# Patient Record
Sex: FEMALE | Race: WHITE | NOT HISPANIC OR LATINO | Employment: OTHER | URBAN - METROPOLITAN AREA
[De-identification: names, ages, dates, MRNs, and addresses within clinical notes are randomized per-mention and may not be internally consistent; named-entity substitution may affect disease eponyms.]

---

## 2017-01-13 ENCOUNTER — COMMUNICATION - HEALTHEAST (OUTPATIENT)
Dept: TELEHEALTH | Facility: CLINIC | Age: 62
End: 2017-01-13

## 2017-01-13 ENCOUNTER — OFFICE VISIT - HEALTHEAST (OUTPATIENT)
Dept: FAMILY MEDICINE | Facility: CLINIC | Age: 62
End: 2017-01-13

## 2017-01-13 DIAGNOSIS — E66.9 OBESITY (BMI 30-39.9): ICD-10-CM

## 2017-01-13 DIAGNOSIS — G47.33 OBSTRUCTIVE SLEEP APNEA (ADULT) (PEDIATRIC): ICD-10-CM

## 2017-01-13 ASSESSMENT — MIFFLIN-ST. JEOR: SCORE: 1313.57

## 2017-01-23 ENCOUNTER — RECORDS - HEALTHEAST (OUTPATIENT)
Dept: GENERAL RADIOLOGY | Facility: CLINIC | Age: 62
End: 2017-01-23

## 2017-01-23 ENCOUNTER — OFFICE VISIT - HEALTHEAST (OUTPATIENT)
Dept: FAMILY MEDICINE | Facility: CLINIC | Age: 62
End: 2017-01-23

## 2017-01-23 DIAGNOSIS — M25.519 SHOULDER PAIN: ICD-10-CM

## 2017-01-23 DIAGNOSIS — M79.18 MYOFASCIAL MUSCLE PAIN: ICD-10-CM

## 2017-01-23 DIAGNOSIS — Z12.11 SCREEN FOR COLON CANCER: ICD-10-CM

## 2017-01-23 DIAGNOSIS — R31.9 HEMATURIA: ICD-10-CM

## 2017-01-23 DIAGNOSIS — M25.519 PAIN IN UNSPECIFIED SHOULDER: ICD-10-CM

## 2017-01-23 DIAGNOSIS — M70.71 BURSITIS OF HIP, RIGHT: ICD-10-CM

## 2017-01-23 ASSESSMENT — MIFFLIN-ST. JEOR: SCORE: 1333.07

## 2017-01-27 ENCOUNTER — COMMUNICATION - HEALTHEAST (OUTPATIENT)
Dept: FAMILY MEDICINE | Facility: CLINIC | Age: 62
End: 2017-01-27

## 2017-01-27 ENCOUNTER — OFFICE VISIT - HEALTHEAST (OUTPATIENT)
Dept: PHYSICAL THERAPY | Facility: REHABILITATION | Age: 62
End: 2017-01-27

## 2017-01-27 DIAGNOSIS — M25.611 DECREASED ROM OF RIGHT SHOULDER: ICD-10-CM

## 2017-01-27 DIAGNOSIS — R29.3 ABNORMAL POSTURE: ICD-10-CM

## 2017-01-27 DIAGNOSIS — M62.81 MUSCLE WEAKNESS (GENERALIZED): ICD-10-CM

## 2017-01-27 DIAGNOSIS — M25.511 RIGHT SHOULDER PAIN: ICD-10-CM

## 2017-01-27 DIAGNOSIS — M25.511 ACUTE PAIN OF RIGHT SHOULDER: ICD-10-CM

## 2017-02-01 ENCOUNTER — OFFICE VISIT - HEALTHEAST (OUTPATIENT)
Dept: PHYSICAL THERAPY | Facility: REHABILITATION | Age: 62
End: 2017-02-01

## 2017-02-01 DIAGNOSIS — M54.9 CHRONIC BACK PAIN: ICD-10-CM

## 2017-02-01 DIAGNOSIS — G89.29 CHRONIC BACK PAIN: ICD-10-CM

## 2017-02-01 DIAGNOSIS — M62.81 MUSCLE WEAKNESS (GENERALIZED): ICD-10-CM

## 2017-02-01 DIAGNOSIS — M25.611 DECREASED ROM OF RIGHT SHOULDER: ICD-10-CM

## 2017-02-01 DIAGNOSIS — E66.9 OBESITY (BMI 30-39.9): ICD-10-CM

## 2017-02-01 DIAGNOSIS — M25.511 RIGHT SHOULDER PAIN: ICD-10-CM

## 2017-02-01 DIAGNOSIS — R29.3 ABNORMAL POSTURE: ICD-10-CM

## 2017-02-01 DIAGNOSIS — R26.9 ABNORMALITY OF GAIT: ICD-10-CM

## 2017-02-01 DIAGNOSIS — M25.511 ACUTE PAIN OF RIGHT SHOULDER: ICD-10-CM

## 2017-02-01 DIAGNOSIS — M70.71 HIP BURSITIS, RIGHT: ICD-10-CM

## 2017-02-06 ENCOUNTER — COMMUNICATION - HEALTHEAST (OUTPATIENT)
Dept: SCHEDULING | Facility: CLINIC | Age: 62
End: 2017-02-06

## 2017-02-08 ENCOUNTER — OFFICE VISIT - HEALTHEAST (OUTPATIENT)
Dept: FAMILY MEDICINE | Facility: CLINIC | Age: 62
End: 2017-02-08

## 2017-02-08 DIAGNOSIS — J06.9 URI (UPPER RESPIRATORY INFECTION): ICD-10-CM

## 2017-02-08 DIAGNOSIS — R53.83 FATIGUE: ICD-10-CM

## 2017-02-08 DIAGNOSIS — R00.2 PALPITATIONS: ICD-10-CM

## 2017-02-08 DIAGNOSIS — R42 LIGHTHEADEDNESS: ICD-10-CM

## 2017-02-08 LAB
ATRIAL RATE - MUSE: 69 BPM
DIASTOLIC BLOOD PRESSURE - MUSE: NORMAL MMHG
INTERPRETATION ECG - MUSE: NORMAL
P AXIS - MUSE: 26 DEGREES
PR INTERVAL - MUSE: 170 MS
QRS DURATION - MUSE: 92 MS
QT - MUSE: 356 MS
QTC - MUSE: 381 MS
R AXIS - MUSE: 42 DEGREES
SYSTOLIC BLOOD PRESSURE - MUSE: NORMAL MMHG
T AXIS - MUSE: 34 DEGREES
VENTRICULAR RATE- MUSE: 69 BPM

## 2017-02-09 ENCOUNTER — COMMUNICATION - HEALTHEAST (OUTPATIENT)
Dept: FAMILY MEDICINE | Facility: CLINIC | Age: 62
End: 2017-02-09

## 2017-02-17 ENCOUNTER — COMMUNICATION - HEALTHEAST (OUTPATIENT)
Dept: SCHEDULING | Facility: CLINIC | Age: 62
End: 2017-02-17

## 2017-02-20 ENCOUNTER — OFFICE VISIT - HEALTHEAST (OUTPATIENT)
Dept: FAMILY MEDICINE | Facility: CLINIC | Age: 62
End: 2017-02-20

## 2017-02-20 DIAGNOSIS — R42 DIZZINESS: ICD-10-CM

## 2017-02-20 DIAGNOSIS — L98.9 SKIN LESIONS: ICD-10-CM

## 2017-02-20 DIAGNOSIS — R01.1 HEART MURMUR: ICD-10-CM

## 2017-02-20 ASSESSMENT — MIFFLIN-ST. JEOR: SCORE: 1337.61

## 2017-02-21 ENCOUNTER — OFFICE VISIT - HEALTHEAST (OUTPATIENT)
Dept: PHYSICAL THERAPY | Facility: REHABILITATION | Age: 62
End: 2017-02-21

## 2017-02-21 ENCOUNTER — OFFICE VISIT - HEALTHEAST (OUTPATIENT)
Dept: FAMILY MEDICINE | Facility: CLINIC | Age: 62
End: 2017-02-21

## 2017-02-21 DIAGNOSIS — G47.33 OBSTRUCTIVE SLEEP APNEA (ADULT) (PEDIATRIC): ICD-10-CM

## 2017-02-21 DIAGNOSIS — M25.611 DECREASED ROM OF RIGHT SHOULDER: ICD-10-CM

## 2017-02-21 DIAGNOSIS — E66.9 OBESITY (BMI 30-39.9): ICD-10-CM

## 2017-02-21 DIAGNOSIS — M25.511 RIGHT SHOULDER PAIN: ICD-10-CM

## 2017-02-21 DIAGNOSIS — G89.29 CHRONIC BACK PAIN: ICD-10-CM

## 2017-02-21 DIAGNOSIS — M54.9 CHRONIC BACK PAIN: ICD-10-CM

## 2017-02-21 DIAGNOSIS — R26.9 ABNORMALITY OF GAIT: ICD-10-CM

## 2017-02-21 DIAGNOSIS — M62.81 MUSCLE WEAKNESS (GENERALIZED): ICD-10-CM

## 2017-02-21 DIAGNOSIS — R29.3 ABNORMAL POSTURE: ICD-10-CM

## 2017-02-21 DIAGNOSIS — M25.511 ACUTE PAIN OF RIGHT SHOULDER: ICD-10-CM

## 2017-02-21 DIAGNOSIS — M70.71 HIP BURSITIS, RIGHT: ICD-10-CM

## 2017-02-21 ASSESSMENT — MIFFLIN-ST. JEOR: SCORE: 1330.35

## 2017-02-22 ENCOUNTER — COMMUNICATION - HEALTHEAST (OUTPATIENT)
Dept: FAMILY MEDICINE | Facility: CLINIC | Age: 62
End: 2017-02-22

## 2017-02-24 ENCOUNTER — OFFICE VISIT - HEALTHEAST (OUTPATIENT)
Dept: PHYSICAL THERAPY | Facility: REHABILITATION | Age: 62
End: 2017-02-24

## 2017-02-24 DIAGNOSIS — M25.511 ACUTE PAIN OF RIGHT SHOULDER: ICD-10-CM

## 2017-02-24 DIAGNOSIS — R29.3 ABNORMAL POSTURE: ICD-10-CM

## 2017-02-24 DIAGNOSIS — M62.81 MUSCLE WEAKNESS (GENERALIZED): ICD-10-CM

## 2017-02-24 DIAGNOSIS — M25.611 DECREASED ROM OF RIGHT SHOULDER: ICD-10-CM

## 2017-03-01 ENCOUNTER — RECORDS - HEALTHEAST (OUTPATIENT)
Dept: ADMINISTRATIVE | Facility: OTHER | Age: 62
End: 2017-03-01

## 2017-03-02 ENCOUNTER — RECORDS - HEALTHEAST (OUTPATIENT)
Dept: ADMINISTRATIVE | Facility: OTHER | Age: 62
End: 2017-03-02

## 2017-03-03 ENCOUNTER — HOSPITAL ENCOUNTER (OUTPATIENT)
Dept: CARDIOLOGY | Facility: CLINIC | Age: 62
Discharge: HOME | End: 2017-03-03
Attending: FAMILY MEDICINE

## 2017-03-03 ENCOUNTER — OFFICE VISIT - HEALTHEAST (OUTPATIENT)
Dept: PHYSICAL THERAPY | Facility: REHABILITATION | Age: 62
End: 2017-03-03

## 2017-03-03 DIAGNOSIS — R29.3 ABNORMAL POSTURE: ICD-10-CM

## 2017-03-03 DIAGNOSIS — R01.1 HEART MURMUR: ICD-10-CM

## 2017-03-03 DIAGNOSIS — M25.611 DECREASED ROM OF RIGHT SHOULDER: ICD-10-CM

## 2017-03-03 DIAGNOSIS — M62.81 MUSCLE WEAKNESS (GENERALIZED): ICD-10-CM

## 2017-03-03 DIAGNOSIS — M25.511 ACUTE PAIN OF RIGHT SHOULDER: ICD-10-CM

## 2017-03-03 LAB
AORTIC ROOT: 3.6 CM
AORTIC VALVE MEAN VELOCITY: 132 CM/S
AR DECEL SLOPE: 3030 MM/S2
AR PEAK VELOCITY: 372 CM/S
ASCENDING AORTA: 3.3 CM
AV DIMENSIONLESS INDEX VTI: 0.9
AV MEAN GRADIENT: 8 MMHG
AV PEAK GRADIENT: 17.6 MMHG
AV REGURGITANT PEAK GRADIENT: 55.4 MMHG
AV REGURGITATION PRESSURE HALF TIME: 359 MS
AV VALVE AREA: 2.1 CM2
AV VELOCITY RATIO: 0.9
DOP CALC AO PEAK VEL: 210 CM/S
DOP CALC AO VTI: 40.2 CM
DOP CALC LVOT AREA: 2.27 CM2
DOP CALC LVOT DIAMETER: 1.7 CM
DOP CALC LVOT PEAK VEL: 180 CM/S
DOP CALC LVOT STROKE VOLUME: 84.4 CM3
DOP CALC MV VTI: 46.3 CM
DOP CALC RVOT PEAK VEL: 126 CM/S
DOP CALC RVOT VTI: 23.9 CM
DOP CALCLVOT PEAK VEL VTI: 37.2 CM
ECHO EJECTION FRACTION ESTIMATED: 65 %
FRACTIONAL SHORTENING: 37.1 % (ref 28–44)
INTERVENTRICULAR SEPTUM IN END DIASTOLE: 1.7 CM (ref 0.6–0.9)
IVS/PW RATIO: 1.4
LA AREA 1: 19.1 CM2
LA AREA 2: 19.7 CM2
LEFT ATRIUM LENGTH: 5.33 CM
LEFT ATRIUM SIZE: 4.4 CM
LEFT ATRIUM TO AORTIC ROOT RATIO: 1.22 NO UNITS
LEFT ATRIUM VOLUME: 60 CM3
LEFT VENTRICLE DIASTOLIC VOLUME: 67 CM3 (ref 46–106)
LEFT VENTRICULAR INTERNAL DIMENSION IN DIASTOLE: 3.5 CM (ref 3.8–5.2)
LEFT VENTRICULAR INTERNAL DIMENSION IN SYSTOLE: 2.2 CM (ref 2.2–3.5)
LEFT VENTRICULAR MASS: 183 G
LEFT VENTRICULAR OUTFLOW TRACT MEAN GRADIENT: 7 MMHG
LEFT VENTRICULAR OUTFLOW TRACT MEAN VELOCITY: 119 CM/S
LEFT VENTRICULAR OUTFLOW TRACT PEAK GRADIENT: 13 MMHG
LEFT VENTRICULAR POSTERIOR WALL IN END DIASTOLE: 1.2 CM (ref 0.6–0.9)
MITRAL VALVE E/A RATIO: 0.9
MITRAL VALVE MEAN INFLOW VELOCITY: 85 CM/S
MITRAL VALVE PEAK VELOCITY: 132 CM/S
MR PEAK GRADIENT: 95.3 MMHG
MV AREA VTI: 1.82 CM2
MV AVERAGE E/E' RATIO: 16.4 CM/S
MV DECELERATION TIME: 254 MS
MV E'TISSUE VEL-LAT: 7.6 CM/S
MV E'TISSUE VEL-MED: 6.43 CM/S
MV LATERAL E/E' RATIO: 15.1
MV MEAN GRADIENT: 3 MMHG
MV MEDIAL E/E' RATIO: 17.9
MV PEAK A VELOCITY: 124 CM/S
MV PEAK E VELOCITY: 115 CM/S
MV PEAK GRADIENT: 7 MMHG
MV VALVE AREA BY CONTINUITY EQUATION: 1.8 CM2
PISA MR PEAK VEL: 488 CM/S
PV MEAN GRADIENT: 4 MMHG
PV MEAN VELOCITY: 93.7 CM/S
PV PEAK GRADIENT: 7.7 MMHG
PV VELOCITY TIME INTERVAL: 30 CM
PV VMAX: 139 CM/S
RIGHT VENTRICULAR INTERNAL DIMENSION IN DYSTOLE: 3.5 CM
RIGHT VENTRICULAR OUTFLOW PEAK GRADIENT: 6 MMHG
RIGHT VENTRICULAR OUTFLOW TRACT MEAN GRADIENT: 3 MMHG
RVOT MV: 81.8 CM/S
TRICUSPID REGURGITATION PEAK PRESSURE GRADIENT: 26.4 MMHG
TRICUSPID VALVE ANULAR PLANE SYSTOLIC EXCURSION: 2.9 CM
TRICUSPID VALVE PEAK REGURGITANT VELOCITY: 257 CM/S

## 2017-03-07 ENCOUNTER — COMMUNICATION - HEALTHEAST (OUTPATIENT)
Dept: FAMILY MEDICINE | Facility: CLINIC | Age: 62
End: 2017-03-07

## 2017-03-07 DIAGNOSIS — I35.0 AORTIC STENOSIS: ICD-10-CM

## 2017-03-08 ENCOUNTER — OFFICE VISIT - HEALTHEAST (OUTPATIENT)
Dept: PHYSICAL THERAPY | Facility: REHABILITATION | Age: 62
End: 2017-03-08

## 2017-03-08 DIAGNOSIS — R29.3 ABNORMAL POSTURE: ICD-10-CM

## 2017-03-08 DIAGNOSIS — M25.511 ACUTE PAIN OF RIGHT SHOULDER: ICD-10-CM

## 2017-03-08 DIAGNOSIS — M25.611 DECREASED ROM OF RIGHT SHOULDER: ICD-10-CM

## 2017-03-08 DIAGNOSIS — E66.9 OBESITY (BMI 30-39.9): ICD-10-CM

## 2017-03-08 DIAGNOSIS — G89.29 CHRONIC BACK PAIN: ICD-10-CM

## 2017-03-08 DIAGNOSIS — M54.9 CHRONIC BACK PAIN: ICD-10-CM

## 2017-03-08 DIAGNOSIS — M70.71 HIP BURSITIS, RIGHT: ICD-10-CM

## 2017-03-08 DIAGNOSIS — M62.81 MUSCLE WEAKNESS (GENERALIZED): ICD-10-CM

## 2017-03-08 DIAGNOSIS — R26.9 ABNORMALITY OF GAIT: ICD-10-CM

## 2017-03-08 DIAGNOSIS — M25.511 RIGHT SHOULDER PAIN: ICD-10-CM

## 2017-03-15 ENCOUNTER — OFFICE VISIT - HEALTHEAST (OUTPATIENT)
Dept: PHYSICAL THERAPY | Facility: REHABILITATION | Age: 62
End: 2017-03-15

## 2017-03-15 DIAGNOSIS — M25.511 RIGHT SHOULDER PAIN: ICD-10-CM

## 2017-03-15 DIAGNOSIS — R29.3 ABNORMAL POSTURE: ICD-10-CM

## 2017-03-15 DIAGNOSIS — M62.81 MUSCLE WEAKNESS (GENERALIZED): ICD-10-CM

## 2017-03-15 DIAGNOSIS — M25.511 ACUTE PAIN OF RIGHT SHOULDER: ICD-10-CM

## 2017-03-15 DIAGNOSIS — M25.611 DECREASED ROM OF RIGHT SHOULDER: ICD-10-CM

## 2017-03-15 DIAGNOSIS — E66.9 OBESITY (BMI 30-39.9): ICD-10-CM

## 2017-03-20 ENCOUNTER — RECORDS - HEALTHEAST (OUTPATIENT)
Dept: ADMINISTRATIVE | Facility: OTHER | Age: 62
End: 2017-03-20

## 2017-03-21 ENCOUNTER — COMMUNICATION - HEALTHEAST (OUTPATIENT)
Dept: SCHEDULING | Facility: CLINIC | Age: 62
End: 2017-03-21

## 2017-03-22 ENCOUNTER — OFFICE VISIT - HEALTHEAST (OUTPATIENT)
Dept: CARDIOLOGY | Facility: CLINIC | Age: 62
End: 2017-03-22

## 2017-03-22 DIAGNOSIS — R42 DIZZY SPELLS: ICD-10-CM

## 2017-03-22 DIAGNOSIS — I35.0 AORTIC STENOSIS: ICD-10-CM

## 2017-03-22 DIAGNOSIS — E78.2 MIXED HYPERLIPIDEMIA: ICD-10-CM

## 2017-03-22 DIAGNOSIS — G47.33 OBSTRUCTIVE SLEEP APNEA (ADULT) (PEDIATRIC): ICD-10-CM

## 2017-03-22 DIAGNOSIS — I35.2 NONRHEUMATIC AORTIC (VALVE) STENOSIS WITH INSUFFICIENCY: ICD-10-CM

## 2017-03-22 ASSESSMENT — MIFFLIN-ST. JEOR: SCORE: 1324

## 2017-03-28 ENCOUNTER — COMMUNICATION - HEALTHEAST (OUTPATIENT)
Dept: FAMILY MEDICINE | Facility: CLINIC | Age: 62
End: 2017-03-28

## 2017-03-28 ENCOUNTER — AMBULATORY - HEALTHEAST (OUTPATIENT)
Dept: FAMILY MEDICINE | Facility: CLINIC | Age: 62
End: 2017-03-28

## 2017-03-28 ENCOUNTER — HOSPITAL ENCOUNTER (OUTPATIENT)
Dept: CARDIOLOGY | Facility: CLINIC | Age: 62
Discharge: HOME | End: 2017-03-28
Attending: INTERNAL MEDICINE

## 2017-03-28 DIAGNOSIS — R42 DIZZINESS: ICD-10-CM

## 2017-03-28 DIAGNOSIS — R42 DIZZY SPELLS: ICD-10-CM

## 2017-03-29 ENCOUNTER — OFFICE VISIT - HEALTHEAST (OUTPATIENT)
Dept: PHYSICAL THERAPY | Facility: REHABILITATION | Age: 62
End: 2017-03-29

## 2017-03-29 DIAGNOSIS — M62.81 MUSCLE WEAKNESS (GENERALIZED): ICD-10-CM

## 2017-03-29 DIAGNOSIS — G89.29 CHRONIC BACK PAIN: ICD-10-CM

## 2017-03-29 DIAGNOSIS — R29.3 ABNORMAL POSTURE: ICD-10-CM

## 2017-03-29 DIAGNOSIS — M54.9 CHRONIC BACK PAIN: ICD-10-CM

## 2017-03-29 DIAGNOSIS — M25.611 DECREASED ROM OF RIGHT SHOULDER: ICD-10-CM

## 2017-03-29 DIAGNOSIS — E66.9 OBESITY (BMI 30-39.9): ICD-10-CM

## 2017-03-29 DIAGNOSIS — R26.9 ABNORMALITY OF GAIT: ICD-10-CM

## 2017-03-29 DIAGNOSIS — M25.511 RIGHT SHOULDER PAIN: ICD-10-CM

## 2017-03-29 DIAGNOSIS — M70.71 HIP BURSITIS, RIGHT: ICD-10-CM

## 2017-03-29 DIAGNOSIS — M25.511 ACUTE PAIN OF RIGHT SHOULDER: ICD-10-CM

## 2017-04-05 ENCOUNTER — COMMUNICATION - HEALTHEAST (OUTPATIENT)
Dept: FAMILY MEDICINE | Facility: CLINIC | Age: 62
End: 2017-04-05

## 2017-04-13 ENCOUNTER — HOSPITAL ENCOUNTER (OUTPATIENT)
Dept: MRI IMAGING | Facility: HOSPITAL | Age: 62
Discharge: HOME | End: 2017-04-13
Attending: INTERNAL MEDICINE

## 2017-04-13 DIAGNOSIS — I35.0 AORTIC STENOSIS: ICD-10-CM

## 2017-04-13 DIAGNOSIS — I35.1 AORTIC VALVE INSUFFICIENCY, UNSPECIFIED ETIOLOGY: ICD-10-CM

## 2017-04-13 DIAGNOSIS — R42 DIZZY SPELLS: ICD-10-CM

## 2017-04-13 DIAGNOSIS — Q25.29 OTHER ATRESIA OF AORTA: ICD-10-CM

## 2017-04-18 ENCOUNTER — OFFICE VISIT - HEALTHEAST (OUTPATIENT)
Dept: PHYSICAL THERAPY | Facility: REHABILITATION | Age: 62
End: 2017-04-18

## 2017-04-18 DIAGNOSIS — M25.611 DECREASED ROM OF RIGHT SHOULDER: ICD-10-CM

## 2017-04-18 DIAGNOSIS — M62.81 MUSCLE WEAKNESS (GENERALIZED): ICD-10-CM

## 2017-04-18 DIAGNOSIS — R29.3 ABNORMAL POSTURE: ICD-10-CM

## 2017-04-18 DIAGNOSIS — M25.511 ACUTE PAIN OF RIGHT SHOULDER: ICD-10-CM

## 2017-04-25 ENCOUNTER — AMBULATORY - HEALTHEAST (OUTPATIENT)
Dept: CARDIOLOGY | Facility: CLINIC | Age: 62
End: 2017-04-25

## 2017-04-25 DIAGNOSIS — R01.1 HEART MURMUR: ICD-10-CM

## 2017-05-03 ENCOUNTER — RECORDS - HEALTHEAST (OUTPATIENT)
Dept: ADMINISTRATIVE | Facility: OTHER | Age: 62
End: 2017-05-03

## 2017-05-20 ENCOUNTER — COMMUNICATION - HEALTHEAST (OUTPATIENT)
Dept: FAMILY MEDICINE | Facility: CLINIC | Age: 62
End: 2017-05-20

## 2017-05-20 DIAGNOSIS — D64.9 ANEMIA, UNSPECIFIED: ICD-10-CM

## 2017-05-30 ENCOUNTER — RECORDS - HEALTHEAST (OUTPATIENT)
Dept: ADMINISTRATIVE | Facility: OTHER | Age: 62
End: 2017-05-30

## 2017-06-17 ENCOUNTER — HEALTH MAINTENANCE LETTER (OUTPATIENT)
Age: 62
End: 2017-06-17

## 2017-07-28 ENCOUNTER — RECORDS - HEALTHEAST (OUTPATIENT)
Dept: ADMINISTRATIVE | Facility: OTHER | Age: 62
End: 2017-07-28

## 2017-09-01 ENCOUNTER — COMMUNICATION - HEALTHEAST (OUTPATIENT)
Dept: SCHEDULING | Facility: CLINIC | Age: 62
End: 2017-09-01

## 2017-09-12 ENCOUNTER — AMBULATORY - HEALTHEAST (OUTPATIENT)
Dept: LAB | Facility: CLINIC | Age: 62
End: 2017-09-12

## 2017-09-12 ENCOUNTER — COMMUNICATION - HEALTHEAST (OUTPATIENT)
Dept: SCHEDULING | Facility: CLINIC | Age: 62
End: 2017-09-12

## 2017-09-12 DIAGNOSIS — Z79.899 ENCOUNTER FOR LONG-TERM (CURRENT) USE OF OTHER MEDICATIONS: ICD-10-CM

## 2017-11-06 ENCOUNTER — COMMUNICATION - HEALTHEAST (OUTPATIENT)
Dept: FAMILY MEDICINE | Facility: CLINIC | Age: 62
End: 2017-11-06

## 2017-11-06 DIAGNOSIS — E03.9 HYPOTHYROIDISM: ICD-10-CM

## 2017-11-27 ENCOUNTER — COMMUNICATION - HEALTHEAST (OUTPATIENT)
Dept: FAMILY MEDICINE | Facility: CLINIC | Age: 62
End: 2017-11-27

## 2017-11-27 DIAGNOSIS — D64.9 ANEMIA: ICD-10-CM

## 2018-01-15 ENCOUNTER — OFFICE VISIT - HEALTHEAST (OUTPATIENT)
Dept: FAMILY MEDICINE | Facility: CLINIC | Age: 63
End: 2018-01-15

## 2018-01-15 ENCOUNTER — AMBULATORY - HEALTHEAST (OUTPATIENT)
Dept: FAMILY MEDICINE | Facility: CLINIC | Age: 63
End: 2018-01-15

## 2018-01-15 DIAGNOSIS — E03.9 HYPOTHYROID: ICD-10-CM

## 2018-01-15 DIAGNOSIS — Z00.00 ROUTINE GENERAL MEDICAL EXAMINATION AT A HEALTH CARE FACILITY: ICD-10-CM

## 2018-01-15 DIAGNOSIS — Z12.31 VISIT FOR SCREENING MAMMOGRAM: ICD-10-CM

## 2018-01-15 DIAGNOSIS — R20.0 NUMBNESS: ICD-10-CM

## 2018-01-15 DIAGNOSIS — Z12.11 SCREEN FOR COLON CANCER: ICD-10-CM

## 2018-01-15 DIAGNOSIS — E55.9 VITAMIN D DEFICIENCY: ICD-10-CM

## 2018-01-15 LAB
FOLATE SERPL-MCNC: >20 NG/ML
HBA1C MFR BLD: 5.9 % (ref 3.5–6)
T4 FREE SERPL-MCNC: 1 NG/DL (ref 0.7–1.8)
TSH SERPL DL<=0.005 MIU/L-ACNC: 5.38 UIU/ML (ref 0.3–5)
VIT B12 SERPL-MCNC: 856 PG/ML (ref 213–816)

## 2018-01-15 ASSESSMENT — MIFFLIN-ST. JEOR: SCORE: 1333.64

## 2018-01-16 LAB — 25(OH)D3 SERPL-MCNC: 35.4 NG/ML (ref 30–80)

## 2018-01-23 ENCOUNTER — COMMUNICATION - HEALTHEAST (OUTPATIENT)
Dept: FAMILY MEDICINE | Facility: CLINIC | Age: 63
End: 2018-01-23

## 2018-01-23 ENCOUNTER — AMBULATORY - HEALTHEAST (OUTPATIENT)
Dept: FAMILY MEDICINE | Facility: CLINIC | Age: 63
End: 2018-01-23

## 2018-01-23 DIAGNOSIS — E03.9 HYPOTHYROID: ICD-10-CM

## 2018-01-24 ENCOUNTER — COMMUNICATION - HEALTHEAST (OUTPATIENT)
Dept: FAMILY MEDICINE | Facility: CLINIC | Age: 63
End: 2018-01-24

## 2018-01-31 ENCOUNTER — COMMUNICATION - HEALTHEAST (OUTPATIENT)
Dept: FAMILY MEDICINE | Facility: CLINIC | Age: 63
End: 2018-01-31

## 2018-01-31 DIAGNOSIS — E78.2 MIXED HYPERLIPIDEMIA: ICD-10-CM

## 2018-03-19 ENCOUNTER — COMMUNICATION - HEALTHEAST (OUTPATIENT)
Dept: SCHEDULING | Facility: CLINIC | Age: 63
End: 2018-03-19

## 2018-04-30 ENCOUNTER — COMMUNICATION - HEALTHEAST (OUTPATIENT)
Dept: SCHEDULING | Facility: CLINIC | Age: 63
End: 2018-04-30

## 2018-04-30 DIAGNOSIS — D64.9 ANEMIA: ICD-10-CM

## 2018-05-23 ENCOUNTER — RECORDS - HEALTHEAST (OUTPATIENT)
Dept: GENERAL RADIOLOGY | Facility: CLINIC | Age: 63
End: 2018-05-23

## 2018-05-23 ENCOUNTER — OFFICE VISIT - HEALTHEAST (OUTPATIENT)
Dept: FAMILY MEDICINE | Facility: CLINIC | Age: 63
End: 2018-05-23

## 2018-05-23 ENCOUNTER — COMMUNICATION - HEALTHEAST (OUTPATIENT)
Dept: SCHEDULING | Facility: CLINIC | Age: 63
End: 2018-05-23

## 2018-05-23 DIAGNOSIS — S93.409A ANKLE SPRAIN: ICD-10-CM

## 2018-05-23 DIAGNOSIS — S93.409A SPRAIN OF UNSPECIFIED LIGAMENT OF UNSPECIFIED ANKLE, INITIAL ENCOUNTER: ICD-10-CM

## 2018-05-23 DIAGNOSIS — S82.839A AVULSION FRACTURE OF DISTAL FIBULA: ICD-10-CM

## 2018-05-31 ENCOUNTER — OFFICE VISIT - HEALTHEAST (OUTPATIENT)
Dept: FAMILY MEDICINE | Facility: CLINIC | Age: 63
End: 2018-05-31

## 2018-05-31 DIAGNOSIS — S82.839A AVULSION FRACTURE OF DISTAL FIBULA: ICD-10-CM

## 2018-07-06 ENCOUNTER — RECORDS - HEALTHEAST (OUTPATIENT)
Dept: GENERAL RADIOLOGY | Facility: CLINIC | Age: 63
End: 2018-07-06

## 2018-07-06 ENCOUNTER — OFFICE VISIT - HEALTHEAST (OUTPATIENT)
Dept: FAMILY MEDICINE | Facility: CLINIC | Age: 63
End: 2018-07-06

## 2018-07-06 DIAGNOSIS — S82.839A AVULSION FRACTURE OF DISTAL FIBULA: ICD-10-CM

## 2018-07-06 DIAGNOSIS — R42 LIGHTHEADEDNESS: ICD-10-CM

## 2018-07-06 DIAGNOSIS — S82.839A OTHER FRACTURE OF UPPER AND LOWER END OF UNSPECIFIED FIBULA, INITIAL ENCOUNTER FOR CLOSED FRACTURE: ICD-10-CM

## 2018-07-09 ENCOUNTER — RECORDS - HEALTHEAST (OUTPATIENT)
Dept: ADMINISTRATIVE | Facility: OTHER | Age: 63
End: 2018-07-09

## 2018-08-27 ENCOUNTER — RECORDS - HEALTHEAST (OUTPATIENT)
Dept: ADMINISTRATIVE | Facility: OTHER | Age: 63
End: 2018-08-27

## 2018-09-18 ENCOUNTER — AMBULATORY - HEALTHEAST (OUTPATIENT)
Dept: LAB | Facility: CLINIC | Age: 63
End: 2018-09-18

## 2018-09-18 DIAGNOSIS — Z79.899 NEED FOR PROPHYLACTIC CHEMOTHERAPY: ICD-10-CM

## 2018-09-18 LAB
ALP SERPL-CCNC: 83 U/L (ref 45–120)
ALT SERPL W P-5'-P-CCNC: 18 U/L (ref 0–45)
AST SERPL W P-5'-P-CCNC: 17 U/L (ref 0–40)
BASOPHILS # BLD AUTO: 0 THOU/UL (ref 0–0.2)
BASOPHILS NFR BLD AUTO: 1 % (ref 0–2)
BILIRUB DIRECT SERPL-MCNC: 0.1 MG/DL
BILIRUB INDIRECT SERPL-MCNC: 0.2 MG/DL (ref 0–1)
BILIRUB SERPL-MCNC: 0.3 MG/DL (ref 0–1)
EOSINOPHIL # BLD AUTO: 0.2 THOU/UL (ref 0–0.4)
EOSINOPHIL NFR BLD AUTO: 3 % (ref 0–6)
ERYTHROCYTE [DISTWIDTH] IN BLOOD BY AUTOMATED COUNT: 12.7 % (ref 11–14.5)
HCT VFR BLD AUTO: 34.4 % (ref 35–47)
HGB BLD-MCNC: 11.6 G/DL (ref 12–16)
LYMPHOCYTES # BLD AUTO: 2 THOU/UL (ref 0.8–4.4)
LYMPHOCYTES NFR BLD AUTO: 29 % (ref 20–40)
MCH RBC QN AUTO: 28 PG (ref 27–34)
MCHC RBC AUTO-ENTMCNC: 33.7 G/DL (ref 32–36)
MCV RBC AUTO: 83 FL (ref 80–100)
MONOCYTES # BLD AUTO: 0.6 THOU/UL (ref 0–0.9)
MONOCYTES NFR BLD AUTO: 9 % (ref 2–10)
NEUTROPHILS # BLD AUTO: 4.2 THOU/UL (ref 2–7.7)
NEUTROPHILS NFR BLD AUTO: 59 % (ref 50–70)
PLATELET # BLD AUTO: 284 THOU/UL (ref 140–440)
PMV BLD AUTO: 7.4 FL (ref 7–10)
RBC # BLD AUTO: 4.13 MILL/UL (ref 3.8–5.4)
VALPROATE SERPL-MCNC: 69.2 UG/ML (ref 50–150)
WBC: 7.1 THOU/UL (ref 4–11)

## 2018-12-04 ENCOUNTER — RECORDS - HEALTHEAST (OUTPATIENT)
Dept: ADMINISTRATIVE | Facility: OTHER | Age: 63
End: 2018-12-04

## 2019-02-24 ENCOUNTER — COMMUNICATION - HEALTHEAST (OUTPATIENT)
Dept: FAMILY MEDICINE | Facility: CLINIC | Age: 64
End: 2019-02-24

## 2019-02-24 DIAGNOSIS — E78.2 MIXED HYPERLIPIDEMIA: ICD-10-CM

## 2019-02-25 ENCOUNTER — RECORDS - HEALTHEAST (OUTPATIENT)
Dept: FAMILY MEDICINE | Facility: CLINIC | Age: 64
End: 2019-02-25

## 2019-03-13 ENCOUNTER — OFFICE VISIT - HEALTHEAST (OUTPATIENT)
Dept: FAMILY MEDICINE | Facility: CLINIC | Age: 64
End: 2019-03-13

## 2019-03-13 DIAGNOSIS — R73.03 PRE-DIABETES: ICD-10-CM

## 2019-03-13 DIAGNOSIS — E78.2 MIXED HYPERLIPIDEMIA: ICD-10-CM

## 2019-03-13 DIAGNOSIS — F31.89 OTHER BIPOLAR DISORDER (H): ICD-10-CM

## 2019-03-13 DIAGNOSIS — D64.9 ANEMIA, UNSPECIFIED TYPE: ICD-10-CM

## 2019-03-13 DIAGNOSIS — G47.33 OBSTRUCTIVE SLEEP APNEA (ADULT) (PEDIATRIC): ICD-10-CM

## 2019-03-13 DIAGNOSIS — Z12.31 VISIT FOR SCREENING MAMMOGRAM: ICD-10-CM

## 2019-03-13 DIAGNOSIS — E03.9 HYPOTHYROIDISM, UNSPECIFIED TYPE: ICD-10-CM

## 2019-03-13 DIAGNOSIS — G56.02 CARPAL TUNNEL SYNDROME OF LEFT WRIST: ICD-10-CM

## 2019-03-13 DIAGNOSIS — I35.2 NONRHEUMATIC AORTIC (VALVE) STENOSIS WITH INSUFFICIENCY: ICD-10-CM

## 2019-03-13 LAB
ALBUMIN SERPL-MCNC: 3.7 G/DL (ref 3.5–5)
ALP SERPL-CCNC: 90 U/L (ref 45–120)
ALT SERPL W P-5'-P-CCNC: 18 U/L (ref 0–45)
ANION GAP SERPL CALCULATED.3IONS-SCNC: 8 MMOL/L (ref 5–18)
AST SERPL W P-5'-P-CCNC: 14 U/L (ref 0–40)
BILIRUB SERPL-MCNC: 0.3 MG/DL (ref 0–1)
BUN SERPL-MCNC: 29 MG/DL (ref 8–22)
CALCIUM SERPL-MCNC: 9.8 MG/DL (ref 8.5–10.5)
CHLORIDE BLD-SCNC: 105 MMOL/L (ref 98–107)
CO2 SERPL-SCNC: 27 MMOL/L (ref 22–31)
CREAT SERPL-MCNC: 0.84 MG/DL (ref 0.6–1.1)
ERYTHROCYTE [DISTWIDTH] IN BLOOD BY AUTOMATED COUNT: 12.8 % (ref 11–14.5)
GFR SERPL CREATININE-BSD FRML MDRD: >60 ML/MIN/1.73M2
GLUCOSE BLD-MCNC: 88 MG/DL (ref 70–125)
HBA1C MFR BLD: 5.6 % (ref 3.5–6)
HCT VFR BLD AUTO: 38.3 % (ref 35–47)
HGB BLD-MCNC: 12.9 G/DL (ref 12–16)
MCH RBC QN AUTO: 28.3 PG (ref 27–34)
MCHC RBC AUTO-ENTMCNC: 33.7 G/DL (ref 32–36)
MCV RBC AUTO: 84 FL (ref 80–100)
PLATELET # BLD AUTO: 304 THOU/UL (ref 140–440)
PMV BLD AUTO: 7.8 FL (ref 7–10)
POTASSIUM BLD-SCNC: 4.6 MMOL/L (ref 3.5–5)
PROT SERPL-MCNC: 6.8 G/DL (ref 6–8)
RBC # BLD AUTO: 4.56 MILL/UL (ref 3.8–5.4)
SODIUM SERPL-SCNC: 140 MMOL/L (ref 136–145)
TSH SERPL DL<=0.005 MIU/L-ACNC: 0.66 UIU/ML (ref 0.3–5)
WBC: 6.4 THOU/UL (ref 4–11)

## 2019-03-13 ASSESSMENT — MIFFLIN-ST. JEOR: SCORE: 1337.04

## 2019-03-14 ENCOUNTER — COMMUNICATION - HEALTHEAST (OUTPATIENT)
Dept: FAMILY MEDICINE | Facility: CLINIC | Age: 64
End: 2019-03-14

## 2019-03-18 ENCOUNTER — HOSPITAL ENCOUNTER (OUTPATIENT)
Dept: MAMMOGRAPHY | Facility: CLINIC | Age: 64
Discharge: HOME | End: 2019-03-18
Attending: NURSE PRACTITIONER

## 2019-03-18 DIAGNOSIS — Z12.31 VISIT FOR SCREENING MAMMOGRAM: ICD-10-CM

## 2019-03-27 ENCOUNTER — HOSPITAL ENCOUNTER (OUTPATIENT)
Dept: CARDIOLOGY | Facility: CLINIC | Age: 64
Discharge: HOME | End: 2019-03-27
Attending: NURSE PRACTITIONER

## 2019-03-27 ENCOUNTER — COMMUNICATION - HEALTHEAST (OUTPATIENT)
Dept: FAMILY MEDICINE | Facility: CLINIC | Age: 64
End: 2019-03-27

## 2019-03-27 DIAGNOSIS — I35.2 NONRHEUMATIC AORTIC (VALVE) STENOSIS WITH INSUFFICIENCY: ICD-10-CM

## 2019-03-27 LAB
AORTIC ROOT: 3 CM
AORTIC VALVE MEAN VELOCITY: 138 CM/S
AR DECEL SLOPE: 1430 MM/S2
AR PEAK VELOCITY: 308 CM/S
AV DIMENSIONLESS INDEX VTI: 0.8
AV MEAN GRADIENT: 9 MMHG
AV PEAK GRADIENT: 16.8 MMHG
AV REGURGITANT PEAK GRADIENT: 37.9 MMHG
AV REGURGITATION PRESSURE HALF TIME: 633 MS
AV VALVE AREA: 1.9 CM2
AV VELOCITY RATIO: 0.8
BSA FOR ECHO PROCEDURE: 1.92 M2
CV BLOOD PRESSURE: ABNORMAL MMHG
CV ECHO HEIGHT: 61.3 IN
CV ECHO WEIGHT: 189 LBS
DOP CALC AO PEAK VEL: 205 CM/S
DOP CALC AO VTI: 38.8 CM
DOP CALC LVOT AREA: 2.27 CM2
DOP CALC LVOT DIAMETER: 1.7 CM
DOP CALC LVOT PEAK VEL: 168 CM/S
DOP CALC LVOT STROKE VOLUME: 74.6 CM3
DOP CALC MV VTI: 43 CM
DOP CALCLVOT PEAK VEL VTI: 32.9 CM
EJECTION FRACTION: 61 % (ref 55–75)
FRACTIONAL SHORTENING: 29.8 % (ref 28–44)
INTERVENTRICULAR SEPTUM IN END DIASTOLE: 0.9 CM (ref 0.6–0.9)
IVS/PW RATIO: 1.1
LA AREA 1: 23.1 CM2
LA AREA 2: 20.5 CM2
LEFT ATRIUM LENGTH: 5.82 CM
LEFT ATRIUM SIZE: 2.9 CM
LEFT ATRIUM TO AORTIC ROOT RATIO: 0.97 NO UNITS
LEFT ATRIUM VOLUME INDEX: 36 ML/M2
LEFT ATRIUM VOLUME: 69.2 ML
LEFT VENTRICLE CARDIAC INDEX: 3.1 L/MIN/M2
LEFT VENTRICLE CARDIAC OUTPUT: 6 L/MIN
LEFT VENTRICLE DIASTOLIC VOLUME INDEX: 72.9 CM3/M2 (ref 29–61)
LEFT VENTRICLE DIASTOLIC VOLUME: 140 CM3 (ref 46–106)
LEFT VENTRICLE HEART RATE: 81 BPM
LEFT VENTRICLE MASS INDEX: 68.9 G/M2
LEFT VENTRICLE SYSTOLIC VOLUME INDEX: 28.1 CM3/M2 (ref 8–24)
LEFT VENTRICLE SYSTOLIC VOLUME: 54 CM3 (ref 14–42)
LEFT VENTRICULAR INTERNAL DIMENSION IN DIASTOLE: 4.7 CM (ref 3.8–5.2)
LEFT VENTRICULAR INTERNAL DIMENSION IN SYSTOLE: 3.3 CM (ref 2.2–3.5)
LEFT VENTRICULAR MASS: 132.3 G
LEFT VENTRICULAR OUTFLOW TRACT MEAN GRADIENT: 6 MMHG
LEFT VENTRICULAR OUTFLOW TRACT MEAN VELOCITY: 116 CM/S
LEFT VENTRICULAR OUTFLOW TRACT PEAK GRADIENT: 11 MMHG
LEFT VENTRICULAR POSTERIOR WALL IN END DIASTOLE: 0.8 CM (ref 0.6–0.9)
LV STROKE VOLUME INDEX: 38.9 ML/M2
MITRAL REGURGITANT VELOCITY TIME INTEGRAL: 75.3 CM
MITRAL VALVE E/A RATIO: 0.8
MITRAL VALVE MEAN INFLOW VELOCITY: 105 CM/S
MITRAL VALVE PEAK VELOCITY: 151 CM/S
MR FLOW: 6 CM3
MR MEAN GRADIENT: 29 MMHG
MR MEAN VELOCITY: 210 CM/S
MR PEAK GRADIENT: 98 MMHG
MR PISA EROA: 0.1 CM2
MR PISA RADIUS: 0.4 CM
MR PISA VN NYQUIST: 38.5 CM/S
MV AREA VTI: 1.74 CM2
MV AVERAGE E/E' RATIO: 15.1 CM/S
MV DECELERATION TIME: 271 MS
MV E'TISSUE VEL-LAT: 9.07 CM/S
MV E'TISSUE VEL-MED: 7.6 CM/S
MV LATERAL E/E' RATIO: 13.9
MV MEAN GRADIENT: 5 MMHG
MV MEDIAL E/E' RATIO: 16.6
MV PEAK A VELOCITY: 149 CM/S
MV PEAK E VELOCITY: 126 CM/S
MV PEAK GRADIENT: 9.1 MMHG
MV REGURGITANT VOLUME: 5.9 CC
MV VALVE AREA BY CONTINUITY EQUATION: 1.7 CM2
NUC REST DIASTOLIC VOLUME INDEX: 3024 LBS
NUC REST SYSTOLIC VOLUME INDEX: 61.25 IN
PISA MR PEAK VEL: 495 CM/S
PR MAX PG: 4 MMHG
PR PEAK VELOCITY: 99.1 CM/S
TRICUSPID REGURGITATION PEAK PRESSURE GRADIENT: 9.9 MMHG
TRICUSPID VALVE ANULAR PLANE SYSTOLIC EXCURSION: 3 CM
TRICUSPID VALVE PEAK REGURGITANT VELOCITY: 157 CM/S

## 2019-03-27 ASSESSMENT — MIFFLIN-ST. JEOR: SCORE: 1333.64

## 2019-04-29 ENCOUNTER — COMMUNICATION - HEALTHEAST (OUTPATIENT)
Dept: SCHEDULING | Facility: CLINIC | Age: 64
End: 2019-04-29

## 2019-04-30 ENCOUNTER — RECORDS - HEALTHEAST (OUTPATIENT)
Dept: ADMINISTRATIVE | Facility: OTHER | Age: 64
End: 2019-04-30

## 2019-04-30 ENCOUNTER — OFFICE VISIT - HEALTHEAST (OUTPATIENT)
Dept: INTERNAL MEDICINE | Facility: CLINIC | Age: 64
End: 2019-04-30

## 2019-04-30 ENCOUNTER — COMMUNICATION - HEALTHEAST (OUTPATIENT)
Dept: SCHEDULING | Facility: CLINIC | Age: 64
End: 2019-04-30

## 2019-04-30 DIAGNOSIS — R29.898 WEAKNESS OF RIGHT ARM: ICD-10-CM

## 2019-04-30 DIAGNOSIS — F31.89 OTHER BIPOLAR DISORDER (H): ICD-10-CM

## 2019-04-30 DIAGNOSIS — F70 MILD INTELLECTUAL DISABILITIES: ICD-10-CM

## 2019-04-30 ASSESSMENT — MIFFLIN-ST. JEOR: SCORE: 1333.64

## 2019-05-01 ENCOUNTER — AMBULATORY - HEALTHEAST (OUTPATIENT)
Dept: INTERNAL MEDICINE | Facility: CLINIC | Age: 64
End: 2019-05-01

## 2019-05-01 ENCOUNTER — COMMUNICATION - HEALTHEAST (OUTPATIENT)
Dept: INTERNAL MEDICINE | Facility: CLINIC | Age: 64
End: 2019-05-01

## 2019-05-01 ENCOUNTER — COMMUNICATION - HEALTHEAST (OUTPATIENT)
Dept: FAMILY MEDICINE | Facility: CLINIC | Age: 64
End: 2019-05-01

## 2019-05-01 DIAGNOSIS — Z76.89 HEALTH CARE HOME: Primary | ICD-10-CM

## 2019-05-01 DIAGNOSIS — R29.898 WEAKNESS OF RIGHT ARM: ICD-10-CM

## 2019-05-02 ENCOUNTER — RECORDS - HEALTHEAST (OUTPATIENT)
Dept: ADMINISTRATIVE | Facility: OTHER | Age: 64
End: 2019-05-02

## 2019-05-07 ENCOUNTER — COMMUNICATION - HEALTHEAST (OUTPATIENT)
Dept: FAMILY MEDICINE | Facility: CLINIC | Age: 64
End: 2019-05-07

## 2019-05-14 ENCOUNTER — RECORDS - HEALTHEAST (OUTPATIENT)
Dept: ADMINISTRATIVE | Facility: OTHER | Age: 64
End: 2019-05-14

## 2019-08-29 ENCOUNTER — COMMUNICATION - HEALTHEAST (OUTPATIENT)
Dept: SCHEDULING | Facility: CLINIC | Age: 64
End: 2019-08-29

## 2019-08-29 ENCOUNTER — RECORDS - HEALTHEAST (OUTPATIENT)
Dept: SCHEDULING | Facility: CLINIC | Age: 64
End: 2019-08-29

## 2019-08-30 ENCOUNTER — COMMUNICATION - HEALTHEAST (OUTPATIENT)
Dept: FAMILY MEDICINE | Facility: CLINIC | Age: 64
End: 2019-08-30

## 2019-08-30 ENCOUNTER — RECORDS - HEALTHEAST (OUTPATIENT)
Dept: ADMINISTRATIVE | Facility: OTHER | Age: 64
End: 2019-08-30

## 2019-08-30 ENCOUNTER — OFFICE VISIT - HEALTHEAST (OUTPATIENT)
Dept: FAMILY MEDICINE | Facility: CLINIC | Age: 64
End: 2019-08-30

## 2019-08-30 DIAGNOSIS — F31.81 BIPOLAR 2 DISORDER (H): ICD-10-CM

## 2019-08-30 DIAGNOSIS — R20.2 NUMBNESS AND TINGLING OF RIGHT LOWER EXTREMITY: ICD-10-CM

## 2019-08-30 DIAGNOSIS — M79.5 FOREIGN BODY (FB) IN SOFT TISSUE: ICD-10-CM

## 2019-08-30 DIAGNOSIS — R20.0 NUMBNESS AND TINGLING OF RIGHT LOWER EXTREMITY: ICD-10-CM

## 2019-08-30 DIAGNOSIS — Z51.81 ENCOUNTER FOR THERAPEUTIC DRUG MONITORING: ICD-10-CM

## 2019-08-30 LAB
ALP SERPL-CCNC: 91 U/L (ref 45–120)
ALT SERPL W P-5'-P-CCNC: 25 U/L (ref 0–45)
AST SERPL W P-5'-P-CCNC: 23 U/L (ref 0–40)
BASOPHILS # BLD AUTO: 0 THOU/UL (ref 0–0.2)
BASOPHILS NFR BLD AUTO: 0 % (ref 0–2)
BILIRUB SERPL-MCNC: 0.4 MG/DL (ref 0–1)
EOSINOPHIL # BLD AUTO: 0.2 THOU/UL (ref 0–0.4)
EOSINOPHIL NFR BLD AUTO: 3 % (ref 0–6)
ERYTHROCYTE [DISTWIDTH] IN BLOOD BY AUTOMATED COUNT: 13.3 % (ref 11–14.5)
FOLATE SERPL-MCNC: 18.3 NG/ML
HCT VFR BLD AUTO: 36.2 % (ref 35–47)
HGB BLD-MCNC: 12 G/DL (ref 12–16)
LYMPHOCYTES # BLD AUTO: 1.6 THOU/UL (ref 0.8–4.4)
LYMPHOCYTES NFR BLD AUTO: 25 % (ref 20–40)
MCH RBC QN AUTO: 28 PG (ref 27–34)
MCHC RBC AUTO-ENTMCNC: 33.1 G/DL (ref 32–36)
MCV RBC AUTO: 85 FL (ref 80–100)
MONOCYTES # BLD AUTO: 0.5 THOU/UL (ref 0–0.9)
MONOCYTES NFR BLD AUTO: 9 % (ref 2–10)
NEUTROPHILS # BLD AUTO: 4 THOU/UL (ref 2–7.7)
NEUTROPHILS NFR BLD AUTO: 63 % (ref 50–70)
PLATELET # BLD AUTO: 271 THOU/UL (ref 140–440)
PMV BLD AUTO: 7.8 FL (ref 7–10)
RBC # BLD AUTO: 4.28 MILL/UL (ref 3.8–5.4)
VALPROATE SERPL-MCNC: 55.3 UG/ML (ref 50–150)
VIT B12 SERPL-MCNC: 681 PG/ML (ref 213–816)
WBC: 6.3 THOU/UL (ref 4–11)

## 2019-09-04 ENCOUNTER — COMMUNICATION - HEALTHEAST (OUTPATIENT)
Dept: FAMILY MEDICINE | Facility: CLINIC | Age: 64
End: 2019-09-04

## 2019-09-17 ENCOUNTER — OFFICE VISIT - HEALTHEAST (OUTPATIENT)
Dept: FAMILY MEDICINE | Facility: CLINIC | Age: 64
End: 2019-09-17

## 2019-09-17 DIAGNOSIS — R73.03 PRE-DIABETES: ICD-10-CM

## 2019-09-17 DIAGNOSIS — N39.41 URGE INCONTINENCE OF URINE: ICD-10-CM

## 2019-09-17 DIAGNOSIS — E07.9 ASYMMETRICAL THYROID: ICD-10-CM

## 2019-09-17 DIAGNOSIS — Z78.0 ASYMPTOMATIC MENOPAUSAL STATE: ICD-10-CM

## 2019-09-17 DIAGNOSIS — F31.89 OTHER BIPOLAR DISORDER (H): ICD-10-CM

## 2019-09-17 DIAGNOSIS — G47.33 OBSTRUCTIVE SLEEP APNEA (ADULT) (PEDIATRIC): ICD-10-CM

## 2019-09-17 DIAGNOSIS — E55.9 VITAMIN D DEFICIENCY: ICD-10-CM

## 2019-09-17 DIAGNOSIS — E03.9 HYPOTHYROIDISM, UNSPECIFIED TYPE: ICD-10-CM

## 2019-09-17 DIAGNOSIS — Z23 ENCOUNTER FOR IMMUNIZATION: ICD-10-CM

## 2019-09-17 DIAGNOSIS — Z00.00 ROUTINE GENERAL MEDICAL EXAMINATION AT A HEALTH CARE FACILITY: ICD-10-CM

## 2019-09-17 DIAGNOSIS — E78.2 MIXED HYPERLIPIDEMIA: ICD-10-CM

## 2019-09-17 LAB
ALBUMIN UR-MCNC: NEGATIVE MG/DL
APPEARANCE UR: CLEAR
BACTERIA #/AREA URNS HPF: ABNORMAL HPF
BILIRUB UR QL STRIP: NEGATIVE
CHOLEST SERPL-MCNC: 180 MG/DL
COLOR UR AUTO: YELLOW
FASTING STATUS PATIENT QL REPORTED: YES
GLUCOSE UR STRIP-MCNC: NEGATIVE MG/DL
HBA1C MFR BLD: 6 % (ref 3.5–6)
HDLC SERPL-MCNC: 61 MG/DL
HGB UR QL STRIP: ABNORMAL
KETONES UR STRIP-MCNC: NEGATIVE MG/DL
LDLC SERPL CALC-MCNC: 100 MG/DL
LEUKOCYTE ESTERASE UR QL STRIP: ABNORMAL
MUCOUS THREADS #/AREA URNS LPF: ABNORMAL LPF
NITRATE UR QL: NEGATIVE
PH UR STRIP: 6 [PH] (ref 5–8)
RBC #/AREA URNS AUTO: ABNORMAL HPF
SP GR UR STRIP: 1.02 (ref 1–1.03)
SQUAMOUS #/AREA URNS AUTO: ABNORMAL LPF
TRIGL SERPL-MCNC: 95 MG/DL
TSH SERPL DL<=0.005 MIU/L-ACNC: 1.55 UIU/ML (ref 0.3–5)
UROBILINOGEN UR STRIP-ACNC: ABNORMAL
WBC #/AREA URNS AUTO: ABNORMAL HPF

## 2019-09-17 ASSESSMENT — ANXIETY QUESTIONNAIRES
6. BECOMING EASILY ANNOYED OR IRRITABLE: NEARLY EVERY DAY
4. TROUBLE RELAXING: NEARLY EVERY DAY
GAD7 TOTAL SCORE: 9
5. BEING SO RESTLESS THAT IT IS HARD TO SIT STILL: SEVERAL DAYS
3. WORRYING TOO MUCH ABOUT DIFFERENT THINGS: MORE THAN HALF THE DAYS
7. FEELING AFRAID AS IF SOMETHING AWFUL MIGHT HAPPEN: NOT AT ALL
1. FEELING NERVOUS, ANXIOUS, OR ON EDGE: NOT AT ALL
2. NOT BEING ABLE TO STOP OR CONTROL WORRYING: NOT AT ALL
IF YOU CHECKED OFF ANY PROBLEMS ON THIS QUESTIONNAIRE, HOW DIFFICULT HAVE THESE PROBLEMS MADE IT FOR YOU TO DO YOUR WORK, TAKE CARE OF THINGS AT HOME, OR GET ALONG WITH OTHER PEOPLE: VERY DIFFICULT

## 2019-09-17 ASSESSMENT — MIFFLIN-ST. JEOR: SCORE: 1304.72

## 2019-09-17 ASSESSMENT — PATIENT HEALTH QUESTIONNAIRE - PHQ9: SUM OF ALL RESPONSES TO PHQ QUESTIONS 1-9: 14

## 2019-09-18 LAB — BACTERIA SPEC CULT: NO GROWTH

## 2019-09-19 ENCOUNTER — COMMUNICATION - HEALTHEAST (OUTPATIENT)
Dept: FAMILY MEDICINE | Facility: CLINIC | Age: 64
End: 2019-09-19

## 2019-09-20 ENCOUNTER — COMMUNICATION - HEALTHEAST (OUTPATIENT)
Dept: SCHEDULING | Facility: CLINIC | Age: 64
End: 2019-09-20

## 2019-09-24 ENCOUNTER — HOSPITAL ENCOUNTER (OUTPATIENT)
Dept: ULTRASOUND IMAGING | Facility: CLINIC | Age: 64
Discharge: HOME | End: 2019-09-24
Attending: NURSE PRACTITIONER

## 2019-09-24 ENCOUNTER — AMBULATORY - HEALTHEAST (OUTPATIENT)
Dept: FAMILY MEDICINE | Facility: CLINIC | Age: 64
End: 2019-09-24

## 2019-09-24 DIAGNOSIS — E07.9 ASYMMETRICAL THYROID: ICD-10-CM

## 2019-09-24 DIAGNOSIS — E04.1 LEFT THYROID NODULE: ICD-10-CM

## 2019-09-26 ENCOUNTER — DOCUMENTATION ONLY (OUTPATIENT)
Dept: OTHER | Facility: CLINIC | Age: 64
End: 2019-09-26

## 2019-09-26 ENCOUNTER — AMBULATORY - HEALTHEAST (OUTPATIENT)
Dept: OTHER | Facility: CLINIC | Age: 64
End: 2019-09-26

## 2019-09-27 ENCOUNTER — RECORDS - HEALTHEAST (OUTPATIENT)
Dept: BONE DENSITY | Facility: CLINIC | Age: 64
End: 2019-09-27

## 2019-09-27 DIAGNOSIS — Z78.0 ASYMPTOMATIC MENOPAUSAL STATE: ICD-10-CM

## 2019-10-01 ENCOUNTER — HOSPITAL ENCOUNTER (OUTPATIENT)
Dept: ULTRASOUND IMAGING | Facility: CLINIC | Age: 64
Discharge: HOME | End: 2019-10-01
Attending: NURSE PRACTITIONER | Admitting: RADIOLOGY

## 2019-10-01 ENCOUNTER — COMMUNICATION - HEALTHEAST (OUTPATIENT)
Dept: FAMILY MEDICINE | Facility: CLINIC | Age: 64
End: 2019-10-01

## 2019-10-01 DIAGNOSIS — E04.1 LEFT THYROID NODULE: ICD-10-CM

## 2019-10-02 LAB
CAP COMMENT: NORMAL
LAB AP CHARGES (HE HISTORICAL CONVERSION): NORMAL
LAB AP INITIAL CYTO EVAL (HE HISTORICAL CONVERSION): NORMAL
LAB MED GENERAL PATH INTERP (HE HISTORICAL CONVERSION): NORMAL
PATH REPORT.COMMENTS IMP SPEC: NORMAL
PATH REPORT.FINAL DX SPEC: NORMAL
PATH REPORT.MICROSCOPIC SPEC OTHER STN: NORMAL
PATH REPORT.RELEVANT HX SPEC: NORMAL
RESULT FLAG (HE HISTORICAL CONVERSION): NORMAL
SPECIMEN DESCRIPTION: NORMAL

## 2019-10-03 ENCOUNTER — COMMUNICATION - HEALTHEAST (OUTPATIENT)
Dept: FAMILY MEDICINE | Facility: CLINIC | Age: 64
End: 2019-10-03

## 2019-10-15 ENCOUNTER — RECORDS - HEALTHEAST (OUTPATIENT)
Dept: ADMINISTRATIVE | Facility: OTHER | Age: 64
End: 2019-10-15

## 2019-10-28 ENCOUNTER — COMMUNICATION - HEALTHEAST (OUTPATIENT)
Dept: FAMILY MEDICINE | Facility: CLINIC | Age: 64
End: 2019-10-28

## 2019-12-03 ENCOUNTER — COMMUNICATION - HEALTHEAST (OUTPATIENT)
Dept: SCHEDULING | Facility: CLINIC | Age: 64
End: 2019-12-03

## 2019-12-09 ENCOUNTER — RECORDS - HEALTHEAST (OUTPATIENT)
Dept: GENERAL RADIOLOGY | Facility: CLINIC | Age: 64
End: 2019-12-09

## 2019-12-09 ENCOUNTER — OFFICE VISIT - HEALTHEAST (OUTPATIENT)
Dept: FAMILY MEDICINE | Facility: CLINIC | Age: 64
End: 2019-12-09

## 2019-12-09 DIAGNOSIS — M79.644 PAIN IN RIGHT FINGER(S): ICD-10-CM

## 2019-12-09 DIAGNOSIS — M79.644 THUMB PAIN, RIGHT: ICD-10-CM

## 2019-12-09 DIAGNOSIS — M79.644 PAIN OF FINGER OF RIGHT HAND: ICD-10-CM

## 2019-12-20 ENCOUNTER — AMBULATORY - HEALTHEAST (OUTPATIENT)
Dept: LAB | Facility: CLINIC | Age: 64
End: 2019-12-20

## 2019-12-20 DIAGNOSIS — M79.672 PAIN IN BOTH FEET: ICD-10-CM

## 2019-12-20 DIAGNOSIS — M79.671 PAIN IN BOTH FEET: ICD-10-CM

## 2019-12-20 LAB
TSH SERPL DL<=0.005 MIU/L-ACNC: 1.6 UIU/ML (ref 0.3–5)
VIT B12 SERPL-MCNC: 792 PG/ML (ref 213–816)

## 2019-12-24 LAB
PATH ICD:: NORMAL
PROT PATTERN SERPL IFE-IMP: NORMAL
REVIEWING PATHOLOGIST: NORMAL

## 2019-12-31 ENCOUNTER — AMBULATORY - HEALTHEAST (OUTPATIENT)
Dept: LAB | Facility: CLINIC | Age: 64
End: 2019-12-31

## 2019-12-31 DIAGNOSIS — M79.672 LEFT FOOT PAIN: ICD-10-CM

## 2019-12-31 DIAGNOSIS — M79.671 RIGHT FOOT PAIN: ICD-10-CM

## 2020-01-02 LAB — PYRIDOXAL PHOS SERPL-SCNC: 50 NMOL/L (ref 20–125)

## 2020-01-14 ENCOUNTER — COMMUNICATION - HEALTHEAST (OUTPATIENT)
Dept: SCHEDULING | Facility: CLINIC | Age: 65
End: 2020-01-14

## 2020-01-14 DIAGNOSIS — M79.644 PAIN OF RIGHT THUMB: ICD-10-CM

## 2020-01-17 ENCOUNTER — COMMUNICATION - HEALTHEAST (OUTPATIENT)
Dept: FAMILY MEDICINE | Facility: CLINIC | Age: 65
End: 2020-01-17

## 2020-01-22 ENCOUNTER — RECORDS - HEALTHEAST (OUTPATIENT)
Dept: ADMINISTRATIVE | Facility: OTHER | Age: 65
End: 2020-01-22

## 2020-03-24 ENCOUNTER — COMMUNICATION - HEALTHEAST (OUTPATIENT)
Dept: FAMILY MEDICINE | Facility: CLINIC | Age: 65
End: 2020-03-24

## 2020-03-24 DIAGNOSIS — E03.9 HYPOTHYROIDISM, UNSPECIFIED TYPE: ICD-10-CM

## 2020-07-16 ENCOUNTER — COMMUNICATION - HEALTHEAST (OUTPATIENT)
Dept: FAMILY MEDICINE | Facility: CLINIC | Age: 65
End: 2020-07-16

## 2020-07-16 DIAGNOSIS — E78.2 MIXED HYPERLIPIDEMIA: ICD-10-CM

## 2020-09-03 ENCOUNTER — COMMUNICATION - HEALTHEAST (OUTPATIENT)
Dept: SCHEDULING | Facility: CLINIC | Age: 65
End: 2020-09-03

## 2020-09-04 ENCOUNTER — OFFICE VISIT - HEALTHEAST (OUTPATIENT)
Dept: INTERNAL MEDICINE | Facility: CLINIC | Age: 65
End: 2020-09-04

## 2020-09-04 DIAGNOSIS — M25.561 MEDIAL KNEE PAIN, RIGHT: ICD-10-CM

## 2020-09-04 ASSESSMENT — ANXIETY QUESTIONNAIRES
3. WORRYING TOO MUCH ABOUT DIFFERENT THINGS: MORE THAN HALF THE DAYS
6. BECOMING EASILY ANNOYED OR IRRITABLE: NEARLY EVERY DAY
2. NOT BEING ABLE TO STOP OR CONTROL WORRYING: SEVERAL DAYS
4. TROUBLE RELAXING: NEARLY EVERY DAY
1. FEELING NERVOUS, ANXIOUS, OR ON EDGE: MORE THAN HALF THE DAYS
7. FEELING AFRAID AS IF SOMETHING AWFUL MIGHT HAPPEN: NOT AT ALL
IF YOU CHECKED OFF ANY PROBLEMS ON THIS QUESTIONNAIRE, HOW DIFFICULT HAVE THESE PROBLEMS MADE IT FOR YOU TO DO YOUR WORK, TAKE CARE OF THINGS AT HOME, OR GET ALONG WITH OTHER PEOPLE: SOMEWHAT DIFFICULT
GAD7 TOTAL SCORE: 12
5. BEING SO RESTLESS THAT IT IS HARD TO SIT STILL: SEVERAL DAYS

## 2020-09-04 ASSESSMENT — PATIENT HEALTH QUESTIONNAIRE - PHQ9: SUM OF ALL RESPONSES TO PHQ QUESTIONS 1-9: 13

## 2020-09-10 ENCOUNTER — RECORDS - HEALTHEAST (OUTPATIENT)
Dept: ADMINISTRATIVE | Facility: OTHER | Age: 65
End: 2020-09-10

## 2020-12-22 ENCOUNTER — COMMUNICATION - HEALTHEAST (OUTPATIENT)
Dept: FAMILY MEDICINE | Facility: CLINIC | Age: 65
End: 2020-12-22

## 2020-12-22 ENCOUNTER — COMMUNICATION - HEALTHEAST (OUTPATIENT)
Dept: SCHEDULING | Facility: CLINIC | Age: 65
End: 2020-12-22

## 2020-12-28 ENCOUNTER — OFFICE VISIT - HEALTHEAST (OUTPATIENT)
Dept: FAMILY MEDICINE | Facility: CLINIC | Age: 65
End: 2020-12-28

## 2020-12-28 DIAGNOSIS — E66.01 MORBID OBESITY (H): ICD-10-CM

## 2020-12-28 DIAGNOSIS — M17.11 PRIMARY LOCALIZED OSTEOARTHROSIS OF RIGHT LOWER LEG: ICD-10-CM

## 2021-01-08 ENCOUNTER — OFFICE VISIT - HEALTHEAST (OUTPATIENT)
Dept: FAMILY MEDICINE | Facility: CLINIC | Age: 66
End: 2021-01-08

## 2021-01-08 DIAGNOSIS — E66.01 MORBID OBESITY (H): ICD-10-CM

## 2021-01-08 DIAGNOSIS — N39.41 URGE INCONTINENCE: ICD-10-CM

## 2021-01-08 DIAGNOSIS — E55.9 VITAMIN D DEFICIENCY: ICD-10-CM

## 2021-01-08 DIAGNOSIS — M17.10 ARTHRITIS OF KNEE: ICD-10-CM

## 2021-01-08 DIAGNOSIS — E03.9 HYPOTHYROIDISM, UNSPECIFIED TYPE: ICD-10-CM

## 2021-01-08 DIAGNOSIS — Z79.899 DRUG THERAPY: ICD-10-CM

## 2021-01-08 DIAGNOSIS — Z12.31 VISIT FOR SCREENING MAMMOGRAM: ICD-10-CM

## 2021-01-08 DIAGNOSIS — Z00.00 ROUTINE GENERAL MEDICAL EXAMINATION AT A HEALTH CARE FACILITY: ICD-10-CM

## 2021-01-08 LAB
ALBUMIN SERPL-MCNC: 3.9 G/DL (ref 3.5–5)
ALP SERPL-CCNC: 72 U/L (ref 45–120)
ALT SERPL W P-5'-P-CCNC: 20 U/L (ref 0–45)
ANION GAP SERPL CALCULATED.3IONS-SCNC: 6 MMOL/L (ref 5–18)
AST SERPL W P-5'-P-CCNC: 15 U/L (ref 0–40)
BASOPHILS # BLD AUTO: 0 THOU/UL (ref 0–0.2)
BASOPHILS NFR BLD AUTO: 1 % (ref 0–2)
BILIRUB DIRECT SERPL-MCNC: 0.1 MG/DL
BILIRUB SERPL-MCNC: 0.3 MG/DL (ref 0–1)
BUN SERPL-MCNC: 21 MG/DL (ref 8–22)
CALCIUM SERPL-MCNC: 8.9 MG/DL (ref 8.5–10.5)
CHLORIDE BLD-SCNC: 106 MMOL/L (ref 98–107)
CO2 SERPL-SCNC: 30 MMOL/L (ref 22–31)
CREAT SERPL-MCNC: 0.75 MG/DL (ref 0.6–1.1)
EOSINOPHIL # BLD AUTO: 0.2 THOU/UL (ref 0–0.4)
EOSINOPHIL NFR BLD AUTO: 3 % (ref 0–6)
ERYTHROCYTE [DISTWIDTH] IN BLOOD BY AUTOMATED COUNT: 12.1 % (ref 11–14.5)
GFR SERPL CREATININE-BSD FRML MDRD: >60 ML/MIN/1.73M2
GLUCOSE BLD-MCNC: 82 MG/DL (ref 70–125)
HCT VFR BLD AUTO: 34.1 % (ref 35–47)
HGB BLD-MCNC: 11.8 G/DL (ref 12–16)
LYMPHOCYTES # BLD AUTO: 1.9 THOU/UL (ref 0.8–4.4)
LYMPHOCYTES NFR BLD AUTO: 28 % (ref 20–40)
MCH RBC QN AUTO: 27.8 PG (ref 27–34)
MCHC RBC AUTO-ENTMCNC: 34.5 G/DL (ref 32–36)
MCV RBC AUTO: 81 FL (ref 80–100)
MONOCYTES # BLD AUTO: 0.6 THOU/UL (ref 0–0.9)
MONOCYTES NFR BLD AUTO: 9 % (ref 2–10)
NEUTROPHILS # BLD AUTO: 4.2 THOU/UL (ref 2–7.7)
NEUTROPHILS NFR BLD AUTO: 60 % (ref 50–70)
PLATELET # BLD AUTO: 228 THOU/UL (ref 140–440)
PMV BLD AUTO: 7.9 FL (ref 7–10)
POTASSIUM BLD-SCNC: 4.4 MMOL/L (ref 3.5–5)
PROT SERPL-MCNC: 6.7 G/DL (ref 6–8)
RBC # BLD AUTO: 4.23 MILL/UL (ref 3.8–5.4)
SODIUM SERPL-SCNC: 142 MMOL/L (ref 136–145)
T4 TOTAL - HISTORICAL: 6 UG/DL (ref 4.5–13)
TSH SERPL DL<=0.005 MIU/L-ACNC: 3.44 UIU/ML (ref 0.3–5)
VALPROATE SERPL-MCNC: 80.6 UG/ML (ref 50–150)
WBC: 7 THOU/UL (ref 4–11)

## 2021-01-08 ASSESSMENT — MIFFLIN-ST. JEOR: SCORE: 1401.11

## 2021-01-08 ASSESSMENT — PATIENT HEALTH QUESTIONNAIRE - PHQ9: SUM OF ALL RESPONSES TO PHQ QUESTIONS 1-9: 15

## 2021-01-11 ENCOUNTER — COMMUNICATION - HEALTHEAST (OUTPATIENT)
Dept: NURSING | Facility: CLINIC | Age: 66
End: 2021-01-11

## 2021-01-11 LAB
25(OH)D3 SERPL-MCNC: 29.6 NG/ML (ref 30–80)
HCV AB SERPL QL IA: NEGATIVE

## 2021-01-12 ENCOUNTER — COMMUNICATION - HEALTHEAST (OUTPATIENT)
Dept: ADMINISTRATIVE | Facility: CLINIC | Age: 66
End: 2021-01-12

## 2021-01-12 ENCOUNTER — COMMUNICATION - HEALTHEAST (OUTPATIENT)
Dept: CARE COORDINATION | Facility: CLINIC | Age: 66
End: 2021-01-12

## 2021-01-13 ENCOUNTER — COMMUNICATION - HEALTHEAST (OUTPATIENT)
Dept: FAMILY MEDICINE | Facility: CLINIC | Age: 66
End: 2021-01-13

## 2021-01-18 ENCOUNTER — COMMUNICATION - HEALTHEAST (OUTPATIENT)
Dept: NURSING | Facility: CLINIC | Age: 66
End: 2021-01-18

## 2021-01-26 ENCOUNTER — COMMUNICATION - HEALTHEAST (OUTPATIENT)
Dept: NURSING | Facility: CLINIC | Age: 66
End: 2021-01-26

## 2021-01-26 ASSESSMENT — ACTIVITIES OF DAILY LIVING (ADL): DEPENDENT_IADLS:: MONEY MANAGEMENT

## 2021-02-25 ENCOUNTER — COMMUNICATION - HEALTHEAST (OUTPATIENT)
Dept: NURSING | Facility: CLINIC | Age: 66
End: 2021-02-25

## 2021-03-08 ENCOUNTER — COMMUNICATION - HEALTHEAST (OUTPATIENT)
Dept: NURSING | Facility: CLINIC | Age: 66
End: 2021-03-08

## 2021-03-09 ENCOUNTER — COMMUNICATION - HEALTHEAST (OUTPATIENT)
Dept: CARE COORDINATION | Facility: CLINIC | Age: 66
End: 2021-03-09

## 2021-03-11 ENCOUNTER — COMMUNICATION - HEALTHEAST (OUTPATIENT)
Dept: FAMILY MEDICINE | Facility: CLINIC | Age: 66
End: 2021-03-11

## 2021-03-11 DIAGNOSIS — E03.9 HYPOTHYROIDISM, UNSPECIFIED TYPE: ICD-10-CM

## 2021-03-24 ENCOUNTER — COMMUNICATION - HEALTHEAST (OUTPATIENT)
Dept: NURSING | Facility: CLINIC | Age: 66
End: 2021-03-24

## 2021-04-08 ENCOUNTER — COMMUNICATION - HEALTHEAST (OUTPATIENT)
Dept: NURSING | Facility: CLINIC | Age: 66
End: 2021-04-08

## 2021-04-20 ENCOUNTER — COMMUNICATION - HEALTHEAST (OUTPATIENT)
Dept: CARE COORDINATION | Facility: CLINIC | Age: 66
End: 2021-04-20

## 2021-05-26 ASSESSMENT — PATIENT HEALTH QUESTIONNAIRE - PHQ9: SUM OF ALL RESPONSES TO PHQ QUESTIONS 1-9: 14

## 2021-05-27 ASSESSMENT — PATIENT HEALTH QUESTIONNAIRE - PHQ9
SUM OF ALL RESPONSES TO PHQ QUESTIONS 1-9: 13
SUM OF ALL RESPONSES TO PHQ QUESTIONS 1-9: 15

## 2021-05-27 NOTE — TELEPHONE ENCOUNTER
Left message to call back for: Patient  Information to relay to patient:  Please see message below from Cuca and relay to pt when she calls back. Thanks!

## 2021-05-27 NOTE — TELEPHONE ENCOUNTER
----- Message from Cuca Fong CNP sent at 3/27/2019  4:46 PM CDT -----  Please call Genia and let her know her echocardiogram showed no change.  Still mild aortic stenosis.  Very reassuring that this has not progressed.

## 2021-05-27 NOTE — TELEPHONE ENCOUNTER
Patient Returning Call  Reason for call:  Patient returning call to the clinic.  Information relayed to patient:  Yes as instructed and patient agrees with plan.  Patient has additional questions:  No  If YES, what are your questions/concerns:  none  Okay to leave a detailed message?: Yes

## 2021-05-28 ASSESSMENT — ANXIETY QUESTIONNAIRES
GAD7 TOTAL SCORE: 9
GAD7 TOTAL SCORE: 12

## 2021-05-28 NOTE — TELEPHONE ENCOUNTER
She should've seen Dr. Velazquez on 5/2- I faxed a records request to [facility]    Called patient and let her know- she is having the EMG on Tuesday- will await results

## 2021-05-28 NOTE — TELEPHONE ENCOUNTER
Test Results  Who is calling?:   patient  Who ordered the test:  Jeison  Type of test: Imaging  Date of test:  4/30/2019  Where was the test performed:   [facility]  What are your questions/concerns?:   Patient is wondering of the results.  She was told she should hear something right away as if there was an issue  Okay to leave a detailed message?:  Yes

## 2021-05-28 NOTE — TELEPHONE ENCOUNTER
CC:  Hand / wrist numbness     Went to ED but left before was seen      Continues to have R hand issues as noted before - numbness to R Hand and tingling in fingers - does not want to go back to ED though as she waited there for so long last night       Has has had it elevated and in a brace / splint       Some anxiety reported        A/P:   > OK for appt today for eval    > No appts at usual clinic - did find appts downtown   > Agree with at home care thus far         Umer Perez, RN   Triage and Medication Refills

## 2021-05-28 NOTE — TELEPHONE ENCOUNTER
I arranged a   Consult with neurology and she was informed of this  I spoke with her regarding her MRI results also  Arley Mchugh MD

## 2021-05-28 NOTE — PROGRESS NOTES
Office Visit - Follow Up   Grace Chinchilla   64 y.o. female    Date of Visit: 4/30/2019    Chief Complaint   Patient presents with     Numbness     Right hand feels numb and tingling, gets pain also starts in elbow and shoots up to shoulder, was light headed this AM at times        Assessment and Plan   1. Weakness of right arm  Symptoms began 72 hours ago.  Neuro exam as below story as below.  Differential could be a acute/subacute left hemisphere CVA.  Al so this could be a approximately C7 nerve palsy.  - MR Brain Without Contrast; Future  - MR Cervical Spine Without Contrast; Future  We will need neurosurgery evaluation likely this is a cervical spine issue.  Hospitalization if this is a acute CVA  2. Mild intellectual disabilities  Did seem to understand that we need imaging studies including these above MRI scans to further determine what is wrong with her hand.    3. Bipolar II Disorder        No follow-ups on file.     History of Present Illness   This 64 y.o. old primarily seen at the [facility].  She had an onset 3 days ago in the afternoon of numbness and numbness of her right hand.  The next day she called our triage line and was told to go to urgent care.  She went to [facility] urgent care.  They were concerned about her examination and suggest that she go to [facility] emergency room.  She went to the ER.  She waited over 4 hours and decided to leave.  With she comes into this clinic today with persistent numbness in her right second through fifth fingers with some pain in her right shoulder and prominent weakness of her right hand and wrist.  She has no neck pain.  She reports no trauma    Review of Systems: A comprehensive review of systems was negative except as noted.     Medications, Allergies and Problem List   Reviewed, reconciled and updated     Physical Exam   General Appearance:       /58 (Patient Site: Left Arm, Patient Position: Sitting, Cuff Size: Adult Large)   Pulse  "78   Ht 5' 1.25\" (1.556 m)   Wt 189 lb (85.7 kg)   LMP  (LMP Unknown)   SpO2 98%   BMI 35.42 kg/m      She has full range of motion about the neck.  There is no pain on movement of her neck.  She has no facial droop.  EOMs are full.  She has quite normal reflexes of her right biceps and triceps and these are equal to her left biceps and triceps.  She has 3/5 weakness of her right wrist and also her right hand.  Her right hand is warm.  There may be just a bit of swollen but this might be due to disuse.     Additional Information   Current Outpatient Medications   Medication Sig Dispense Refill     atorvastatin (LIPITOR) 80 MG tablet Take 1 tablet (80 mg total) by mouth daily. 90 tablet 3     calcium-magnesium-zinc Tab Take 2 tablets by mouth daily.        cholecalciferol, vitamin D3, 1,000 unit tablet Take 1,000 Units by mouth daily.       clonazePAM (KLONOPIN) 0.5 MG tablet        dextroamphetamine-amphetamine (ADDERALL XR) 20 MG 24 hr capsule Take 40 mg by mouth.       divalproex (DEPAKOTE) 250 MG 24 hr tablet Take 250 mg by mouth at bedtime. Total dose 1250mg       divalproex (DEPAKOTE) 500 MG 24 hr tablet Take 1,000 mg by mouth at bedtime. Total 1250mg       DULoxetine (CYMBALTA) 30 MG capsule Take 90 mg by mouth daily.       ferrous sulfate 325 (65 FE) MG tablet Take 1 tablet (325 mg total) by mouth daily. 90 tablet 0     GLUCOSAMINE HCL/CHONDR RANDALL A NA (OSTEO BI-FLEX ORAL) Take 2 tablets by mouth daily.        levothyroxine (SYNTHROID, LEVOTHROID) 125 MCG tablet Take 1 tablet (125 mcg total) by mouth daily. 90 tablet 3     MULTIVITAMINS,THER W-MINERALS (VITAMINS & MINERALS ORAL) Take 1 tablet by mouth daily.       omega-3 fatty acids-vitamin E (FISH OIL) 1,000 mg cap Take 1 capsule by mouth daily.       No current facility-administered medications for this visit.      Allergies   Allergen Reactions     Bupropion Unknown     Social History     Tobacco Use     Smoking status: Never Smoker     Smokeless " tobacco: Never Used   Substance Use Topics     Alcohol use: No     Drug use: No       Review and/or order of clinical lab tests:  Review and/or order of radiology tests:  Review and/or order of medicine tests:  Discussion of test results with performing physician:  Decision to obtain old records and/or obtain history from someone other than the patient:  Review and summarization of old records and/or obtaining history from someone other than the patient and.or discussion of case with another health care provider:  Independent visualization of image, tracing or specimen itself:    Time: total time spent with the patient was 25 minutes of which >50% was spent in counseling and coordination of care     Arley Mchugh MD

## 2021-05-28 NOTE — TELEPHONE ENCOUNTER
Who is calling:  Patient  Reason for Call:  Patient was checking the status of her results. Patient stated she does not need to know the results today. Patient stated she was concerned because the MRI tech at [facility] in [place] made it sound like she was supposed to get a call today. Please note the MRIs were not done at [facility] but at [facility] in [place].  Date of last appointment with primary care: 4/30/19  Okay to leave a detailed message: Yes

## 2021-05-28 NOTE — TELEPHONE ENCOUNTER
"Patient is calling to report that she had sudden loss of Right hand function with pain and swelling on Saturday 4/27/19 around 6:30 pm. She states that she could not use a pen or make a fist. Patient states that she also experienced difficulty with speaking/garbled speech at this time. She states prior to this she noticed left hip pain that caused her to have difficulty walking. Patient denied headaches, she did state that she had some dizziness this morning that went away. She denies any trouble with vision. Patient states that she has an arm brace for her left arm that she has been using today on her Right arm. Patient states that she can raise her Right arm and that helps with the hand pain. But patient states she cannot raise it as far as her Left. Denies chest pain, shortness of breath, headache, no difficulty walking and talking now.  Patient was given disposition of ED per RN protocol and she stated that she needed to speak with her sisters before she would go.  Please see triage questions below.  Jewels Foss, RN  [facility] Triage Nurse  11:29 AM  4/29/2019      Reason for Disposition    Can't use hand normally (e.g., hold a glass of water)    Answer Assessment - Initial Assessment Questions  1. SYMPTOM: \"What is the main symptom you are concerned about?\" (e.g., weakness, numbness)     Weak tingling swelling right hand, denies discoloration  2. ONSET: \"When did this start?\" (minutes, hours, days; while sleeping)      Saturday evening  3. LAST NORMAL: \"When was the last time you were normal (no symptoms)?\"      Saturday evening  4. PATTERN \"Does this come and go, or has it been constant since it started?\"  \"Is it present now?\"     Constant  5. CARDIAC SYMPTOMS: \"Have you had any of the following symptoms: chest pain, difficulty breathing, palpitations?\"      none  6. NEUROLOGIC SYMPTOMS: \"Have you had any of the following symptoms: headache, dizziness, vision loss, double vision, changes in " "speech, unsteady on your feet?\"      Dizziness for a few seconds this morning  7. OTHER SYMPTOMS: \"Do you have any other symptoms?\"      Patient states before this happened she noticed Left hip pain that caused trouble walking on Saturday but this went away by Sunday.  8. PREGNANCY: \"Is there any chance you are pregnant?\" \"When was your last menstrual period?\"      n/a    Protocols used: NEUROLOGIC DEFICIT-A-OH      "

## 2021-05-28 NOTE — TELEPHONE ENCOUNTER
Question following Office Visit  When did you see your provider: 4/30/19  What is your question: She has an appointment for an EMG on 5/14/19 through [facility].  In the meantime, she is asking Dr. Mchugh what she can do to relieve the tingling in her right hand.  She is wearing a wrist brace during the day but it does not seem to help much.    Okay to leave a detailed message: Yes

## 2021-05-28 NOTE — TELEPHONE ENCOUNTER
Tell her that we do not have any medications for numbness.  We have to wait for the results of the EMG.  Ask her if she saw the neurologist already.  I don't have this report and I would like to get the consult sent to me from [facility]

## 2021-05-28 NOTE — TELEPHONE ENCOUNTER
Dr. Mchugh,    Do you advise patient go on her trip or stay home? Please advise and I will contact patient for you.    She is also asking about her test results (imaging) from yesterday.   I do not see the report yet.    Lela DANG LPN .......... 1:27 PM  05/01/19

## 2021-05-28 NOTE — TELEPHONE ENCOUNTER
Question following Office Visit  When did you see your provider: yesterday  What is your question: Patient stated she and her  are going to state recognition event for the weight loss program they are in, [facility]. Patient stated her friend booked a hotel stay for tomorrow until Sunday, for them in [place], for this event. Patient stated she concerned on whether or not she should go to this event. Patient stated due to her hand numbness, she is questioning if she should go. Patient would like a call back from Dr. Mchugh if she should go on this trip to [place] or should she stay home? Patient stated she will stand to lose $280. Please call her back today.  Okay to leave a detailed message: Yes

## 2021-05-30 VITALS — HEIGHT: 62 IN | BODY MASS INDEX: 34.6 KG/M2 | WEIGHT: 188 LBS

## 2021-05-30 VITALS — BODY MASS INDEX: 34.23 KG/M2 | HEIGHT: 62 IN | WEIGHT: 186 LBS

## 2021-05-30 VITALS — BODY MASS INDEX: 33.8 KG/M2 | WEIGHT: 183.7 LBS | HEIGHT: 62 IN

## 2021-05-30 VITALS — WEIGHT: 187.4 LBS | BODY MASS INDEX: 34.48 KG/M2 | HEIGHT: 62 IN

## 2021-05-30 VITALS — BODY MASS INDEX: 34.78 KG/M2 | WEIGHT: 189 LBS | HEIGHT: 62 IN

## 2021-05-30 VITALS — BODY MASS INDEX: 33.83 KG/M2 | WEIGHT: 182 LBS

## 2021-05-31 ENCOUNTER — RECORDS - HEALTHEAST (OUTPATIENT)
Dept: ADMINISTRATIVE | Facility: CLINIC | Age: 66
End: 2021-05-31

## 2021-05-31 VITALS — HEIGHT: 61 IN | WEIGHT: 189 LBS | BODY MASS INDEX: 35.68 KG/M2

## 2021-05-31 NOTE — TELEPHONE ENCOUNTER
----- Message from Gabriela Rose MD sent at 9/3/2019 10:48 AM CDT -----  Please let the patient know all of her labs are normal

## 2021-05-31 NOTE — TELEPHONE ENCOUNTER
Reason for Disposition    Patient wants to be seen    Caller reluctant to take out FB and it's causing pain    Protocols used: SKIN FOREIGN BODY-A-OH

## 2021-05-31 NOTE — PROGRESS NOTES
Assessment & Plan  1. Numbness and tingling of right lower extremity  I do not think this is related to the graphite, which is not in the nerve distribution and is very superficial.  Therefore will rule out deficiency, and refer to neurology for EMG  - Vitamin B12  - Folate, Serum  - Ambulatory referral to Neurology    2. Encounter for therapeutic drug monitoring  She brings in a letter from her psychiatrist who requested these labs  - HM1(CBC and Differential)  - Valproic Acid (Depakene )  - ALT (SGPT)  - AST (SGOT)  - Alkaline Phosphatase; Future  - HM1 (CBC with Diff)  - Alkaline Phosphatase    3. Bipolar 2 disorder (H)  As above  - Bilirubin, Total    4. Foreign body (FB) in soft tissue  The skin is completely healed over the area, and there is no sign of infection.  I think that the symptoms are not related to the graphite.  I offered to try to remove it by making a small incision over the foreign body, but she declined.  I think it may cause more harm to remove than to leave it there.      Gabriela Rose MD    Subjective  Chief Complaint:  Foot Pain (right foot tip of pencil in foot, right toe numbness DOI: 8/26/19)    HPI:   Grace Chinchilla is a 64 y.o. female who has pencil graphite in her right foot.  She is not sure how she got it because she did not step on the pencil. She is concerned because she now has numbness and weakness of the toes on that foot.    Injury was several days ago    Allergies:  is allergic to bupropion.    SH/FH:  Social History and Family History reviewed and updated.   Tobacco Status:  She  reports that she has never smoked. She has never used smokeless tobacco.    Review of Systems:    Constitutional:  No Fever  Skin: No Skin Lesions  Neuro: Weakness, Numbness  Psych: hx of bipolar    Objective  Vitals:    08/30/19 1059   BP: 138/58   Patient Site: Right Arm   Patient Position: Sitting   Cuff Size: Adult Regular   Pulse: 76   SpO2: 98%   Weight: 183 lb (83 kg)       Physical  Exam:  GENERAL: Alert, well-appearing, in no acute distress.   PSYCH: flat affect  SKIN: No apparent lesions.  There is a visible foreign body of the lateral aspect of her right foot.  It is very superficial, however the skin has completely healed over the area.  There is no pain on palpation of the area and no surrounding erythema.   PV : No edema. The circulation to the feet is intact bilaterally  MSK: No deformity.  When asked to dorsiflex or plantarflex the right foot she will not do so.  However she is able to walk on her heel when asked to walk.  She will not move her toes when requested, though is able to have a normal gait.   NEURO: negative Babinski.  Decreased sensation by patient report on the right plantar surface to monofilament testing.

## 2021-05-31 NOTE — PATIENT INSTRUCTIONS - HE
1. We'll do a couple lab tests to see why you have numbness  2. We'll also refer you for a lab test

## 2021-05-31 NOTE — TELEPHONE ENCOUNTER
RN triage   Callack from pt   Pt states initially had numbness middle R toe -- now all R toes feeling tingling   Per protocol - should be seen today --   Transferred to    No [facility] appts available today --   Pt states she will wait until tomorrow's appt   July Silva RN [facility] RN triage

## 2021-05-31 NOTE — TELEPHONE ENCOUNTER
Pencil lead  Stuck in  Right foot, by right ankle.    She states she is unsure how the pencil tip(lead) got into ankle, and she states she tried to get it out, and could not get it out.    An appointment was made at [facility] clinic tomorrow , where patient will be seen by Dr. Gabriela Rose at 11:00am.    Cindy Kruger RN  [facility] Triage/refill nurse

## 2021-05-31 NOTE — TELEPHONE ENCOUNTER
Question following Office Visit  When did you see your provider: Today  What is your question: Patient would like to know if there is anything she can do at home to help with her foot?  Okay to leave a detailed message: No  [phone]

## 2021-06-01 VITALS — BODY MASS INDEX: 34.44 KG/M2 | WEIGHT: 183.75 LBS

## 2021-06-01 VITALS — WEIGHT: 189 LBS | BODY MASS INDEX: 35.42 KG/M2

## 2021-06-01 NOTE — TELEPHONE ENCOUNTER
Left message to call back for: Patient  Information to relay to patient:  Please see results message below from Cuca and relay to pt when she calls back. Thanks!

## 2021-06-01 NOTE — TELEPHONE ENCOUNTER
Patient Returning Call  Reason for call:  The patient is returning the call.  Information relayed to patient:  The below message has been relayed to the patient.   Patient has additional questions:  No  If YES, what are your questions/concerns:  NA  Okay to leave a detailed message?: No

## 2021-06-01 NOTE — TELEPHONE ENCOUNTER
----- Message from Cuca Fong CNP sent at 9/18/2019  5:54 PM CDT -----  Please call Genia and let her know that her urine did not show an infection. Her thyroid hormone and cholesterol panel look good.  Her A1C remains in the pre diabetes range and I recommend regular walking as we discussed. We will recheck this in one year.

## 2021-06-01 NOTE — TELEPHONE ENCOUNTER
"Pt had pneumonia and influenza immunizations 9/17/19 (72 hours ago).  Pt reports \"tenderness at the injection spots.\"  Denies redness or swelling.  Denies any hardened lumps.  \"Just sore.\"    Advised self care measures:  - 10 mins topical cold followed by 10 mins of warm packs  - repeat this pattern 4x daily as feasible  - may switch to entirely warm packs by tomorrow if still having discomfort  - seek clinical eval if no improvement or any new redness, swelling or fever    Pt verbalizes understanding.  Agrees to plan.    Cristiane Rocha RN BSBA  [facility] RN Triage     Reason for Disposition    Mild immunization reaction    Protocols used: IMMUNIZATION [id number]      "

## 2021-06-01 NOTE — TELEPHONE ENCOUNTER
Patient Returning Call  Reason for call:  Results  Information relayed to patient:    ----- Message from Gabriela Rose MD sent at 9/3/2019 10:48 AM CDT -----  Please let the patient know all of her labs are normal  Patient has additional questions:  No  If YES, what are your questions/concerns:  N/A  Okay to leave a detailed message?: No call back needed

## 2021-06-01 NOTE — PROGRESS NOTES
Assessment and Plan:   1. Routine general medical examination at a health care facility  Fasting labs.  Up-to-date on mammogram and colonoscopy.  Due for first pneumonia vaccine today.  Order for first bone density scan as well.  We discussed cervical cancer screening and is her Paps have been normal for the last 10 years and she has not had any new partners in several decades it is reasonable to discontinue screening at this time.  She feels comfortable with this plan.  - Pneumococcal conjugate vaccine 13-valent 6wks-17yrs; >50yrs    2. Asymmetrical thyroid  Thyroid feels enlarged and asymmetric on exam with prominence of the left lobe.  Ultrasound to assess.  Known history of Hashimoto's thyroiditis for many years.  - US Thyroid; Future    3. Urge incontinence of urine  New onset urge incontinence.  Check urinalysis for possible infection.  We discussed scheduled toileting and use of a bladder training program.  I am unsure if she will be able to follow this so we simplified it by making it a goal to use the restroom on even hours consistently even if she does not feel the need to urinate.  Discussed incontinence briefs and she is fairly resistant to this initially.  - Urinalysis-UC if Indicated  - Culture, Urine    4. Pre-diabetes  Recheck A1c.  No current exercise, encouraged more regular walking.  Discussed healthy dietary changes.  - Glycosylated Hemoglobin A1c    5. Hypothyroidism, unspecified type  - Thyroid Cascade    6. Mixed hyperlipidemia  On atorvastatin 80 mg.  - Lipid Cascade    7. Vitamin D deficiency    8. Bipolar II Disorder  Continue to follow with psychiatry    9. Encounter for immunization   - Pneumococcal conjugate vaccine 13-valent 6wks-17yrs; >50yrs    10. Asymptomatic menopausal state   - DXA Bone Density Scan; Future    11. Obstructive Sleep Apnea  Continue to follow with sleep medicine.  She does wear her CPAP consistently.    The patient's current medical problems were reviewed.    The  following high BMI interventions were performed this visit: encouragement to exercise and dietary needs education  The following health maintenance schedule was reviewed with the patient and provided in printed form in the after visit summary:   Health Maintenance   Topic Date Due     HEPATITIS C SCREENING  1955     HIV SCREENING  04/22/1969     ZOSTER VACCINES (2 of 3) 11/11/2015     DXA SCAN  04/22/2019     PNEUMOCOCCAL POLYSACCHARIDE VACCINE AGE 65 AND OVER  09/17/2020 (Originally 4/22/2019)     DEPRESSION FOLLOW UP  03/17/2020     MEDICARE ANNUAL WELLNESS VISIT  09/17/2020     FALL RISK ASSESSMENT  09/17/2020     MAMMOGRAM  03/18/2021     COLONOSCOPY  12/04/2023     ADVANCE CARE PLANNING  09/17/2024     TD 18+ HE  09/16/2025     INFLUENZA VACCINE RULE BASED  Completed     TDAP ADULT ONE TIME DOSE  Completed     PNEUMOCOCCAL CONJUGATE VACCINE FOR ADULTS (PCV13 OR PREVNAR)  Completed        Subjective:   Chief Complaint: Grace Chinchilla is an 64 y.o. female here for an Annual Wellness visit.   HPI:      Neck stiffness: Right side of neck for last 1-2 days.  Primarily with trying to fall asleep and first waking in the morning. No radiation of pain. Full ROM, pain with rotation to the left.      Neuropathy: She was seen in April of this year for numbness and weakness of the right arm as well as transient speech changes.  MRI obtained, which was essentially normal.  She had an MRI of her cervical spine as well that showed severe right C5-C6 and C4-5 C5 foraminal stenosis.  Consulted with neurology who felt this might be consistent with a TIA or stroke despite the lack of imaging evidence.  She did undergo EMG testing that was normal.  They noted potential psychological overlay with poor performance on testing in clinic.  She states that since that time the numbness and tingling in the right arm as well as weakness has essentially resolved.  She is continuing to experience some numbness and tingling of the  first 3 digits of the left hand which is consistent with her previous carpal tunnel symptoms.  She did see Dr. Goff last month for new onset neuropathy in the right toes,, second third and fourth.  B12 and folate normal.  She was referred to neurology and [facility].    Sister is power of .  She expresses frustration with having to include her medical and financial decisions.  History of mild to moderate intellectual disabilities.    Heart health: She would like to have a discussion regarding general recommendations for heart health.  She is generally walking maybe once a week a few blocks.  She notes she runs to the bus when she misses one. Diet is low in plant sources    Aortic valve stenosis: Obtain follow-up echocardiogram in March of this year that showed no significant change when compared to study from 2017.  Normal ejection fraction.  Mild aortic stenosis.  She denies any shortness of breath or activity intolerance.    Urinary urgency: She states within the last month she is experiencing more difficulty with urinary incontinence.  She states she typically is unaware she needs to urinate until she has a very straight and urge.  Most often she is not able to make it to the bathroom in the situations without losing continence.  She states this typically occurs when she has forgotten to urinate for many hours and just simply is not aware that her bladder is full.  No stress incontinence noted.    Review of Systems:   Please see above.  The rest of the review of systems are negative for all systems.    Patient Care Team:  Cuca Fong CNP as PCP - General (Nurse Practitioner)  [facility] (Generic Provider)  Juan Montana MD as Psychologist (Psychiatry)  [facility]  (Generic Provider)  Brady Grijalva MD as Physician (Cardiology)  Service, Britton Molina MD as Physician (Otolaryngology)  Cuca Fong CNP as Assigned  PCP     Patient Active Problem List   Diagnosis     Bipolar II Disorder     Urge Incontinence Of Urine     Obstructive Sleep Apnea     Localized Primary Osteoarthritis Of The Knee     Conversion Disorder     Mixed hyperlipidemia     Anxiety Disorder NOS     Hypothyroidism     Anemia     Mild Intellectual Disabilities     Carpal tunnel syndrome     Vitamin D Deficiency     Obesity (BMI 30-39.9)     Memory loss     Nonrheumatic aortic (valve) stenosis with insufficiency     Pre-diabetes     Past Medical History:   Diagnosis Date     Anxiety      Depression      Disease of thyroid gland      Hyperlipidemia       Past Surgical History:   Procedure Laterality Date     BREAST BIOPSY      benign     CARPAL TUNNEL RELEASE Right      CERVICAL BIOPSY       DC BIOPSY OF BREAST, INCISIONAL      Description: Biopsy Breast Open;  Recorded: 07/14/2009;  Comments: right, benign      Family History   Problem Relation Age of Onset     Breast cancer Maternal Grandmother 85        d @94     Colon cancer Maternal Grandfather      Breast cancer Maternal Aunt 65     Breast cancer Cousin         diagnosed in her 30's     Heart disease Mother         d @42     Leukemia Father         d @49     Cancer Paternal Grandmother         lung     Diabetes Sister      Emphysema Brother      Alcoholism Brother      Mental retardation Brother      Heart murmur Brother      Liver cancer Paternal Aunt      Cancer Niece         breast, liver d@ 44       Social History     Socioeconomic History     Marital status:      Spouse name: Jesse     Number of children: 0     Years of education: Not on file     Highest education level: Not on file   Occupational History     Not on file   Social Needs     Financial resource strain: Not on file     Food insecurity:     Worry: Not on file     Inability: Not on file     Transportation needs:     Medical: Not on file     Non-medical: Not on file   Tobacco Use     Smoking status: Never Smoker     Smokeless tobacco:  Never Used   Substance and Sexual Activity     Alcohol use: No     Drug use: No     Sexual activity: Yes     Partners: Male     Birth control/protection: Post-menopausal   Lifestyle     Physical activity:     Days per week: Not on file     Minutes per session: Not on file     Stress: Not on file   Relationships     Social connections:     Talks on phone: Not on file     Gets together: Not on file     Attends Confucianist service: Not on file     Active member of club or organization: Not on file     Attends meetings of clubs or organizations: Not on file     Relationship status: Not on file     Intimate partner violence:     Fear of current or ex partner: Not on file     Emotionally abused: Not on file     Physically abused: Not on file     Forced sexual activity: Not on file   Other Topics Concern     Not on file   Social History Narrative     nulligravida       Current Outpatient Medications   Medication Sig Dispense Refill     atorvastatin (LIPITOR) 80 MG tablet Take 1 tablet (80 mg total) by mouth daily. 90 tablet 3     calcium-magnesium-zinc Tab Take 2 tablets by mouth daily.        cholecalciferol, vitamin D3, 1,000 unit tablet Take 1,000 Units by mouth daily.       clonazePAM (KLONOPIN) 0.5 MG tablet        dextroamphetamine-amphetamine (ADDERALL XR) 20 MG 24 hr capsule Take 40 mg by mouth.       divalproex (DEPAKOTE) 250 MG 24 hr tablet Take 250 mg by mouth at bedtime. Total dose 1250mg       divalproex (DEPAKOTE) 500 MG 24 hr tablet Take 1,000 mg by mouth at bedtime. Total 1250mg       DULoxetine (CYMBALTA) 30 MG capsule Take 90 mg by mouth daily.       ferrous sulfate 325 (65 FE) MG tablet Take 1 tablet (325 mg total) by mouth daily. 90 tablet 0     GLUCOSAMINE HCL/CHONDR RANDALL A NA (OSTEO BI-FLEX ORAL) Take 2 tablets by mouth daily.        levothyroxine (SYNTHROID, LEVOTHROID) 125 MCG tablet Take 1 tablet (125 mcg total) by mouth daily. 90 tablet 3     MULTIVITAMINS,THER W-MINERALS (VITAMINS & MINERALS  "ORAL) Take 1 tablet by mouth daily.       omega-3 fatty acids-vitamin E (FISH OIL) 1,000 mg cap Take 1 capsule by mouth daily.       No current facility-administered medications for this visit.       Objective:   Vital Signs:   Visit Vitals  /68 (Patient Site: Left Arm, Patient Position: Sitting, Cuff Size: Adult Large)   Pulse 68   Ht 5' 1.5\" (1.562 m)   Wt 181 lb 12 oz (82.4 kg)   LMP  (LMP Unknown)   BMI 33.79 kg/m         VisionScreening:  No exam data present     PHYSICAL EXAM  GENERAL: Alert, well appearing female  PSYCH: Pleasant mood, affect appropriate.   SKIN: No atypical lesions  EYES: Conjunctiva pink, sclera white, no exudates. BG.  EOMs intact.   EARS: TMs pearly grey, no bulging, redness, retraction.   MOUTH: Pharynx moist, pink without exudate. No tonsillar enlargement  NECK: No lymphadenopathy. Thyroid feels enlarged with prominence on left side.   CV: Regular rate and rhythm without murmurs, rubs or gallops.  RESP: Lung sounds clear  ABDOMEN: BS+. Abdomen soft, nontender to palpation without guarding. No organomegaly, masses or hernias  PV : No edema  BREASTS: Breasts symmetric, no dimpling, masses or skin discolorations seen. Areolas and nipples symmetric without discharge. On palpation, breast tissue supple and nontender. No masses or nodules. Axillary and epitrochlear lymph nodes nonpalpable.       Assessment Results 9/17/2019   Activities of Daily Living No help needed   Instrumental Activities of Daily Living 2-4 - Moderate impairment   Get Up and Go Score -   Mini Cog Total Score 5   Some recent data might be hidden     A Mini-Cog score of 0-2 suggests the possibility of dementia, score of 3-5 suggests no dementia    Identified Health Risks:     She is at risk for lack of exercise and has been provided with information to increase physical activity for the benefit of her well-being.  The patient was counseled and encouraged to consider modifying their diet and eating habits. She was " provided with information on recommended healthy diet options.  The patient was provided with written information regarding signs of hearing loss.  Information on urinary incontinence and treatment options given to patient.  The patient was provided with suggestions to help her develop a healthy emotional lifestyle.   The patient s PHQ-9 score is consistent with moderate depression.  She was provided with information regarding depression and she follows with psychiatry and a therapist regularly  Patient's advanced directive was discussed and encouraged patient to complete

## 2021-06-02 VITALS — HEIGHT: 61 IN | WEIGHT: 189 LBS | BODY MASS INDEX: 35.68 KG/M2

## 2021-06-02 VITALS — WEIGHT: 189.75 LBS | BODY MASS INDEX: 35.83 KG/M2 | HEIGHT: 61 IN

## 2021-06-02 NOTE — TELEPHONE ENCOUNTER
Reviewed EMG, please let her know that this was completely normal.  If she is still having any difficulty I recommend she schedule a follow up with neurology to discuss.

## 2021-06-02 NOTE — TELEPHONE ENCOUNTER
Patient Returning Call  Reason for call:  Return call  Information relayed to patient:  Patient was informed of her result below. Patient verbalized understanding to have a repeat ultrasound in 12 months and 24 months to monitor for stability in size. Patient is in agreement with this and has no further questions or concerns at this time.  Patient has additional questions:  No  If YES, what are your questions/concerns:  n/a  Okay to leave a detailed message?: No call back needed

## 2021-06-02 NOTE — TELEPHONE ENCOUNTER
Test Results  Who is calling?:  Patient   Who ordered the test:  Cuca Fong CNP  Type of test: Other: EMG  Date of test:  10/15/19  Where was the test performed:  [facility], [place]   What are your questions/concerns?:  Patient requesting for results .  Okay to leave a detailed message?:  No

## 2021-06-02 NOTE — TELEPHONE ENCOUNTER
Patient Returning Call  Reason for call:  Patient called back.  Information relayed to patient:  Informed of Cuca Fong NP's message listed below.  Patient states understanding.  Patient states she does not drive and it is difficult to get to all these appointment's she has had. States would like to know if the neurologist can call her on what the next steps would be.  Please call and advise.  Patient has additional questions:  Yes  If YES, what are your questions/concerns:  As above.  Okay to leave a detailed message?: Yes

## 2021-06-02 NOTE — TELEPHONE ENCOUNTER
Called pt and phone line was disconnected.  If pt calls back, please relay Cuca's message below.  Will try calling her back later today.

## 2021-06-02 NOTE — TELEPHONE ENCOUNTER
----- Message from Cuca Fong CNP sent at 10/3/2019 12:14 PM CDT -----  Attempted to reach patient with results but unable to leave VM on cell number and no voicemail on home phone.     Please try back and let her know her biopsy showed benign findings - this is like something called a colloid nodule.  We will plan to repeat an ultrasound in 12 months and 24 months to monitor for stability in size.

## 2021-06-03 VITALS — BODY MASS INDEX: 35.68 KG/M2 | HEIGHT: 61 IN | WEIGHT: 189 LBS

## 2021-06-03 VITALS
DIASTOLIC BLOOD PRESSURE: 68 MMHG | SYSTOLIC BLOOD PRESSURE: 122 MMHG | HEIGHT: 62 IN | HEART RATE: 68 BPM | BODY MASS INDEX: 33.45 KG/M2 | WEIGHT: 181.75 LBS

## 2021-06-03 VITALS — BODY MASS INDEX: 34.3 KG/M2 | WEIGHT: 183 LBS

## 2021-06-03 NOTE — TELEPHONE ENCOUNTER
Right hand problems x 2 weeks.  Palm of hand by thumb hurts and makes noise.  Thumb is sensitive.    Carpal tunnel in past and surgery.    Needs to be seen.  No known injury. Pain not as bad today as yesterday.    Patient to be seen in next 3 days per protocol. Walk In Clinic available as well.  Transferred to scheduling for an appointment.    Jodie Dsouza, RN, [facility] Nurse Triage/Med Refills RN     Reason for Disposition    MODERATE pain (e.g., interferes with normal activities) and present > 3 days    Protocols used: HAND AND WRIST PAIN-A-OH

## 2021-06-04 VITALS
BODY MASS INDEX: 34.25 KG/M2 | DIASTOLIC BLOOD PRESSURE: 68 MMHG | HEART RATE: 77 BPM | OXYGEN SATURATION: 96 % | SYSTOLIC BLOOD PRESSURE: 140 MMHG | WEIGHT: 184.25 LBS

## 2021-06-04 NOTE — PROGRESS NOTES
OFFICE VISIT - FAMILY MEDICINE     ASSESSMENT AND PLAN     1. Pain of finger of right hand  XR Hand Right 3 or More VWS   2. Thumb pain, right  XR Hand Right 3 or More VWS   X-ray wrist and thumb did not show any acute fracture, differential diagnosis discussed included tendinitis, degenerative joint disease, musculoskeletal injury etc., symptomatic care discussed, she will continue the wrist brace with a thumb splint, continued symptomatic care, activity modification, consider referral to orthopedist if not improving.    CHIEF COMPLAINT   Hand Pain (Rt.- pain started about 3 weeks ago, index finger-knuckle has intermitten sharp pain. Non-injury, had carpal tunnel years ago-wonders if same thing, but not having pain in rt wrist.)    HPI   Grace Chinchilla is a 64 y.o. female.  No Patient Care Coordination Note on file.  Right  thumb pain and some time second finger mild to moderate sharp pain, no specific injury, history of carpal tunnel syndrome release.  Occasional numbness and tingling.  No swelling no redness.  Some time mild discomfort at the wrist area.  Has been using a wrist splint with a thumb with some improvement.      Review of Systems As per HPI, otherwise negative.    OBJECTIVE   /68 (Patient Site: Left Arm, Patient Position: Sitting, Cuff Size: Adult Regular)   Pulse 77   Wt 184 lb 4 oz (83.6 kg)   LMP  (LMP Unknown)   SpO2 96%   BMI 34.25 kg/m    Physical Exam   Constitutional: She appears well-developed and well-nourished.   Musculoskeletal:         General: Tenderness (Minimal at the proximal interphalangeal joint of the right thumb) present. No deformity or edema.       [facility]     Family History   Problem Relation Age of Onset     Breast cancer Maternal Grandmother 85        d @94     Colon cancer Maternal Grandfather      Breast cancer Maternal Aunt 65     Breast cancer Cousin         diagnosed in her 30's     Heart disease Mother         d @42     Leukemia Father         d @49      Cancer Paternal Grandmother         lung     Diabetes Sister      Emphysema Brother      Alcoholism Brother      Mental retardation Brother      Heart murmur Brother      Liver cancer Paternal Aunt      Cancer Niece         breast, liver d@ 44      Social History     Socioeconomic History     Marital status:      Spouse name: Jesse     Number of children: 0     Years of education: Not on file     Highest education level: Not on file   Occupational History     Not on file   Social Needs     Financial resource strain: Not on file     Food insecurity:     Worry: Not on file     Inability: Not on file     Transportation needs:     Medical: Not on file     Non-medical: Not on file   Tobacco Use     Smoking status: Never Smoker     Smokeless tobacco: Never Used   Substance and Sexual Activity     Alcohol use: No     Drug use: No     Sexual activity: Yes     Partners: Male     Birth control/protection: Post-menopausal   Lifestyle     Physical activity:     Days per week: Not on file     Minutes per session: Not on file     Stress: Not on file   Relationships     Social connections:     Talks on phone: Not on file     Gets together: Not on file     Attends Nondenominational service: Not on file     Active member of club or organization: Not on file     Attends meetings of clubs or organizations: Not on file     Relationship status: Not on file     Intimate partner violence:     Fear of current or ex partner: Not on file     Emotionally abused: Not on file     Physically abused: Not on file     Forced sexual activity: Not on file   Other Topics Concern     Not on file   Social History Narrative     nulligravida      Relevant history was reviewed with the patient today, unless noted in HPI, nothing is pertinent for this visit.  [facility]     Patient Active Problem List    Diagnosis Date Noted     Thyroid nodule 10/03/2019     Overview Note:     Left thyroid nodule, FNA 10/19 - colloid nodule  Plan for repeat US at 12 and 24  months       Pre-diabetes 03/13/2019     Nonrheumatic aortic (valve) stenosis with insufficiency 03/22/2017     Obesity (BMI 30-39.9) 08/11/2015     Memory loss 08/11/2015     Vitamin D Deficiency      Overview Note:     Created by Conversion    Replacement Utility updated for latest IMO load       Bipolar II Disorder      Overview Note:     Created by Conversion    Replacement Utility updated for latest IMO load       Urge Incontinence Of Urine      Overview Note:     Created by Conversion         Obstructive Sleep Apnea      Overview Note:     Created by Conversion         Localized Primary Osteoarthritis Of The Knee      Overview Note:     Created by Conversion         Conversion Disorder      Overview Note:     Created by Conversion  Health [facility] Annotation: Dec 22 2011  5:35PM - Vera Roman: Intermittent   inability to open her eyes. Has been evaluated by ohthalmology.         Mixed hyperlipidemia      Overview Note:     Created by Conversion         Anxiety Disorder NOS      Overview Note:     Created by Conversion    Replacement Utility updated for latest IMO load       Hypothyroidism      Overview Note:     Created by Conversion    Replacement Utility updated for latest IMO load       Anemia      Overview Note:     Created by Conversion         Mild Intellectual Disabilities      Overview Note:     Created by Conversion         Carpal tunnel syndrome      Overview Note:     S/p release on right  Bracing left          Past Surgical History:   Procedure Laterality Date     BREAST BIOPSY      benign     CARPAL TUNNEL RELEASE Right      CERVICAL BIOPSY       MO BIOPSY OF BREAST, INCISIONAL      Description: Biopsy Breast Open;  Recorded: 07/14/2009;  Comments: right, benign     US THYROID BIOPSY  10/1/2019       RESULTS/CONSULTS (Lab/Rad)   No results found for this or any previous visit (from the past 168 hour(s)).  Xr Hand Right 3 Or More Vws    Result Date: 12/9/2019  EXAM: XR HAND RIGHT 3 OR MORE VWS  LOCATION: [facility] DATE/TIME: 12/9/2019 2:05 PM INDICATION: right 1st and 2nd finger pain COMPARISON: None.     Normal joint spaces and alignment. No fracture.    MEDICATIONS     Current Outpatient Medications on File Prior to Visit   Medication Sig Dispense Refill     atorvastatin (LIPITOR) 80 MG tablet Take 1 tablet (80 mg total) by mouth daily. 90 tablet 3     calcium-magnesium-zinc Tab Take 2 tablets by mouth daily.        cholecalciferol, vitamin D3, 1,000 unit tablet Take 1,000 Units by mouth daily.       clonazePAM (KLONOPIN) 0.5 MG tablet        dextroamphetamine-amphetamine (ADDERALL XR) 20 MG 24 hr capsule Take 40 mg by mouth.       divalproex (DEPAKOTE) 250 MG 24 hr tablet Take 250 mg by mouth at bedtime. Total dose 1250mg       divalproex (DEPAKOTE) 500 MG 24 hr tablet Take 1,000 mg by mouth at bedtime. Total 1250mg       DULoxetine (CYMBALTA) 30 MG capsule Take 90 mg by mouth daily.       ferrous sulfate 325 (65 FE) MG tablet Take 1 tablet (325 mg total) by mouth daily. 90 tablet 0     GLUCOSAMINE HCL/CHONDR RANDALL A NA (OSTEO BI-FLEX ORAL) Take 2 tablets by mouth daily.        levothyroxine (SYNTHROID, LEVOTHROID) 125 MCG tablet Take 1 tablet (125 mcg total) by mouth daily. 90 tablet 3     MULTIVITAMINS,THER W-MINERALS (VITAMINS & MINERALS ORAL) Take 1 tablet by mouth daily.       omega-3 fatty acids-vitamin E (FISH OIL) 1,000 mg cap Take 1 capsule by mouth daily.       No current facility-administered medications on file prior to visit.        HEALTH MAINTENANCE / SCREENING   PHQ-2 Total Score: 3 (9/17/2019 10:00 AM)  , PHQ-9 Total Score: 14 (9/17/2019 10:00 AM)  ,JILLIAN 7 Total Score: 9 (9/17/2019 11:00 AM)    Immunization History   Administered Date(s) Administered     Influenza high dose,seasonal,PF, 65+ yrs 09/17/2019     Influenza, seasonal,quad inj 6-35 mos 10/18/2011     Influenza,seasonal quad, PF, =/> 6months 12/05/2016     Pneumo Conj 13-V (2010&after) 09/17/2019     Td, Adult, Absorbed  10/18/1995     Td,adult,historic,unspecified 08/25/2005     Tdap 09/16/2015     ZOSTER, LIVE 09/16/2015     Health Maintenance   Topic     DEPRESSION ACTION PLAN      HEPATITIS C SCREENING      HIV SCREENING      ZOSTER VACCINES (2 of 3)     MEDICARE ANNUAL WELLNESS VISIT      PNEUMOCOCCAL IMMUNIZATION 65+ LOW/MEDIUM RISK (2 of 2 - PPSV23)     FALL RISK ASSESSMENT      MAMMOGRAM      DXA SCAN      COLONOSCOPY      LIPID      ADVANCE CARE PLANNING      TD 18+ HE      INFLUENZA VACCINE RULE BASED      TDAP ADULT ONE TIME DOSE        Morro Avendaño MD  Family Medicine, [facility]     This note was dictated using a voice recognition software.  Any grammatical or context distortion are unintentional and inherent to the software.

## 2021-06-04 NOTE — PATIENT INSTRUCTIONS - HE
Right Hand and thumb Tendinitis with mild osteoarthritis.  Wear a wrist brace,take tylenol or ibuprofen as needed  Could have you see the orthopedic for steroid injection if more pain

## 2021-06-05 VITALS
WEIGHT: 200.6 LBS | DIASTOLIC BLOOD PRESSURE: 60 MMHG | SYSTOLIC BLOOD PRESSURE: 148 MMHG | TEMPERATURE: 98.5 F | OXYGEN SATURATION: 97 % | HEART RATE: 72 BPM | BODY MASS INDEX: 37.29 KG/M2

## 2021-06-05 VITALS
OXYGEN SATURATION: 98 % | DIASTOLIC BLOOD PRESSURE: 62 MMHG | HEIGHT: 62 IN | HEART RATE: 78 BPM | SYSTOLIC BLOOD PRESSURE: 138 MMHG | WEIGHT: 203 LBS | BODY MASS INDEX: 37.36 KG/M2

## 2021-06-05 NOTE — TELEPHONE ENCOUNTER
"Pt had clinical eval 12/9/2019 for hand pain.  Had been only R hand at time of OV.  Now \"sometimes pain in L hand also.\"    Per OV notes of 12/9/19, pain primarily involved R thumb.  No fractures found at time of OV>  No wrist pain.  Pt was instructed as follows:  Right Hand and thumb Tendinitis with mild osteoarthritis.  Wear a wrist brace,take tylenol or ibuprofen as needed  Could have you see the orthopedic for steroid injection if more pain.  __________________________    Pain primarily involves \"webbing between thumb and index finger (2nd digit).\"  Pt has tried \"squeezing a ball.\"  Pain medication \"helped somewhat\" (Tylenol, ibuprofen).  \"Hand brace does not help.\"    Pt would like referral to an orthopedic provider to consider injection.  Needs orders entered please.  Pt is reliant upon public transportation for scheduling purposes.  Needs a location along a bus route.    Pt will await a callback -> [phone].  Detailed Voicemail message okay.    Cristiane Rocha RN  [facility] Triage     Reason for Disposition    [1] Follow-up call from patient regarding patient's clinical status AND [2] information NON-URGENT    Protocols used: PCP CALL - NO TRIAGE-A-AH      "

## 2021-06-05 NOTE — TELEPHONE ENCOUNTER
Spoke with pt and relayed message.  Informed her of where she had her most recent sleep follow up and gave her the phone number to where she can call to see about getting a new mask and tubing.

## 2021-06-05 NOTE — TELEPHONE ENCOUNTER
Left detailed message for pt letting her know that a referral for orthopedics was placed for her and someone should be in contact with her to get this scheduled.

## 2021-06-05 NOTE — TELEPHONE ENCOUNTER
I would need to know all of her settings and exactly what type of mask and hose she needs . For this reason it is best for her to request the order from the location where she had her most recent sleep study

## 2021-06-05 NOTE — TELEPHONE ENCOUNTER
Orders being requested: CPAP Mask and Hose  Reason service is needed/diagnosis: Sleep Apnea  When are orders needed by: as soon as possible  Where to send Orders: unknown patient goes to [facility]  Okay to leave detailed message?  Yes

## 2021-06-07 NOTE — TELEPHONE ENCOUNTER
Refill Approved    Rx renewed per Medication Renewal Policy. Medication was last renewed on 3/13/19.    Janelle Schmitt, [facility] Triage/Med Refill 3/24/2020     Requested Prescriptions   Pending Prescriptions Disp Refills     levothyroxine (SYNTHROID, LEVOTHROID) 125 MCG tablet 90 tablet 3     Sig: Take 1 tablet (125 mcg total) by mouth daily.       Thyroid Hormones Protocol Passed - 3/24/2020 12:04 PM        Passed - Provider visit in past 12 months or next 3 months     Last office visit with prescriber/PCP: 3/13/2019 Cuca Fong CNP OR same dept: 12/9/2019 Morro Avendaño MD OR same specialty: 12/9/2019 Morro Avendaño MD  Last physical: 9/17/2019 Last MTM visit: Visit date not found   Next visit within 3 mo: Visit date not found  Next physical within 3 mo: Visit date not found  Prescriber OR PCP: Cuca Fong CNP  Last diagnosis associated with med order: 1. Hypothyroidism, unspecified type  - levothyroxine (SYNTHROID, LEVOTHROID) 125 MCG tablet; Take 1 tablet (125 mcg total) by mouth daily.  Dispense: 90 tablet; Refill: 3    If protocol passes may refill for 12 months if within 3 months of last provider visit (or a total of 15 months).             Passed - TSH on file in past 12 months for patient age 12 & older     TSH   Date Value Ref Range Status   12/20/2019 1.60 0.30 - 5.00 uIU/mL Final

## 2021-06-07 NOTE — TELEPHONE ENCOUNTER
FYI - Status Update  Who is Calling: Patient  Update: Patient stated she requested a refill for her levothyroxine 2 weeks ago from her pharmacy and has heard nothing. Patient stated she is out of the levothyroxine and would like this sent in today.  Okay to leave a detailed message?:  No return call needed

## 2021-06-08 NOTE — PROGRESS NOTES
Assessment/Plan:        Diagnoses and all orders for this visit:    Shoulder pain  -     XR Shoulder Right 2 or More VWS; Future; Expected date: 1/23/17  -     Ambulatory referral to Physical Therapy    Hematuria  -     Urinalysis-UC if Indicated  -     Culture, Urine    Screen for colon cancer  -     Ambulatory referral for Colonoscopy    Bursitis of hip, right  -     meloxicam (MOBIC) 7.5 MG tablet; Take 1-2 tablets (7.5-15 mg total) by mouth daily. Take 1-2 tabs po q day prn pain  Dispense: 60 tablet; Refill: 0    Myofascial muscle pain  -     meloxicam (MOBIC) 7.5 MG tablet; Take 1-2 tablets (7.5-15 mg total) by mouth daily. Take 1-2 tabs po q day prn pain  Dispense: 60 tablet; Refill: 0    ray of the shoulder taken and read by me - this shows mild DJD in the AC joint. Otherwise is normal.   I believe she has a deltoid sprain. Will give trial of Pt for this. She has been using Meloxicam for her   Hip. This should work for her shoulder as well.   FU will be after PT if no improvmenet.         Subjective:    Patient ID: Grace Chinchilla is a 61 y.o. female.    Shoulder Pain    The pain is present in the right shoulder. This is a new problem. The current episode started more than 1 month ago. There has been no history of extremity trauma. The problem occurs intermittently. The problem has been unchanged. The quality of the pain is described as aching. The pain is at a severity of 3/10. The pain is mild. Associated symptoms include a limited range of motion. Pertinent negatives include no joint locking, joint swelling or stiffness. The symptoms are aggravated by activity. She has tried acetaminophen and NSAIDS for the symptoms. The treatment provided mild relief. There is no history of osteoarthritis.       The following portions of the patient's history were reviewed and updated as appropriate: allergies, current medications, past family history, past medical history, past social history, past surgical history  and problem list.    Review of Systems   Musculoskeletal: Positive for arthralgias and back pain. Negative for joint swelling, neck pain, neck stiffness and stiffness.   All other systems reviewed and are negative.            Objective:    Physical Exam   Constitutional: She appears well-developed and well-nourished. No distress.   Musculoskeletal:   Exam of the R shoulder shows no pain to palpation over the shoulder. She indicates pain over the lateral shoulder. ROM - Abduction active is to 80 degrees before pain. Abduction passive is to 100 degrees before pain. Forward flexion is to 90 degrees before pain. Internal and externall rotation are both painful. Positive give-away sign.    Skin: Skin is warm and dry.   Nursing note and vitals reviewed.

## 2021-06-08 NOTE — PROGRESS NOTES
Subjective   Chief Complaint:  Cough (non-productive cough); Dizziness; Fatigue; Loss of appetite; and URI    HPI:   Grace Chinchilla is a 61 y.o. female who presents for evaluation of cough, URI, fatigue.      She states cough and URI symptoms began one week ago.  Sneezing, nasal congestion, itchy eyes.  Have improved over the last two days but now she is concerned that she is feeling more fatigued, lightheaded at times. No pre-syncope.  States when she called the triage nurse about her cough on Monday she felt her heart rhythm was 'off' but cannot describe further.  She has discomfort in her chest wall in center of chest.  Tender to the touch.  Fatigue, lightheadedness and chest tenderness present at all times.  Fatigue is more bothersome when being more active around the house. Denies shortness of breath, exertional chest pain. No N/V.  No changes in gait.  No changes in vision.     She confirms no recent changes to medications.       PMH:   Patient Active Problem List   Diagnosis     Bipolar II Disorder     Urge Incontinence Of Urine     Obstructive Sleep Apnea     Localized Primary Osteoarthritis Of The Knee     Conversion Disorder     Mixed hyperlipidemia     Anxiety Disorder NOS     Hypothyroidism     Anemia     Asymptomatic Hyperuricemia     Mild Intellectual Disabilities     Microscopic Hematuria     Carpal Tunnel Syndrome     Vitamin D Deficiency     Benign Vulvar Neoplasm     Chronic back pain     Polypharmacy     Obesity (BMI 30-39.9)     Memory loss       Past Medical History:   Diagnosis Date     Anxiety      Depression      Disease of thyroid gland      Hyperlipidemia        Current Medications:   Current Outpatient Prescriptions on File Prior to Visit   Medication Sig Dispense Refill     atorvastatin (LIPITOR) 80 MG tablet Take 1 tablet by mouth once daily. 90 tablet 3     calcium-magnesium-zinc Tab Take 1 tablet by mouth daily.       CHROMIUM ORAL Take by mouth.       clonazePAM (KLONOPIN) 1 MG  tablet Take 1 mg by mouth bedtime.       dextroamphetamine-amphetamine (AMPHETAMINE-DEXTROAMPHETAMINE) 10 mg Tab tablet Take 10 mg by mouth daily.        divalproex (DEPAKOTE) 500 MG 24 hr tablet Take 1,000 mg by mouth daily.        DULoxetine (CYMBALTA) 30 MG capsule Take 90 mg by mouth daily.       ferrous sulfate 325 (65 FE) MG tablet Take 1 tablet by mouth daily. 90 tablet 1     GLUCOSAMINE HCL/CHONDR RANDALL A NA (OSTEO BI-FLEX ORAL) Take by mouth.       levothyroxine (SYNTHROID, LEVOTHROID) 112 MCG tablet Take 1 tablet (112 mcg total) by mouth daily. 90 tablet 0     meloxicam (MOBIC) 7.5 MG tablet Take 1-2 tablets (7.5-15 mg total) by mouth daily. Take 1-2 tabs po q day prn pain 60 tablet 0     MULTIVITAMINS,THER W-MINERALS (VITAMINS & MINERALS ORAL) Take 1 tablet by mouth daily.       omega-3 fatty acids-vitamin E (FISH OIL) 1,000 mg cap Take 1 capsule by mouth daily.       UNABLE TO FIND Med Name: Flax seed       No current facility-administered medications on file prior to visit.        Allergies:  has No Known Allergies.    SH/FH:  Social History and Family History reviewed and updated.   Tobacco Status:  She  reports that she has never smoked. She has never used smokeless tobacco.    Review of Systems:  A complete head to toe ROS is negative unless otherwise noted in HPI    Objective   There were no vitals filed for this visit.  Wt Readings from Last 3 Encounters:   01/23/17 188 lb (85.3 kg)   01/13/17 183 lb 11.2 oz (83.3 kg)   12/08/16 176 lb 14.4 oz (80.2 kg)       Physical Exam:  GENERAL: Alert, well appearing female.    SKIN: No rashes  HEAD: Normocephalic, atraumatic  EYES: Conjunctiva pink, sclera white, no exudates.   EARS: TMs pearly grey, no bulging, redness, retraction. Hearing grossly normal.   NOSE: Nares patent, no discharge.   MOUTH: Pharynx moist, pink without exudate. No tonsillar enlargement  NECK: No lymphadenopathy.   CV: Regular rate and rhythm without murmurs, rubs or gallops. Tenderness  to palpation of sternum  RESP: Lung sounds clear.  No wheezing, rhonchi, rales.   NEURO: Alert, oriented x3 CNs 2-12 intact.  Normal gait.  Negative Romberg.   EKG:  Normal sinus rhythm    Labs:    No results found for this or any previous visit (from the past 168 hour(s)).    Assessment & Plan   1. Fatigue:  Suspect fatigue is due to recovering from recent cough and URI, though will check labs today.  She did note some irregularity in heart rhythm two days ago, EKG normal today.  Advised to continue to monitor fatigue and return for reevaluation if no improvement in a week or symptoms worsen  - HM2(CBC w/o Differential)  - Comprehensive Metabolic Panel    2. Lightheadedness    3. Palpitations  - Electrocardiogram Perform - Clinic    4. URI (upper respiratory infection)    Cuca Fong, AMANDA

## 2021-06-08 NOTE — PROGRESS NOTES
ASSESSMENT:  1. Obstructive Sleep Apnea     2. Obesity (BMI 30-39.9)           PLAN:  Follow up in 1 month.  Continue supplements.   Continue to work on weight loss for treatment of obstructive sleep apnea, continue to use CPAP for treatment of this.  Goals set today:  1.  She will go back to the [facility] and set goal of getting a 2 times per week.  2.  She plans walk more at least one half hour daily.  3.  She'll not eat anything after her dinner meal.    Recommend increasing movement throughout the day--sitting less, moving more.  Will increase activity over time to reach a goal of at least 150 minutes of moderate exercise each week.  Behavior modification:  Cognitive restructuring exercises, journaling stressors, triggers for food cravings.  Dietary Intervention:  Reduced calorie, reduced carbohydrates, whole food diet.  Greater than 50% of this 15 minute visit was spent in counseling regarding patient's obesity, medications, dietary, exercise, and behavior modification recommendations.      SUBJECTIVE  Patient presents for treatment of chronic, comorbid conditions (RD) using intensive therapeutic lifestyle interventions including nutrition, physical activity, and behavior management.  She will have a difficult time over the Christmas season keeping her weight under control.  She snacks a lot in the evenings and feels that this is what she really needs to get a hold of.  She has not done any exercise.  To the [facility] and wants to get to 2 days per week.  We set some goals today as noted above.  She continues to CPAP return of obstructive sleep apnea.    Are you experiencing any side effects to the medications:  No  Hunger control:  good  Exercise was discussed: yes  Taking supplements:  yes  Discussed journaling food:  yes  Patient is pleased with the current results:  no  The patient is following the nutrition plan:  no  Barriers to losing weight: Social.    Patient Active Problem List   Diagnosis     Bipolar II  Disorder     Urge Incontinence Of Urine     Obstructive Sleep Apnea     Localized Primary Osteoarthritis Of The Knee     Conversion Disorder     Mixed hyperlipidemia     Anxiety Disorder NOS     Hypothyroidism     Anemia     Asymptomatic Hyperuricemia     Mild Intellectual Disabilities     Microscopic Hematuria     Carpal Tunnel Syndrome     Vitamin D Deficiency     Benign Vulvar Neoplasm     Chronic back pain     Polypharmacy     Obesity (BMI 30-39.9)     Memory loss       Current Outpatient Prescriptions on File Prior to Visit   Medication Sig Dispense Refill     atorvastatin (LIPITOR) 80 MG tablet Take 1 tablet by mouth once daily. 90 tablet 3     calcium-magnesium-zinc Tab Take 1 tablet by mouth daily.       CHROMIUM ORAL Take by mouth.       clonazePAM (KLONOPIN) 1 MG tablet Take 1 mg by mouth bedtime.       dextroamphetamine-amphetamine (AMPHETAMINE-DEXTROAMPHETAMINE) 10 mg Tab tablet Take 10 mg by mouth daily.        divalproex (DEPAKOTE) 500 MG 24 hr tablet Take 1,000 mg by mouth daily.        DULoxetine (CYMBALTA) 30 MG capsule Take 90 mg by mouth daily.       ferrous sulfate 325 (65 FE) MG tablet Take 1 tablet by mouth daily. 90 tablet 1     GLUCOSAMINE HCL/CHONDR RANDALL A NA (OSTEO BI-FLEX ORAL) Take by mouth.       levothyroxine (SYNTHROID, LEVOTHROID) 112 MCG tablet Take 1 tablet (112 mcg total) by mouth daily. 90 tablet 0     meloxicam (MOBIC) 7.5 MG tablet Take 1-2 tablets (7.5-15 mg total) by mouth daily. 60 tablet 0     MULTIVITAMINS,THER W-MINERALS (VITAMINS & MINERALS ORAL) Take 1 tablet by mouth daily.       omega-3 fatty acids-vitamin E (FISH OIL) 1,000 mg cap Take 1 capsule by mouth daily.       UNABLE TO FIND Med Name: Flax seed       No current facility-administered medications on file prior to visit.            OBJECTIVE:  Vitals:    01/13/17 1138   BP: 132/68   Pulse: 76     Initial Weight: 187 lbs  Weight: 183 lb 11.2 oz (83.3 kg)  Weight loss from initial: 3.3  % Weight loss: 1.76 %  Body  mass index is 34.15 kg/(m^2).  General:  Patient is alert, pleasant, no distress.  Patient is obese.       .  This note has been dictated using voice recognition software. Any grammatical or context distortions are unintentional and inherent to the software

## 2021-06-08 NOTE — PROGRESS NOTES
[facility] Certification Request    January 27, 2017      Patient: Grace Chinchilla  MR Number: [id number]  YOB: 1955  Date of Visit: 1/27/2017      Dear Dr. Brianna Kendrick:    Thank you for this referral.   We are seeing Grace Chinchilla for Physical Therapy of right shoulder pain with limited ROM and decreased strength.    Medicare and/or Medicaid requires physician review and approval of the treatment plan. Please review the plan of care and verify that you agree with the therapy plan of care by co-signing this note.      Plan of Care  Authorization / Certification Start Date: 01/27/17  Authorization / Certification End Date: 04/20/17  Authorization / Certification Number of Visits: 12  Communication with: Referral Source  Patient Related Instruction: Nature of Condition;Treatment plan and rationale;Self Care instruction;Basis of treatment;Posture;Precautions;Next steps;Expected outcome  Times per Week: 2  Number of Weeks: 6  Number of Visits: 12  Discharge Planning: Independent HEP and self-management of symptoms  Precautions / Restrictions : None  Therapeutic Exercise: ROM;Stretching;Strengthening  Neuromuscular Reeducation: kinesio tape;posture  Manual Therapy: soft tissue mobilization;myofascial release  Modalities: ultrasound;electrical stimulation;cold pack  Equipment: [employer]    Goals:  Pt. will demonstrate/verbalize independence in self-management of condition in : 6 weeks  Pt. will be independent with home exercise program in : 6 weeks  Patient will decrease : SPADI score;by _ points;for improved quality of function;for improved quality of life;in 6 weeks  by ___ points: 10  Pt will: be able to wash back via ER or IR with less pain, greater ease, in 6 weeks  Pt will: be able to reach overhead into cupboards with less pain, greater ease in 6 weeks  Pt will: be able to yolis UE garments with less pain, greater ease in 6 weeks  Pt will: be able to lift 10# loads such  as groceries with less pain, greater ease, in 6 weeks      If you have any questions or concerns, please don't hesitate to call.    Sincerely,      Grace Johnson, PT        Physician recommendation:     ___ Follow therapist's recommendation        ___ Modify therapy      *Physician co-signature indicates they certify the need for these services furnished within this plan and while under their care.          [facility]   Shoulder Initial Evaluation    Patient Name: Grace Chinchilla  PRECAUTIONS/RESTRICTIONS: None  Date of evaluation: 1/27/2017  12 visits requested through [facility]  Referral Diagnosis:   Right Shoulder Pain  Referring provider: Brianna Kendrick*  Visit Diagnosis:     ICD-10-CM    1. Right shoulder pain M25.511    2. Decreased ROM of right shoulder M25.611    3. Muscle weakness (generalized) M62.81    4. Abnormal posture R29.3        Assessment:      Skilled PT is required to modulate pain, increase ROM, strength and posture through manual therapy, modalities, exercise and education.  Pt. is appropriate for skilled PT intervention as outlined in the Plan of Care (POC).  Pt. is a good candidate for skilled PT services to improve pain levels and function.    Goals:  Pt. will demonstrate/verbalize independence in self-management of condition in : 6 weeks  Pt. will be independent with home exercise program in : 6 weeks  Patient will decrease : SPADI score;by _ points;for improved quality of function;for improved quality of life;in 6 weeks  by ___ points: 10  Pt will: be able to wash back via ER or IR with less pain, greater ease, in 6 weeks  Pt will: be able to reach overhead into cupboards with less pain, greater ease in 6 weeks    Patient's expectations/goals are realistic.    Barriers to Learning or Achieving Goals:  No Barriers.       Plan / Patient Instructions:        Plan of Care:   Authorization / Certification Start Date: 01/27/17  Authorization / Certification End Date:  04/20/17  Authorization / Certification Number of Visits: 12  Communication with: Referral Source  Patient Related Instruction: Nature of Condition;Treatment plan and rationale;Self Care instruction;Basis of treatment;Posture;Precautions;Next steps;Expected outcome  Times per Week: 2  Number of Weeks: 6  Number of Visits: 12  Discharge Planning: Independent HEP and self-management of symptoms  Precautions / Restrictions : None  Therapeutic Exercise: ROM;Stretching;Strengthening  Neuromuscular Reeducation: kinesio tape;posture  Manual Therapy: soft tissue mobilization;myofascial release  Modalities: ultrasound;electrical stimulation;cold pack  Equipment: [employer]    POC and pathology of condition were reviewed with patient.  Pt. is in agreement with the Plan of Care  A Home Exercise Program (HEP) was initiated today.  Pt. was instructed in exercises by PT and patient was given a handout with detailed instructions.    Plan for next visit: see 2x/week for 5 more visits then reassess needs.  Begin US to AP shoulder and teres musculature at 3 MHZ, 20%, 0.8 w/c2, add scapular retraction, pulleys, wand ER/IR and flex, progressing to row/pull downs, table top weight shifting, IR/Adduction with ex band, and active motion with progressive resistance.  .   Subjective:       Social information:      Occupation:  Homemaker   Work Status:  -   Equipment Available: None    History of Present Illness:    Grace Chinchilla is a 61 y.o. female who presents to therapy today with chief complaints of right shoulder pain with limited use and loss of motion, onset early December 2016.  Patient reports she feel in the snow in December and recalls she did not have the pain in the right shoulder prior to fallilng.  She denies any prior episodes of similar pain.  She has had left shoulder pain in the past, requiring PT.  She tried performing some of the previously instructed exercises from her left shoulder treatment but they were pain  provoking.  Pt demo's signs and sx consistent with Rotator cuff Sprain/strain.     Pain Rating current:  3  Pain rating at best: 1  Pain rating at worst: 8  Pain description: sharp and soreness    Functional limitations are described as occurring with:   reach up behind back and over shoulders to wash back, reach overhead into cupboards, donning UE garments, lift 10# loads    Patient reports benefit from:  rest  , cold       Objective:      Note: Items left blank indicates the item was not performed or not indicated at the time of the evaluation.    Patient Outcome Measures :    Shoulder Pain and Disability Index (SPADI) in %: 61   Scores range from 0-100%, where a score of 0% represents minimal pain and maximal function. The minimal clincically important difference is a score reduction of 10%.    Shoulder Examination  1. Right shoulder pain     2. Decreased ROM of right shoulder     3. Muscle weakness (generalized)     4. Abnormal posture       Precautions/Restrictions: None    Involved side: Right    Posture Observation: Standing: C-protraction, left shoulder/scapula, iliac crest high, increased L lordosis       Cervical Clearing: Cervical ROM  Date: 1/27/2017  *=pain     *Indicate scale AROM AROM AROM   Cervical Flexion Min  Loss, NE     Cervical Extension Min-mod loss, mild right shoulder pain upon return to neutral      Right Left Right Left Right Left   Cervical Sidebending Min-mod loss, NE Min to mod loss, NE       Cervical Rotation Min loss, NE Min loss, NE       Cervical Protraction      Cervical Retraction      Thoracic Flexion      Thoracic Extension      Thoracic Sidebending         Thoracic Rotation             Flexibility:     Palpation:  Tenderness right supraspinatus, subscapularis, coracoid process, teres musculature>>infraspinatus    Joint Assessment:  Sternoclavicular Joint Assessment: Not Indicated  Acromioclavicular Joint Assessment: Not Indicated  Scapulothoracic Joint Assessment: pain limited  motion  Glenohumeral Joint Assessment:Hypomobile., Posterior Capsule tightness., Inferior Capsule tightness.    Shoulder/Elbow ROM   Date: 1/27/2017  *=pain     Shoulder and Elbow ROM ( )   AROM in degrees AROM in degrees AROM in degrees    Right Left Right Left Right Left   Shoulder Flexion (0-180 ) 90 *, and pop upon return to neutral  140       Shoulder Abduction (0-180 ) 85 * 130       Shoulder Extension (0-60 ) 59 with end range * 62       Shoulder ER (0-90 ) 50 * end range 70       Shoulder IR (0-70 ) S3-4 * T12 bra line       Shoulder IR/Ext         Elbow Flexion (150 )         Elbow Extension (0 )          PROM in degrees PROM in degrees PROM in degrees    Right Left Right Left Right Left   Shoulder Flexion (0-180 ) 110 end range * with catch or locking upon return to neutral 130 end range pain       Shoulder Abduction (0-180 ) 100 end range * 115 end range *       Shoulder Extension (0-60 )         Shoulder ER (0-90 ) At neutral/base to 50o with end range * 85 end range *       Shoulder IR (0-70 ) To stomach as base postion 30 at 90 abduction with end range *       Elbow Flexion (150 )         Elbow Extension (0 )           Shoulder/Elbow Strength Tested isometric  Date: 1/27/2017  *=pain     Shoulder/Elbow Strength (/5)  Manual Muscle Test (MMT) MMT MMT MMT    Right Left Right Left Right Left   Shoulder Flexion  5       Supraspinatus         Shoulder Abduction * 4 with feeling of weakness       Shoulder Extension         Shoulder External Rotation 3+ tightness top of shoulder mild painAC pain 4+       Shoulder Internal Rotation 5 tightness 5 with tightness       Elbow Flexion 5 5       Elbow Extension 5- 5       Other:         Other:             Shoulder Special Tests     Impingement Cluster Right (+/-) Left (+/-) Rotator Cuff Tests Right (+/-) Left (+/-)   Jung-Bigg +  Drop Arm Sign     Painful Arc   Hornblowers     Infraspinatus Test   ERLS     AC Tests Right (+/-) Left (+/-) IRLS     Active  Compression   Labral Tests Right (+/-) Left (+/-)   Crossbody Adduction   Biceps Load Test II     AC Resisted Extension   Jerk Test     GH Instability Tests Right (+/-) Left (+/-) Marcelina Test     Sulcus Sign   Biceps Tests Right (+/-) Left (+/-)   Apprehension   Speed     Relocation   Brayan pena     Other:   Other:     Other:   Other:       Today's HEP:  Exercises:  Exercise #1: Codmans for flexioin/extension and rotation both directions, 10x each-H  Comment #1: Trial wand flex at standing/chest press into overhead flex 1x each with pain  Exercise #2: Use of cold packs instructed., 15-20 minutes, 2-3x-H    Tolerated CP well.  Pain significantly impacting AAROM of right arm.      Treatment Today  TREATMENT MINUTES COMMENTS   Evaluation 45 Shoulder/neck   Self-care/ Home management 8 See flow sheet   Manual therapy     Neuromuscular Re-education     Therapeutic Activity     Therapeutic Exercises 15 See flow sheet.  Detailed instruction on presumed cause of pain.  Patient had numerous questions on the meaning of the tests and findings   Gait training     Modality__________________          CP right AP shoulder, secured with sheet  8 No charge.   Total 68    Blank areas are intentional and mean the treatment did not include these items.     PT Evaluation Code: (Please list factors)  Patient History/Comorbidities: 1 1/2-2 month history of pain, no prior right shoulder pain  Examination: neck and shoulder ROM and strength  Clinical Presentation: stable  Clinical Decision Making:  low    Patient History/  Comorbidities Examination  (body structures and functions, activity limitations, and/or participation restrictions) Clinical Presentation Clinical Decision Making (Complexity)   No documented Comorbidities or personal factors 1-2 Elements Stable and/or uncomplicated Low   1-2 documented comorbidities or personal factor 3 Elements Evolving clinical presentation with changing characteristics Moderate   3-4 documented  comorbidities or personal factors 4 or more Unstable and unpredictable High              Grace Johnson  1/27/2017  2:07 PM

## 2021-06-09 NOTE — PROGRESS NOTES
"[facility] Daily Progress     Patient Name: Grace Chinchilla  PRECAUTIONS/RESTRICTIONS: none  Date: 3/29/2017  Visit #:  9/12  PTA visit #:  5  Referral Diagnosis:  Right shoulder pain  Referring provider: Brianna Kendrick*  Visit Diagnosis:     ICD-10-CM    1. Decreased ROM of right shoulder M25.611    2. Muscle weakness (generalized) M62.81    3. Abnormal posture R29.3    4. Acute pain of right shoulder M25.511    5. Right shoulder pain M25.511    6. Obesity (BMI 30-39.9) E66.9    7. Hip bursitis, right M70.71    8. Abnormality of gait R26.9    9. Chronic back pain M54.9     G89.29          Assessment:     Cues for HEP/posture-poor scapular retraction  Overall, ROM hasn't significantly improved but greatest improvement with shoulder abduction and IR.  Symptoms are variable but overall meeting expectations.  Reports feeling much better, lifting light grocery bags and reaching into cupboards for bowls with decreased pain.  Goals nearing      Goal Status:  Ongoing  Pt. will demonstrate/verbalize independence in self-management of condition in : Progressing toward  Pt. will be independent with home exercise program in : Progressing toward  Patient will decrease : Met  by ___ points: 10  Pt will: be able to wash back via ER or IR with less pain, greater ease, in 6 weeks-partially met  Pt will: be able to reach overhead into cupboards with less pain, greater ease in 6 week-progress toward goal  Pt will: be able to yolis UE garments with less pain, greater ease in 6 weeks-partially met  Pt will: be able to lift 10# loads such as groceries with less pain, greater ease, in 6 weeks-partially met    Plan / Patient Education:   Continue POC  Progress ex as tolerated   See once more    Subjective:     Pain Rating: right shoulder pain 5/10 intermittent pain this morning  Pt reports she is having some heart issues  \"Im wearing a heart monitor this morning\".  Ex 1-2x/day      Objective:   Pt wearing heart " monitor,held US today  Ex per flow sheet  Pt anxious about her heart condition so doing ex to tolerance        US to right AP shoulder    Treatment Today  3/29/2017      TREATMENT MINUTES COMMENTS   Evaluation     Self-care/ Home management     Manual therapy 5 Cross friction massage right anterior subscapularis and bicipital groove.   Neuromuscular Re-education     Therapeutic Activity     Therapeutic Exercises 18 Ex per flow sheet   Gait training     Modality__________________  US to right shoulder 2MHz 50% 1.0 wts/cms seated A/P   ROM assessment           Total 23    Blank areas are intentional and mean the treatment did not include these items.       Son Goldman  3/29/2017

## 2021-06-09 NOTE — PROGRESS NOTES
[facility] Daily Progress     Patient Name: Grace Chinchilla  PRECAUTIONS/RESTRICTIONS: none  Date: 3/15/2017  Visit #:  8/12  PTA visit #:  4  Referral Diagnosis:  Right shoulder pain  Referring provider: Brianna Kendrick*  Visit Diagnosis:     ICD-10-CM    1. Decreased ROM of right shoulder M25.611    2. Muscle weakness (generalized) M62.81    3. Abnormal posture R29.3    4. Acute pain of right shoulder M25.511    5. Right shoulder pain M25.511    6. Obesity (BMI 30-39.9) E66.9          Assessment:     Cues for HEP/posture-poor scapular retraction  Overall, ROM hasn't significantly improved but greatest improvement with shoulder abduction and IR.  Symptoms are variable but overall meeting expectations.  Reports feeling much better, lifting light grocery bags and reaching into cupboards for bowls with decreased pain.  Goals nearing      Goal Status:  Ongoing  Pt. will demonstrate/verbalize independence in self-management of condition in : Progressing toward  Pt. will be independent with home exercise program in : Progressing toward  Patient will decrease : Met  by ___ points: 10  Pt will: be able to wash back via ER or IR with less pain, greater ease, in 6 weeks-partially met  Pt will: be able to reach overhead into cupboards with less pain, greater ease in 6 week-progress toward goal  Pt will: be able to yolis UE garments with less pain, greater ease in 6 weeks-partially met  Pt will: be able to lift 10# loads such as groceries with less pain, greater ease, in 6 weeks-partially met    Plan / Patient Education:   Continue POC  Progress ex as tolerated   Assess outcomes and need for more visits    Subjective:     Pain Rating: right shoulder pain 1/10 intermittent pain states she is feeling much better  Reports relief with US  Ex 1-2x/day      Objective:   Arrived 12 mins late missed bus.  Reviewed HEP  Reviewed  flex and scaption to 90 degrees  Added empty can and cupboard reach with 6 oz  cans  Cues for HEP/posture needed  Today's Exercises: per flow sheet    US to right AP shoulder    Treatment Today  3/15/2017      TREATMENT MINUTES COMMENTS   Evaluation     Self-care/ Home management     Manual therapy  Cross friction massage right anterior subscapularis and bicipital groove.   Neuromuscular Re-education     Therapeutic Activity     Therapeutic Exercises 13 Ex per flow sheet   Gait training     Modality__________________ 8 + 2set up US to right shoulder 2MHz 50% 1.0 wts/cms seated A/P   ROM assessment           Total 23    Blank areas are intentional and mean the treatment did not include these items.       Son Goldman  3/15/2017

## 2021-06-09 NOTE — PROGRESS NOTES
[facility] Daily Progress     Patient Name: Grace Chinchilla  PRECAUTIONS/RESTRICTIONS: none  Date: 3/8/2017  Visit #:  7/12  PTA visit #:  3  Referral Diagnosis:  Right shoulder pain  Referring provider: Brianna Kendrick*  Visit Diagnosis:     ICD-10-CM    1. Decreased ROM of right shoulder M25.611    2. Muscle weakness (generalized) M62.81    3. Abnormal posture R29.3    4. Acute pain of right shoulder M25.511    5. Right shoulder pain M25.511    6. Hip bursitis, right M70.71    7. Abnormality of gait R26.9    8. Obesity (BMI 30-39.9) E66.9    9. Chronic back pain M54.9     G89.29          Assessment:     Cues for HEP/posture-poor scapular retraction  Complaint with HEP with some difficulty with wand exercise for Ext and IR and IR with ex band  Overall, ROM hasn't significantly improved but greatest improvement with shoulder abduction and IR.  Symptoms are variable but overall meeting expectations.  Greater ROM with IR but little and variable improvement in pain reduction with flexion/abduction, which is limiting ROM.  ROM is only slightly better.      Goal Status:  Ongoing  Pt. will demonstrate/verbalize independence in self-management of condition in : Progressing toward  Pt. will be independent with home exercise program in : Progressing toward  Patient will decrease : Met  by ___ points: 10  Pt will: be able to wash back via ER or IR with less pain, greater ease, in 6 weeks-partially met  Pt will: be able to reach overhead into cupboards with less pain, greater ease in 6 week-progress toward goal  Pt will: be able to yolis UE garments with less pain, greater ease in 6 weeks-partially met  Pt will: be able to lift 10# loads such as groceries with less pain, greater ease, in 6 weeks-partially met    Plan / Patient Education:   Continue POC  Progress ex as tolerated    CFM to anterior RC tendons and bicipital groove as tolerated   review flex/scaption to 90o     Subjective:     Pain Rating:  "right shoulder pain 3/10 constant., pt c/o being dizzy past few days seeing a Doc on Friday    Reports relief with US  Ex 1-2x/day      Objective:     Palpation:  Tenderness at right supraspinatus/subscapularis tendons and bicipital groove..tried CFM but pt uncomfortable so held today  Reviewed HEP  Added flex and scaption to 90 degrees  Cues for HEP/posture needed      Today's Exercises: per flow sheet    OPT EXERCISES 3/3/2017   Add Additional Exercises    Exercise #1    Comment #1    Exercise #2 Encouraged continued use of CP   Comment #2    Exercise #3    Comment #3 pulleys flex/ scaption/ and added IR with assist 15x each, scaption is less painful than flexion   Exercise #4 rows and pull downs with OTB 10x 5\"each-H   Comment #4 UBE 1.5 level,  F/B at 1 minute intervals for 2 cycles seat at #5   Exercise #5    Comment #5    Exercise #6 Ex Band IR with orange band, 10x5\"-reinstructed   Comment #6    Exercise #7 Passive flexion/scaptionx4 each, ER/IRx2 each       US to right AP shoulder    Treatment Today  3/8/2017      TREATMENT MINUTES COMMENTS   Evaluation     Self-care/ Home management     Manual therapy 5 Cross friction massage right anterior subscapularis and bicipital groove.   Neuromuscular Re-education     Therapeutic Activity     Therapeutic Exercises 12 Ex per flow sheet   Gait training     Modality__________________ 8 + 2set up US to right shoulder 2MHz 50% 1.0 wts/cms seated A/P   ROM assessment           Total 27    Blank areas are intentional and mean the treatment did not include these items.       Son Goldman  3/8/2017    "

## 2021-06-09 NOTE — PROGRESS NOTES
[facility] Daily Progress     Patient Name: Grace Chinchilla  PRECAUTIONS/RESTRICTIONS: none  Date: 2/21/2017  Visit #:3/12  PTA visit #:  2  Referral Diagnosis:  Right shoulder pain  Referring provider: Brianna Kendrick*  Visit Diagnosis:     ICD-10-CM    1. Right shoulder pain M25.511    2. Decreased ROM of right shoulder M25.611    3. Muscle weakness (generalized) M62.81    4. Abnormal posture R29.3    5. Acute pain of right shoulder M25.511    6. Hip bursitis, right M70.71    7. Abnormality of gait R26.9    8. Obesity (BMI 30-39.9) E66.9    9. Chronic back pain M54.9     G89.29          Assessment:     Cues for HEP/posture  Complaint with HEP     Goal Status:  Ongoing  Pt. will demonstrate/verbalize independence in self-management of condition in : 6 weeks  Pt. will be independent with home exercise program in : 6 weeks  Patient will decrease : SPADI score;by _ points;for improved quality of function;for improved quality of life;in 6 weeks  by ___ points: 10  Pt will: be able to wash back via ER or IR with less pain, greater ease, in 6 weeks  Pt will: be able to reach overhead into cupboards with less pain, greater ease in 6 weeks  Pt will: be able to yolis UE garments with less pain, greater ease in 6 weeks  Pt will: be able to lift 10# loads such as groceries with less pain, greater ease, in 6 weeks    Plan / Patient Education:   Continue POC  Progress ex as tolerated   continue US as needed    Subjective:     Pain Rating: right shoulder pain 2/10 constant, I haven't been doing my ex lately  Reports relief with US  Ex 1-2x/day        Objective:     Cues for HEP/posture needed  reviewed scapular retraction, wand flex, bench press, IR/ER with wand seated, pulleys  Added rows and pull downs with OTB, UBE level 1.5 F/B 4 mins  US to right AP shoulder continued  Tolerated treatment well      Treatment Today  2/21/2017      TREATMENT MINUTES COMMENTS   Evaluation     Self-care/ Home management      Manual therapy     Neuromuscular Re-education     Therapeutic Activity     Therapeutic Exercises 15 Ex per flow sheet   Gait training     Modality__________________ 8 + 2set up US to right shoulder 3MHz 20% 0.8wts/cms seated A/P              Total 25    Blank areas are intentional and mean the treatment did not include these items.       Son Goldman  2/21/2017

## 2021-06-09 NOTE — PROGRESS NOTES
ASSESSMENT:  1. Obstructive Sleep Apnea     2. Obesity (BMI 30-39.9)           PLAN:  Discussed with her at length that at this point I don't think continuing in her program would provide her additional benefit.  She admits she is not really very motivated in doing what she needs to do to lose weight.  I did give her some resources for her to use on her own at home and she will follow-up if ever at some point she feels it would be beneficial.    Greater than 50% of this 15 minute visit was spent in counseling regarding patient's obesity, medications, dietary, exercise, and behavior modification recommendations.      SUBJECTIVE  Patient presents for treatment of chronic, comorbid conditions (obstructive sleep apnea) using intensive therapeutic lifestyle interventions including nutrition, physical activity, and behavior management.  She has not been exercising at all and really not paying any attention to what she's been eating.  She tends to snack a lot on highly processed foods.  She does not feel motivated to lose weight though states she just wants the weight off.  She has gained 15 pounds over the past year.  We discussed issues of motivation.  We discussed that maybe this is not the right time for her to be focusing on weight loss.    Are you experiencing any side effects to the medications:  Not taking  Hunger control:  poor  Exercise was discussed: yes  Taking supplements:  no  Discussed journaling food:  yes  Patient is pleased with the current results:  no  The patient is following the nutrition plan:  no  Barriers to losing weight:  Psychology:   mental health    Patient Active Problem List   Diagnosis     Bipolar II Disorder     Urge Incontinence Of Urine     Obstructive Sleep Apnea     Localized Primary Osteoarthritis Of The Knee     Conversion Disorder     Mixed hyperlipidemia     Anxiety Disorder NOS     Hypothyroidism     Anemia     Asymptomatic Hyperuricemia     Mild Intellectual Disabilities      Microscopic Hematuria     Carpal Tunnel Syndrome     Vitamin D Deficiency     Benign Vulvar Neoplasm     Chronic back pain     Polypharmacy     Obesity (BMI 30-39.9)     Memory loss       Current Outpatient Prescriptions on File Prior to Visit   Medication Sig Dispense Refill     atorvastatin (LIPITOR) 80 MG tablet Take 1 tablet by mouth once daily. 90 tablet 3     calcium-magnesium-zinc Tab Take 1 tablet by mouth daily.       CHROMIUM ORAL Take by mouth.       clonazePAM (KLONOPIN) 1 MG tablet Take 1 mg by mouth bedtime.       dextroamphetamine-amphetamine (AMPHETAMINE-DEXTROAMPHETAMINE) 10 mg Tab tablet Take 10 mg by mouth daily.        divalproex (DEPAKOTE) 500 MG 24 hr tablet Take 1,000 mg by mouth daily.        DULoxetine (CYMBALTA) 30 MG capsule Take 90 mg by mouth daily.       ferrous sulfate 325 (65 FE) MG tablet Take 1 tablet by mouth daily. 90 tablet 1     GLUCOSAMINE HCL/CHONDR RANDALL A NA (OSTEO BI-FLEX ORAL) Take by mouth.       levothyroxine (SYNTHROID, LEVOTHROID) 112 MCG tablet Take 1 tablet (112 mcg total) by mouth daily. 90 tablet 0     meloxicam (MOBIC) 7.5 MG tablet Take 1-2 tablets (7.5-15 mg total) by mouth daily. Take 1-2 tabs po q day prn pain 60 tablet 0     MULTIVITAMINS,THER W-MINERALS (VITAMINS & MINERALS ORAL) Take 1 tablet by mouth daily.       omega-3 fatty acids-vitamin E (FISH OIL) 1,000 mg cap Take 1 capsule by mouth daily.       UNABLE TO FIND Med Name: Flax seed       No current facility-administered medications on file prior to visit.            OBJECTIVE:  Vitals:    02/21/17 1100   BP: 122/74   Pulse: 76     Initial Weight: 187 lbs  Weight: 187 lb 6.4 oz (85 kg)  Weight loss from initial: -0.4  % Weight loss: -0.21 %  Body mass index is 34.84 kg/(m^2).  General:  Patient is alert, pleasant, no distress.  Patient is obese.       .  This note has been dictated using voice recognition software. Any grammatical or context distortions are unintentional and inherent to the software

## 2021-06-09 NOTE — TELEPHONE ENCOUNTER
Medication Request  Medication name: ATORVASTATIN 80MG TALBET  Requested Pharmacy: [facility]  Reason for request: Electronic request  When did you use medication last?:  N/A  Patient offered appointment:  N/A - electronic request  Okay to leave a detailed message: no

## 2021-06-09 NOTE — PROGRESS NOTES
VENKATESH Cormier is a 61 year old lady who is here c/o several things. Her main issue is dizziness. She ssays this is not really dizziness but lightheadedness. She describes getting up and feeling unsteady. She has noticed this for the last week. She does not think this is getting any better. She also does not think that the episodes are getting less frequent or are shorter in duration. She denies vertigo. She does have tinnitus with these episodes. No N/V. No change in vision. She has a headache with this which she says is all over her head. No head injury. She denies numbness over her face or anywhere. No problems with weakness of the arms or legs.  She also requests a referral to dermatology for a general skin check.     O - VS _ BP - 138/62 height and weight are noted. This lady is alert and in NAD. HEENT - Eyes - PERRL EOMI - she could not follow for this. Fundi - benign. No AV nicking, exudastes or hemorraghes. Ears - TM clear. Canals normal. Nose - septum midline. Turbinates are clear. Throat - Posterior pharynx is non-red. No exudate or adenopathy. Lungs - clear throughout. Heart - RR and R S1S2 normal with Grade 2/6 systolic murmur . Neck - no thyromegaly. CN - 2-12 intact. DTR are = and full bilaterally both UE and LE.     ASS  - 1 Lightheadedness - ? Etiology               2. Heart murmur - per the patient this has not been THOMASON in the past               3. Request for skin check     PLAN - 1. Will check thyroid studies. She has had other bloodwork done recently                2. Echocardiogram                3. Referral to ENT - if this is not revealing and if sx continue, will get imaging of head and refer to neurology

## 2021-06-09 NOTE — PROGRESS NOTES
[facility] Daily Progress     Patient Name: Grace Chinchilla  PRECAUTIONS/RESTRICTIONS: none  Date: 2/24/2017  Visit:   #5/12  PTA visit #:  2  Referral Diagnosis:  Right shoulder pain  Referring provider: Brianna Kendrick*  Visit Diagnosis:     ICD-10-CM    1. Decreased ROM of right shoulder M25.611    2. Muscle weakness (generalized) M62.81    3. Abnormal posture R29.3    4. Acute pain of right shoulder M25.511          Assessment:     Cues for HEP/posture  Complaint with HEP   Overall, ROM hasn't significantly improved but greatest improvement with shoulder abduction and IR.  Symptoms are variable but overall meeting expectations.  SPADI demonstrated a statistically significant change.    Goal Status:  Ongoing  Pt. will demonstrate/verbalize independence in self-management of condition in : Partially Met  Pt. will be independent with home exercise program in : Partially met  Patient will decrease : Met  by ___ points: 10  Pt will: be able to wash back via ER or IR with less pain, greater ease, in 6 weeks-partially met  Pt will: be able to reach overhead into cupboards with less pain, greater ease in 6 week-progress toward goal  Pt will: be able to yolis UE garments with less pain, greater ease in 6 weeks-partially met  Pt will: be able to lift 10# loads such as groceries with less pain, greater ease, in 6 weeks-unmet    Plan / Patient Education:   Continue POC  Progress ex as tolerated   continue US as needed    Subjective:     Pain Rating: right shoulder pain 4/10 constant, Did exercises this morning  Reports relief with US  Ex 1-2x/day  Greater ease in reaching overhead to retrieve items on high shelves but more difficulty in lifting something up overhead to shelving,  Also lifting/carrying items is difficult.  Improvements:  Less intense and frequency of pain.    SPADI Outcome Measure:  Initial 61%  Current 37%      Objective:     Shoulder/Elbow ROM            Date: 1/27/2017  *=pain  " 2/24/2017     Shoulder and Elbow ROM ( ) AROM in degrees AROM in degrees AROM in degrees    Right Left Right Left Right Left   Shoulder Flexion (0-180 ) 90 *, and pop upon return to neutral  140 114 with pain          Shoulder Abduction (0-180 ) 85 * 130 95 with pain          Shoulder Extension (0-60 ) 59 with end range * 62  55  stretch         Shoulder ER (0-90 ) 50 * end range 70  55         Shoulder IR (0-70 ) S3-4 * T12 bra line L4-5          Shoulder IR/Ext               Elbow Flexion (150 )                   PROM in degrees PROM in degrees PROM in degrees     Right Left Right Left Right Left   Shoulder Flexion (0-180 ) 110 end range * with catch or locking upon return to neutral 130 end range pain  110 end range pain         Shoulder Abduction (0-180 ) 100 end range * 115 end range *  95o with pain         Shoulder Extension (0-60 )               Shoulder ER (0-90 ) At neutral/base to 50o with end range * 85 end range * 65 end range pain          Shoulder IR (0-70 ) To stomach as base postion 30 at 90 abduction with end range *  to stomach at base position         Elbow Flexion (150 )               Elbow Extension (0 )                 Palpation:  Tenderness at right supraspinatus/subscapularis tendons and coracoid process.    Cues for HEP/posture needed  Skin condition good at right shoulder.    Today's Exercises:  OPT EXERCISES 2/24/2017   Comment #3 pulleys flex/ scaption/ and added IR with assist 10x each   Exercise #4 rows and pull downs with OTB 10x 5\"each-H   Comment #4 UBE 1.5 level F/B at 1 minute intervals for s cycles seat at #6   Exercise #5    Comment #5    Exercise #6 Ex Band IR with orange band, 10x5\"-H       US to right AP shoulder continued  Tolerated treatment well      Treatment Today  2/24/2017      TREATMENT MINUTES COMMENTS   Evaluation     Self-care/ Home management     Manual therapy     Neuromuscular Re-education     Therapeutic Activity     Therapeutic Exercises 25 Ex per flow sheet "   Gait training     Modality__________________ 8 + 2set up US to right shoulder 3MHz 20% 0.8wts/cms seated A/P   ROM assessment 20          Total 55    Blank areas are intentional and mean the treatment did not include these items.       Grace Johnson  2/24/2017

## 2021-06-09 NOTE — PROGRESS NOTES
[facility] Daily Progress     Patient Name: Grace Chinchilla  PRECAUTIONS/RESTRICTIONS: none  Date: 3/3/2017  Visit #:  6/12  PTA visit #:  2  Referral Diagnosis:  Right shoulder pain  Referring provider: Brianna Kendrick*  Visit Diagnosis:     ICD-10-CM    1. Decreased ROM of right shoulder M25.611    2. Muscle weakness (generalized) M62.81    3. Abnormal posture R29.3    4. Acute pain of right shoulder M25.511          Assessment:     Cues for HEP/posture-poor scapular retraction  Complaint with HEP with some difficulty with wand exercise for Ext and IR and IR with ex band  Overall, ROM hasn't significantly improved but greatest improvement with shoulder abduction and IR.  Symptoms are variable but overall meeting expectations.  Greater ROM with IR but little and variable improvement in pain reduction with flexion/abduction, which is limiting ROM.  ROM is only slightly better.      Goal Status:  Ongoing  Pt. will demonstrate/verbalize independence in self-management of condition in : Progressing toward  Pt. will be independent with home exercise program in : Progressing toward  Patient will decrease : Met  by ___ points: 10  Pt will: be able to wash back via ER or IR with less pain, greater ease, in 6 weeks-partially met  Pt will: be able to reach overhead into cupboards with less pain, greater ease in 6 week-progress toward goal  Pt will: be able to yolis UE garments with less pain, greater ease in 6 weeks-partially met  Pt will: be able to lift 10# loads such as groceries with less pain, greater ease, in 6 weeks-partially met    Plan / Patient Education:   Continue POC  Progress ex as tolerated   Focus on CFM to anterior RC tendons and bicipital groove, need to focus on ROM and try flex/scaption to 90o and or isometric for strength.  Also need to ensure patient is performing exercises accurately.      Subjective:     Pain Rating: right shoulder pain 5/10 constant.  Some difficulty with wand  extension and IR.  Reports relief with US  Ex 1-2x/day  Improvements:  Greater ease in reaching overhead to retrieve items on high shelves but more difficulty in lifting something up overhead to shelving,  Also lifting/carrying items is difficult.  Feels ROM may be better.    Objective:     Shoulder/Elbow ROM            Date: 1/27/2017  *=pain  2/24/2017 3/3/2017    Shoulder and Elbow ROM ( ) AROM in degrees AROM in degrees AROM in degrees    Right Left Right Left Right Left   Shoulder Flexion (0-180 ) 90 *, and pop upon return to neutral  140 114 with pain    97 pain     Shoulder Abduction (0-180 ) 85 * 130 95 with pain     92 pain    Shoulder Extension (0-60 ) 59 with end range * 62  55  stretch   50      Shoulder ER (0-90 ) 50 * end range 70  55   55     Shoulder IR (0-70 ) S3-4 * T12 bra line L4-5    L3-4 pain      Shoulder IR/Ext               Elbow Flexion (150 )                   PROM in degrees PROM in degrees PROM in degrees     Right Left Right Left Right Left   Shoulder Flexion (0-180 ) 110 end range * with catch or locking upon return to neutral 130 end range pain  110 end range pain         Shoulder Abduction (0-180 ) 100 end range * 115 end range *  95o with pain         Shoulder Extension (0-60 )               Shoulder ER (0-90 ) At neutral/base to 50o with end range * 85 end range * 65 end range pain          Shoulder IR (0-70 ) To stomach as base postion 30 at 90 abduction with end range *  to stomach at base position         Elbow Flexion (150 )               Elbow Extension (0 )                 Palpation:  Tenderness at right supraspinatus/subscapularis tendons and bicipital groove..    Cues for HEP/posture needed      Today's Exercises:     OPT EXERCISES 3/3/2017   Add Additional Exercises    Exercise #1 Codmans for flexioin/extension and rotation both directions, 10x each; trial into abduction/adduction but with slight pain in abduction.   Comment #1    Exercise #2 Encouraged continued use of CP  "  Comment #2 scapular retraction 5x5\" then 5x10\" with cues/reinstruction   Exercise #3    Comment #3 pulleys flex/ scaption/ and added IR with assist 15x each, scaption is less painful than flexion   Exercise #4 rows and pull downs with OTB 10x 5\"each-H   Comment #4 UBE 1.5 level,  F/B at 1 minute intervals for s cycles seat at #6, noting pain at medial left elbow during     Exercise #5    Comment #5    Exercise #6 Ex Band IR with orange band, 10x5\"-reinstructed   Comment #6    Exercise #7 Passive flexion/scaptionx4 each, ER/IRx2 each       US to right AP shoulder and gentle CFM tolerated well.   Significant discomfort during last rep of supine scaption requiring patient to perform codmans dangle for distraction which resolved the pain.  Tolerated treatment well      Treatment Today  3/3/2017      TREATMENT MINUTES COMMENTS   Evaluation     Self-care/ Home management     Manual therapy 8 Cross friction massage right anterior subscapularis and bicipital groove.   Neuromuscular Re-education     Therapeutic Activity     Therapeutic Exercises 35 Ex per flow sheet   Gait training     Modality__________________ 8 + 2set up US to right shoulder 2MHz 50% 1.0 wts/cms seated A/P   ROM assessment           Total 53    Blank areas are intentional and mean the treatment did not include these items.       Grace Johnson  3/3/2017    "

## 2021-06-09 NOTE — PROGRESS NOTES
Consultation - [facility]  Grace Chinchilla,  1955, MRN [id number]    PCP: Brianna Kendrick MD, [phone]    Assessment and Plan: Lightheadedness and dizziness aortic stenosis and regurgitation   Recommendations: We will arrange for cardiac MRI to better delineate the aortic valve which could not be fully discerned by echo.  In addition I am concerned this could be a bicuspid valve I to ascertain the diagnosis of the structure and if bicuspid valve present aortic assessment for dilatation.    Chief Complaint: Dizziness lightheadedness    HPI:  We have been requested by Dr. Kendrick to evaluate Grace Chinchilla for consultation who is a  61 y.o. year old female for above.   Hx: 61-year-old woman with no previous cardiac history.  She gives a more recent history of recurrent episodes of lightheadedness and dizziness which she describes as different phenomena.  The dizziness is a sort of a spinning sensation not to the room around her but herself.  The lightheadedness is more of a transitory weakness that she experiences particularly when standing up abruptly.  Seems to be occurring daily has been more noticeable over the past month she does not have chest pain pressure or angina she does have shoulder arm ache with motion or movement of the extremity no changes in breathing pattern dyspnea or peripheral edema.  When she was young she had episodes of fainting but these have resolved for many years over 15 year she states she has a number of concerns that she listed for us and we reviewed today she does have apparently a family history of a hypertrophic cardiomyopathy which was written down a piece of paper for her to give to me today she is non-smoker no diabetes no hypertension hyperlipidemia coronary disease myocardial infarction.  The cardiac echo was obtained and showed mild regurgitation and stenosis but no ability to define the leaflet structure or number.    Medical  History  Active Ambulatory (Non-Hospital) Problems    Diagnosis     Dizzy spells     Nonrheumatic aortic (valve) stenosis with insufficiency     Obesity (BMI 30-39.9)     Memory loss     Chronic back pain     Polypharmacy     Benign Vulvar Neoplasm     Vitamin D Deficiency     Bipolar II Disorder     Urge Incontinence Of Urine     Obstructive Sleep Apnea     Localized Primary Osteoarthritis Of The Knee     Conversion Disorder     Mixed hyperlipidemia     Anxiety Disorder NOS     Hypothyroidism     Anemia     Asymptomatic Hyperuricemia     Mild Intellectual Disabilities     Microscopic Hematuria     Carpal Tunnel Syndrome     Past Medical History:   Diagnosis Date     Anxiety      Depression      Disease of thyroid gland      Hyperlipidemia        Surgical History  She  has a past surgical history that includes biopsy of breast, incisional; Breast biopsy; Carpal tunnel release (Right); and Cervical biopsy.    Social History  Reviewed, and she  reports that she has never smoked. She has never used smokeless tobacco. She reports that she does not drink alcohol or use illicit drugs.  Smoking status reviewed.  Social history othrwise not contributory to HPI.  Allergies  No Known Allergies    Family History  Reviewed, and family history includes Alcoholism in her brother; Breast cancer in her cousin; Breast cancer (age of onset: 65) in her maternal aunt; Breast cancer (age of onset: 85) in her maternal grandmother; Cancer in her niece and paternal grandmother; Colon cancer in her maternal grandfather; Diabetes in her sister; Emphysema in her brother; Heart disease in her mother; Heart murmur in her brother; Leukemia in her father; Liver cancer in her paternal aunt; Mental retardation in her brother.  Extended Emergency Contact Information  Primary Emergency Contact: Puja Cha   [place]  Work Phone: [phone]  Mobile Phone: [phone]  Relation: Sibling  Secondary Emergency Contact:  Jesse Chinchilla  Address: [address] [place] [address] [place]  Home Phone: [phone]  Relation: Spouse  Family history otherwise negative or not conributory to HPI.    Psychosocial Needs  Social History     Social History Narrative     nulligravida      Additional psychosocial needs reviewed per nursing assessment.    Prior to Admission Medications    (Not in a hospital admission)    Review of Systems:  A 12 point comprehensive review of systems was negative except as noted.  Review of systems is negative except for HPI  Physical Exam:  [unfilled]    Vital signs are stable adult female in no acute distress head and neck without focal cranial neurologic defect external eye exam normal external ear exam normal JVD is nondistended carotid upstrokes are normal no cervical lymphadenopathy.  No thyromegaly.  Breath sounds were clear without rales or wheezes.  Heart tones S1-S2 normal distinct regular there is a 2 to 3/6 murmur heard systolic midsystolic crescendo decrescendo mid chest extending upwards no diastolic murmur was heard no S3-S4 epigastric bowel tones are normal pulses were full and equal in the upper lower extremities joints with mild degenerative changes towards the lower extremity no peripheral edema skin warm and dry without rash nodules lesions spontaneous move all extremities.    Pertinent Labs  Lab Results: personally reviewed.   Hospital Outpatient Visit on 03/03/2017   Component Date Value     LV volume diastolic 03/03/2017 67      IVSd 03/03/2017 1.7      LVIDd 03/03/2017 3.5      LVIDs 03/03/2017 2.2      LVOT diam 03/03/2017 1.7      LVOT mean kuldeep 03/03/2017 119      LVOT mean gradient 03/03/2017 7      LVOT peak VTI 03/03/2017 37.2      LVOT peak kuldeep 03/03/2017 180      LVOT peak gradient 03/03/2017 13      LV PWd 03/03/2017 1.2      MV E' lat kuldeep 03/03/2017 7.6      MV E' med kuldeep 03/03/2017 6.43      AR decel slope 03/03/2017 3030      AR p 1/2  time 03/03/2017 359      AR peak kuldeep 03/03/2017 372      AV peak kuldeep 03/03/2017 210      AV mean kuldeep 03/03/2017 132      AV mean gradient 03/03/2017 8      AV VTI 03/03/2017 40.2      AO root 03/03/2017 3.6      AO ascending 03/03/2017 3.3      LA size 03/03/2017 4.4      LA/AO root ratio 03/03/2017 1.22      MR PISA peak kuldeep 03/03/2017 488      MV peak velocityoctiy 03/03/2017 132      MV decel time 03/03/2017 254      MV peak A kuldeep 03/03/2017 124      MV peak E kuldeep 03/03/2017 115      MV mean kuldeep 03/03/2017 85      MV mean gradient 03/03/2017 3      MV VTI 03/03/2017 46.3      PV mean kuldeep 03/03/2017 93.7      PV mean gradient 03/03/2017 4      PV VTI 03/03/2017 30      PV peak velocity 03/03/2017 139      RVIDd 03/03/2017 3.5      RVOT mean kuldeep 03/03/2017 81.8      RVOT mean gradient 03/03/2017 3      RVOT peak VTI 03/03/2017 23.9      RVOT peak kuldeep 03/03/2017 126      RVOT peak gradient 03/03/2017 6      TR peak kuldeep 03/03/2017 257      LA area 2 03/03/2017 19.7      LA area 1 03/03/2017 19.1      LA length 03/03/2017 5.33      TAPSE 03/03/2017 2.9      IVS/PW ratio 03/03/2017 1.4      MR peak gradent 03/03/2017 95.3      TR peak gradent 03/03/2017 26.4      LV FS 03/03/2017 37.1      LA volume 03/03/2017 60.0      LV mass 03/03/2017 183.0      AV area 03/03/2017 2.1      AV DIM IND kuldeep 03/03/2017 0.9      MV area cont eq 03/03/2017 1.8      PV peak gradient 03/03/2017 7.7      MV E/A Ratio 03/03/2017 0.9      LVOT area 03/03/2017 2.27      LVOT SV 03/03/2017 84.4      AV peak gradient 03/03/2017 17.6      MV peak gradient 03/03/2017 7.0      MV med E/e' ratio 03/03/2017 17.9      MV lat E/e' ratio 03/03/2017 15.1      MV Avg E/e' Ratio 03/03/2017 16.4      AR peak gradient 03/03/2017 55.4      AV DIM IND VTI 03/03/2017 0.9      MVA VTI 03/03/2017 1.82      Echo LVEF Estimated 03/03/2017 65    Office Visit on 02/20/2017   Component Date Value     Free T4 02/21/2017 0.9      TSH 02/21/2017 1.43    Office Visit on  02/08/2017   Component Date Value     WBC 02/08/2017 9.2      RBC 02/08/2017 4.74      Hemoglobin 02/08/2017 13.0      Hematocrit 02/08/2017 38.8      MCV 02/08/2017 82      MCH 02/08/2017 27.4      MCHC 02/08/2017 33.5      RDW 02/08/2017 14.4      Platelets 02/08/2017 294      MPV 02/08/2017 7.4      Sodium 02/08/2017 138      Potassium 02/08/2017 4.5      Chloride 02/08/2017 101      CO2 02/08/2017 27      Anion Gap, Calculation 02/08/2017 10      Glucose 02/08/2017 82      BUN 02/08/2017 19      Creatinine 02/08/2017 0.83      GFR MDRD Af Amer 02/08/2017 >60      GFR MDRD Non Af Amer 02/08/2017 >60      Bilirubin, Total 02/08/2017 0.4      Calcium 02/08/2017 9.7      Protein, Total 02/08/2017 7.3      Albumin 02/08/2017 4.1      Alkaline Phosphatase 02/08/2017 85      AST 02/08/2017 16      ALT 02/08/2017 16      VENTRICULAR RATE 02/08/2017 69      ATRIAL RATE 02/08/2017 69      P-R INTERVAL 02/08/2017 170      QRS DURATION 02/08/2017 92      Q-T INTERVAL 02/08/2017 356      QTC CALCULATION (BEZET) 02/08/2017 381      P Burton 02/08/2017 26      R AXIS 02/08/2017 42      T AXIS 02/08/2017 34      MUSE DIAGNOSIS 02/08/2017                      Value:Normal sinus rhythm with sinus arrhythmia  Normal ECG  No previous ECGs available  Confirmed by ARTURO THAKKAR MD LOC: ([id number]) on 2/8/2017 4:42:39 PM     Office Visit on 01/23/2017   Component Date Value     Color, UA 01/23/2017 Yellow      Clarity, UA 01/23/2017 Clear      Glucose, UA 01/23/2017 Negative      Bilirubin, UA 01/23/2017 Negative      Ketones, UA 01/23/2017 Negative      Specific Gravity, UA 01/23/2017 1.015      Blood, UA 01/23/2017 Negative      pH, UA 01/23/2017 7.5      Protein, UA 01/23/2017 Negative      Urobilinogen, UA 01/23/2017 0.2 E.U./dL      Nitrite, UA 01/23/2017 Negative      Leukocytes, UA 01/23/2017 Moderate*     Culture 01/23/2017 No Growth    Physical on 12/05/2016   Component Date Value     Color,  12/05/2016 Yellow      Clarity,  UA 12/05/2016 Clear      Glucose, UA 12/05/2016 Negative      Bilirubin, UA 12/05/2016 Negative      Ketones, UA 12/05/2016 Negative      Specific Gravity, UA 12/05/2016 1.020      Blood, UA 12/05/2016 Trace*     pH, UA 12/05/2016 6.0      Protein, UA 12/05/2016 Negative      Urobilinogen, UA 12/05/2016 0.2 E.U./dL      Nitrite, UA 12/05/2016 Negative      Leukocytes, UA 12/05/2016 Moderate*     Bacteria, UA 12/05/2016 Moderate*     RBC, UA 12/05/2016 0-2      WBC, UA 12/05/2016 5-10*     Squam Epithel, UA 12/05/2016 0-5      Case Report 12/05/2016                      Value:Gynecologic Cytology Report                       Case: [id number]                                   Authorizing Provider:  Brianna Kendrick MD Collected:           12/05/2016 [id number]              Ordering Location:     [facility] Received:            12/05/2016 3741                                     Medicine/OB                                                                  First Screen:          SARAH Harris                                                                            (ASCP)                                                                       Specimen:    SUREPATH PAP, SCREENING, Endocervical/cervical                                              Interpretation 12/05/2016 Negative for squamous intraepithelial lesion or malignancy      Result Flag 12/05/2016 Normal      Specimen Adequacy 12/05/2016 Satisfactory for evaluation, endocervical/transformation zone component present      HPV Reflex? 12/05/2016 Yes regardless of result      HIGH RISK 12/05/2016                      Value:No     LMP/Menopause Date 12/05/2016                      Value:remote     Abnormal Bleeding 12/05/2016                      Value:No     Pt Status 12/05/2016                      Value:na     Birth Control/Hormones 12/05/2016                      Value:None     Previous Normal/Date 12/05/2016                       Value:2013     Prev Abn Date/Dx 12/05/2016                      Value:no     Cervical Appearance 12/05/2016                      Value:Normal     Culture 12/05/2016 Mixture of urogenital organisms      Interpretation 12/05/2016 No HPV Type(s) Detected       12/05/2016 Job Dangelo MD, [facility]    Lab on 10/31/2016   Component Date Value     Bilirubin, Total 10/31/2016 0.4      Bilirubin, Direct 10/31/2016 0.1      Protein, Total 10/31/2016 6.8      Albumin 10/31/2016 3.9      Alkaline Phosphatase 10/31/2016 83      AST 10/31/2016 16      ALT 10/31/2016 14      Valproic Acid 10/31/2016 57.9      WBC 10/31/2016 6.7      RBC 10/31/2016 4.46      Hemoglobin 10/31/2016 12.1      Hematocrit 10/31/2016 35.8      MCV 10/31/2016 80      MCH 10/31/2016 27.2      MCHC 10/31/2016 33.8      RDW 10/31/2016 13.7      Platelets 10/31/2016 269      MPV 10/31/2016 8.2      Neutrophils % 10/31/2016 60      Lymphocytes % 10/31/2016 29      Monocytes % 10/31/2016 8      Eosinophils % 10/31/2016 3      Basophils % 10/31/2016 1      Neutrophils Absolute 10/31/2016 4.0      Lymphocytes Absolute 10/31/2016 1.9      Monocytes Absolute 10/31/2016 0.5      Eosinophils Absolute 10/31/2016 0.2      Basophils Absolute 10/31/2016 0.1        Pertinent Radiology  Radiology Results: See Report

## 2021-06-10 NOTE — PROGRESS NOTES
"[facility] Daily Progress     Patient Name: Grace Chinchilla  PRECAUTIONS/RESTRICTIONS: none  Date: 4/18/2017  Visit #:  9/12  PTA visit #:  5  Referral Diagnosis:  Right shoulder pain  Referring provider: Brianna Kendrick*  Visit Diagnosis:     ICD-10-CM    1. Decreased ROM of right shoulder M25.611    2. Muscle weakness (generalized) M62.81    3. Abnormal posture R29.3    4. Acute pain of right shoulder M25.511          Assessment:     Compliance with HEP:  Inconsistent performing exercises 2x/week.  Work up for cardiac issues interfered with compliance for a period of time.  Cues for HEP/posture-poor scapular retraction  Overall, right shoulder ROM has improved since initial assessment but she continues to have end range pain in all motions at AC joint and anterior shoulder.  Reports feeling much better, lifting light grocery bags and reaching into cupboards with decreased pain although she will have occasions when the shoulder will \"crink and become frozen.\"  Meeting expectations with progress toward functional goals.  SPADI Outcome Measure has demonstrated a statistically significant change.    Goal Status:  Ongoing  Pt. will demonstrate/verbalize independence in self-management of condition in : Met  Pt. will be independent with home exercise program in : Met  Patient will decrease : Met  by ___ points: 10  Pt will: be able to wash back via ER or IR with less pain, greater ease, in 6 weeks-progress toward goal but uses a scrub brush  Pt will: be able to reach overhead into cupboards with less pain, greater ease in 6 week-progress toward goal  Pt will: be able to yoils UE garments with less pain, greater ease in 6 weeks-progress toward goal but still modifies method, right arm in first  Pt will: be able to lift 10# loads such as groceries with less pain, greater ease, in 6 weeks-progress toward goal, able to lift a gallon of milk    Plan / Patient Education:     Patient will continue " independently at home and is aware of importance of exercise compliance to further increase strength and have less pain with ROM and functional use.    Subjective:     Pain Rating: right shoulder pain at rest 2/10, with use 4/10 but at its worst is still 7/10.  Overall the shoulder has been good reporting better ROM and flexibility.    Patient relates that due to lightheadedness and dizziness that she used a 24 hour heart monitor and had heart MRI on 4/13/2017 with concern over a heart murmur and heart valve.    SPADI Outcome Measure:  Initial 61%  Current 41%       Objective:     Palpation:  Tenderness right AC joint and supraspinatus  tendon    Shoulder/Elbow ROM            Date: 1/27/2017  *=pain 4/18/2017      Shoulder and Elbow ROM ( ) AROM in degrees AROM in degrees AROM in degrees    Right Left Right Left Right Left   Shoulder Flexion (0-180 ) 90 *, and pop upon return to neutral  140 138 end range pain          Shoulder Abduction (0-180 ) 85 * 130 110 end range pain          Shoulder Extension (0-60 ) 59 with end range * 62  60         Shoulder ER (0-90 ) 50 * end range 70 60 with end range pain          Shoulder IR (0-70 ) S3-4 * T12 bra line L4-5 pain          Shoulder IR/Ext               Elbow Flexion (150 )               Elbow Extension (0 )                 PROM in degrees PROM in degrees PROM in degrees     Right Left Right Left Right Left   Shoulder Flexion (0-180 ) 110 end range * with catch or locking upon return to neutral 130 end range pain 140 end range pain         Shoulder Abduction (0-180 ) 100 end range * 115 end range *  140 end range pain         Shoulder Extension (0-60 )               Shoulder ER (0-90 ) At neutral/base to 50o with end range * 85 end range * 85-90          Shoulder IR (0-70 ) To stomach as base postion 30 at 90 abduction with end range * 30 at 90o abduction.          Elbow Flexion (150 )               Elbow Extension (0 )                  Shoulder/Elbow Strength Tested  "isometric           Date: 1/27/2017  *=pain  4/18/2017     Shoulder/Elbow Strength (/5)  Manual Muscle Test (MMT) MMT MMT MMT    Right Left Right Left Right Left   Shoulder Flexion   5           Supraspinatus               Shoulder Abduction * 4 with feeling of weakness weak          Shoulder Extension               Shoulder External Rotation 3+ tightness top of shoulder mild painAC pain 4+  4         Shoulder Internal Rotation 5 tightness 5 with tightness 5          Elbow Flexion 5 5 5          Elbow Extension 5- 5  5-         Other:Adduction      painful         Other:                Cervical Clearing: Cervical ROM           Date: 1/27/2017  *=pain       *Indicate scale AROM AROM AROM   Cervical Flexion Min Loss, NE Nil loss     Cervical Extension Min-mod loss, mild right shoulder pain upon return to neutral Min-mod without pain        Right Left Right Left Right Left   Cervical Sidebending Min-mod loss, NE Min to mod loss, NE Status quo  Status quo       Cervical Rotation Min loss, NE Min loss, NE Status  quo Status quo         Complete HEP:  Exercises:  Exercise #1: Codmans for flexioin/extension and rotation both directions, 10x each; added abduction/adduction   Comment #1: wand flex /chest press supine 10x each  Exercise #2: Encouraged continued use of CP  Comment #2: scapular retraction 5x5\" then 5x10\" with cues/reinstruction  Exercise #3: wand shoulder IR/ER seated 10x [id number] 10x  Comment #3: pulleys flex/ scaption/ and added IR with assist 15x each, scaption is less painful than flexion  Exercise #4: rows and pull downs with OTB 10x 5\"each-  Comment #4: UBE 1.5 level,  F/B at 1 minute intervals for 2 cycles seat at #5,   Exercise #5: wand shoulder ext like a canoe x10  Exercise #6: Ex Band IR with orange band, 10x5\"  Comment #6: Kinesiotape for supraspinatus.  Skin prep applied.  Wear and care instructed HO issued.  Exercise #7: Passive flexion/scaptionx4 each, ER/IRx2 each  Comment #7: flex and " scaption to 90 degrees 10x each  Exercise #8: empty can 10x memo  Comment #8: cupboard reach with 6oz cans 10 memo  Exercise #9: carry and lift with 3# weight 4 x  Exercise #13: Wand ext, 10x-H      Treatment Today  4/18/2017      TREATMENT MINUTES COMMENTS   Evaluation     Self-care/ Home management     Manual therapy  Cross friction massage right anterior subscapularis and bicipital groove.   Neuromuscular Re-education     Therapeutic Activity     Therapeutic Exercises 30 Ex per flow sheet, PROM right shoulderx5 each   Gait training     Modality__________________  US to right shoulder 2MHz 50% 1.0 wts/cms seated A/P   ROM assessment 25 PROM, AROM shoulder, neck    CP right AP shoulder at left SL 10 No charge   Total 55 Without CP charge.   Blank areas are intentional and mean the treatment did not include these items.       Grace Johnson  4/18/2017        [facility] Discharge Summary  Patient Name: Grace Chinchilla  Date: 5/19/2017  Referral Diagnosis: Right Shoulder Pain  Referring provider: Brianna Kendrick*  Visit Diagnosis:   1. Decreased ROM of right shoulder     2. Muscle weakness (generalized)     3. Abnormal posture     4. Acute pain of right shoulder         Goals:  Pt. will demonstrate/verbalize independence in self-management of condition in : Met  Pt. will be independent with home exercise program in : Met  Patient will decrease : Met  Pt will: be able to wash back via ER or IR with less pain, greater ease, in 6 weeks-progress toward goal but uses a scrub brush  Pt will: be able to reach overhead into cupboards with less pain, greater ease in 6 week-progress toward goal  Pt will: be able to yolis UE garments with less pain, greater ease in 6 weeks-progress toward goal but still modifies method, right arm in first  Pt will: be able to lift 10# loads such as groceries with less pain, greater ease, in 6 weeks-progress toward goal, able to lift a gallon of milk    Patient was seen  for 9 visits from 1/27/2017 to 4/18/2017 with 12 missed/cancelled appointments.  The patient met/making progress toward goals and has demonstrated understanding of and independence in the home program for self-care, and progression to next steps.  She will initiate contact if questions or concerns arise.  The patient reports feeling better and did not wish to schedule further therapy at this time.  Patient received a home program shoulder ROM/shoulder and scapular strengthen  The patient was issued theraband/kinesiotape and instructed in proper usage.  No further therapy is required at this time.    Therapy will be discontinued at this time.  The patient will need a new referral to resume.    Thank you for your referral.  Grace Johnson  5/19/2017  8:16 AM

## 2021-06-11 NOTE — PROGRESS NOTES
"Office Visit - Follow Up   Grace Chinchilla   65 y.o. female    Date of Visit: 9/4/2020    Chief Complaint   Patient presents with     Knee Pain     Right x 6 days - No known injury        -------------------------------------------------------------------------------------------------------------------------  Assessment and Plan    Right knee pain at the medial joint line, onset 6 days ago on Saturday, August 29, 2020, without any preceding trauma, suspect this is meniscus pain    On examination today, she is walking with a limp, and has reduced flexion range of motion of the right knee.  I did not detect any effusion or ligament instability, however she is tender when I press at her medial joint line.    She is known to have osteoarthritis involving both of her hips, and she very well may have degenerative arthritis in her knees.    I am going to refer her to orthopedics for further evaluation, and imaging per discretion of the orthopedic specialist.    In the meantime, I told her that she could use over-the-counter ibuprofen 200 mg tablets, take 2 or 3 tablets up to 3 times a day.  I told her that ibuprofen has anti-inflammatory as well as pain relief activity.  She has been taking Tylenol (acetaminophen) which has no anti-inflammatory action, but does have some pain relieving analgesic effects.    She has no history of kidney disease, and her blood pressure is reasonably well controlled.    She told me that she has access to a cane, so I told her to hold the cane in her left hand meaning the \"good\" side, so that she leans away from the bad right knee.    Bipolar II Disorder          Urge Incontinence Of Urine       Obstructive Sleep Apnea           Mixed hyperlipidemia     Anxiety Disorder NOS    Hypothyroidism                 --------------------------------------------------------------------------------------------------------------------------  History of Present Illness  This 65 y.o. old woman    Right " knee pain at the medial joint line, onset 6 days ago on Saturday, August 29, 2020, without any preceding trauma    Patient reports pain in her right knee since Saturday.  Patient says it's the medial aspect of her knee; pain level is 7-8/10; no redness/swellng/rash/ulcer.  Patient reports she tried using some muscle rub.  Patient reports both ankles are slightly swollen.      Known to have hip osteoarthritis  XR HIP RIGHT 2 OR MORE VWS  2/11/2016 3:53 PM   INDICATION: Hip pain.      COMPARISON: None.  FINDINGS: Mild degenerative changes of both hips. Degenerative changes also noted in the lower lumbar    Thyroid nodule 10/03/2019  Left thyroid nodule, FNA 10/19 - colloid nodule  Plan for repeat US at 12 and 24 months     Pre-diabetes 03/13/2019  Nonrheumatic aortic (valve) stenosis with insufficiency 03/22/2017  Obesity (BMI 30-39.9) 08/11/2015  Memory loss 08/11/2015  Vitamin D Deficiency      Bipolar II Disorder      Urge Incontinence Of Urine       Obstructive Sleep Apnea       Localized Primary Osteoarthritis Of The Knee      Conversion Disorder    Dec 22 2011       Mixed hyperlipidemia      Anxiety Disorder NOS      Hypothyroidism      Anemia       Mild Intellectual Disabilities      Carpal tunnel syndrome      Wt Readings from Last 3 Encounters:   09/04/20 200 lb 9.6 oz (91 kg)   12/09/19 184 lb 4 oz (83.6 kg)   09/17/19 181 lb 12 oz (82.4 kg)     BP Readings from Last 3 Encounters:   09/04/20 148/60   12/09/19 140/68   09/17/19 122/68       Lab Results   Component Value Date    WBC 6.3 08/30/2019    HGB 12.0 08/30/2019    HCT 36.2 08/30/2019     08/30/2019    CHOL 180 09/17/2019    TRIG 95 09/17/2019    HDL 61 09/17/2019    LDLDIRECT 113 04/04/2013    ALT 25 08/30/2019    AST 23 08/30/2019     03/13/2019    K 4.6 03/13/2019     03/13/2019    CREATININE 0.84 03/13/2019    BUN 29 (H) 03/13/2019    CO2 27 03/13/2019    TSH 1.60 12/20/2019    GLUF 98 05/04/2011    HGBA1C 6.0 09/17/2019         ---------------------------------------------------------------------------------------------------------------------------    Medications, Allergies, Social, and Problem List   Current Outpatient Medications   Medication Sig Dispense Refill     atorvastatin (LIPITOR) 80 MG tablet Take 1 tablet (80 mg total) by mouth daily. 90 tablet 3     calcium-magnesium-zinc Tab Take 2 tablets by mouth daily.        cholecalciferol, vitamin D3, 1,000 unit tablet Take 1,000 Units by mouth daily.       clonazePAM (KLONOPIN) 0.5 MG tablet        dextroamphetamine-amphetamine (ADDERALL XR) 20 MG 24 hr capsule Take 40 mg by mouth.       divalproex (DEPAKOTE) 250 MG 24 hr tablet Take 250 mg by mouth at bedtime. Total dose 1250mg       divalproex (DEPAKOTE) 500 MG 24 hr tablet Take 1,000 mg by mouth at bedtime. Total 1250mg       DULoxetine (CYMBALTA) 30 MG capsule Take 90 mg by mouth daily.       ferrous sulfate 325 (65 FE) MG tablet Take 1 tablet (325 mg total) by mouth daily. 90 tablet 0     gabapentin (NEURONTIN) 100 MG capsule Take 2 capsules by mouth at bedtime       levothyroxine (SYNTHROID, LEVOTHROID) 125 MCG tablet Take 1 tablet (125 mcg total) by mouth daily. 90 tablet 2     MULTIVITAMINS,THER W-MINERALS (VITAMINS & MINERALS ORAL) Take 1 tablet by mouth daily.       omega-3 fatty acids-vitamin E (FISH OIL) 1,000 mg cap Take 1 capsule by mouth daily.       No current facility-administered medications for this visit.      Allergies   Allergen Reactions     Bupropion Unknown     Social History     Tobacco Use     Smoking status: Never Smoker     Smokeless tobacco: Never Used   Substance Use Topics     Alcohol use: No     Drug use: No     Patient Active Problem List   Diagnosis     Bipolar II Disorder     Urge Incontinence Of Urine     Obstructive Sleep Apnea     Localized Primary Osteoarthritis Of The Knee     Conversion Disorder     Mixed hyperlipidemia     Anxiety Disorder NOS     Hypothyroidism     Anemia     Mild  Intellectual Disabilities     Carpal tunnel syndrome     Vitamin D Deficiency     Obesity (BMI 30-39.9)     Memory loss     Nonrheumatic aortic (valve) stenosis with insufficiency     Pre-diabetes     Thyroid nodule      Reviewed, reconciled and updated       Physical Exam   General Appearance: Very pleasant, somewhat tangential in communication style, but does not appear to be in any distress, breathing comfortably    /60 (Patient Site: Right Arm, Patient Position: Sitting, Cuff Size: Adult Large)   Pulse 72   Temp 98.5  F (36.9  C) (Oral)   Wt 200 lb 9.6 oz (91 kg)   LMP  (LMP Unknown)   SpO2 97%   BMI 37.29 kg/m      Walks with a limp because of right knee pain  Overweight  Right knee has slightly reduced flexion range of motion, flexes to about 75 degrees, limited by pain  Quite tender when I push over her medial joint line  No effusion appreciated  No ligament instability appreciated       Additional Information        Jonatan Hair MD

## 2021-06-11 NOTE — PATIENT INSTRUCTIONS - HE
"Right knee pain at the medial joint line, onset 6 days ago on Saturday, August 29, 2020, without any preceding trauma, suspect this is meniscus pain    On examination today, she is walking with a limp, and has reduced flexion range of motion of the right knee.  I did not detect any effusion or ligament instability, however she is tender when I press at her medial joint line.    She is known to have osteoarthritis involving both of her hips, and she very well may have degenerative arthritis in her knees.    I am going to refer her to orthopedics for further evaluation, and imaging per discretion of the orthopedic specialist.    In the meantime, I told her that she could use over-the-counter ibuprofen 200 mg tablets, take 2 or 3 tablets up to 3 times a day.  I told her that ibuprofen has anti-inflammatory as well as pain relief activity.  She has been taking Tylenol (acetaminophen) which has no anti-inflammatory action, but does have some pain relieving analgesic effects.    She has no history of kidney disease, and her blood pressure is reasonably well controlled.    She told me that she has access to a cane, so I told her to hold the cane in her left hand meaning the \"good\" side, so that she leans away from the bad right knee.    Bipolar II Disorder          Urge Incontinence Of Urine       Obstructive Sleep Apnea           Mixed hyperlipidemia     Anxiety Disorder NOS    Hypothyroidism      "

## 2021-06-11 NOTE — TELEPHONE ENCOUNTER
"Patient reports pain in her right knee since Saturday.  Patient says it's the medial aspect of her knee; pain level is 7-8/10; no redness/swellng/rash/ulcer.  Patient reports she tried using some muscle rub.  Patient reports both ankles are slightly swollen.      Reviewed care advice with caller per RN triage protocol re: home care, reasons to call back and timeframe to be seen by a healthcare provider.  Patient was informed to be seen within 2-3 days.  Caller verbalized understanding but says her sister will have to transport her so she may have to wait until Monday or Tuesday or next week.      Reason for Disposition    [1] MODERATE pain (e.g., interferes with normal activities, limping) AND [2] present > 3 days    Additional Information    Negative: Sounds like a life-threatening emergency to the triager    Negative: [1] Swollen joint AND [2] fever    Negative: [1] Red area or streak AND [2] fever    Negative: Patient sounds very sick or weak to the triager    Negative: [1] SEVERE pain (e.g., excruciating, unable to walk) AND [2] not improved after 2 hours of pain medicine    Negative: [1] Can't move swollen joint at all AND [2] no fever    Negative: [1] Thigh or calf pain AND [2] only 1 side AND [3] present > 1 hour    Negative: [1] Thigh, calf, or ankle swelling AND [2] only 1 side    Negative: [1] Looks infected (spreading redness, pus) AND [2] large red area (> 2 in. or 5 cm)    Negative: [1] Very swollen joint AND [2] no fever    Negative: Blistering rash in area of pain  (i.e., dermatomal distribution or \"band\" or \"stripe\")    Negative: Looks like a boil, infected sore, or deep ulcer    Negative: [1] Redness of the skin AND [2] no fever    Protocols used: KNEE PAIN-A-AH      "

## 2021-06-14 NOTE — TELEPHONE ENCOUNTER
Reason for Call:  Other call back  and question for provider     Detailed comments: Puja calling with questions to provider; wondering if there's any recommendation for weight loss or exercise plan for pt and is the care team aware she's getting the cortisone injections as recommended on ltr from 12/28/20.    Phone Number Patient can be reached at: Other phone number:  [phone]    Best Time: anytime    Can we leave a detailed message on this number?: Yes    Call taken on 1/12/2021 at 12:14 PM by Nolan Valentin

## 2021-06-14 NOTE — TELEPHONE ENCOUNTER
Grace is having knee pain due to arthritis and wanted home care for that. Discussed with her. She also is going to discuss with her provider next Monday.     Reason for Disposition    Knee pain is a chronic symptom (recurrent or ongoing AND lasting > 4 weeks)    Swollen knee joint (no fever or redness)    Protocols used: KNEE PAIN-A-OH

## 2021-06-14 NOTE — TELEPHONE ENCOUNTER
Yes, weight loss is advised. It looks like this was discussed recently at her annual wellness visit with Dr. Agrawal.      In general, I recommend healthy eating with a mediterranean type diet. We can print information to mail.     Exercise 3-5 times a week for at least 30 minutes. This is important for the osteoarthritis in her knee as well as weight loss.  Something like a recumbent bike or water aerobics would be the most gentle on her joints.

## 2021-06-14 NOTE — PROGRESS NOTES
Clinic Care Coordination Contact  Community Health Worker Initial Outreach    CHW Initial Information Gathering:  Referral Source: PCP  Preferred Urgent Care: [facility], [phone]  Current living arrangement:: I live in a private home  Type of residence:: Apartment - handicap accessible  Informal Support system:: Family, Neighbors  Transportation means:: None  CHW Additional Questions  If ED/Hospital discharge, follow-up appointment scheduled as recommended?: N/A  MyChart active?: No  Patient agreeable to assistance with activating MyChart?: No    Patient accepts CC: Yes. Patient scheduled for assessment with CC DANIELE Caro on 1/12/2021 at 2pm. Patient noted desire to discuss Transportation resources.

## 2021-06-14 NOTE — TELEPHONE ENCOUNTER
Left message to call back for: Grace  Information to relay to patient:  See note,  Lab was faxed    Lena De Leon CMA ([facility])

## 2021-06-14 NOTE — PROGRESS NOTES
Clinic Care Coordination Contact  Due Date: Within 2 weeks from today's date, 1/12/2021  Delegation: No Show for SW appointment. Please follow unsuccessful outreach, no show standard work.

## 2021-06-14 NOTE — TELEPHONE ENCOUNTER
Cuca Fong is out of the office until 1/11/21.  Unable to schedule pt any sooner.  
FYI - Status Update  Who is Calling: Can someone call this patient back and see if she can get in with Cuca?  It looks like Cuca left a note that she had openings on 12/28 Tuesday afternoon, but no one called her back. (I got her one evening, I work the 3pm to 11 shift. So, I could not call her back during the day. I noticed no one has called her back. Is there any other openings before 1/6 for her to see Cuca?  If so, please call her to reschedule her AWV. Thanks. ( I had to schedule with Dr. Max as when I scheduled at night, there were no openings for Cuca) thanks. ( I just noticed this was still open)   Update: See above   Okay to leave a detailed message?:  Try to get patient on phone  
Joann Fong appears to have openings in the afternoon on 12/28  
New Appointment Needed  What is the reason for the visit:  Patient needs a physical around 11am before January 8th with Cuca Fong for a physical and lab work that needs to be completed before then per her psychiatrist. Can Cuca fit this patient in for an appointment?  Right now I have her Scheduled with Dr. Agrawal at the [facility] as this is the only clinic she can get to as she relies on a ride from family.      Provider Preference: Dr. Fong  How soon do you need to be seen?: on of before 1/8 with Cuca Fong [facility]  Waitlist offered?: N/A  Okay to leave a detailed message:  Yes, but it would be better to speak to patient. Thank you    
Moises Murphy)  Otolaryngology  [address] [place] [address]  Phone: [phone]  Fax: [phone]  Follow Up Time: 1-3 days

## 2021-06-14 NOTE — PROGRESS NOTES
Patient enrolled with CCC and goals were established with SW  Outreach Frequency: monthly     Plan: Will send care plan.  Delegating to CHW to call in less than one month. SW CC to do outreach in 45 days and as needed.   Next outreach 2/22/2021      CHW spoke to patient on 1/25 and rescheduled SW assmt regarding transportation on 1/26      No answer on 1/22- last attempt with patient will be made on 1/25    Clinic Care Coordination Contact  [facility]/Voicemail       Clinical Data: Care Coordinator Outreach  Outreach attempted x 1.  Left message on patient's voicemail with call back information and requested return call.  Plan: Care Coordinator reschedule missed SW phone assmt from 1/12/21    Care Coordinator will try to reach patient again in 3-5 business days.  1/22/2021

## 2021-06-14 NOTE — PROGRESS NOTES
"Grace Chinchilla is a 65 y.o. female who is being evaluated via a billable telephone visit.      The patient has been notified of following:     \"This telephone visit will be conducted via a call between you and your physician/provider. We have found that certain health care needs can be provided without the need for a physical exam.  This service lets us provide the care you need with a short phone conversation.  If a prescription is necessary we can send it directly to your pharmacy.  If lab work is needed we can place an order for that and you can then stop by our lab to have the test done at a later time.    Telephone visits are billed at different rates depending on your insurance coverage. During this emergency period, for some insurers they may be billed the same as an in-person visit.  Please reach out to your insurance provider with any questions.    If during the course of the call the physician/provider feels a telephone visit is not appropriate, you will not be charged for this service.\"    Patient has given verbal consent to a Telephone visit? Yes    What phone number would you like to be contacted at? [phone]    Patient would like to receive their AVS by AVS Preference: Mail a copy.    Additional provider notes:         Subjective   Chief Complaint:  Knee Pain (R knee pain x 2 weeks, got a lot worse last Monday; not a constant pain, pain comes and goes)    HPI:   Grace hCinchilla is a 65 y.o. female who presents for knee pain.     She complains of right knee pain. Began more intensely 12/9.  Intensified even more one week ago.  Has not noted any redness and warmth.  Has noted swelling.  Located on outside of right knee.  No injury that she recalls.  Taking Tylenol arthritis twice a day.      Did have an UC visit 9/4 for right knee pain with acute onset and no known injury. Referred to orthopedics, records reviewed.  Grade 3 osteoarthritis right knee.  Counseled on injections though preferred " to wait.     Today she states her sister controls her finances and her sister did not want her to get it due to concerns about side effects.  She states she does not believe she needs the cortisone injection.  She requests a letter be sent to her explaining the need for cortisone injection.     Puja Cha is POA.    [address] [facility] [address]    Allergies:  is allergic to bupropion.    SH/FH:  Social History and Family History reviewed and updated.   Tobacco Status:  She  reports that she has never smoked. She has never used smokeless tobacco.    Review of Systems:  A complete head to toe ROS is negative unless otherwise noted in HPI    Assessment & Plan   1. Primary localized osteoarthrosis of right lower leg  Referral back to orthopedics for consideration of steroid injection . Letter written to patient's sister per patient's request.  She is her POA.  Recommended continuing acetaminophen and adding topical Voltaren.   - diclofenac sodium (VOLTAREN) 1 % Gel; Apply three times daily to affected area as needed for pain.  Dispense: 100 g; Refill: 1  - Ambulatory referral to Orthopedics    2. Morbid obesity (H)  Discussed relation to arthritis and recommended continuing walking as able.   Cuca Fong CNP    Phone call duration:  23 minutes

## 2021-06-14 NOTE — PATIENT INSTRUCTIONS - HE
Patient Education   Urinary Incontinence, Female (Adult)  Urinary incontinence means loss of control of the bladder. This problem affects many women, especially as they get older. If you have incontinence, you may be embarrassed to ask for help. But know that this problem can be treated.  Types of Incontinence  There are different types of incontinence. Two of the main types are described here. You can have more than one type.    Stress incontinence. With this type, urine leaks when pressure (stress) is put on the bladder. This may happen when you cough, sneeze, or laugh. Stress incontinence most often occurs because the pelvic floor muscles that support the bladder and urethra are weak. This can happen after pregnancy and vaginal childbirth or a hysterectomy. It can also be due to excess body weight or hormone changes.    Urge incontinence (also called overactive bladder). With this type, a sudden urge to urinate is felt often. This may happen even though there may not be much urine in the bladder. The need to urinate often during the night is common. Urge incontinence most often occurs because of bladder spasms. This may be due to bladder irritation or infection. Damage to bladder nerves or pelvic muscles, constipation, and certain medicines can also lead to urge incontinence.  Treatment of urinary incontinence depends on the cause. Further evaluation is needed to find the type you have. This will likely include an exam and certain tests. Based on the results, you and your healthcare provider can then plan treatment. Until a diagnosis is made, the home care tips below can help relieve symptoms.  Home care    Do pelvic floor muscle exercises, if they are prescribed. The pelvic floor muscles help support the bladder and urethra. Many women find that their symptoms improve when doing special exercises that strengthen these muscles. To do the exercises contract the muscles you would use to stop your stream of urine,  but do this when you re not urinating. Hold for 10 seconds, then relax. Repeat 10 to 20 times in a row, at least 3 times a day. Your provider may give you other instructions for how to do the exercises and how often.    Keep a bladder diary. This helps track how often and how much you urinate over a set period of time. Bring this diary with you to your next visit with the provider. The information can help your provider learn more about your bladder problem.    Lose weight, if advised to by your provider. Excess weight puts pressure on the bladder. Your provider can help you create a weight-loss plan that s right for you. This may include exercising more and making certain diet changes.    Don't consume foods and drinks that may irritate the bladder. These can include alcohol and caffeinated drinks.    Quit smoking. Smoking and other tobacco use can lead to chronic cough that strains the pelvic floor muscles. Smoking may also damage the bladder and urethra. Talk with your provider about treatments or methods you can use to quit smoking.    If drinking large amounts of fluid causes you to have symptoms, you may be advised to limit your fluid intake. You may also be advised to drink most of your fluids during the day and to limit fluids at night.    If you re worried about urine leakage or accidents, you may wear absorbent pads to catch urine. Change the pads often. This helps reduce discomfort. It may also reduce the risk of skin or bladder infections.  Follow-up care  Follow up with your healthcare provider, or as directed. It may take some to find the right treatment for your problem. Your treatment plan may include special therapies or medicines. Certain procedures or surgery may also be options. Be sure to discuss any questions you have with your provider.  When to seek medical advice  Call the healthcare provider right away if any of these occur:    Fever of 100.4 F (38 C) or higher, or as directed by your  provider    Bladder pain or fullness    Abdominal swelling    Nausea or vomiting    Back pain    Weakness, dizziness or fainting  Date Last Reviewed: 10/1/2017    6138-6355 [employer]. [address] [place] [address], [place] [address]. All rights reserved. This information is not intended as a substitute for professional medical care. Always follow your healthcare professional's instructions.        Maalox or tums for acid reflux.

## 2021-06-14 NOTE — TELEPHONE ENCOUNTER
----- Message from Elysia Agrawal, DO sent at 1/11/2021  3:06 PM CST -----  Please call patient with their results. Let her know that all her labs look great.  Her screen for hepatitis C is negative.  Her thyroid is within normal limits.  She has normal kidney function.  Patient's blood levels look good.  Patient's hemoglobin is mildly low but nothing to worry about at this time.  Patient's vitamin D level is also mildly low.  Encouraged her to take a vitamin D supplement daily.  Patient should take 2000 units of vitamin D daily.    Please ensure a fax of these results has been sent to her psychiatrist.  Number was written in the lab order.    Elysia Agrawal

## 2021-06-14 NOTE — PROGRESS NOTES
Assessment and Plan:   Patient has been advised of split billing requirements and indicates understanding: Yes  Grace was seen today for annual wellness visit.    Routine general medical examination at a health care facility: Patient here for annual wellness exam.  Patient doing okay overall.  Patient will be due for her mammogram shortly.  Ordered this.  Will screen for hepatitis C.  Patient notes issues with IDLs.  Patient's  and family assist with this.  Patient notes issues with transportation to appointments.  Her insurance should cover rides.  We will send a referral to care management to help coordinate this.  -     Ambulatory referral to Care Management (Primary Care)  -     Hepatitis C Antibody (Anti-HCV)  -     Basic Metabolic Panel    Visit for screening mammogram  -     Mammo Screening Bilateral; Future    Drug therapy: Patient psychiatrist requesting labs.  Provided fax number in order  -     HM1(CBC and Differential)  -     Valproic Acid (Depakene )  -     Hepatic Profile    Obesity (BMI 35.0-39.9) with comorbidity (H): Discussed healthy eating and encouraged light exercise    Hypothyroidism, unspecified type: Patient on levothyroxine will complete yearly lab  -     Thyroid Stimulating Hormone (TSH)  -     T4, Total    Urge Incontinence Of Urine: Offered referral to pelvic floor PT.  Patient would like to wait till after Covid.  Will readdress at the next visit    Arthritis of knee: Patient is currently doing well with topical treatment.  We will continue to monitor.  Patient has had x-rays at [facility]    Vitamin D deficiency: Patient on therapy will screen today  -     Vitamin D, Total (25-Hydroxy)    Other orders  -     Pneumococcal polysaccharide vaccine 23-valent 3 yo or older, subq/IM    Elysia Agrawal D.O.  [facility]    The patient's current medical problems were reviewed.    The following health maintenance schedule was reviewed with the patient and provided  in printed form in the after visit summary:   Health Maintenance   Topic Date Due     HEPATITIS C SCREENING  1955     ZOSTER VACCINES (2 of 3) 11/11/2015     MAMMOGRAM  03/18/2021     MEDICARE ANNUAL WELLNESS VISIT  01/08/2022     FALL RISK ASSESSMENT  01/08/2022     LIPID  09/17/2024     ADVANCE CARE PLANNING  09/26/2024     TD 18+ HE  09/16/2025     COLORECTAL CANCER SCREENING  12/04/2028     DEXA SCAN  09/27/2034     DEPRESSION ACTION PLAN  Completed     Pneumococcal Vaccine: 65+ Years  Completed     INFLUENZA VACCINE RULE BASED  Completed     Pneumococcal Vaccine: Pediatrics (0 to 5 Years) and At-Risk Patients (6 to 64 Years)  Aged Out     HEPATITIS B VACCINES  Aged Out        Subjective:   Chief Complaint: Grace Chinchilla is an 65 y.o. female here for an Annual Wellness visit.   HPI:     Knee pain: seeing ortho, offered cortison injection. Trying topical medication. This is helping. Will continue this. Worse in the morning and late at night.     Needing labs for psychiatry. Sees psychology and psychiatry.     Patient describes having urge incontinence.  She notes that she does not have to go to the bathroom until she stands up and will have an urge to go.  Sometimes she loses her bladder.    Review of Systems:  Please see above.  The rest of the review of systems are negative for all systems.    Patient Care Team:  Cuca Fong CNP as PCP - General (Nurse Practitioner)  [facility] (Generic Provider)  Juan Montana MD as Psychologist (Psychiatry)  [facility]  (Generic Provider)  Brady Grijalva MD as Physician (Cardiology)  Service, Britton Molina MD as Physician (Otolaryngology)  Jonatan Hair MD as Assigned PCP     Patient Active Problem List   Diagnosis     Bipolar II Disorder     Urge Incontinence Of Urine     Obstructive Sleep Apnea     Localized Primary Osteoarthritis Of The Knee     Conversion Disorder     Mixed hyperlipidemia     Anxiety Disorder NOS      Hypothyroidism     Anemia     Mild Intellectual Disabilities     Carpal tunnel syndrome     Vitamin D Deficiency     Obesity (BMI 30-39.9)     Memory loss     Nonrheumatic aortic (valve) stenosis with insufficiency     Pre-diabetes     Thyroid nodule     Obesity (BMI 35.0-39.9) with comorbidity (H)     Past Medical History:   Diagnosis Date     Anxiety      Depression      Disease of thyroid gland      Hyperlipidemia       Past Surgical History:   Procedure Laterality Date     BREAST BIOPSY      benign     CARPAL TUNNEL RELEASE Right      CERVICAL BIOPSY       N[place] BIOPSY OF BREAST, INCISIONAL      Description: Biopsy Breast Open;  Recorded: 07/14/2009;  Comments: right, benign     US THYROID BIOPSY  10/1/2019      Family History   Problem Relation Age of Onset     Breast cancer Maternal Grandmother 85        d @94     Colon cancer Maternal Grandfather      Breast cancer Maternal Aunt 65     Breast cancer Cousin         diagnosed in her 30's     Heart disease Mother         d @42     Leukemia Father         d @49     Cancer Paternal Grandmother         lung     Diabetes Sister      Emphysema Brother      Alcoholism Brother      Mental retardation Brother      Heart murmur Brother      Liver cancer Paternal Aunt      Cancer Niece         breast, liver d@ 44       Social History     Socioeconomic History     Marital status:      Spouse name: Jesse     Number of children: 0     Years of education: Not on file     Highest education level: Not on file   Occupational History     Not on file   Social Needs     Financial resource strain: Not on file     Food insecurity     Worry: Not on file     Inability: Not on file     Transportation needs     Medical: Not on file     Non-medical: Not on file   Tobacco Use     Smoking status: Never Smoker     Smokeless tobacco: Never Used   Substance and Sexual Activity     Alcohol use: No     Drug use: No     Sexual activity: Yes     Partners: Male     Birth control/protection:  Post-menopausal   Lifestyle     Physical activity     Days per week: Not on file     Minutes per session: Not on file     Stress: Not on file   Relationships     Social connections     Talks on phone: Not on file     Gets together: Not on file     Attends Scientologist service: Not on file     Active member of club or organization: Not on file     Attends meetings of clubs or organizations: Not on file     Relationship status: Not on file     Intimate partner violence     Fear of current or ex partner: Not on file     Emotionally abused: Not on file     Physically abused: Not on file     Forced sexual activity: Not on file   Other Topics Concern     Not on file   Social History Narrative     nulligravida       Current Outpatient Medications   Medication Sig Dispense Refill     atorvastatin (LIPITOR) 80 MG tablet Take 1 tablet (80 mg total) by mouth daily. 90 tablet 3     calcium-magnesium-zinc Tab Take 2 tablets by mouth daily.        cholecalciferol, vitamin D3, 1,000 unit tablet Take 1,000 Units by mouth daily.       clonazePAM (KLONOPIN) 0.5 MG tablet        dextroamphetamine-amphetamine (ADDERALL XR) 20 MG 24 hr capsule Take 40 mg by mouth.       divalproex (DEPAKOTE) 250 MG 24 hr tablet Take 250 mg by mouth at bedtime. Total dose 1250mg       divalproex (DEPAKOTE) 500 MG 24 hr tablet Take 1,000 mg by mouth at bedtime. Total 1250mg       DULoxetine (CYMBALTA) 30 MG capsule Take 90 mg by mouth daily.       ferrous sulfate 325 (65 FE) MG tablet Take 1 tablet (325 mg total) by mouth daily. 90 tablet 0     gabapentin (NEURONTIN) 100 MG capsule Take 2 capsules by mouth at bedtime       glucosamine-chondroitin 500-400 mg cap Take 1 capsule by mouth 3 (three) times a day.       levothyroxine (SYNTHROID, LEVOTHROID) 125 MCG tablet Take 1 tablet (125 mcg total) by mouth daily. 90 tablet 2     MULTIVITAMINS,THER W-MINERALS (VITAMINS & MINERALS ORAL) Take 1 tablet by mouth daily.       omega-3 fatty acids-vitamin E  "(FISH OIL) 1,000 mg cap Take 1 capsule by mouth daily.       diclofenac sodium (VOLTAREN) 1 % Gel Apply three times daily to affected area as needed for pain. 100 g 1     No current facility-administered medications for this visit.       Objective:   Vital Signs:   Visit Vitals  /62 (Patient Site: Left Arm, Patient Position: Sitting, Cuff Size: Adult Regular)   Pulse 78   Ht 5' 1.5\" (1.562 m)   Wt 203 lb (92.1 kg)   LMP  (LMP Unknown)   SpO2 98%   BMI 37.74 kg/m       VisionScreening:  No exam data present     PHYSICAL EXAM  GENERAL:  Patient alert, in no acute distress. Overweight  EYES: PERRLA. Extraoccular movements intact, pupils equal, reactive to light and accommodation.  Normal conjunctiva and lids.   ENT:  Hearing grossly normal.  Normal appearance to ears and nose.  Bilateral TM s, external canals, oropharynx normal. Normal lips, gums and teeth.  Normal nasal mucosa, septum and turbinates.  NECK:  Supple, without thyromegaly or mass.  RESP:  Clear to auscultation without crackles, wheezes or distress.  Normal respiratory effort.   CV:  Regular rate and rhythm without murmurs, rubs or gallops.  Normal carotid, abdominal aorta, femoral and pedal pulses.  No varicosities or edema.  ABDOMEN:  Soft, non-tender, without hepatosplenomegaly, masses, or hernias.   LYMPHATIC: Normal palpation of neck, groin and axilla..  No lymphadenopathy.  No bruising.  NEURO:  CN II-XII intact, motor & sensory function all intact.  DTR and reflexes normal.  PSYCHIATRIC:  Alert & oriented with normal mood and affect.  Tangential   SKIN:  Normal inspection and palpation.  MUSCULOSKELETAL: Normal gait and station.  - Spine / Ribs / Pelvis: Normal inspection, ROM, stability and strength: Spine, Head, Neck, Upper and Lower Extremities.      Assessment Results 1/8/2021   Activities of Daily Living No help needed   Instrumental Activities of Daily Living 2-4 - Moderate impairment   Get Up and Go Score Less than 12 seconds   Mini " Cog Total Score 5   Some recent data might be hidden      and family help with IDLs     A Mini-Cog score of 0-2 suggests the possibility of dementia, score of 3-5 suggests no dementia    Identified Health Risks:     Information on urinary incontinence and treatment options given to patient.

## 2021-06-15 NOTE — PROGRESS NOTES
Clinic Care Coordination Contact  [facility]/Voicemail       Clinical Data: Care Coordinator Outreach  Outreach attempted x 1.  Left message on patient's voicemail with call back information and requested return call.  Plan: Care Coordinator UPDATE GOAL PROGRESSION    Care Coordinator will try to reach patient again in 10 business days.  3/3/2021

## 2021-06-15 NOTE — TELEPHONE ENCOUNTER
Refill Approved    Rx renewed per Medication Renewal Policy. Medication was last renewed on 3/24/20.    Heraclio Ortiz, [facility] Connection Triage/Med Refill 3/12/2021     Requested Prescriptions   Pending Prescriptions Disp Refills     levothyroxine (SYNTHROID, LEVOTHROID) 125 MCG tablet [Pharmacy Med Name: levothyroxine 125 mcg tablet (SYNTHROID)] 90 tablet 2     Sig: Take 1 tablet (125 mcg total) by mouth daily.       Thyroid Hormones Protocol Passed - 3/11/2021  4:34 PM        Passed - Provider visit in past 12 months or next 3 months     Last office visit with prescriber/PCP: 3/13/2019 Cuca Fong CNP OR same dept: Visit date not found OR same specialty: 12/9/2019 Morro Avendaño MD  Last physical: 9/17/2019 Last MTM visit: Visit date not found   Next visit within 3 mo: Visit date not found  Next physical within 3 mo: Visit date not found  Prescriber OR PCP: Cuca Fong CNP  Last diagnosis associated with med order: 1. Hypothyroidism, unspecified type  - levothyroxine (SYNTHROID, LEVOTHROID) 125 MCG tablet [Pharmacy Med Name: levothyroxine 125 mcg tablet (SYNTHROID)]; Take 1 tablet (125 mcg total) by mouth daily.  Dispense: 90 tablet; Refill: 2    If protocol passes may refill for 12 months if within 3 months of last provider visit (or a total of 15 months).             Passed - TSH on file in past 12 months for patient age 12 & older     TSH   Date Value Ref Range Status   01/08/2021 3.44 0.30 - 5.00 uIU/mL Final

## 2021-06-15 NOTE — PROGRESS NOTES
Assessment and Plan:   Normal exam on this 62 year old lady  1. Hypothyroid  -labs ddrawn to check this   2/Hyperlipidemia  - lipids checked   3. Peripheral numbness - Ft4, TSH, B12, folate, CMP, HM1, checked   4. R upper arm pain - no signs for a fracture   5. Hx of aortic stenosis - will get repeat echo in 1 year   6. RD - on CPAP  7. Vit d deficiency - levvel checked today      Health Maintenance   Topic Date Due     COLONOSCOPY  01/17/2013     INFLUENZA VACCINE RULE BASED (1) 08/01/2017     MAMMOGRAM  05/20/2018     ADVANCE DIRECTIVES DISCUSSED WITH PATIENT  12/03/2018     PAP SMEAR  12/05/2021     TD 18+ HE  09/16/2025     TDAP ADULT ONE TIME DOSE  Completed     ZOSTER VACCINE  Completed        Subjective:   Chief Complaint: Grace Chinchilla is an 62 y.o. female here for an Annual Wellness visit.   HPI:  Patient is feeling well. She has several questions - 1. R upper arm pain. This has been since she accidentally hit this arm about 2 weeks ago. No   Loss of strength or ROM.  2. She has 2 toes which go numb. This has been present for multiple months.  3. She states her feet will   Periodically feel numb. It has not gotten worse and has not extended up her leg.     Review of Systems:     Please see above.  The rest of the review of systems are negative for all systems.    Patient Care Team:  Brianna Kendrick MD as PCP - General (Family Medicine)     Patient Active Problem List   Diagnosis     Bipolar II Disorder     Urge Incontinence Of Urine     Obstructive Sleep Apnea     Localized Primary Osteoarthritis Of The Knee     Conversion Disorder     Mixed hyperlipidemia     Anxiety Disorder NOS     Hypothyroidism     Anemia     Asymptomatic Hyperuricemia     Mild Intellectual Disabilities     Microscopic Hematuria     Carpal Tunnel Syndrome     Vitamin D Deficiency     Benign Vulvar Neoplasm     Chronic back pain     Polypharmacy     Obesity (BMI 30-39.9)     Memory loss     Dizzy spells     Nonrheumatic  aortic (valve) stenosis with insufficiency     Past Medical History:   Diagnosis Date     Anxiety      Depression      Disease of thyroid gland      Hyperlipidemia       Past Surgical History:   Procedure Laterality Date     BREAST BIOPSY      benign     CARPAL TUNNEL RELEASE Right      CERVICAL BIOPSY       [place] BIOPSY OF BREAST, INCISIONAL      Description: Biopsy Breast Open;  Recorded: 07/14/2009;  Comments: right, benign      Family History   Problem Relation Age of Onset     Breast cancer Maternal Grandmother 85     d @94     Colon cancer Maternal Grandfather      Breast cancer Maternal Aunt 65     Breast cancer Cousin      diagnosed in her 30's     Heart disease Mother      d @42     Leukemia Father      d @49     Cancer Paternal Grandmother      lung     Diabetes Sister      Emphysema Brother      Alcoholism Brother      Mental retardation Brother      Heart murmur Brother      Liver cancer Paternal Aunt      Cancer Niece      breast, liver d@ 44       Social History     Social History     Marital status:      Spouse name: Jesse     Number of children: 0     Years of education: N/A     Occupational History     Not on file.     Social History Main Topics     Smoking status: Never Smoker     Smokeless tobacco: Never Used     Alcohol use No     Drug use: No     Sexual activity: Yes     Partners: Male     Birth control/ protection: Post-menopausal     Other Topics Concern     Not on file     Social History Narrative     nulligravida       Current Outpatient Prescriptions   Medication Sig Dispense Refill     atorvastatin (LIPITOR) 80 MG tablet Take 80 mg by mouth daily.       calcium-magnesium-zinc Tab Take 2 tablets by mouth daily.        cholecalciferol, vitamin D3, 1,000 unit tablet Take 1,000 Units by mouth daily.       clonazePAM (KLONOPIN) 0.5 MG tablet        dextroamphetamine-amphetamine (ADDERALL XR) 30 MG 24 hr capsule        dextroamphetamine-amphetamine (AMPHETAMINE-DEXTROAMPHETAMINE) 10 mg  "Tab tablet Take 20 mg by mouth daily.        divalproex (DEPAKOTE) 250 MG 24 hr tablet Take 250 mg by mouth at bedtime. Total dose 1250mg       divalproex (DEPAKOTE) 500 MG 24 hr tablet Take 1,000 mg by mouth at bedtime. Total 1250mg       DULoxetine (CYMBALTA) 30 MG capsule Take 90 mg by mouth daily.       ferrous sulfate 325 (65 FE) MG tablet Take 1 tablet (325 mg total) by mouth daily. 90 tablet 0     GLUCOSAMINE HCL/CHONDR RANDALL A NA (OSTEO BI-FLEX ORAL) Take 2 tablets by mouth daily.        levothyroxine (SYNTHROID, LEVOTHROID) 112 MCG tablet Take 1 tablet (112 mcg total) by mouth daily. 90 tablet 0     MULTIVITAMINS,THER W-MINERALS (VITAMINS & MINERALS ORAL) Take 1 tablet by mouth daily.       omega-3 fatty acids-vitamin E (FISH OIL) 1,000 mg cap Take 1 capsule by mouth daily.       CHROMIUM ORAL Take 2 tablets by mouth daily.        clonazePAM (KLONOPIN) 1 MG tablet Take 1 mg by mouth Daily after lunch.        meloxicam (MOBIC) 7.5 MG tablet Take 7.5-15 mg by mouth daily as needed for pain.       No current facility-administered medications for this visit.       Objective:   Vital Signs:   Visit Vitals     /72 (Patient Position: Sitting, Cuff Size: Adult Regular)     Ht 5' 1.25\" (1.556 m)     Wt 189 lb (85.7 kg)     LMP  (LMP Unknown)     BMI 35.42 kg/m2        VisionScreening:  No exam data present     PHYSICAL EXAM  Physical Examination: General appearance - alert, well appearing, and in no distress  Mental status - alert, oriented to person, place, and time  Eyes - pupils equal and reactive, extraocular eye movements intact  Ears - bilateral TM's and external ear canals normal  Nose - normal and patent, no erythema, discharge or polyps  Mouth - mucous membranes moist, pharynx normal without lesions and dental hygiene good  Neck - supple, no significant adenopathy, no thyromegaly  Lymphatics - no palpable lymphadenopathy, no hepatosplenomegaly  Chest - clear to auscultation, no wheezes, rales or rhonchi, " symmetric air entry  Heart - normal rate, regular rhythm, normal S1, S2, no murmurs, rubs, clicks or gallops  Abdomen - soft, nontender, nondistended, no masses or organomegaly  Breasts - breasts appear normal, no suspicious masses, no skin or nipple changes or axillary nodes  Pelvic - examination not indicated  Back exam - full range of motion, no tenderness, palpable spasm or pain on motion  Neurological - alert, oriented, normal speech, no focal findings or movement disorder noted  Musculoskeletal - toes indicated were the 3rd and 4th toes on the R foot. These are normal to appearance. Warm to touch.   Touch senssation is intact. Feet show intact senssation to touch. Pulses are intact. Exam of RUE - shows no bruising.   ROM - abduction is intact witihout pain. Forward flexion is intact without pain. Strength is 5/5. No point pain to palpation.   Extremities - peripheral pulses normal, no pedal edema, no clubbing or cyanosis  Skin - normal coloration and turgor, no rashes, no suspicious skin lesions noted      Assessment Results 1/15/2018   Activities of Daily Living No help needed   Instrumental Activities of Daily Living 2-4 - Moderate impairment   Get Up and Go Score Less than 12 seconds   Mini Cog Total Score 5   Some recent data might be hidden     A Mini-Cog score of 0-2 suggests the possibility of dementia, score of 3-5 suggests no dementia    Identified Health Risks:     The patient was provided with suggestions to help her develop a healthy lifestyle.

## 2021-06-15 NOTE — PROGRESS NOTES
Clinic Care Coordination Contact  [facility]/Voicemail       Clinical Data: Care Coordinator Outreach    Left message on patient's voicemail with call back information and requested return call and informing her that vaccines for COVID are available and to call if she wants assistance with setting up appointment.  CHW has left VM as well to check on progress on goals.  Plan: Delegating to CHW to continue to do outreach per protocol.  Care Coordinator will try to reach patient again in 45 days and as needed  NIRAJ Meneses  Clinic Care Coordinator  [phone]

## 2021-06-15 NOTE — PROGRESS NOTES
Clinic Care Coordination Contact  [facility]/Voicemail       Clinical Data: Care Coordinator Outreach  Outreach attempted x 1.  Left message on patient's voicemail with call back information and requested return call.  Plan: Care Coordinator update and discuss goal progression.   Care Coordinator will try to reach patient again in 10 business days.  3/18/21

## 2021-06-16 NOTE — PROGRESS NOTES
Clinic Care Coordination Contact    Situation: Patient chart reviewed by care coordinator.    Background: Enrolled in care coordination in January 2021.     Assessment: has been unreachable since assessment.     Plan/Recommendations: Disenrolling patient due to lack of follow up with team.    Vania Fortune [facility]  Clinic Care Coordinator  [phone]

## 2021-06-16 NOTE — PROGRESS NOTES
Clinic Care Coordination Contact  [facility]/Voicemail       Clinical Data: Care Coordinator Outreach  Outreach attempted x 2.  Left message on patient's voicemail with call back information and requested return call.  Plan: Care Coordinator will send disenrollment letter with care coordinator contact information via mail. Care Coordinator will try to reach patient again in 2 weeks.

## 2021-06-16 NOTE — PROGRESS NOTES
Clinic Care Coordination Contact  [facility]/Voicemail       Clinical Data: Care Coordinator Outreach  Outreach attempted x 2.  Left message on patient's voicemail with call back information and requested return call.  Plan: Care Coordinator will send unable to contact letter with care coordinator contact information via mail. Care Coordinator will try to reach patient again in 10 business days.  4/5/2021

## 2021-06-18 NOTE — PROGRESS NOTES
Assessment/ Plan  1. Ankle sprain  Requires x-ray by [place] criteria based on lateral malleolus tenderness and swelling and inability to walk  - XR Ankle Left 3 or More VWS; Future    2. Avulsion fracture of distal fibula  Nondisplaced  Plan is short sugar tong splint which I placed today with a 3 inch fiberglass roll and padding as well as 2 Ace wraps.  She was given crutches and instructions on how to use them.    Appointment made for next week, plan to transition to a walking boot cast  Recommend ice and elevation  Subjective  HPI  63-year-old with mild intellectual disability presents with ankle sprain injury  L Ankle Injury    When?  Last night  Description of injury-rolled ankle off uneven pavement, inversion  Felt a pop/snap? no  Pain: Immediately location: lat ankle  Quality: sharp  Severity:  variable  Things that trigger/exacerbate pain?  standing Able to walk? immediately after injury?  No now?  Hardly, using ski poles   Previous history of ankle sprains/ treatment? no  Comments: Significant pain and swelling.  Current Outpatient Prescriptions on File Prior to Visit   Medication Sig     atorvastatin (LIPITOR) 80 MG tablet Take 1 tablet (80 mg total) by mouth daily.     calcium-magnesium-zinc Tab Take 2 tablets by mouth daily.      cholecalciferol, vitamin D3, 1,000 unit tablet Take 1,000 Units by mouth daily.     CHROMIUM ORAL Take 2 tablets by mouth daily.      clonazePAM (KLONOPIN) 0.5 MG tablet      clonazePAM (KLONOPIN) 1 MG tablet Take 1 mg by mouth Daily after lunch.      dextroamphetamine-amphetamine (ADDERALL XR) 30 MG 24 hr capsule      dextroamphetamine-amphetamine (AMPHETAMINE-DEXTROAMPHETAMINE) 10 mg Tab tablet Take 20 mg by mouth daily.      divalproex (DEPAKOTE) 250 MG 24 hr tablet Take 250 mg by mouth at bedtime. Total dose 1250mg     divalproex (DEPAKOTE) 500 MG 24 hr tablet Take 1,000 mg by mouth at bedtime. Total 1250mg     DULoxetine (CYMBALTA) 30 MG capsule Take 90 mg by mouth daily.      ferrous sulfate 325 (65 FE) MG tablet Take 1 tablet (325 mg total) by mouth daily.     GLUCOSAMINE HCL/CHONDR RANDALL A NA (OSTEO BI-FLEX ORAL) Take 2 tablets by mouth daily.      levothyroxine (SYNTHROID, LEVOTHROID) 112 MCG tablet Take 1 tablet (112 mcg total) by mouth daily.     levothyroxine (SYNTHROID, LEVOTHROID) 125 MCG tablet Take 1 tablet (125 mcg total) by mouth daily.     meloxicam (MOBIC) 7.5 MG tablet Take 7.5-15 mg by mouth daily as needed for pain.     MULTIVITAMINS,THER W-MINERALS (VITAMINS & MINERALS ORAL) Take 1 tablet by mouth daily.     omega-3 fatty acids-vitamin E (FISH OIL) 1,000 mg cap Take 1 capsule by mouth daily.     No current facility-administered medications on file prior to visit.      Patient Active Problem List   Diagnosis     Bipolar II Disorder     Urge Incontinence Of Urine     Obstructive Sleep Apnea     Localized Primary Osteoarthritis Of The Knee     Conversion Disorder     Mixed hyperlipidemia     Anxiety Disorder NOS     Hypothyroidism     Anemia     Asymptomatic Hyperuricemia     Mild Intellectual Disabilities     Microscopic Hematuria     Carpal Tunnel Syndrome     Vitamin D Deficiency     Chronic back pain     Polypharmacy     Obesity (BMI 30-39.9)     Memory loss     Dizzy spells     Nonrheumatic aortic (valve) stenosis with insufficiency     Past Surgical History:   Procedure Laterality Date     BREAST BIOPSY      benign     CARPAL TUNNEL RELEASE Right      CERVICAL BIOPSY       MI BIOPSY OF BREAST, INCISIONAL      Description: Biopsy Breast Open;  Recorded: 07/14/2009;  Comments: right, benign     Family History   Problem Relation Age of Onset     Breast cancer Maternal Grandmother 85     d @94     Colon cancer Maternal Grandfather      Breast cancer Maternal Aunt 65     Breast cancer Cousin      diagnosed in her 30's     Heart disease Mother      d @42     Leukemia Father      d @49     Cancer Paternal Grandmother      lung     Diabetes Sister      Emphysema Brother       Alcoholism Brother      Mental retardation Brother      Heart murmur Brother      Liver cancer Paternal Aunt      Cancer Niece      breast, liver d@ 44        ROS  As listed above     Objective  Physical Exam  Vitals:    05/23/18 1517   BP: 132/54   Patient Site: Left Arm   Patient Position: Sitting   Cuff Size: Adult Regular   Pulse: 76   Resp: 12   Temp: 97.4  F (36.3  C)   TempSrc: Oral   Weight: 183 lb 12 oz (83.3 kg)     Left ankle inspection shows  Ankle being swollen  Bruising over the lateral malleolus  Palpation shows  Negative squeeze test- of tibia and fibula  Severe tenderness along distal fibula, lateral malleolus  No tenderness anterior inferior or posterior to the medial malleolus  No tenderness on squeezing the Achilles  Squeeze test of the calcaneus is negative  No tenderness along the fifth metatarsal  There is   Mild along the joint line anterior/dorsal  No tenderness along the anterior talo fibular ligament  No tenderness along the in inferior talo fibular ligament  Personally reviewed ankle films which show a nondisplaced avulsion type fracture of distal fibula.  Mortise appears intact            Please note: Voice recognition software was used in this dictation.  It may therefore contain typographical errors.

## 2021-06-18 NOTE — PROGRESS NOTES
Grace follows up today for lateral malleolar fracture left ankle.  She has been wearing a short stirrup splint and using crutches for 1 week.  She is here with her sister for follow-up.  She has had no significant problems.    Splint removed today, swelling is gone down somewhat, fair amount of bruising remains per still slightly tender.    She was given a walking boot cast which she was able to navigate.  Instructed that she could now bear weight, discussed range of motion exercises to use with her feet.  Have her continue to ice and elevate.  Follow-up 5 weeks from now, re-x-ray

## 2021-06-19 NOTE — PROGRESS NOTES
Assessment/ Plan  1. Avulsion fracture of distal fibula  6 weeks of walking boot cast, no radiographic evidence for healing, also, question on initial film of some mortise widening though patient had no medial tenderness.  Referred to orthopedics  - XR Ankle Left 3 or More VWS; Future  - Ambulatory referral to Orthopedics    2. Lightheadedness  No significant orthostatic changes encouraged increased fluid intake, follow-up if dizziness not improving  - Orthostatic blood pressure  - Ambulatory referral to Orthopedics    Body mass index is 35.42 kg/(m^2).    Subjective  CC:  Chief Complaint   Patient presents with     Dizziness     Has been getting dizzy with standing and doesn't go away for awhile  - started happening after the migraine      Follow-up     Ankle      HPI:  Patient presented with left ankle injury 5/23/18.  Ankle rolled off uneven pavement and had pain on lateral malleolus.  X-ray showed avulsion fraction.  Placed in an ankle stirrup splint for 1 week with crutches then transition to a boot cast.  Now here for follow-up.      Spells of presyncope    Narrative: 11 days ago had early am migraine, has had spells ever since then    How long have spells been happening/number of spells?  Above, 11 days  Duration of spells?  Just last a few seconds, like 32nd  Trigger? When standing up suddenly  Any symptoms that precede dizziness? no  Any symptoms while having dizziness? no  Chest pain?  No  Diaphoresis?  No  Shortness of breath?  No  History of heart disease?  No  History of seizures?  No      Patient Active Problem List   Diagnosis     Bipolar II Disorder     Urge Incontinence Of Urine     Obstructive Sleep Apnea     Localized Primary Osteoarthritis Of The Knee     Conversion Disorder     Mixed hyperlipidemia     Anxiety Disorder NOS     Hypothyroidism     Anemia     Asymptomatic Hyperuricemia     Mild Intellectual Disabilities     Microscopic Hematuria     Carpal Tunnel Syndrome     Vitamin D Deficiency      Chronic back pain     Polypharmacy     Obesity (BMI 30-39.9)     Memory loss     Dizzy spells     Nonrheumatic aortic (valve) stenosis with insufficiency     Current medications reviewed as follows:  Current Outpatient Prescriptions on File Prior to Visit   Medication Sig     atorvastatin (LIPITOR) 80 MG tablet Take 1 tablet (80 mg total) by mouth daily.     calcium-magnesium-zinc Tab Take 2 tablets by mouth daily.      cholecalciferol, vitamin D3, 1,000 unit tablet Take 1,000 Units by mouth daily.     CHROMIUM ORAL Take 2 tablets by mouth daily.      clonazePAM (KLONOPIN) 0.5 MG tablet      clonazePAM (KLONOPIN) 1 MG tablet Take 1 mg by mouth Daily after lunch.      dextroamphetamine-amphetamine (ADDERALL XR) 30 MG 24 hr capsule      dextroamphetamine-amphetamine (AMPHETAMINE-DEXTROAMPHETAMINE) 10 mg Tab tablet Take 20 mg by mouth daily.      divalproex (DEPAKOTE) 250 MG 24 hr tablet Take 250 mg by mouth at bedtime. Total dose 1250mg     divalproex (DEPAKOTE) 500 MG 24 hr tablet Take 1,000 mg by mouth at bedtime. Total 1250mg     DULoxetine (CYMBALTA) 30 MG capsule Take 90 mg by mouth daily.     ferrous sulfate 325 (65 FE) MG tablet Take 1 tablet (325 mg total) by mouth daily.     GLUCOSAMINE HCL/CHONDR RANDALL A NA (OSTEO BI-FLEX ORAL) Take 2 tablets by mouth daily.      levothyroxine (SYNTHROID, LEVOTHROID) 112 MCG tablet Take 1 tablet (112 mcg total) by mouth daily.     levothyroxine (SYNTHROID, LEVOTHROID) 125 MCG tablet Take 1 tablet (125 mcg total) by mouth daily.     meloxicam (MOBIC) 7.5 MG tablet Take 7.5-15 mg by mouth daily as needed for pain.     MULTIVITAMINS,THER W-MINERALS (VITAMINS & MINERALS ORAL) Take 1 tablet by mouth daily.     omega-3 fatty acids-vitamin E (FISH OIL) 1,000 mg cap Take 1 capsule by mouth daily.     No current facility-administered medications on file prior to visit.      History   Smoking Status     Never Smoker   Smokeless Tobacco     Never Used     Social History     Social  History Narrative     nulligravida      Patient Care Team:  Melita Caraballo PA-C as PCP - General (General Practice)  [facility] (Generic Provider)  Juan Montana MD as Psychologist (Psychiatry)  [facility] (Generic Provider)  Brady Grijalva MD as Physician (Cardiology)  Britton Blackwell MD as Physician (Otolaryngology)  ROS  Full 10 system review including constitutional, respiratory, cardiac, gi, urinary, rheumatologic, neurologic, reproductive, dermatologic psychiatric is  performed (via questionnaire) and is negative except for fatigue, dizziness, ringing in ears bilaterally, heartburn, constipation, urinary urgency, heat intolerance, discomfort from a tight bra, no desire for sex, anxiety, mood swings        Objective  Physical Exam  Vitals:    07/06/18 1311   BP: 124/58   Patient Site: Left Arm   Patient Position: Sitting   Cuff Size: Adult Regular   Pulse: 76   Resp: 16   Temp: 97.2  F (36.2  C)   TempSrc: Oral   Weight: 189 lb (85.7 kg)     Gen- alert, oriented/ appropriately responsive  HEENT- normal cephalic, atraumatic.   Chest- Normal inspiration and expiration.    Clear to ascultation.    No chest wall deformity or scar.  CV- Heart regular rate and rhythm  normal tones, no murmurs   No gallops or rubs.  Ext- appear well perfused, no edema  Skin- warm and dry,   no visualized rash  Lying down  -Blood pressure 142/58  -Pulse 63    Standing for 1 minute  -Blood pressure 138/60  -Pulse 60    Standing for 3 minutes  -Blood pressure 172/62  -Pulse 72      Ankle is examined and while slightly swollen is no longer tender over lateral malleolus  Diagnostics  X-ray is personally reviewed and shows no sign of healing of the fracture and possibly some widening of the fracture line    Please note: Voice recognition software was used in this dictation.  It may therefore contain typographical errors.

## 2021-06-19 NOTE — LETTER
Letter by Cuca Fong CNP at      Author: Cuca Fong CNP Service: -- Author Type: --    Filed:  Encounter Date: 10/1/2019 Status: Signed         Grace GELLER Giovannyrenan  [address] [place] [address]             October 1, 2019         Dear Ms. Amor,    Below are the results from your recent visit:    Resulted Orders   DXA Bone Density Scan    Narrative    9/27/2019      RE: Grace Amor  YOB: 1955        Dear Cuca Fong,    Patient Profile:  64 y.o. female, postmenopausal, is here for the follow up bone density   test.   History of fractures - Yes;  Tibia. Family history of osteoporosis - Yes;    sibling.  Family history of hip fracture: None. Smoking history - No.   Osteoporosis treatment past -  No. Osteoporosis treatment current - No.    Chronic medical problems - Thyroid disease. High risk medications -  None.    Assessment:    1. The spine bone density is best assessed with L2  excluded.  Using L1,   L3, and L4, bone density is in the normal range with a T score of -0.1.  2. Femoral bone densities show left femoral neck T- score of -0.5, and   right femoral neck T-score of -0.4..  3.  Since the scan in 2016, bone density has decreased a significant 3.2%   in the right total hip and 6.6% in the AP spine.  Bone density has not   significantly changed in the left total hip.  4.  The trabecular bone score could not be assessed.    64 y.o. female with NORMAL BONE DENSITY and LOW predicted fracture risk.         Recommendations:  Ensure adequate calcium, vitamin D intake, and regular weightbearing   exercise with a recheck in 3 to 5 years.      Bone densitometry was performed on your patient using our SkyBridgeXA   densitometer. The results are summarized and a copy of the actual scans   are included for your review. In conformity with the International Society   of Clinical Densitometry's most recent position statement for DXA   interpretation (2015), the  diagnosis will be made on the lowest measured   T-score of the lumbar spine, femoral neck, total proximal femur or 33%   radius. Note the change in terminology for diagnostic classification from   OSTEOPENIA to LOW BONE MASS. All trending for sequential exams will be   done using multiple vertebrae or the total proximal femur. Fracture risk   is based on the WHO Fracture Risk Assessment Tool (FRAX). If additional   information is needed or if you would like to discuss the results, please   do not hesitate to call me.       Thank you for referring this patient to [facility].   We are happy to be of service in support of you and your practice. If you   have any questions or suggestions to improve our service, please call me   at [phone].     Sincerely,     Tripp Romero M.D. C.C.HIRAM.  Osteoporosis Services, [facility]       COMMENT:   Grace, your bone density has decreased slightly though is still within normal range with a low  predicted fracture risk.  We will plan to recheck this in five years.     Please call with questions or contact us using [employer].    Sincerely,        Electronically signed by Cuca Fong CNP

## 2021-06-19 NOTE — LETTER
Letter by Cuca Fong CNP at      Author: Cuca Fong CNP Service: -- Author Type: --    Filed:  Encounter Date: 9/4/2019 Status: (Other)         Grace GELLER Amor  [address] [place] [address]             September 4, 2019         Dear Ms. Amor,    Below are the results from your recent visit:    Resulted Orders   Vitamin B12   Result Value Ref Range    Vitamin B-12 681 213 - 816 pg/mL   Folate, Serum   Result Value Ref Range    Folate 18.3 >=3.5 ng/mL   Valproic Acid (Depakene )   Result Value Ref Range    Valproic Acid 55.3 50.0 - 150.0 ug/mL      Comment:        Recommended therapeutic trough conc range when used  for manic episodes associated with bipolar disorder:   ug/ml.   ALT (SGPT)   Result Value Ref Range    ALT 25 0 - 45 U/L   AST (SGOT)   Result Value Ref Range    AST 23 0 - 40 U/L   HM1 (CBC with Diff)   Result Value Ref Range    WBC 6.3 4.0 - 11.0 thou/uL    RBC 4.28 3.80 - 5.40 mill/uL    Hemoglobin 12.0 12.0 - 16.0 g/dL    Hematocrit 36.2 35.0 - 47.0 %    MCV 85 80 - 100 fL    MCH 28.0 27.0 - 34.0 pg    MCHC 33.1 32.0 - 36.0 g/dL    RDW 13.3 11.0 - 14.5 %    Platelets 271 140 - 440 thou/uL    MPV 7.8 7.0 - 10.0 fL    Neutrophils % 63 50 - 70 %    Lymphocytes % 25 20 - 40 %    Monocytes % 9 2 - 10 %    Eosinophils % 3 0 - 6 %    Basophils % 0 0 - 2 %    Neutrophils Absolute 4.0 2.0 - 7.7 thou/uL    Lymphocytes Absolute 1.6 0.8 - 4.4 thou/uL    Monocytes Absolute 0.5 0.0 - 0.9 thou/uL    Eosinophils Absolute 0.2 0.0 - 0.4 thou/uL    Basophils Absolute 0.0 0.0 - 0.2 thou/uL   Bilirubin, Total   Result Value Ref Range    Bilirubin, Total 0.4 0.0 - 1.0 mg/dL   Alkaline Phosphatase   Result Value Ref Range    Alkaline Phosphatase 91 45 - 120 U/L       All labs are normal    Please call with questions or contact us using [employer].    Sincerely,        Electronically signed by Cuca Fong CNP

## 2021-06-19 NOTE — LETTER
Letter by Cuca Fong CNP at      Author: Cuca Fong CNP Service: -- Author Type: --    Filed:  Encounter Date: 3/14/2019 Status: (Other)         Grace Chinchilla  [address]  [place] [address]             March 14, 2019         Dear Ms. Chinchilla,    Below are the results from your recent visit:    Resulted Orders   Thyroid Cascade   Result Value Ref Range    TSH 0.66 0.30 - 5.00 uIU/mL   Comprehensive Metabolic Panel   Result Value Ref Range    Sodium 140 136 - 145 mmol/L    Potassium 4.6 3.5 - 5.0 mmol/L    Chloride 105 98 - 107 mmol/L    CO2 27 22 - 31 mmol/L    Anion Gap, Calculation 8 5 - 18 mmol/L    Glucose 88 70 - 125 mg/dL    BUN 29 (H) 8 - 22 mg/dL    Creatinine 0.84 0.60 - 1.10 mg/dL    GFR MDRD Af Amer >60 >60 mL/min/1.73m2    GFR MDRD Non Af Amer >60 >60 mL/min/1.73m2    Bilirubin, Total 0.3 0.0 - 1.0 mg/dL    Calcium 9.8 8.5 - 10.5 mg/dL    Protein, Total 6.8 6.0 - 8.0 g/dL    Albumin 3.7 3.5 - 5.0 g/dL    Alkaline Phosphatase 90 45 - 120 U/L    AST 14 0 - 40 U/L    ALT 18 0 - 45 U/L    Narrative    Fasting Glucose reference range is 70-99 mg/dL per  American Diabetes Association (ADA) guidelines.   Glycosylated Hemoglobin A1c   Result Value Ref Range    Hemoglobin A1c 5.6 3.5 - 6.0 %   HM2(CBC w/o Differential)   Result Value Ref Range    WBC 6.4 4.0 - 11.0 thou/uL    RBC 4.56 3.80 - 5.40 mill/uL    Hemoglobin 12.9 12.0 - 16.0 g/dL    Hematocrit 38.3 35.0 - 47.0 %    MCV 84 80 - 100 fL    MCH 28.3 27.0 - 34.0 pg    MCHC 33.7 32.0 - 36.0 g/dL    RDW 12.8 11.0 - 14.5 %    Platelets 304 140 - 440 thou/uL    MPV 7.8 7.0 - 10.0 fL        Please call patient and let her know that her labs look good.  Her diabetes number is decreased and no  longer in the pre-diabetes range.  We will plan to see you soon for your physical     Please call with questions or contact us using [employer].    Sincerely,        Electronically signed by Cuca Fong CNP

## 2021-06-21 NOTE — LETTER
Letter by Cuca Fong CNP at      Author: Cuca Fong CNP Service: -- Author Type: --    Filed:  Encounter Date: 12/28/2020 Status: (Other)         December 28, 2020     Patient: Grace Chinchilla   YOB: 1955   Date of Visit: 12/28/2020       Puja Chinchilla is a patient of mine and she has requested I communicate with you regarding her plan of care.  She suffers from arthritis of the right knee that has been shown on xray and is worsening in intensity.  In addition to using Tylenol and topical diclofenac for pain, I believe that she would benefit greatly from a steroid injection and have referred her back to [facility] for this.     If you have any questions or concerns, please don't hesitate to call.    Sincerely,        Electronically signed by Cuca Fong CNP

## 2021-06-21 NOTE — LETTER
Letter by Vania Fortune SW at      Author: Vania Fortune SW Service: -- Author Type: --    Filed:  Encounter Date: 1/26/2021 Status: (Other)       Care Plan  About Me:    Patient Name:  Grace Chinchilla    YOB: 1955  Age:         65 y.o.   [facility] MRN:    [id number] Telephone Information:  Home Phone [phone]   Mobile [phone]       Address:  [address]  [place] [address] Email address:  No e-mail address on record      Emergency Contact(s)  Extended Emergency Contact Information      Name: Puja Cha    Work Phone Number: [phone]  Relation: Sibling      Name: Jesse Chinchilla  Address:       [address] [place] [address]  Home Phone Number: [phone]  Work Phone Number: [phone]  Relation: Spouse      Name[place] [address]  Home Phone Number: [phone]          Primary language:  English     needed? [employer]:  [phone] op. 1  Other communication barriers: Cognitive impairment, Access to technology  Preferred Method of Communication:     Current living arrangement: I live in a private home with spouse  Mobility Status/ Medical Equipment: Independent    Health Maintenance  Health Maintenance Reviewed: Up to date    My Access Plan  Medical Emergency 911   Primary Clinic Line Cuca Fong CNP - [phone]   24 Hour Appointment Line [phone] or  [phone]) (toll-free)   24 Hour Nurse Line [phone] (toll-free)   Preferred Urgent Care [facility], [phone]   Preferred [facility]  [phone]   Preferred Pharmacy [facility] - [place], [place] - [address]     Behavioral Health Crisis Line The National Suicide Prevention Lifeline at 1-736.629.4142 or 911             My Care Team Members  Patient Care Team       Relationship Specialty Notifications Start End     Viland, Cuca, CNP PCP - General Nurse Practitioner  3/13/19     Phone: [phone] Fax: [phone]         [address] [place] [address]    Pa, [facility]  Generic Provider  1/15/18     Fax: [phone]         Juan Montana MD Psychologist Psychiatry  1/15/18     Phone: [phone] Fax: [phone]         [address] [facility] [address]    Davey, [facility]   Generic Provider  1/15/18     Phone: [phone] Fax: [phone]        Brady Grijalva MD Physician Cardiology  1/15/18     Phone: [phone] Fax: [phone]         [address] [facility] [address]    Rishi, Britton Molina MD Physician Otolaryngology  1/15/18     Phone: [phone] Fax: [phone]         [address] [address]    Jonatan Hair MD Assigned PCP   9/21/20     Phone: [phone] Fax: [phone]         [address] [facility] [address] [place] [address]    Vania Fortune SW Lead Care Coordinator Primary Care - CC  1/26/21      Michelle Parada CHW Community Health Worker Primary Care - CC  1/26/21     Phone: [phone] Fax: [phone]                My Care Plans  Self Management and Treatment Plan  Goals and (Comments)  Goals        General    Financial Wellbeing (pt-stated)     Notes - Note created  1/26/2021 10:15 AM by Vania Fortune SW    Goal Statement: I will get my taxes done within 6 months.   Date Goal set: 1-  Barriers: Didn't get taxes done last year, not able to do taxes by myself.   Strengths: Have used [employer] services in the past and am calling them to see how to work together during COVID.  Date to Achieve By: 7-  Patient expressed understanding of goal: yes  Action steps to achieve this goal:  1. I will talk to [employer] to see how it works in 2021.  2. I will work with them remotely if possible.   3. I will update care coordination team if I need other resources and my progress, at least  monthly.         Mental Health Management (pt-stated)     Notes - Note created  1/26/2021 10:09 AM by Vania Fortune SW    Goal Statement: I will have stable mental health during COVID when I am more isolated at home for next six months.  Date Goal set: 1-  Barriers: COVID has stopped many activities that I enjoy.  Not comfortable taking public transportation.   Strengths: Has therapist and psychiatrist, supportive family, extrovert.  Date to Achieve By: 7-  Patient expressed understanding of goal: yes  Action steps to achieve this goal:  1. I will stay in touch with family and friends as I can.   2. I will stay safe when interacting with people to prevent COVID.  3. I will work with my psychiatrist and therapist.  4. I will update care coordination team at least monthly and as needed.           Other    Weight (lb) < 200 lb (90.7 kg)     Notes - Note created  1/26/2021 10:13 AM by Vania Fortnue SW    Goal Statement: I will not gain any more weight during COVID, currently weigh 200, within 6 months.   Date Goal set: 1-  Barriers: Not able to attend TOPS meetings, more isolated, not as active due to COVID.  Have been overwieght much of my life.   Strengths: Found TOPS helpful.  Motivated to lose weight.   Date to Achieve By: 1-  Patient expressed understanding of goal: yes  Action steps to achieve this goal:  1. I will not eat because I am bored.   2. I will keep walking in the building for exercise.   3. I will return to [facility] when safe to do so.  4. I will update care coordination team at least monthly and as needed.                  Advance Care Plans/Directives Type:        My Medical and Care Information  Problem List   Patient Active Problem List   Diagnosis   ? Bipolar II Disorder   ? Urge Incontinence Of Urine   ? Obstructive Sleep Apnea   ? Localized Primary Osteoarthritis Of The Knee   ? Conversion Disorder   ? Mixed hyperlipidemia   ? Anxiety Disorder NOS   ? Hypothyroidism    ? Anemia   ? Mild Intellectual Disabilities   ? Carpal tunnel syndrome   ? Vitamin D Deficiency   ? Obesity (BMI 30-39.9)   ? Memory loss   ? Nonrheumatic aortic (valve) stenosis with insufficiency   ? Pre-diabetes   ? Thyroid nodule   ? Obesity (BMI 35.0-39.9) with comorbidity (H)      Current Medications and Allergies:  See printed Medication Report.    Care Coordination Start Date: 1/26/2021   Frequency of Care Coordination: monthly   Form Last Updated: 01/26/2021

## 2021-06-21 NOTE — LETTER
Letter by Michelle Johnson CHW at      Author: Michelle Johnson CHW Service: -- Author Type: --    Filed:  Encounter Date: 3/24/2021 Status: (Other)         March 24, 2021            Grace Chinchilla  [address] [place] [address]        Dear Grace,    After you enrolled with Clinic Care Coordination in January we discussed your goals and how as a Community Health Worker, I can work to keep you connected to your provider and care team, we both agreed that in order to best help give you the access, support and resources you need to reach your goals; and most importantly, for you to get and stay healthy, we would connect monthly.    I have been unable to reach you for 1 month.      I am writing to ask you, a family member or your representative to call me at [phone].  If you reach my voicemail, please leave a message with your daytime telephone number and a date and time that I can call you.      Please call me as soon as you receive this letter.  I look forward to speaking with you.          Sincerely,  Melissa STORY  Community Health Worker  [phone]

## 2021-06-21 NOTE — LETTER
Letter by Vania Fortune SW at      Author: Vania Fortune SW Service: -- Author Type: --    Filed:  Encounter Date: 4/20/2021 Status: (Other)       CARE COORDINATION  [facility]    April 20, 2021    Grace Chinchilla  [address] [place] [address]      Dear Grace,  I have been unsuccessful in reaching you since our last contact. At this time the Care Coordination team will make no further attempts to reach you, however this does not change your ability to continue receiving care from your providers at your primary care clinic. If you need additional support from a care coordinator in the future please contact Melissa at [phone].    All of us at [facility] are invested in your health and are here to assist you in meeting your goals.     Sincerely,    Vania Fortune, [facility] Care Coordinator  [phone]

## 2021-06-21 NOTE — LETTER
Letter by Michelle Johnson CHW at      Author: Micehlle Johnson CHW Service: -- Author Type: --    Filed:  Encounter Date: 4/8/2021 Status: (Other)       CARE COORDINATION  [facility]  [address] [place] [address]    April 8, 2021    Grace Chinchilla  [address]  [place] [address]      Dear Grace,  I have been unsuccessful in reaching you since our last contact. At this time the Care Coordination team will make no further attempts to reach you, however this does not change your ability to continue receiving care from your providers at your primary care clinic. If you need additional support from a care coordinator in the future please contact Melissa at [phone].    All of us at [facility] are invested in your health and are here to assist you in meeting your goals.     Sincerely,  Melissa RIDDLE  Community Health Worker  [phone]

## 2021-06-21 NOTE — LETTER
Letter by Vania Fortune SW at      Author: Vania Fortune SW Service: -- Author Type: --    Filed:  Encounter Date: 1/26/2021 Status: (Other)       CARE COORDINATION  [facility]    January 26, 2021    Grace Chinchilla  [address] [place] [address]      Dear Grace,    I am a clinic care coordinator who works with Cuca Fong CNP at [facility]. I wanted to thank you for spending the time to talk with me.  Below is a description of clinic care coordination and how I can further assist you.      The clinic care coordination team is made up of a registered nurse,  and community health worker who understand the health care system. The goal of clinic care coordination is to help you manage your health and improve access to the health care system in the most efficient manner. The team can assist you in meeting your health care goals by providing education, coordinating services, strengthening the communication among your providers and supporting you with any resource needs.    Please feel free to contact me at [phone] with any questions or concerns. We are focused on providing you with the highest-quality healthcare experience possible and that all starts with you.     Sincerely,     REBECCA Meneses  Clinic Care Coordinator  [phone]      Enclosed: I have enclosed a copy of the Care Plan. This has helpful information and goals that we have talked about. Please keep this in an easy to access place to use as needed.

## 2021-06-24 NOTE — TELEPHONE ENCOUNTER
Former patient of Mireille & has not established care with another provider.  Please assign refill request to covering provider per Clinic standard process.      Refill Approved    Rx renewed per Medication Renewal Policy. Medication was last renewed on 1/31/18.    Janelle Schmitt, [facility] Connection Triage/Med Refill 2/25/2019     Requested Prescriptions   Pending Prescriptions Disp Refills     atorvastatin (LIPITOR) 80 MG tablet [Pharmacy Med Name: atorvastatin 80 mg tablet] 30 tablet 0     Sig: Take 1 tablet (80 mg total) by mouth daily.    Statins Refill Protocol (Hmg CoA Reductase Inhibitors) Failed - 2/25/2019 12:42 PM       Failed - PCP or prescribing provider visit in past 12 months     Last office visit with prescriber/PCP: 2/20/2017 Brianna Kendrick MD OR same dept: Visit date not found OR same specialty: 7/6/2018 Sage Clifton MD  Last physical: 1/15/2018 Last MTM visit: Visit date not found   Next visit within 3 mo: Visit date not found  Next physical within 3 mo: Visit date not found  Prescriber OR PCP: Brianna Kendrick MD  Last diagnosis associated with med order: 1. Mixed hyperlipidemia  - atorvastatin (LIPITOR) 80 MG tablet [Pharmacy Med Name: atorvastatin 80 mg tablet]; Take 1 tablet (80 mg total) by mouth daily.  Dispense: 30 tablet; Refill: 0    If protocol passes may refill for 12 months if within 3 months of last provider visit (or a total of 15 months).

## 2021-06-24 NOTE — PROGRESS NOTES
Assessment & Plan   1. Hypothyroidism, unspecified type  Recheck TSH and advise on need for dose adjustment.  - Thyroid Cascade  - levothyroxine (SYNTHROID, LEVOTHROID) 125 MCG tablet; Take 1 tablet (125 mcg total) by mouth daily.  Dispense: 90 tablet; Refill: 3    2. Nonrheumatic aortic (valve) stenosis with insufficiency  History of mild aortic valve stenosis with notable murmur on exam.  As it has been 2 years we will recheck echocardiogram and follow-up with results.  - Echo Complete; Future    3. Pre-diabetes  Recheck A1c  - Comprehensive Metabolic Panel  - Glycosylated Hemoglobin A1c    4. Obstructive Sleep Apnea  Wears CPAP    5. Mixed hyperlipidemia  Advised scheduling physical after she turns 64 and will recheck lipid panel at that time.  - atorvastatin (LIPITOR) 80 MG tablet; Take 1 tablet (80 mg total) by mouth daily.  Dispense: 90 tablet; Refill: 3    6. Carpal tunnel syndrome of left wrist  Status post release procedure on right wrist.  Continues with bracing on the left and very mild symptoms.  Continue to monitor for now.    7. Visit for screening mammogram    - Mammo Screening Bilateral; Future    8. Bipolar II Disorder  Follows with psychiatry.  Encouraged contacting Dr. Montana to clarify dose of duloxetine remains at 90 mg.  She will need a new prescription for this.  She feels comfortable calling herself.    9. Anemia, unspecified type    - HM2(CBC w/o Differential)    Cuca Fong CNP     Total Time: 40 minutes.  Coordination of Care Time: 30 minutes spent including:  History, exam, discussion of possible diagnoses, testing today, next steps and follow up.    Subjective   Chief Complaint:  [facility]    HPI:   Grace Chinchilla is a 63 y.o. female who presents for [facility].   She is a previous patient of Dr. Kendrick.  PMH notable for Bipolar II, coversion disorder, anxiety, intellectual disabilities, chronic low back pain, RD, hyperlipidemia, hypothyroidism, anemia.  She lives with  her .  Sounds like sister is power of  as she states her sister has control over her finances.    Hypothyroidism:  On levothyroxine 125mcg. Dose increase one year ago and has not been checked since.     Pre-diabetes:  A1C 5.9 one year ago.  No prior history of diabetes.  Family history notable for diabetes in sister.    RD:  On CPAP.     H/o aortic stenosis: mild on echocardiogram 2017. Due for recheck     Psych:  Follow[facility] with [facility] Juan [facility].  States she has been on duloxetine 90 mg though her most recent prescription she picked up stated take 130 mg tablet on the bottle.  She has continued to take the 90 mg.    Carpal tunnel: History of right carpal tunnel release.  She has been symptomatic on the left side though states her sister did not want her to undergo surgery so she continues to wear a brace nightly.  She has noted some mild increased discomfort at the base of the palm.  Not particularly bothered by this.    HM:  Due for mammogram, Shingrix.       Allergies:  is allergic to bupropion.    SH/FH:  Social History and Family History reviewed and updated.   Tobacco Status:  She  reports that  has never smoked. she has never used smokeless tobacco.    Review of Systems:  A complete head to toe ROS is negative unless otherwise noted in HPI    Objective     Vitals:    03/13/19 1427   BP: 120/58   Patient Site: Right Arm   Patient Position: Sitting   Cuff Size: Adult Large   Pulse: 84   Weight: 189 lb 12 oz (86.1 kg)       Physical Exam:  GENERAL: Alert, well-appearing female .   PSYCH: Pleasant.  Slow mentation. Very literal  CV: Regular rate and rhythm.  Grade 3 holosystolic murmur heard best at left upper sternal border.  RESP: Lung sounds clear  PV :  No edema

## 2021-06-24 NOTE — TELEPHONE ENCOUNTER
Patient is asking to be seen to establish care.  Please fill until her appointment.   Refill Request  Did you contact pharmacy: Yes  Medication name:   Requested Prescriptions     Pending Prescriptions Disp Refills     atorvastatin (LIPITOR) 80 MG tablet [Pharmacy Med Name: atorvastatin 80 mg tablet] 90 tablet 3     Sig: Take 1 tablet (80 mg total) by mouth daily.     Who prescribed the medication: Dr Gonzalez  Pharmacy Name and Location: [facility]  Is patient out of medication: Yes  Patient notified refills processed in 72 hours:  yes  Okay to leave a detailed message: no

## 2021-06-24 NOTE — TELEPHONE ENCOUNTER
Patient Returning Call  Reason for call:  Patient called back.  She made an appointment to establish care with Cuca Fong CNP on 3/13/19  Information relayed to patient:  n/a  Patient has additional questions:  Yes  If YES, what are your questions/concerns:  Now that patient scheduled an appoitnment, will clinic order medication for?  Please call patient back.  Okay to leave a detailed message?: Yes

## 2021-06-24 NOTE — PATIENT INSTRUCTIONS - HE
Recommendations from today's visit                                                       1. We will schedule you for a repeat echocardiogram and mammogram today    2. You will receive a call with your lab results to let you know if there are any changes in your levothyroxine dose    3. I recommend coming back after you turn 64 for a physical     4. I recommend contacting your psychiatrist to ask about your duloxetine dose and requesting a new prescription if needed    Next appointment: 2 months, physical    To reschedule your appointment, please call the clinic directly at [phone].   It was a pleasure seeing you today! I look forward to seeing you again.

## 2021-06-25 NOTE — PROGRESS NOTES
Progress Notes by Son Goldman PTA at 2/1/2017  9:00 AM     Author: Son Goldman PTA Service: -- Author Type: Physical Therapist Assistant    Filed: 2/1/2017  9:46 AM Encounter Date: 2/1/2017 Status: Attested    : Son Goldman PTA (Physical Therapist Assistant) Cosigner: Gabriela Bloom PT at 2/1/2017  9:55 AM    Attestation signed by Gabriela Bloom PT at 2/1/2017  9:55 AM    Ultrasound added to treatment per POC                [facility] Daily Progress     Patient Name: Grace Chinchilla  PRECAUTIONS/RESTRICTIONS: none  Date: 2/1/2017  Visit #:2/12  PTA visit #:  1  Referral Diagnosis:  Right shoulder pain  Referring provider: Brianna Kendrick*  Visit Diagnosis:     ICD-10-CM    1. Right shoulder pain M25.511    2. Decreased ROM of right shoulder M25.611    3. Muscle weakness (generalized) M62.81    4. Abnormal posture R29.3    5. Acute pain of right shoulder M25.511    6. Hip bursitis, right M70.71    7. Abnormality of gait R26.9    8. Obesity (BMI 30-39.9) E66.9    9. Chronic back pain M54.9     G89.29          Assessment:     Cues for HEP/posture  Complaint with HEP     Goal Status:  Ongoing  Pt. will demonstrate/verbalize independence in self-management of condition in : 6 weeks  Pt. will be independent with home exercise program in : 6 weeks  Patient will decrease : SPADI score;by _ points;for improved quality of function;for improved quality of life;in 6 weeks  by ___ points: 10  Pt will: be able to wash back via ER or IR with less pain, greater ease, in 6 weeks  Pt will: be able to reach overhead into cupboards with less pain, greater ease in 6 weeks  Pt will: be able to yolis UE garments with less pain, greater ease in 6 weeks  Pt will: be able to lift 10# loads such as groceries with less pain, greater ease, in 6 weeks    Plan / Patient Education:   Continue POC  Progress ex as tolerated   Assess US    Subjective:     Pain Rating: right shoulder pain  2/10 constant   using ice at home   Ex 1-2x/day        Objective:     Cues for HEP/posture needed  Today's Exercises: reviewed codmans ex  Added scapular retraction, wand flex, bench press, IR/ER with wand seated, pulleys  US to right AP shoulder was added   Tolerated treatment      Treatment Today  2/1/2017      TREATMENT MINUTES COMMENTS   Evaluation     Self-care/ Home management     Manual therapy     Neuromuscular Re-education     Therapeutic Activity     Therapeutic Exercises 15 Ex per flow sheet   Gait training     Modality__________________ 8 + 2set up US to right shoulder 3MHz 20% 0.8wts/cms seated              Total     Blank areas are intentional and mean the treatment did not include these items.       Son Goldman  2/1/2017

## 2021-06-25 NOTE — TELEPHONE ENCOUNTER
----- Message from Cuca Fong CNP sent at 3/14/2019  5:24 PM CDT -----  Please call patient and let her know that her labs look good.  Her diabetes number is decreased and no longer in the pre-diabetes range.  We will plan to see you soon for your physical

## 2021-06-30 NOTE — PROGRESS NOTES
Progress Notes by Vania Fortune SW at 1/26/2021  9:00 AM     Author: Vania Fortune SW Service: -- Author Type:     Filed: 1/26/2021 10:29 AM Encounter Date: 1/26/2021 Status: Signed    : Vania Fortune SW ()       Clinic Care Coordination Contact    Clinic Care Coordination Contact  OUTREACH    Referral Information:  Referral Source: PCP    Primary Diagnosis: Psychosocial    Chief Complaint   Patient presents with   ? Clinic Care Coordination - Initial        Universal Utilization: No concerns.  Is up to date on preventative health.    Clinic Utilization  Difficulty keeping appointments:: No  Compliance Concerns: No  No-Show Concerns: No  No PCP office visit in Past Year: No  Utilization    Last refreshed: 1/26/2021 12:57 AM: Hospital Admissions 0           Last refreshed: 1/26/2021 12:57 AM: ED Visits 0           Last refreshed: 1/26/2021 12:57 AM: No Show Count (past year) 2              Current as of: 1/26/2021 12:57 AM              Clinical Concerns:  Current Medical Concerns:  Due for mammogram.  Discussed waiting a few more months until she can take bus to appointments.  Arthritis in her knees.  Over weight.      Current Behavioral Concerns: It has been difficult since COVID restrictions as she is not able to be with her friends and family. Misses patel.  Has supportive , has psychiatrist and therapist.  Supportive sisters.      Education Provided to patient: Reviewed role of care coordination, transportation options.     Pain  Pain (GOAL):: No  Health Maintenance Reviewed: Up to date  Clinical Pathway: None     Medication Management:  No concerns.      Functional Status:  Dependent ADLs:: Independent, Ambulation-no assistive device  Dependent IADLs:: Money Management  Bed or wheelchair confined:: No  Mobility Status: Independent  Fallen 2 or more times in the past year?: No  Any fall with injury in the past year?: No    Living Situation:  Current living  arrangement:: I live in a private home with spouse  Type of residence:: Apartment - handicap accessible([place], [facility])    Lifestyle & Psychosocial Needs:  Lifestyle   ? Physical activity     Days per week: 0 days     Minutes per session: 0 min   ? Stress: Not at all     Social Needs   ? Financial resource strain: Not very hard   ? Food insecurity     Worry: Never true     Inability: Never true   ? Transportation needs     Medical: No     Non-medical: No     Diet:: Regular  Inadequate nutrition (GOAL):: No  Tube Feeding: No  Inadequate activity/exercise (GOAL):: No  Significant changes in sleep pattern (GOAL): No  Transportation means:: Public transportation, Family, Regular car     Mental health DX:: Yes  Mental health DX how managed:: Medication, Outpatient Counseling, Psychiatrist  Mental health management concern (GOAL):: Yes  Chemical Dependency Status: Not Applicable  Informal Support system:: Family, Neighbors, Spouse   Socioeconomic History   ? Marital status:      Spouse name: Jesse   ? Number of children: 0   ? Years of education: Not on file   ? Highest education level: Not on file   Occupational History   ? Occupation: disabled   Relationships   ? Social connections     Talks on phone: More than three times a week     Gets together: More than three times a week     Attends Anabaptism service: More than 4 times per year     Active member of club or organization: Yes     Attends meetings of clubs or organizations: More than 4 times per year     Relationship status:    ? Intimate partner violence     Fear of current or ex partner: No     Emotionally abused: No     Physically abused: No     Forced sexual activity: No     Tobacco Use   ? Smoking status: Never Smoker   ? Smokeless tobacco: Never Used   Substance and Sexual Activity   ? Alcohol use: No     Frequency: Never     Binge frequency: Never   ? Drug use: No   ? Sexual activity: Yes     Partners: Male     Birth control/protection:  Post-menopausal          Sister Puja is her POA and they get money each month to spend on her needs. Unsure what her income is. At one time, she and her  were at a [employer] dinner and was screened for SNAP and they were over income. Jesse works yet.  She is on disability.  They have a social program in [place] that they attended before COVID.  es connections there. She and Jesse are members of [employer] and can have weekly weigh in, but they don't bother with it as the weigh ins are not as important as the connection to people and prizes at meetings.  They don't drive. They are skilled at public transportation. Patient's sisters do not want her to use public transportation and have been driving her to appointments. She does not want to bother them.  Reviewed other options for transportation and they all have risks.  She will have conversation with sisters about her needs and their willingness to continue to provide rides during COVID.  She will also talk to Puja to see if she could apply for MA and SNAP at this time.  She will offer assistance from [facility] and care coordination team if Puja would like assistance.      es going to [employer] as well.  They do go to the library. Concerned about getting her taxes done to get the refund.     Patient agreed to enroll in care coordination as she would like to support and accountability.       Resources and Interventions:  Current Resources:      Community Resources: None  Supplies Currently Used at Home: None  Equipment Currently Used at Home: none  Type of Employment: Disability       Advance Care Plan/Directive  Advanced Care Plans/Directives on file:: No    Referrals Placed: None     Goals:   Goals        General    Financial Wellbeing (pt-stated)     Notes - Note created  1/26/2021 10:15 AM by Vania Fortune SW    Goal Statement: I will get my taxes done within 6 months.   Date Goal set: 1-  Barriers: Didn't get taxes done last year, not able to  do taxes by myself.   Strengths: Have used [employer] [facility] [employer] services in the past and am calling them to see how to work together during COVID.  Date to Achieve By: 7-  Patient expressed understanding of goal: yes  Action steps to achieve this goal:  1. I will talk to [facility] [place] to see how it works in 2021.  2. I will work with them remotely if possible.   3. I will update care coordination team if I need other resources and my progress, at least monthly.         Mental Health Management (pt-stated)     Notes - Note created  1/26/2021 10:09 AM by Vania Fortune SW    Goal Statement: I will have stable mental health during COVID when I am more isolated at home for next six months.  Date Goal set: 1-  Barriers: COVID has stopped many activities that I enjoy.  Not comfortable taking public transportation.   Strengths: Has therapist and psychiatrist, supportive family, extrovert.  Date to Achieve By: 7-  Patient expressed understanding of goal: yes  Action steps to achieve this goal:  1. I will stay in touch with family and friends as I can.   2. I will stay safe when interacting with people to prevent COVID.  3. I will work with my psychiatrist and therapist.  4. I will update care coordination team at least monthly and as needed.           Other    Weight (lb) < 200 lb (90.7 kg)     Notes - Note created  1/26/2021 10:13 AM by Vania Fortune, DANIELE    Goal Statement: I will not gain any more weight during COVID, currently weigh 200, within 6 months.   Date Goal set: 1-  Barriers: Not able to attend [facility] meetings, more isolated, not as active due to COVID.  Have been overwieght much of my life.   Strengths: Found [facility] helpful.  Motivated to lose weight.   Date to Achieve By: 1-  Patient expressed understanding of goal: yes  Action steps to achieve this goal:  1. I will not eat because I am bored.   2. I will keep walking in the building for exercise.   3. I will return to  [facility] when safe to do so.  4. I will update care coordination team at least monthly and as needed.               Patient/Caregiver understanding: Patient will talk to sisters about her transportation needs and if she may be eligible for MA and SNAP.  Will work on goals.      Outreach Frequency: monthly      Plan: Will send care plan.  Delegating to [facility] to call in less than one month. DANIELE CC to do outreach in 45 days and as needed.   Vania Fortune, [facility]  Clinic Care Coordinator  [phone]

## 2021-07-14 ENCOUNTER — TELEPHONE (OUTPATIENT)
Dept: FAMILY MEDICINE | Facility: CLINIC | Age: 66
End: 2021-07-14

## 2021-07-14 NOTE — TELEPHONE ENCOUNTER
Last Tdap was 09/16/2015, she is good till 2025.    Tried to call and could not hear her will need to try back    Lena De Leon CMA ([facility])

## 2021-07-15 NOTE — TELEPHONE ENCOUNTER
Called her with the information, no other questions    Lena De Leon CMA ([facility])

## 2021-07-21 ENCOUNTER — RECORDS - HEALTHEAST (OUTPATIENT)
Dept: ADMINISTRATIVE | Facility: CLINIC | Age: 66
End: 2021-07-21

## 2021-07-22 ENCOUNTER — RECORDS - HEALTHEAST (OUTPATIENT)
Dept: ADMINISTRATIVE | Facility: CLINIC | Age: 66
End: 2021-07-22

## 2021-07-29 ENCOUNTER — TELEPHONE (OUTPATIENT)
Dept: FAMILY MEDICINE | Facility: CLINIC | Age: 66
End: 2021-07-29

## 2021-07-29 DIAGNOSIS — E78.2 MIXED HYPERLIPIDEMIA: ICD-10-CM

## 2021-07-29 RX ORDER — ATORVASTATIN CALCIUM 80 MG/1
80 TABLET, FILM COATED ORAL DAILY
Qty: 90 TABLET | Refills: 3 | Status: SHIPPED | OUTPATIENT
Start: 2021-07-29 | End: 2022-12-02

## 2021-07-29 NOTE — TELEPHONE ENCOUNTER
Reason for Call:  Other call back    Detailed comments: pt calling on status of refll for her:   Atorvastatin - pt is out of this medication    Pharm:  [facility]  [address]  Phone:  [phone]    Phone Number Patient can be reached at: Cell number on file:    Telephone Information:   Mobile [phone]       Best Time: anytime    Can we leave a detailed message on this number? YES    Call taken on 7/29/2021 at 1:11 PM by Carmina Arellano

## 2021-08-06 NOTE — PATIENT INSTRUCTIONS - HE
Patient Instructions by Cuca Fong CNP at 9/17/2019 10:00 AM     Author: Cuca Fong CNP Service: -- Author Type: Nurse Practitioner    Filed: 9/17/2019 11:14 AM Encounter Date: 9/17/2019 Status: Addendum    : Cuca Fong CNP (Nurse Practitioner)    Related Notes: Original Note by Cuca Fong CNP (Nurse Practitioner) filed at 9/17/2019 10:28 AM       Recommendations from today's visit                                                       1. We did your pneumonia and flu shots today    2.  We will schedule you for a bone density scan to screen for osteoporosis    3. I noted your thyroid feels enlarged and slightly larger on the left so we will do an ultrasound to assess this.     4. The echocardiogram of your heart earlier this year looked good. We will continue to monitor this every couple of years    Next appointment: one year, annual physical     To reschedule your appointment, please call the clinic directly at [phone].   It was a pleasure seeing you today! I look forward to seeing you again.              Patient Education     Exercise for a Healthier Heart  You may wonder how you can improve the health of your heart. If youre thinking about exercise, youre on the right track. You dont need to become an athlete, but you do need a certain amount of brisk exercise to help strengthen your heart. If you have been diagnosed with a heart condition, your doctor may recommend exercise to help stabilize your condition. To help make exercise a habit, choose safe, fun activities.       Be sure to check with your health care provider before starting an exercise program.    Why exercise?  Exercising regularly offers many healthy rewards. It can help you do all of the following:    Improve your blood cholesterol levels to help prevent further heart trouble    Lower your blood pressure to help prevent a stroke or heart attack    Control diabetes, or reduce your risk of getting this disease    Improve  your heart and lung function    Reach and maintain a healthy weight    Make your muscles stronger and more limber so you can stay active    Prevent falls and fractures by slowing the loss of bone mass (osteoporosis)    Manage stress better  Exercise tips  Ease into your routine. Set small goals. Then build on them.  Exercise on most days. Aim for a total of 150 or more minutes of moderate to  vigorous intensity activity each week. Consider 40 minutes, 3 to 4 times a week. For best results, activity should last for 40 minutes on average. It is OK to work up to the 40 minute period over time. Examples of moderate-intensity activity is walking one mile in 15 minutes or 30 to 45 minutes of yard work.  Step up your daily activity level. Along with your exercise program, try being more active throughout the day. Walk instead of drive. Do more household tasks or yard work.  Choose one or more activities you enjoy. Walking is one of the easiest things you can do. You can also try swimming, riding a bike, or taking an exercise class.  Stop exercising and call your doctor if you:    Have chest pain or feel dizzy or lightheaded    Feel burning, tightness, pressure, or heaviness in your chest, neck, shoulders, back, or arms    Have unusual shortness of breath    Have increased joint or muscle pain    Have palpitations or an irregular heartbeat      7288-0057 The [employer]. [address] [place], [place] [address]. All rights reserved. This information is not intended as a substitute for professional medical care. Always follow your healthcare professional's instructions.         Patient Education   Understanding USDA MyPlate  The USDA (US Department of Agriculture) has guidelines to help you make healthy food choices. These are called MyPlate. MyPlate shows the food groups that make up healthy meals using the image of a place setting. Before you eat, think about the healthiest choices for what to put onto your  plate or into your cup or bowl. To learn more about building a healthy plate, visit www.choosemyplate.gov.       The Food Groups    Fruits: Any fruit or 100% fruit juice counts as part of the Fruit Group. Fruits may be fresh, canned, frozen, or dried, and may be whole, cut-up, or pureed. Make half your plate fruits and vegetables.    Vegetables: Any vegetable or 100% vegetable juice counts as a member of the Vegetable Group. Vegetables may be fresh, frozen, canned, or dried. They can be served raw or cooked and may be whole, cut-up, or mashed. Make half your plate fruits and vegetables.     Grains: All foods made from grains are part of the Grains Group. These include wheat, rice, oats, cornmeal, and barley such as bread, pasta, oatmeal, cereal, tortillas, and grits. Grains should be no more than a quarter of your plate. At least half of your grains should be whole grains.    Protein: This group includes meat, poultry, seafood, beans and peas, eggs, processed soy products (like tofu), nuts (including nut butters), and seeds. Make protein choices no more than a quarter of your plate. Meat and poultry choices should be lean or low fat.    Dairy: All fluid milk products and foods made from milk that contain calcium, like yogurt and cheese are part of the Dairy Group. (Foods that have little calcium, such as cream, butter, and cream cheese, are not part of the group.) Most dairy choices should be low-fat or fat-free.    Oils: These are fats that are liquid at room temperature. They include canola, corn, olive, soybean, and sunflower oil. Foods that are mainly oil include mayonnaise, certain salad dressings, and soft margarines. You should have only 5 to 7 teaspoons of oils a day. You probably already get this much from the food you eat.  Use [employer] to Help Build Your Meals  The [employer] can help you plan and track your meals and activity. You can look up individual foods to see or compare their nutritional  value. You can get guidelines for what and how much you should eat. You can compare your food choices. And you can assess personal physical activities and see ways you can improve. Go to www.ShareGrove.gov/supertracker/.    5322-2389 [employer]. [address] [place] [address], [place] [address]. All rights reserved. This information is not intended as a substitute for professional medical care. Always follow your healthcare professional's instructions.           Patient Education   Instrumental Activities of Daily Living  Your Health Risk Assessment indicates you have difficulties with instrumental activities of daily living which include laundry, housekeeping, banking, shopping, using the telephone, food preparation, transportation, or taking your own medications. Please make a follow up appointment for us to address this issue in more detail.    [employer] has resources available on the following website: https://www.[employer]tMarket.org/caregivers.[email or web address]     Also, here is a local agency that provides help with meals and other assistance:   [facility] Line: [phone]     Patient Education   Signs of Hearing Loss  Hearing loss is a problem shared by many people. In fact, it is one of the most common health conditions, particularly as people age. Most people over age 65 have some hearing loss, and by age 80, almost everyone does. Because hearing loss usually occurs slowly over the years, you may not realize your hearing ability has gotten worse.       Have your hearing checked  Contact your [facility] care provider if you:    Have to strain to hear normal conversation.    Have to watch other peoples faces very carefully to follow what theyre saying.    Need to ask people to repeat what theyve said.    Often misunderstand what people are saying.    Turn the volume of the television or radio up so high that others complain.    Feel that people are mumbling when theyre talking to you.    Find that the effort to  hear leaves you feeling tired and irritated.    Notice, when using the phone, that you hear better with 1 ear than the other.    4510-8801 [employer]. [address] [place] [address], [place] [address]. All rights reserved. This information is not intended as a substitute for professional medical care. Always follow your healthcare professional's instructions.         Patient Education   Urinary Incontinence, Female (Adult)  Urinary incontinence means loss of control of the bladder. This problem affects many women, especially as they get older. If you have incontinence, you may be embarrassed to ask for help. But know that this problem can be treated.  Types of Incontinence  There are different types of incontinence. Two of the main types are described here. You can have more than one type.    Stress incontinence. With this type, urine leaks when pressure (stress) is put on the bladder. This may happen when you cough, sneeze, or laugh. Stress incontinence most often occurs because the pelvic floor muscles that support the bladder and urethra are weak. This can happen after pregnancy and vaginal childbirth or a hysterectomy. It can also be due to excess body weight or hormone changes.    Urge incontinence (also called overactive bladder). With this type, a sudden urge to urinate is felt often. This may happen even though there may not be much urine in the bladder. The need to urinate often during the night is common. Urge incontinence most often occurs because of bladder spasms. This may be due to bladder irritation or infection. Damage to bladder nerves or pelvic muscles, constipation, and certain medicines can also lead to urge incontinence.  Treatment of urinary incontinence depends on the cause. Further evaluation is needed to find the type you have. This will likely include an exam and certain tests. Based on the results, you and your healthcare provider can then plan treatment. Until a diagnosis is made,  the home care tips below can help relieve symptoms.  Home care    Do pelvic floor muscle exercises, if they are prescribed. The pelvic floor muscles help support the bladder and urethra. Many women find that their symptoms improve when doing special exercises that strengthen these muscles. To do the exercises contract the muscles you would use to stop your stream of urine, but do this when youre not urinating. Hold for 10 seconds, then relax. Repeat 10 to 20 times in a row, at least 3 times a day. Your provider may give you other instructions for how to do the exercises and how often.    Keep a bladder diary. This helps track how often and how much you urinate over a set period of time. Bring this diary with you to your next visit with the provider. The information can help your provider learn more about your bladder problem.    Lose weight, if advised to by your provider. Excess weight puts pressure on the bladder. Your provider can help you create a weight-loss plan thats right for you. This may include exercising more and making certain diet changes.    Don't consume foods and drinks that may irritate the bladder. These can include alcohol and caffeinated drinks.    Quit smoking. Smoking and other tobacco use can lead to chronic cough that strains the pelvic floor muscles. Smoking may also damage the bladder and urethra. Talk with your provider about treatments or methods you can use to quit smoking.    If drinking large amounts of fluid causes you to have symptoms, you may be advised to limit your fluid intake. You may also be advised to drink most of your fluids during the day and to limit fluids at night.    If youre worried about urine leakage or accidents, you may wear absorbent pads to catch urine. Change the pads often. This helps reduce discomfort. It may also reduce the risk of skin or bladder infections.  Follow-up care  Follow up with your healthcare provider, or as directed. It may take some to find  the right treatment for your problem. Your treatment plan may include special therapies or medicines. Certain procedures or surgery may also be options. Be sure to discuss any questions you have with your provider.  When to seek medical advice  Call the healthcare provider right away if any of these occur:    Fever of 100.4 F (38 C) or higher, or as directed by your provider    Bladder pain or fullness    Abdominal swelling    Nausea or vomiting    Back pain    Weakness, dizziness or fainting  Date Last Reviewed: 10/1/2017    1551-6476 [employer]. [address] [place] [address]. All rights reserved. This information is not intended as a substitute for professional medical care. Always follow your healthcare professional's instructions.     Patient Education   Your Health Risk Assessment indicates you feel you are not in good emotional health.    Recreation   Recreation is not limited to sports and team events. It includes any activity that provides relaxation, interest, enjoyment, and exercise. Recreation provides an outlet for physical, mental, and social energy. It can give a sense of worth and achievement. It can help you stay healthy.    Mental Exercise and Social Involvement  Mental and emotional health is as important as physical health. Keep in touch with friends and family. Stay as active as possible. Continue to learn and challenge yourself.   Things you can do to stay mentally active are:    Learn something new, like a foreign language or musical instrument.     Play SCRABBLE or do crossword puzzles. If you cannot find people to play these games with you at home, you can play them with others on your computer through the Internet.     Join a games club--anything from card games to chess or checkers or lawn bowling.     Start a new hobby.     Go back to school.     Volunteer.     Read.     Keep up with world events.       Patient Education   Depression and Suicide in Older  Adults  Nearly 2 million older Americans have some type of depression. Sadly, some of them even take their own lives. Yet depression among older adults is often ignored. Learn the warning signs. You may help spare a loved one needless pain. You may also save a life.       What Is Depression?  Depression is a mood disorder that affects the way you think and feel. The most common symptom is a feeling of deep sadness. People who are depressed also may seem tired and listless. And nothing seems to give them pleasure. Its normal to grieve or be sad sometimes. But sadness lessens or passes with time. Depression rarely goes away or improves on its own. Other symptoms of depression are:    Sleeping more or less than normal    Eating more or less than normal    Having headaches, stomachaches, or other pains that dont go away    Feeling nervous, empty, or worthless    Crying a great deal    Thinking or talking about suicide or death    Feeling confused or forgetful  What Causes It?  The causes of depression arent fully known. Certain chemicals in the brain play a role. Depression does run in families. And life stresses can also trigger depression in some people. That may be the case with older adults. They often face great burdens, such as the death of friends or a spouse. They may have failing health. And they are more likely to be alone, lonely, or poor.  How You Can Help  Often, depressed people may not want to ask for help. When they do, they may be ignored. Or, they may receive the wrong treatment. You can help by showing parents and older friends love and support. If they seem depressed, help them find the right treatment. Talk to your doctor. Or contact a local mental health center, social service agency, or hospital. With modern treatment, no one has to suffer from depression.  Resources:    National [place] of Mental Health  520.308.1767  www.nimh.nih.gov    National [place] on Mental  Illness  869.953.2042  www.estefania.org    [facility]  866.136.2513  www.[facility].org    National Suicide Hotline  578.707.3084 (800-SUICIDE)      6765-6269 [employer]. [address] [place] [address]. All rights reserved. This information is not intended as a substitute for professional medical care. Always follow your healthcare professional's instructions.           Advance Directive  Patients advance directive was discussed and I am comfortable with the patients wishes.  Patient Education   Personalized Prevention Plan  You are due for the preventive services outlined below.  Your care team is available to assist you in scheduling these services.  If you have already completed any of these items, please share that information with your care team to update in your medical record.  Health Maintenance   Topic Date Due   ? HEPATITIS C SCREENING  1955   ? HIV SCREENING  04/22/1969   ? ZOSTER VACCINES (2 of 3) 11/11/2015   ? MEDICARE ANNUAL WELLNESS VISIT  04/22/2019   ? DXA SCAN  04/22/2019   ? PNEUMOCOCCAL POLYSACCHARIDE VACCINE AGE 65 AND OVER  04/22/2019   ? PNEUMOCOCCAL CONJUGATE VACCINE FOR ADULTS (PCV13 OR PREVNAR)  04/22/2019   ? FALL RISK ASSESSMENT  04/22/2019   ? INFLUENZA VACCINE RULE BASED (1) 08/01/2019   ? DEPRESSION FOLLOW UP  03/17/2020   ? MAMMOGRAM  03/18/2021   ? COLONOSCOPY  12/04/2023   ? ADVANCE CARE PLANNING  09/17/2024   ? TD 18+ HE  09/16/2025   ? TDAP ADULT ONE TIME DOSE  Completed

## 2021-08-26 ENCOUNTER — NURSE TRIAGE (OUTPATIENT)
Dept: NURSING | Facility: CLINIC | Age: 66
End: 2021-08-26

## 2021-08-26 NOTE — TELEPHONE ENCOUNTER
RN Triage:    Has had loose and more frequent stools x about 6 weeks.  Is not associated with any particular foods.  Whereas she used to have one stool/day, now it is three.  Sometimes there is mild lower abdominal pain for a few minutes after BM.  Occasionally sees some blood on toilet tissue upon wiping after BM.  No blood in stool.    Also calling about pain in right shoulder, mostly when lying down and which interferes with sleep, x 2 months.  No precipitating injury.  Denies chest pain of difficulty breathing.    Home care discussed.  Transferred caller to  for appointment.    Elysia Skelton RN 08/26/21 4:09 PM  [facility] Nurse Advisor            Reason for Disposition    MILD diarrhea (e.g., 1-3 or more stools than normal in past 24 hours) diarrhea without known cause and present > 7 days    [1] MODERATE pain (e.g., interferes with normal activities) AND [2] present > 3 days    Additional Information    Negative: Shock suspected (e.g., cold/pale/clammy skin, too weak to stand, low BP, rapid pulse)    Negative: Difficult to awaken or acting confused (e.g., disoriented, slurred speech)    Negative: Sounds like a life-threatening emergency to the triager    Negative: Vomiting also present and worse than the diarrhea    Negative: Blood in stool and without diarrhea    Negative: SEVERE abdominal pain (e.g., excruciating) and present > 1 hour    Negative: SEVERE abdominal pain and age > 60    Negative: Bloody, black, or tarry bowel movements (Exception: chronic-unchanged black-grey bowel movements and is taking iron pills or Pepto-bismol)    Negative: SEVERE diarrhea (e.g., 7 or more times / day more than normal) and age > 60 years    Negative: Constant abdominal pain lasting > 2 hours    Negative: Drinking very little and has signs of dehydration (e.g., no urine > 12 hours, very dry mouth, very lightheaded)    Negative: Patient sounds very sick or weak to the triager    Negative: SEVERE  "diarrhea (e.g., 7 or more times / day more than normal) and present > 24 hours (1 day)    Negative: MODERATE diarrhea (e.g., 4-6 times / day more than normal) and present > 48 hours (2 days)    Negative: MODERATE diarrhea (e.g., 4-6 times / day more than normal) and age > 70 years    Negative: Abdominal pain  (Exception: pain clears completely with each passage of diarrhea stool)    Negative: Fever > 101 F (38.3 C)    Negative: Blood in the stool    Negative: Mucus or pus in stool has been present > 2 days and diarrhea is more than mild    Negative: Weak immune system (e.g., HIV positive, cancer chemo, splenectomy, organ transplant, chronic steroids)    Negative: Travel to a foreign country in past month    Negative: Recent antibiotic therapy (i.e., within last 2 months) and diarrhea present > 3 days since antibiotic was stopped    Negative: Recent hospitalization and diarrhea present > 3 days    Negative: Tube feedings (e.g., nasogastric, g-tube, j-tube)    Negative: Passed out (i.e., lost consciousness, collapsed and was not responding)    Negative: Shock suspected (e.g., cold/pale/clammy skin, too weak to stand, low BP, rapid pulse)    Negative: [1] Similar pain previously AND [2] it was from \"heart attack\"    Negative: [1] Similar pain previously AND [2] it was from \"angina\" AND [3] not relieved by nitroglycerin    Negative: Sounds like a life-threatening emergency to the triager    Negative: Followed a shoulder injury    Negative: Chest pain    Negative: Difficulty breathing or unusual sweating (e.g., sweating without exertion)    Negative: [1] Pain lasting > 5 minutes AND [2] pain also present in chest  (Exception: pain is clearly made worse by movement)    Negative: [1] Age > 40 AND [2] no obvious cause AND [3] pain even when not moving the arm    (Exception: pain is clearly made worse by moving arm or bending neck)    Negative: [1] SEVERE pain AND [2] not improved 2 hours after pain medicine    Negative: [1] " "Red area or streak AND [2] fever    Negative: [1] Swollen joint AND [2] fever    Negative: Patient sounds very sick or weak to the triager    Negative: Entire arm is swollen    Negative: Weakness (i.e., loss of strength) in hand or fingers    (Exception: not truly weak; hand feels weak because of pain)    Negative: [1] Shoulder pains with exertion (e.g., walking) AND [2] pain goes away on resting AND [3] not present now    Negative: [1] Painful rash AND [2] multiple small blisters grouped together (i.e., dermatomal distribution or \"band\" or \"stripe\")    Negative: Looks like a boil, infected sore, deep ulcer or other infected rash (spreading redness, pus)    Negative: [1] Localized rash is very painful AND [2] no fever    Negative: Numbness (i.e., loss of sensation) in hand or fingers    Negative: [1] Unable to use arm at all AND [2] because of shoulder pain or stiffness    Protocols used: DIARRHEA-A-OH, SHOULDER PAIN-A-AH      "

## 2021-08-30 ENCOUNTER — OFFICE VISIT (OUTPATIENT)
Dept: FAMILY MEDICINE | Facility: CLINIC | Age: 66
End: 2021-08-30
Payer: COMMERCIAL

## 2021-08-30 VITALS
DIASTOLIC BLOOD PRESSURE: 68 MMHG | SYSTOLIC BLOOD PRESSURE: 128 MMHG | OXYGEN SATURATION: 98 % | WEIGHT: 204 LBS | HEART RATE: 88 BPM | BODY MASS INDEX: 37.92 KG/M2

## 2021-08-30 DIAGNOSIS — E66.01 MORBID OBESITY (H): ICD-10-CM

## 2021-08-30 DIAGNOSIS — M75.42 IMPINGEMENT SYNDROME, SHOULDER, LEFT: ICD-10-CM

## 2021-08-30 DIAGNOSIS — R10.30 LOWER ABDOMINAL PAIN: ICD-10-CM

## 2021-08-30 DIAGNOSIS — M19.011 ARTHRITIS OF RIGHT SHOULDER REGION: ICD-10-CM

## 2021-08-30 DIAGNOSIS — Z12.31 VISIT FOR SCREENING MAMMOGRAM: ICD-10-CM

## 2021-08-30 DIAGNOSIS — R19.5 LOOSE STOOLS: ICD-10-CM

## 2021-08-30 DIAGNOSIS — D50.9 IRON DEFICIENCY ANEMIA, UNSPECIFIED IRON DEFICIENCY ANEMIA TYPE: Primary | ICD-10-CM

## 2021-08-30 DIAGNOSIS — E03.9 HYPOTHYROIDISM, UNSPECIFIED TYPE: ICD-10-CM

## 2021-08-30 LAB
ALBUMIN UR-MCNC: NEGATIVE MG/DL
APPEARANCE UR: CLEAR
BACTERIA #/AREA URNS HPF: ABNORMAL /HPF
BILIRUB UR QL STRIP: NEGATIVE
COLOR UR AUTO: YELLOW
ERYTHROCYTE [DISTWIDTH] IN BLOOD BY AUTOMATED COUNT: 13.5 % (ref 10–15)
GLUCOSE UR STRIP-MCNC: NEGATIVE MG/DL
HCT VFR BLD AUTO: 39.3 % (ref 35–47)
HGB BLD-MCNC: 12.7 G/DL (ref 11.7–15.7)
HGB UR QL STRIP: ABNORMAL
KETONES UR STRIP-MCNC: NEGATIVE MG/DL
LEUKOCYTE ESTERASE UR QL STRIP: ABNORMAL
MCH RBC QN AUTO: 27.4 PG (ref 26.5–33)
MCHC RBC AUTO-ENTMCNC: 32.3 G/DL (ref 31.5–36.5)
MCV RBC AUTO: 85 FL (ref 78–100)
NITRATE UR QL: NEGATIVE
PH UR STRIP: 6 [PH] (ref 5–8)
PLATELET # BLD AUTO: 301 10E3/UL (ref 150–450)
RBC # BLD AUTO: 4.63 10E6/UL (ref 3.8–5.2)
RBC #/AREA URNS AUTO: ABNORMAL /HPF
SP GR UR STRIP: 1.02 (ref 1–1.03)
UROBILINOGEN UR STRIP-ACNC: 0.2 E.U./DL
WBC # BLD AUTO: 8.7 10E3/UL (ref 4–11)
WBC #/AREA URNS AUTO: ABNORMAL /HPF

## 2021-08-30 PROCEDURE — 83540 ASSAY OF IRON: CPT

## 2021-08-30 PROCEDURE — 83690 ASSAY OF LIPASE: CPT | Performed by: FAMILY MEDICINE

## 2021-08-30 PROCEDURE — 81001 URINALYSIS AUTO W/SCOPE: CPT | Performed by: FAMILY MEDICINE

## 2021-08-30 PROCEDURE — 85027 COMPLETE CBC AUTOMATED: CPT | Performed by: FAMILY MEDICINE

## 2021-08-30 PROCEDURE — 84466 ASSAY OF TRANSFERRIN: CPT

## 2021-08-30 PROCEDURE — 82728 ASSAY OF FERRITIN: CPT | Performed by: FAMILY MEDICINE

## 2021-08-30 PROCEDURE — 80053 COMPREHEN METABOLIC PANEL: CPT | Performed by: FAMILY MEDICINE

## 2021-08-30 PROCEDURE — 99214 OFFICE O/P EST MOD 30 MIN: CPT | Performed by: FAMILY MEDICINE

## 2021-08-30 PROCEDURE — 36415 COLL VENOUS BLD VENIPUNCTURE: CPT | Performed by: FAMILY MEDICINE

## 2021-08-30 PROCEDURE — 84443 ASSAY THYROID STIM HORMONE: CPT | Performed by: FAMILY MEDICINE

## 2021-08-30 PROCEDURE — 83516 IMMUNOASSAY NONANTIBODY: CPT | Performed by: FAMILY MEDICINE

## 2021-08-30 RX ORDER — LISDEXAMFETAMINE DIMESYLATE 30 MG/1
CAPSULE ORAL
COMMUNITY
Start: 2021-05-03 | End: 2021-10-04

## 2021-08-30 RX ORDER — DEXTROAMPHETAMINE SACCHARATE, AMPHETAMINE ASPARTATE MONOHYDRATE, DEXTROAMPHETAMINE SULFATE AND AMPHETAMINE SULFATE 7.5; 7.5; 7.5; 7.5 MG/1; MG/1; MG/1; MG/1
30 CAPSULE, EXTENDED RELEASE ORAL EVERY MORNING
COMMUNITY
Start: 2021-04-28 | End: 2024-04-29 | Stop reason: DRUGHIGH

## 2021-08-30 RX ORDER — DULOXETIN HYDROCHLORIDE 30 MG/1
3 CAPSULE, DELAYED RELEASE ORAL DAILY
COMMUNITY
Start: 2021-04-15

## 2021-08-30 RX ORDER — CLONAZEPAM 0.5 MG/1
0.5 TABLET ORAL 2 TIMES DAILY PRN
COMMUNITY
Start: 2021-01-25

## 2021-08-30 RX ORDER — LISDEXAMFETAMINE DIMESYLATE 30 MG/1
30 CAPSULE ORAL
COMMUNITY
Start: 2021-04-29 | End: 2022-03-14

## 2021-08-30 RX ORDER — LEVOTHYROXINE SODIUM 125 UG/1
TABLET ORAL
COMMUNITY
Start: 2021-06-30 | End: 2022-05-23

## 2021-08-30 RX ORDER — DIVALPROEX SODIUM 250 MG/1
250 TABLET, EXTENDED RELEASE ORAL DAILY
COMMUNITY
Start: 2021-06-01

## 2021-08-30 RX ORDER — DIVALPROEX SODIUM 500 MG/1
1000 TABLET, EXTENDED RELEASE ORAL DAILY
COMMUNITY
Start: 2021-06-01

## 2021-08-30 ASSESSMENT — PATIENT HEALTH QUESTIONNAIRE - PHQ9: SUM OF ALL RESPONSES TO PHQ QUESTIONS 1-9: 6

## 2021-08-30 NOTE — PATIENT INSTRUCTIONS
Thanks for coming in today Grace Mckenzie start by getting lab testing for the stool and your lower abdominal pain    This will entail stool cultures  This will entail blood work    I will also check your thyroid testing and blood counts and iron levels given your history of use of iron    Eat a bland diet  Try to identify any triggers for the diarrhea or loose stools    If there is an identifiable cause we will treat this with either antibiotics or antibacterials    Once you have your stool cultures and you may try Imodium as directed over-the-counter    I would like you to see Cuca Fong in 10 days and follow-up    One of the neck steps may be a CAT scan of your belly to look at whether there is irritation of the colon tissue and I will order this today if all blood work is unrevealing and stool cultures and testing is unrevealing the neck step would be a CT scan of your belly and possible colonoscopy    Talk to your psychiatrist about the recent starting of Vyvanse as this could possibly impact your stools      Please see Dr. Elvira Denson [facility] about your shoulder  DanL

## 2021-08-30 NOTE — ASSESSMENT & PLAN NOTE
Lower abdominal pain  6 weeks  Goes 3-4 times a day  No blood no black  Positive queasiness  No relief with bowel movements  Is like a stomachache  Loose watery stools  No cramping  No recent antibiotics  [place]  She does have some frequency of urination    Known history of anemia

## 2021-08-30 NOTE — PROGRESS NOTES
"ASSESSMENT & PLAN    Loose stools  Loose   6 weeks  No change with food     + queasy  No relief with stool    \"like an ache\"  No Black  No blood       [place]       Lower abdominal pain  Lower abdominal pain  6 weeks  Goes 3-4 times a day  No blood no black  Positive queasiness  No relief with bowel movements  Is like a stomachache  Loose watery stools  No cramping  No recent antibiotics  She did swim in the [place]  She does have some frequency of urination    Known history of anemia        Grace was seen today for clinic care coordination - follow-up.    Diagnoses and all orders for this visit:    Visit for screening mammogram  -     MA SCREENING DIGITAL BILAT - Future  (s+30); Future    Loose stools  -     Enteric Bacteria and Virus Panel by AVERY Stool; Future  -     Ova and Parasite Exam Routine; Future  -     Lipase; Future  -     Comprehensive metabolic panel (BMP + Alb, Alk Phos, ALT, AST, Total. Bili, TP); Future  -     CBC with platelets; Future    Morbid obesity (H)    Hypothyroidism, unspecified type  -     TSH with free T4 reflex; Future    Iron deficiency anemia, unspecified iron deficiency anemia type  -     CBC with platelets; Future  -     Iron and iron binding capacity; Future  -     Ferritin; Future  -     Tissue transglutaminase neida IgA and IgG; Future    Arthritis of right shoulder region  -     Orthopedic  Referral; Future    Impingement syndrome, shoulder, left  -     Orthopedic  Referral; Future    Lower abdominal pain  -     CT Abdomen Pelvis w Contrast; Future  -     UA with Microscopic reflex to Culture - lab collect; Future    Other orders  -     REVIEW OF HEALTH MAINTENANCE PROTOCOL ORDERS        Patient Instructions   Thanks for coming in today Grace    Lets start by getting lab testing for the stool and your lower abdominal pain    This will entail stool cultures  This will entail blood work    I will also check your thyroid testing and blood counts and " iron levels given your history of use of iron    Eat a bland diet  Try to identify any triggers for the diarrhea or loose stools    If there is an identifiable cause we will treat this with either antibiotics or antibacterials    Once you have your stool cultures and you may try Imodium as directed over-the-counter    I would like you to see Cuca Fong in 10 days       Return in about 10 days (around 9/9/2021).       [unfilled]    CHIEF COMPLAINT: Grace Chinchilla had concerns including Clinic Care Coordination - Follow-up.    [place]: 1.............. SUBJECTIVE:  Grace Chinchilla is a 66 year old female had concerns including Clinic Care Coordination - Follow-up.    1. Visit for screening mammogram    2. Loose stools    3. Morbid obesity (H)    4. Hypothyroidism, unspecified type    5. Iron deficiency anemia, unspecified iron deficiency anemia type    6. Arthritis of right shoulder region    7. Impingement syndrome, shoulder, left    8. Lower abdominal pain          Allergies   Allergen Reactions     Bupropion                          SOCIAL: She  reports that she has never smoked. She has never used smokeless tobacco. She reports that she does not drink alcohol and does not use drugs.    REVIEW OF SYSTEMS:   Family history not pertinent to chief complaint or presenting problem    Review of systems otherwise negative as requested from patient, except   Those positive ROS outlined and discussed in [place].      VITALS:  Vitals:    08/30/21 1543   BP: 128/68   BP Location: Left arm   Patient Position: Sitting   Cuff Size: Adult Large   Pulse: 88   SpO2: 98%   Weight: 92.5 kg (204 lb)     Wt Readings from Last 3 Encounters:   08/30/21 92.5 kg (204 lb)   01/08/21 92.1 kg (203 lb)   09/04/20 91 kg (200 lb 9.6 oz)     Body mass index is 37.92 kg/m .    Physical Exam:  Abdomen soft slightly distended bowel sounds are appreciable  No appreciable rebound or guarding  No appreciable hernia  TMs are clear oropharynx is clear  thyroid palpable slight nodular component on the left  Lungs are clear  Cardiac regular rate and rhythm soft murmur left lower sternal border  Lower extremities without edema  Crepitus with passive range of motion of the shoulder  Some tenderness to palpation across the deltoid complex just below the acromion  As well as the rotator cuff complex      Recent Results (from the past 240 hour(s))   Lipase    Collection Time: 08/30/21  4:40 PM   Result Value Ref Range    Lipase 31 0 - 52 U/L   Comprehensive metabolic panel (BMP + Alb, Alk Phos, ALT, AST, Total. Bili, TP)    Collection Time: 08/30/21  4:40 PM   Result Value Ref Range    Sodium 137 136 - 145 mmol/L    Potassium 3.9 3.5 - 5.0 mmol/L    Chloride 100 98 - 107 mmol/L    Carbon Dioxide (CO2) 25 22 - 31 mmol/L    Anion Gap 12 5 - 18 mmol/L    Urea Nitrogen 18 8 - 22 mg/dL    Creatinine 0.85 0.60 - 1.10 mg/dL    Calcium 9.8 8.5 - 10.5 mg/dL    Glucose 96 70 - 125 mg/dL    Alkaline Phosphatase 95 45 - 120 U/L    AST 15 0 - 40 U/L    ALT 22 0 - 45 U/L    Protein Total 7.5 6.0 - 8.0 g/dL    Albumin 4.1 3.5 - 5.0 g/dL    Bilirubin Total 0.4 0.0 - 1.0 mg/dL    GFR Estimate 71 >60 mL/min/1.73m2   CBC with platelets    Collection Time: 08/30/21  4:40 PM   Result Value Ref Range    WBC Count 8.7 4.0 - 11.0 10e3/uL    RBC Count 4.63 3.80 - 5.20 10e6/uL    Hemoglobin 12.7 11.7 - 15.7 g/dL    Hematocrit 39.3 35.0 - 47.0 %    MCV 85 78 - 100 fL    MCH 27.4 26.5 - 33.0 pg    MCHC 32.3 31.5 - 36.5 g/dL    RDW 13.5 10.0 - 15.0 %    Platelet Count 301 150 - 450 10e3/uL   Ferritin    Collection Time: 08/30/21  4:40 PM   Result Value Ref Range    Ferritin 334 (H) 10 - 130 ng/mL   TSH with free T4 reflex    Collection Time: 08/30/21  4:40 PM   Result Value Ref Range    TSH 3.10 0.30 - 5.00 uIU/mL   Iron binding panel    Collection Time: 08/30/21  4:40 PM   Result Value Ref Range    Iron 50 42 - 175 ug/dL    Transferrin 319 212 - 360 mg/dL    Transferrin Saturation, Calculated 13  (L) 20 - 50 %    Transferrin IBC, Calculated 399 313 - 563 ug/dL   UA with Microscopic reflex to Culture - lab collect    Collection Time: 08/30/21  4:41 PM    Specimen: Urine, Clean Catch   Result Value Ref Range    Color Urine Yellow Colorless, Straw, Light Yellow, Yellow    Appearance Urine Clear Clear    Glucose Urine Negative Negative mg/dL    Bilirubin Urine Negative Negative    Ketones Urine Negative Negative mg/dL    Specific Gravity Urine 1.020 1.005 - 1.030    Blood Urine Trace (A) Negative    pH Urine 6.0 5.0 - 8.0    Protein Albumin Urine Negative Negative mg/dL    Urobilinogen Urine 0.2 0.2, 1.0 E.U./dL    Nitrite Urine Negative Negative    Leukocyte Esterase Urine Trace (A) Negative   Urine Microscopic    Collection Time: 08/30/21  4:41 PM   Result Value Ref Range    Bacteria Urine Few (A) None Seen /HPF    RBC Urine 0-2 0-2 /HPF /HPF    WBC Urine 0-5 0-5 /HPF /HPF          I spent 30  minutes with this patient.  This includes pre-visit, intra-visit and post visit work an evaluation with regards to Grace was seen today for clinic care coordination - follow-up.    Diagnoses and all orders for this visit:    Visit for screening mammogram  -     MA SCREENING DIGITAL BILAT - Future  (s+30); Future    Loose stools  -     Enteric Bacteria and Virus Panel by AVERY Stool; Future  -     Ova and Parasite Exam Routine; Future  -     Lipase; Future  -     Comprehensive metabolic panel (BMP + Alb, Alk Phos, ALT, AST, Total. Bili, TP); Future  -     CBC with platelets; Future    Morbid obesity (H)    Hypothyroidism, unspecified type  -     TSH with free T4 reflex; Future    Iron deficiency anemia, unspecified iron deficiency anemia type  -     CBC with platelets; Future  -     Iron and iron binding capacity; Future  -     Ferritin; Future  -     Tissue transglutaminase neida IgA and IgG; Future    Arthritis of right shoulder region  -     Orthopedic  Referral; Future    Impingement syndrome, shoulder, left  -      Orthopedic  Referral; Future    Lower abdominal pain  -     CT Abdomen Pelvis w Contrast; Future  -     UA with Microscopic reflex to Culture - lab collect; Future    Other orders  -     REVIEW OF HEALTH MAINTENANCE PROTOCOL ORDERS        Rodrigo Finley MD  Family Medicine   [facility] [id number] [phone]

## 2021-08-30 NOTE — LETTER
September 15, 2021      Grace Chinchilla  [address] [place] [address]        Dear ,    We are writing to inform you of your test results.       These are all OK   Ferritin is a bit high but can be due to inflammation  Follow up with Cuca if still symptoms     Resulted Orders   Lipase   Result Value Ref Range    Lipase 31 0 - 52 U/L   Comprehensive metabolic panel (BMP + Alb, Alk Phos, ALT, AST, Total. Bili, TP)   Result Value Ref Range    Sodium 137 136 - 145 mmol/L    Potassium 3.9 3.5 - 5.0 mmol/L    Chloride 100 98 - 107 mmol/L    Carbon Dioxide (CO2) 25 22 - 31 mmol/L    Anion Gap 12 5 - 18 mmol/L    Urea Nitrogen 18 8 - 22 mg/dL    Creatinine 0.85 0.60 - 1.10 mg/dL    Calcium 9.8 8.5 - 10.5 mg/dL    Glucose 96 70 - 125 mg/dL    Alkaline Phosphatase 95 45 - 120 U/L    AST 15 0 - 40 U/L    ALT 22 0 - 45 U/L    Protein Total 7.5 6.0 - 8.0 g/dL    Albumin 4.1 3.5 - 5.0 g/dL    Bilirubin Total 0.4 0.0 - 1.0 mg/dL    GFR Estimate 71 >60 mL/min/1.73m2      Comment:      As of July 11, 2021, eGFR is calculated by the CKD-EPI creatinine equation, without race adjustment. eGFR can be influenced by muscle mass, exercise, and diet. The reported eGFR is an estimation only and is only applicable if the renal function is stable.   CBC with platelets   Result Value Ref Range    WBC Count 8.7 4.0 - 11.0 10e3/uL    RBC Count 4.63 3.80 - 5.20 10e6/uL    Hemoglobin 12.7 11.7 - 15.7 g/dL    Hematocrit 39.3 35.0 - 47.0 %    MCV 85 78 - 100 fL    MCH 27.4 26.5 - 33.0 pg    MCHC 32.3 31.5 - 36.5 g/dL    RDW 13.5 10.0 - 15.0 %    Platelet Count 301 150 - 450 10e3/uL   Ferritin   Result Value Ref Range    Ferritin 334 (H) 10 - 130 ng/mL   Tissue transglutaminase neida IgA and IgG   Result Value Ref Range    Tissue Transglutaminase Antibody IgA 0.6 <7.0 U/mL      Comment:      Negative- The tTG-IgA assay has limited utility for patients with decreased levels of IgA. Screening for celiac disease should  include IgA testing to rule out selective IgA deficiency and to guide selection and interpretation of serological testing. tTG-IgG testing may be positive in celiac disease patients with IgA deficiency.    Tissue Transglutaminase Antibody IgG <0.6 <7.0 U/mL      Comment:      Negative   TSH with free T4 reflex   Result Value Ref Range    TSH 3.10 0.30 - 5.00 uIU/mL   UA with Microscopic reflex to Culture - lab collect   Result Value Ref Range    Color Urine Yellow Colorless, Straw, Light Yellow, Yellow    Appearance Urine Clear Clear    Glucose Urine Negative Negative mg/dL    Bilirubin Urine Negative Negative    Ketones Urine Negative Negative mg/dL    Specific Gravity Urine 1.020 1.005 - 1.030    Blood Urine Trace (A) Negative    pH Urine 6.0 5.0 - 8.0    Protein Albumin Urine Negative Negative mg/dL    Urobilinogen Urine 0.2 0.2, 1.0 E.U./dL    Nitrite Urine Negative Negative    Leukocyte Esterase Urine Trace (A) Negative   Enteric Bacteria and Virus Panel by AVERY Stool   Result Value Ref Range    Campylobacter group Not Detected Not Detected    Salmonella species Not Detected Not Detected    Shigella species Not Detected Not Detected    Vibrio group Not Detected Not Detected    Rotavirus Not Detected Not Detected    Shiga toxin 1 gene Not Detected Not Detected    Shiga toxin 2 gene Not Detected Not Detected    Norovirus I and II Not Detected Not Detected    Yersinia enterocolitica Not Detected Not Detected    Narrative    Testing performed by multiplexed, qualitative PCR using the InOpen Enteric Pathogens Nucleic Acid Test. Results should not be used as the sole basis for diagnosis, treatment or other patient management decisions. Positive results do not rule out co-infection with other organisms that are not detected by this test and may not be the sole or definitive cause of patient illness. Negative results in the setting of clinical illness compatible with gastroenteritis may be due to infection by  pathogens that are not detected by this test or non-infectious causes such as ulcerative colitis, irritable bowel syndrome or Crohn's disease. Note: Shiga toxin producing E. coli (STEC) typically harbor one or both genes that encode for Shiga toxins 1 and 2.   Ova and Parasite Exam Routine   Result Value Ref Range    OVA AND PARASITE EXAM Negative Negative      Comment:      A single negative specimen does not rule out parasitic infection.    Narrative    Cryptosporidium, Cyclospora and Microsporidia are not readily detected by this method.   Urine Microscopic   Result Value Ref Range    Bacteria Urine Few (A) None Seen /HPF    RBC Urine 0-2 0-2 /HPF /HPF    WBC Urine 0-5 0-5 /HPF /HPF    Narrative    Urine Culture not indicated       If you have any questions or concerns, please call the clinic at the number listed above.       Sincerely,      Rodrigo Finley MD

## 2021-08-30 NOTE — ASSESSMENT & PLAN NOTE
"Loose   6 weeks  No change with food     + queasy  No relief with stool    \"like an ache\"  No Black  No blood       [place]     "

## 2021-08-30 NOTE — LETTER
September 15, 2021      Grace GELLER Giovannyrenan  [address]  [address] [place] [address]        Dear ,    We are writing to inform you of your test results.    These are all OK   Ferritin is a bit high but can be due to inflammation  Follow up with Cuac if still symptoms     DanL  Resulted Orders   Lipase   Result Value Ref Range    Lipase 31 0 - 52 U/L   Comprehensive metabolic panel (BMP + Alb, Alk Phos, ALT, AST, Total. Bili, TP)   Result Value Ref Range    Sodium 137 136 - 145 mmol/L    Potassium 3.9 3.5 - 5.0 mmol/L    Chloride 100 98 - 107 mmol/L    Carbon Dioxide (CO2) 25 22 - 31 mmol/L    Anion Gap 12 5 - 18 mmol/L    Urea Nitrogen 18 8 - 22 mg/dL    Creatinine 0.85 0.60 - 1.10 mg/dL    Calcium 9.8 8.5 - 10.5 mg/dL    Glucose 96 70 - 125 mg/dL    Alkaline Phosphatase 95 45 - 120 U/L    AST 15 0 - 40 U/L    ALT 22 0 - 45 U/L    Protein Total 7.5 6.0 - 8.0 g/dL    Albumin 4.1 3.5 - 5.0 g/dL    Bilirubin Total 0.4 0.0 - 1.0 mg/dL    GFR Estimate 71 >60 mL/min/1.73m2      Comment:      As of July 11, 2021, eGFR is calculated by the CKD-EPI creatinine equation, without race adjustment. eGFR can be influenced by muscle mass, exercise, and diet. The reported eGFR is an estimation only and is only applicable if the renal function is stable.   CBC with platelets   Result Value Ref Range    WBC Count 8.7 4.0 - 11.0 10e3/uL    RBC Count 4.63 3.80 - 5.20 10e6/uL    Hemoglobin 12.7 11.7 - 15.7 g/dL    Hematocrit 39.3 35.0 - 47.0 %    MCV 85 78 - 100 fL    MCH 27.4 26.5 - 33.0 pg    MCHC 32.3 31.5 - 36.5 g/dL    RDW 13.5 10.0 - 15.0 %    Platelet Count 301 150 - 450 10e3/uL   Ferritin   Result Value Ref Range    Ferritin 334 (H) 10 - 130 ng/mL   Tissue transglutaminase neida IgA and IgG   Result Value Ref Range    Tissue Transglutaminase Antibody IgA 0.6 <7.0 U/mL      Comment:      Negative- The tTG-IgA assay has limited utility for patients with decreased levels of IgA. Screening for celiac disease  should include IgA testing to rule out selective IgA deficiency and to guide selection and interpretation of serological testing. tTG-IgG testing may be positive in celiac disease patients with IgA deficiency.    Tissue Transglutaminase Antibody IgG <0.6 <7.0 U/mL      Comment:      Negative   TSH with free T4 reflex   Result Value Ref Range    TSH 3.10 0.30 - 5.00 uIU/mL   UA with Microscopic reflex to Culture - lab collect   Result Value Ref Range    Color Urine Yellow Colorless, Straw, Light Yellow, Yellow    Appearance Urine Clear Clear    Glucose Urine Negative Negative mg/dL    Bilirubin Urine Negative Negative    Ketones Urine Negative Negative mg/dL    Specific Gravity Urine 1.020 1.005 - 1.030    Blood Urine Trace (A) Negative    pH Urine 6.0 5.0 - 8.0    Protein Albumin Urine Negative Negative mg/dL    Urobilinogen Urine 0.2 0.2, 1.0 E.U./dL    Nitrite Urine Negative Negative    Leukocyte Esterase Urine Trace (A) Negative   Enteric Bacteria and Virus Panel by AVERY Stool   Result Value Ref Range    Campylobacter group Not Detected Not Detected    Salmonella species Not Detected Not Detected    Shigella species Not Detected Not Detected    Vibrio group Not Detected Not Detected    Rotavirus Not Detected Not Detected    Shiga toxin 1 gene Not Detected Not Detected    Shiga toxin 2 gene Not Detected Not Detected    Norovirus I and II Not Detected Not Detected    Yersinia enterocolitica Not Detected Not Detected    Narrative    Testing performed by multiplexed, qualitative PCR using the IPWireless Enteric Pathogens Nucleic Acid Test. Results should not be used as the sole basis for diagnosis, treatment or other patient management decisions. Positive results do not rule out co-infection with other organisms that are not detected by this test and may not be the sole or definitive cause of patient illness. Negative results in the setting of clinical illness compatible with gastroenteritis may be due to  infection by pathogens that are not detected by this test or non-infectious causes such as ulcerative colitis, irritable bowel syndrome or Crohn's disease. Note: Shiga toxin producing E. coli (STEC) typically harbor one or both genes that encode for Shiga toxins 1 and 2.   Ova and Parasite Exam Routine   Result Value Ref Range    OVA AND PARASITE EXAM Negative Negative      Comment:      A single negative specimen does not rule out parasitic infection.    Narrative    Cryptosporidium, Cyclospora and Microsporidia are not readily detected by this method.   Urine Microscopic   Result Value Ref Range    Bacteria Urine Few (A) None Seen /HPF    RBC Urine 0-2 0-2 /HPF /HPF    WBC Urine 0-5 0-5 /HPF /HPF    Narrative    Urine Culture not indicated       If you have any questions or concerns, please call the clinic at the number listed above.       Sincerely,      Rodrigo Finley MD

## 2021-08-31 ENCOUNTER — LAB (OUTPATIENT)
Dept: LAB | Facility: CLINIC | Age: 66
End: 2021-08-31
Payer: COMMERCIAL

## 2021-08-31 DIAGNOSIS — E61.1 IRON DEFICIENCY: Primary | ICD-10-CM

## 2021-08-31 LAB
ALBUMIN SERPL-MCNC: 4.1 G/DL (ref 3.5–5)
ALP SERPL-CCNC: 95 U/L (ref 45–120)
ALT SERPL W P-5'-P-CCNC: 22 U/L (ref 0–45)
ANION GAP SERPL CALCULATED.3IONS-SCNC: 12 MMOL/L (ref 5–18)
AST SERPL W P-5'-P-CCNC: 15 U/L (ref 0–40)
BILIRUB SERPL-MCNC: 0.4 MG/DL (ref 0–1)
BUN SERPL-MCNC: 18 MG/DL (ref 8–22)
CALCIUM SERPL-MCNC: 9.8 MG/DL (ref 8.5–10.5)
CHLORIDE BLD-SCNC: 100 MMOL/L (ref 98–107)
CO2 SERPL-SCNC: 25 MMOL/L (ref 22–31)
CREAT SERPL-MCNC: 0.85 MG/DL (ref 0.6–1.1)
FERRITIN SERPL-MCNC: 334 NG/ML (ref 10–130)
GFR SERPL CREATININE-BSD FRML MDRD: 71 ML/MIN/1.73M2
GLUCOSE BLD-MCNC: 96 MG/DL (ref 70–125)
IRON SATN MFR SERPL: 13 % (ref 20–50)
IRON SERPL-MCNC: 50 UG/DL (ref 42–175)
LIPASE SERPL-CCNC: 31 U/L (ref 0–52)
POTASSIUM BLD-SCNC: 3.9 MMOL/L (ref 3.5–5)
PROT SERPL-MCNC: 7.5 G/DL (ref 6–8)
SODIUM SERPL-SCNC: 137 MMOL/L (ref 136–145)
TIBC SERPL-MCNC: 399 UG/DL (ref 313–563)
TRANSFERRIN SERPL-MCNC: 319 MG/DL (ref 212–360)
TSH SERPL DL<=0.005 MIU/L-ACNC: 3.1 UIU/ML (ref 0.3–5)

## 2021-08-31 PROCEDURE — 87209 SMEAR COMPLEX STAIN: CPT | Performed by: FAMILY MEDICINE

## 2021-08-31 PROCEDURE — 87177 OVA AND PARASITES SMEARS: CPT | Performed by: FAMILY MEDICINE

## 2021-08-31 PROCEDURE — 87506 IADNA-DNA/RNA PROBE TQ 6-11: CPT | Performed by: FAMILY MEDICINE

## 2021-09-01 LAB
C COLI+JEJUNI+LARI FUSA STL QL NAA+PROBE: NOT DETECTED
EC STX1 GENE STL QL NAA+PROBE: NOT DETECTED
EC STX2 GENE STL QL NAA+PROBE: NOT DETECTED
NOROV GI+II ORF1-ORF2 JNC STL QL NAA+PR: NOT DETECTED
O+P STL MICRO: NEGATIVE
RVA NSP5 STL QL NAA+PROBE: NOT DETECTED
SALMONELLA SP RPOD STL QL NAA+PROBE: NOT DETECTED
SHIGELLA SP+EIEC IPAH STL QL NAA+PROBE: NOT DETECTED
V CHOL+PARA RFBL+TRKH+TNAA STL QL NAA+PR: NOT DETECTED
Y ENTERO RECN STL QL NAA+PROBE: NOT DETECTED

## 2021-09-02 ENCOUNTER — TELEPHONE (OUTPATIENT)
Dept: FAMILY MEDICINE | Facility: CLINIC | Age: 66
End: 2021-09-02

## 2021-09-02 LAB
TTG IGA SER-ACNC: 0.6 U/ML
TTG IGG SER-ACNC: <0.6 U/ML

## 2021-09-02 NOTE — TELEPHONE ENCOUNTER
----- Message from Rodrigo Finley MD sent at 9/1/2021  2:43 PM CDT -----  Inform Grace that Iron is borderline low  but ferritin is normal and blood counts normal   Yoko

## 2021-09-15 ENCOUNTER — OFFICE VISIT (OUTPATIENT)
Dept: FAMILY MEDICINE | Facility: CLINIC | Age: 66
End: 2021-09-15
Payer: COMMERCIAL

## 2021-09-15 VITALS
OXYGEN SATURATION: 99 % | BODY MASS INDEX: 38.11 KG/M2 | DIASTOLIC BLOOD PRESSURE: 76 MMHG | RESPIRATION RATE: 16 BRPM | HEART RATE: 85 BPM | SYSTOLIC BLOOD PRESSURE: 130 MMHG | WEIGHT: 205 LBS

## 2021-09-15 DIAGNOSIS — R10.31 ABDOMINAL PAIN, RLQ (RIGHT LOWER QUADRANT): Primary | ICD-10-CM

## 2021-09-15 DIAGNOSIS — F33.41 RECURRENT MAJOR DEPRESSIVE DISORDER, IN PARTIAL REMISSION (H): ICD-10-CM

## 2021-09-15 DIAGNOSIS — R19.7 DIARRHEA, UNSPECIFIED TYPE: ICD-10-CM

## 2021-09-15 PROBLEM — F33.9 MAJOR DEPRESSION, RECURRENT (H): Status: ACTIVE | Noted: 2021-09-15

## 2021-09-15 PROCEDURE — 90662 IIV NO PRSV INCREASED AG IM: CPT | Performed by: NURSE PRACTITIONER

## 2021-09-15 PROCEDURE — G0008 ADMIN INFLUENZA VIRUS VAC: HCPCS | Performed by: NURSE PRACTITIONER

## 2021-09-15 PROCEDURE — 99214 OFFICE O/P EST MOD 30 MIN: CPT | Performed by: NURSE PRACTITIONER

## 2021-09-15 NOTE — PROGRESS NOTES
Assessment & Plan   1. Abdominal pain, RLQ (right lower quadrant)  Now almost three week history of intermittent lower abdominal pain (R>L) and loose stools.  Negative stool studies and labs.  Did recommend proceeding with CT.  If negative would refer to GI for early colonoscopy.  Check C. Diff today as well to be thorough, no risk factors known.   - CT Abdomen Pelvis w Contrast; Future    2. Diarrhea, unspecified type  - Clostridium difficile Toxin B PCR; Future    3. Recurrent major depressive disorder, in partial remission (H)  Follows with psychiatry and therapist.  Feels mood relatively stable. Acknowledges recent family stress may be contributing to physical symptoms.     Cuca Fong, CNP    Subjective   Chief Complaint:  pain    HPI:   Grace Chinchilla is a 66 year old female who presents for follow up.     Saw Dr. Finley 8/30 for loose stools, lower abdominal pain. Stool cultures negative. Normal CBC, CMP, TSH.  Iron saturation slightly low, normal ferritin.     She was scheduled for a CT 9/3 but did not go as she states her symptoms had resolved for a week. Unfortunately she states they began again 9/6.   Loose liquid stools a few times a day.  Cramping and discomfort.  She has not had a fever or chills. No N/V.  Eating and drinking well. She does not feel that the symptoms are related to food intake.      Colonoscopy 2018 with polyps, recommended 5 year call back.     She wonders if symptoms may be related to stress.  Has been discussing with therapist. Some family tension with her sister.       Allergies:  is allergic to bupropion.    SH/FH:  Social History and Family History reviewed and updated.   Tobacco Status:  She  reports that she has never smoked. She has never used smokeless tobacco.    Review of Systems:  A complete head to toe ROS is negative unless otherwise noted in HPI    Objective     Vitals:    09/15/21 1234   BP: 130/76   BP Location: Left arm   Patient Position: Sitting   Cuff  Size: Adult Regular   Pulse: 85   Resp: 16   SpO2: 99%   Weight: 93 kg (205 lb)       Physical Exam:  GENERAL: Alert, well-appearing   CV: Regular rate and rhythm without murmurs, rubs or gallops.  RESP: Lung sounds clear  ABDOMEN: BS+. Abdomen soft, nontender to palpation without guarding. No organomegaly, masses or hernias

## 2021-09-21 ENCOUNTER — TELEPHONE (OUTPATIENT)
Dept: FAMILY MEDICINE | Facility: CLINIC | Age: 66
End: 2021-09-21

## 2021-09-21 ENCOUNTER — LAB (OUTPATIENT)
Dept: LAB | Facility: CLINIC | Age: 66
End: 2021-09-21
Payer: COMMERCIAL

## 2021-09-21 DIAGNOSIS — M25.511 CHRONIC RIGHT SHOULDER PAIN: Primary | ICD-10-CM

## 2021-09-21 DIAGNOSIS — R19.7 DIARRHEA, UNSPECIFIED TYPE: ICD-10-CM

## 2021-09-21 DIAGNOSIS — G89.29 CHRONIC RIGHT SHOULDER PAIN: Primary | ICD-10-CM

## 2021-09-21 PROCEDURE — 87493 C DIFF AMPLIFIED PROBE: CPT

## 2021-09-21 NOTE — TELEPHONE ENCOUNTER
Grace was in the clinic today and thought that Cuca was going to order PT for her after their visit last week. She has not heard from PT and I don't see an order. Please advise.

## 2021-09-22 ENCOUNTER — TELEPHONE (OUTPATIENT)
Dept: FAMILY MEDICINE | Facility: CLINIC | Age: 66
End: 2021-09-22

## 2021-09-22 LAB — C DIFF TOX B STL QL: NEGATIVE

## 2021-09-22 NOTE — TELEPHONE ENCOUNTER
----- Message from Cuca Fong CNP sent at 9/22/2021 12:54 PM CDT -----  Please let Grace know her stool test for C. Diff was negative

## 2021-10-01 ENCOUNTER — NURSE TRIAGE (OUTPATIENT)
Dept: NURSING | Facility: CLINIC | Age: 66
End: 2021-10-01

## 2021-10-01 ENCOUNTER — HOSPITAL ENCOUNTER (OUTPATIENT)
Dept: CT IMAGING | Facility: HOSPITAL | Age: 66
Discharge: HOME | End: 2021-10-01
Attending: NURSE PRACTITIONER | Admitting: NURSE PRACTITIONER
Payer: COMMERCIAL

## 2021-10-01 DIAGNOSIS — R10.31 ABDOMINAL PAIN, RLQ (RIGHT LOWER QUADRANT): ICD-10-CM

## 2021-10-01 LAB
CREAT BLD-MCNC: 1 MG/DL (ref 0.6–1.1)
GFR SERPL CREATININE-BSD FRML MDRD: 58 ML/MIN/1.73M2

## 2021-10-01 PROCEDURE — 74177 CT ABD & PELVIS W/CONTRAST: CPT

## 2021-10-01 PROCEDURE — 82565 ASSAY OF CREATININE: CPT

## 2021-10-01 PROCEDURE — 250N000011 HC RX IP 250 OP 636: Performed by: NURSE PRACTITIONER

## 2021-10-01 RX ORDER — IOPAMIDOL 755 MG/ML
75 INJECTION, SOLUTION INTRAVASCULAR ONCE
Status: COMPLETED | OUTPATIENT
Start: 2021-10-01 | End: 2021-10-01

## 2021-10-01 RX ADMIN — IOPAMIDOL 75 ML: 755 INJECTION, SOLUTION INTRAVENOUS at 13:54

## 2021-10-01 NOTE — TELEPHONE ENCOUNTER
Hx arthritis in knees.  Past three days having significantly more stiffness. Sudden onset. Very difficult to get up from a sitting position. Not more painful.  R>L    Wearing leg supports. Not sure if it's helping  No warmth  No redness  No swelling.  No injury.  Walking more slowly because of the stiffness.    No fever.    Recommended she be seen within the next three days.  Transferred to [facility].    COVID 19 Nurse Triage Plan/Patient Instructions    Please be aware that novel coronavirus (COVID-19) may be circulating in the community. If you develop symptoms such as fever, cough, or SOB or if you have concerns about the presence of another infection including coronavirus (COVID-19), please contact your health care provider or visit [email or web address].     Disposition/Instructions    In-Person Visit with provider recommended. Reference Visit Selection Guide.    Thank you for taking steps to prevent the spread of this virus.  o Limit your contact with others.  o Wear a simple mask to cover your cough.  o Wash your hands well and often.    Resources    [facility]: About COVID-19: [email or web address]    CDC: What to Do If You're Sick: www.cdc.gov/coronavirus/2019-ncov/about/steps-when-sick.html    CDC: Ending Home Isolation: www.cdc.gov/coronavirus/2019-ncov/hcp/disposition-in-home-patients.html     CDC: Caring for Someone: www.cdc.gov/coronavirus/2019-ncov/if-you-are-sick/care-for-someone.html     [facility]: Interim Guidance for Hospital Discharge to Home: www.health.[email or web address].[email or web address]/diseases/coronavirus/hcp/hospdischarge.pdf    [facility] clinical trials (COVID-19 research studies): [email or web address]     Below are the COVID-19 hotlines at the [place] Department of Health ([facility]). Interpreters are available.   o For health questions: Call 452-535-7051 or 1-123.432.6324 (7 a.m. to 7 p.m.)  o For questions about schools and childcare: Call 026-535-3422 or  1-145.851.7498 (7 a.m. to 7 p.m.)     Reason for Disposition    MODERATE pain (e.g., symptoms interfere with work or school, limping) and present > 3 days    Additional Information    Negative: Sounds like a life-threatening emergency to the triager    Negative: Swollen knee joint and fever    Negative: Thigh or calf pain and only 1 side and present > 1 hour    Negative: Thigh or calf swelling and only 1 side    Negative: Patient sounds very sick or weak to the triager    Negative: Can't move swollen joint at all    Negative: SEVERE pain (e.g., excruciating, unable to walk)    Negative: Very swollen joint    Negative: Painful rash with multiple small blisters grouped together (i.e., dermatomal distribution or 'band' or 'stripe')    Negative: Looks like a boil, infected sore, deep ulcer, or other infected rash (spreading redness, pus)    Protocols used: KNEE PAIN-A-OH    Eugenia RUBIO RN [facility]

## 2021-10-04 ENCOUNTER — OFFICE VISIT (OUTPATIENT)
Dept: FAMILY MEDICINE | Facility: CLINIC | Age: 66
End: 2021-10-04
Payer: COMMERCIAL

## 2021-10-04 ENCOUNTER — TELEPHONE (OUTPATIENT)
Dept: FAMILY MEDICINE | Facility: CLINIC | Age: 66
End: 2021-10-04

## 2021-10-04 VITALS
OXYGEN SATURATION: 96 % | BODY MASS INDEX: 37.65 KG/M2 | HEIGHT: 62 IN | WEIGHT: 204.6 LBS | DIASTOLIC BLOOD PRESSURE: 72 MMHG | HEART RATE: 75 BPM | SYSTOLIC BLOOD PRESSURE: 132 MMHG

## 2021-10-04 DIAGNOSIS — N28.1 RENAL CYST, LEFT: ICD-10-CM

## 2021-10-04 DIAGNOSIS — M25.561 ACUTE PAIN OF RIGHT KNEE: Primary | ICD-10-CM

## 2021-10-04 DIAGNOSIS — E27.8 ADRENAL MASS (H): ICD-10-CM

## 2021-10-04 DIAGNOSIS — N94.89 ADNEXAL MASS: ICD-10-CM

## 2021-10-04 PROBLEM — F41.1 ANXIETY STATE: Status: ACTIVE | Noted: 2021-10-04

## 2021-10-04 PROBLEM — I35.2 NONRHEUMATIC AORTIC (VALVE) STENOSIS WITH INSUFFICIENCY: Status: ACTIVE | Noted: 2017-03-22

## 2021-10-04 PROCEDURE — 99214 OFFICE O/P EST MOD 30 MIN: CPT | Performed by: STUDENT IN AN ORGANIZED HEALTH CARE EDUCATION/TRAINING PROGRAM

## 2021-10-04 RX ORDER — NAPROXEN 500 MG/1
500 TABLET ORAL 2 TIMES DAILY WITH MEALS
Qty: 20 TABLET | Refills: 0 | Status: SHIPPED | OUTPATIENT
Start: 2021-10-04 | End: 2022-09-16

## 2021-10-04 RX ORDER — MULTIPLE VITAMINS W/ MINERALS TAB 9MG-400MCG
2 TAB ORAL DAILY
COMMUNITY

## 2021-10-04 RX ORDER — CYANOCOBALAMIN (VITAMIN B-12) 500 MCG
1500 LOZENGE ORAL DAILY
COMMUNITY

## 2021-10-04 ASSESSMENT — MIFFLIN-ST. JEOR: SCORE: 1408.37

## 2021-10-04 NOTE — PROGRESS NOTES
Assessment & Plan   Problem List Items Addressed This Visit     None      Visit Diagnoses     Acute pain of right knee    -  Primary    Relevant Medications    naproxen (NAPROSYN) 500 MG tablet    Other Relevant Orders    XR Knee Right 3 Views (Completed)    Adnexal mass        Renal cyst, left        Adrenal mass (H)             [place], patient has swelling of the right knee and loss of landmarks.  Patient has tenderness along the superior and inferior pole of the patella.  There is some patellar edema and she is limping with ambulation.  We do not have any updated imaging of her knee.  Will start patient on RICE therapy accident burst.  Told to ice the area 15 minutes at a time up to 3 times a day.  We will have her go over the x-ray to get imaging of her knee done and decide next steps from there.  May benefit from steroid injection vs physical therapy.  Has not had a steroid injection in her knee before.    Of note, patient was recently seen by PCP for concerns for R Q pain.  Underwent CT abdomen/pelvis that shows several abnormalities including indeterminant cystic lesion at the right pelvis that could be an adnexal mass, large mildly complex cyst of the left kidney and indeterminate right adrenal nodule.  Today, I will not discuss these results but did encourage patient to reach out to her PCP to set up a time to discuss this.    Discussed getting Covid booster as patient qualifies.  Patient reports that she is conserved and that she needs to speak to the POA who is her sister before making any decisions.  Told her to have her sister reach out to her PCP or me if they have any questions about the blister.    32 minutes spent on the date of the encounter doing chart review, history and exam, documentation and further activities per the note    Return in about 1 month (around 11/4/2021) for With PCP for knee pain follow-up.    Reina Cuellar DO  [facility]    Melita Edwards is a  "66 year old who presents for the following health issues: right knee pain     Patient reports worsening right knee pain today.  Has had longstanding history of right knee pain but, started to become acutely worse starting last Wednesday.  She is not sure what caused it.  However, worse.  She says she knows she has \"arthritis\" and that but this feels \"different \".  Her knee is very stiff and she feels that she is unable to walk.  Walking is difficult and standing on it is difficult.  There is no overlying swelling, redness, rash or fever associated with it.  She also feels that the knee is stiff throughout the day and not at any particular time.  Is now also clicking.  Denies any instability, locking is trying to elevate the knee and try to use a knee brace without much improvement.    Review of Systems   As per HPI       Objective    /72 (BP Location: Right arm, Patient Position: Sitting, Cuff Size: Adult Regular)   Pulse 75   Ht 1.562 m (5' 1.5\")   Wt 92.8 kg (204 lb 9.6 oz)   LMP 09/13/2006   SpO2 96%   BMI 38.03 kg/m    Body mass index is 38.03 kg/m .     Physical Exam   GENERAL: healthy, alert and no distress  MS: Patient is limping with ambulation.  On exam, right knee has some swelling and loss of landmarks.  Patient has pain with palpation of the inferior and superior pole of the patella.  There is a fluid wave.  Patient has no significant pain with palpation of the bony joint line.  No ligament laxity.  Does have mild crepitus.  Pain with hyperflexion flexion of the knee.  Left knee: Unremarkable      "

## 2021-10-04 NOTE — TELEPHONE ENCOUNTER
----- Message from Cuca Fong CNP sent at 10/3/2021  6:07 PM CDT -----  Please call Grace and help her set up a video or phone visit this week to discuss the results of her CT.  Reassure her there is nothing urgent but there are some findings that we will need to follow up on

## 2021-10-04 NOTE — PATIENT INSTRUCTIONS
For your knee pain:  -Please start taking naproxen (anti-inflammatory) twice a day for the next 10 days to help with the pain and inflammation.  While taking naproxen, it is very important that you do not take ibuprofen/Aleve/Motrin or any other NSAIDs.  The only other thing that you can take to augment your pain control is Tylenol.    -Please ice the knee 15 minutes at a time for up to 3 times a day for the next 10 days as well    -I have ordered an x-ray of your knee-will reach out to with the results and then we will decide neck steps.  Ultimately, we may send you to physical therapy to improve knee pain.    -Please remember to call your PCP for results of the CT abdomen/pelvis    Let us know if you have any additional questions regarding the COVID-19 vaccine

## 2021-10-04 NOTE — TELEPHONE ENCOUNTER
Left message for pt to call back. Pt left the exam room before we could properly discharge her. She needed an x-ray and review note from Dr. Cuellar via the AVS. Please advise pt to come back for x-ray.

## 2021-10-06 ENCOUNTER — OFFICE VISIT (OUTPATIENT)
Dept: FAMILY MEDICINE | Facility: CLINIC | Age: 66
End: 2021-10-06
Payer: COMMERCIAL

## 2021-10-06 ENCOUNTER — TELEPHONE (OUTPATIENT)
Dept: FAMILY MEDICINE | Facility: CLINIC | Age: 66
End: 2021-10-06

## 2021-10-06 VITALS
HEIGHT: 62 IN | OXYGEN SATURATION: 96 % | HEART RATE: 72 BPM | RESPIRATION RATE: 18 BRPM | SYSTOLIC BLOOD PRESSURE: 138 MMHG | WEIGHT: 206.9 LBS | DIASTOLIC BLOOD PRESSURE: 64 MMHG | BODY MASS INDEX: 38.07 KG/M2

## 2021-10-06 DIAGNOSIS — N28.1 CYST OF LEFT KIDNEY: ICD-10-CM

## 2021-10-06 DIAGNOSIS — N83.8 OVARIAN MASS, RIGHT: Primary | ICD-10-CM

## 2021-10-06 PROCEDURE — 99214 OFFICE O/P EST MOD 30 MIN: CPT | Performed by: NURSE PRACTITIONER

## 2021-10-06 ASSESSMENT — MIFFLIN-ST. JEOR: SCORE: 1418.8

## 2021-10-06 NOTE — PROGRESS NOTES
Assessment & Plan    1. Ovarian mass, right  Reviewed the results CT of abdomen and pelvis. All questions answered. We will follow up with a pelvic MRI for the adnexal mass and the renal MRI cystic mass on the left kidney. She feels comfortable waiting on any additional imaging for the adrenal mass likely an incidentaloma. Follow-up with results when received.  - MR Pelvis (GYN) wo & w Contrast; Future    2. Cyst of left kidney    - MR Renal wo & w Contrast; Future    Cuca Fong, CNP    Subjective   Chief Complaint:  CT    HPI:   Grace Chinchilla is a 66 year old female who presents for results discussion    Grace was seen 3 weeks ago with concerns about right lower quadrant pain. She underwent CT of abdomen and pelvis showed several abnormalities. She is here today to discuss results. She states discomfort has mostly resolved.    CT findings below. In the right adnexal area there is a 2.1 centimeters complex cystic mass. Determined to be indeterminate and follow-up pelvic ultrasound or MRI recommended. There is also a mildly complex cyst on the left kidney measuring 2.2 cm. This is a growth in comparison to 0.9 cm in 2008. Finally, there is a right adrenal nodule.    IMPRESSION:   1.  No acute abnormality can be seen.  2.  Indeterminant cystic lesion that is mildly complex at the right pelvis that could arise from the right ovary/adnexa. Recommend correlation with pelvic ultrasound. Pelvic MRI could also be performed for further characterization.  3.  Larger mildly complex cyst at the left kidney. Tiny internal areas of small nodularity suggested. Recommend renal MRI for further characterization.  4.  New but technically indeterminate right adrenal nodule compared to 2008. These are commonly adenomas, but as it is new, consider further assessment with adrenal protocol CT.      Allergies:  is allergic to bupropion.    SH/FH:  Social History and Family History reviewed and updated.   Tobacco Status:   "She  reports that she has never smoked. She has never used smokeless tobacco.    Review of Systems:  A complete head to toe ROS is negative unless otherwise noted in HPI    Objective     Vitals:    10/06/21 1647   BP: 138/64   BP Location: Left arm   Patient Position: Sitting   Cuff Size: Adult Regular   Pulse: 72   Resp: 18   SpO2: 96%   Weight: 93.8 kg (206 lb 14.4 oz)   Height: 1.562 m (5' 1.5\")       Physical Exam:  GENERAL: Alert, well-appearing  PSYCH: Pleasant mood, affect appropriate.          "

## 2021-10-06 NOTE — TELEPHONE ENCOUNTER
LMTCB- when patient calls back relay message below and assist in scheduling an appointment. KK 10/6/21     Reina Cuellar DO [facility] [place], [facility]  Call patient and let her know that there is quite a bit of swelling in her knee seen on Xray + arthritis and arthritis in her knee. I would like her to come in for steoird injection and drainage of the fluid in her knee on Friday with me. She can come in over my lunch hour. Ortherwise, it would be at the next  available appt

## 2021-10-08 NOTE — TELEPHONE ENCOUNTER
PT called to reschedule appt.      PT shares that she is sleepy.    Appt rescheduled to next available date of 10/27/21

## 2021-10-21 ENCOUNTER — TELEPHONE (OUTPATIENT)
Dept: FAMILY MEDICINE | Facility: CLINIC | Age: 66
End: 2021-10-21

## 2021-10-21 DIAGNOSIS — M17.10 ARTHRITIS OF KNEE: Primary | ICD-10-CM

## 2021-10-21 NOTE — TELEPHONE ENCOUNTER
Reason for Call:  Other call back    Detailed comments: Pt needing to know who to see for draining of her right knee and cortisone injection    Please    Phone Number Patient can be reached at: Cell number on file:    Telephone Information:   Mobile [phone]       Best Time: anytime    Can we leave a detailed message on this number? YES    Call taken on 10/21/2021 at 12:22 PM by Carmina Arellano

## 2021-10-21 NOTE — TELEPHONE ENCOUNTER
Please let her know she has seen [facility] in the past, Dr. Mazariegos.  I have placed a referral order for her for orthopedics.  She is also welcome to call [facility] to schedule

## 2021-11-08 ENCOUNTER — NURSE TRIAGE (OUTPATIENT)
Dept: FAMILY MEDICINE | Facility: CLINIC | Age: 66
End: 2021-11-08

## 2021-11-08 ENCOUNTER — HOSPITAL ENCOUNTER (OUTPATIENT)
Dept: PHYSICAL THERAPY | Facility: REHABILITATION | Age: 66
End: 2021-11-08
Payer: COMMERCIAL

## 2021-11-08 DIAGNOSIS — M25.511 CHRONIC RIGHT SHOULDER PAIN: Primary | ICD-10-CM

## 2021-11-08 DIAGNOSIS — M25.611 DECREASED ROM OF RIGHT SHOULDER: ICD-10-CM

## 2021-11-08 DIAGNOSIS — G89.29 CHRONIC RIGHT SHOULDER PAIN: Primary | ICD-10-CM

## 2021-11-08 DIAGNOSIS — R29.898 DECREASED ROM OF NECK: ICD-10-CM

## 2021-11-08 PROCEDURE — 97110 THERAPEUTIC EXERCISES: CPT | Mod: GP | Performed by: PHYSICAL THERAPIST

## 2021-11-08 PROCEDURE — 97161 PT EVAL LOW COMPLEX 20 MIN: CPT | Mod: GP | Performed by: PHYSICAL THERAPIST

## 2021-11-08 NOTE — TELEPHONE ENCOUNTER
Patient came to the Clinic today without an appointment and states she has felt dizzy since yesterday off and on. Patient states she took Naproxen on Saturday and today for arthritis. No other change of medications. No nausea,vomiting, diarrhea.  Appetite is WNL, voiding, drinking fluids WNL.  Discussed with patient to watch the days that she takes Naproxen and if the dizziness happens on those days to stop the Naproxen for a few days to see if the dizziness continues. Patient states the dizziness is when she stands up or moves too quickly and resolves quickly. Patient will watch her symptoms and call for an appointment to be seen by Cuca Fong CNP if they continue or worsen.

## 2021-11-09 ENCOUNTER — HOSPITAL ENCOUNTER (OUTPATIENT)
Dept: MRI IMAGING | Facility: CLINIC | Age: 66
Discharge: HOME | End: 2021-11-09
Attending: NURSE PRACTITIONER | Admitting: NURSE PRACTITIONER
Payer: COMMERCIAL

## 2021-11-09 DIAGNOSIS — N83.8 OVARIAN MASS, RIGHT: ICD-10-CM

## 2021-11-09 PROCEDURE — 72197 MRI PELVIS W/O & W/DYE: CPT

## 2021-11-09 PROCEDURE — A9585 GADOBUTROL INJECTION: HCPCS | Performed by: NURSE PRACTITIONER

## 2021-11-09 PROCEDURE — 255N000002 HC RX 255 OP 636: Performed by: NURSE PRACTITIONER

## 2021-11-09 RX ORDER — GADOBUTROL 604.72 MG/ML
9 INJECTION INTRAVENOUS ONCE
Status: COMPLETED | OUTPATIENT
Start: 2021-11-09 | End: 2021-11-09

## 2021-11-09 RX ADMIN — GADOBUTROL 9 ML: 604.72 INJECTION INTRAVENOUS at 19:13

## 2021-11-09 NOTE — PROGRESS NOTES
11/08/21 1100   General Information   Type of Visit Initial OP Ortho PT Evaluation   Start of Care Date 11/08/21   Referring Physician Cuca Fong CNP   Patient/Family Goals Statement PT for increasing R shoulder mobility   Orders Evaluate and Treat   Date of Order 09/22/21   Certification Required? Yes   Medical Diagnosis chronic right shoulder pain   Surgical/Medical history reviewed Yes   Precautions/Limitations other (see comments)  (awaiting MRI of lower abdomen for kidney and ovarian cysts/m)   General Information Comments started naproxyn for R knee OA 11/06/2021   Presentation and Etiology   Pertinent history of current problem (include personal factors and/or comorbidities that impact the POC) onset of right posterolateral R shoulder pain July, 2021, hard to don bra or move Right arm to behind back; had been helping mom move out of the house and did a lot of cleaning and lifitng boxes in June 2021 and flared up  R shoulder pain that she has had previously for years intermittently   Impairments A. Pain;D. Decreased ROM;E. Decreased flexibility;F. Decreased strength and endurance;H. Impaired gait   Functional Limitations   (having bilateral knee pain that is going to be getting corti)   Symptom Location right posterolateral shoulder, hard to don bra or move Right arm to behind back;    How/Where did it occur While carrying an object;While lifting;From insidious onset;From Degenerative Joint Disease   Onset date of current episode/exacerbation 07/01/21   Chronicity Recurrent   Pain rating (0-10 point scale) Best (/10);Worst (/10)   Best (/10) 1   Worst (/10) 7   Pain quality B. Dull   Frequency of pain/symptoms B. Intermittent   Pain/symptoms are: Other   Pain symptoms comment   (pain has improved greatly over the past few days)   Pain/symptoms exacerbated by D. Carrying;H. Overhead reach;J. ADL;K. Home tasks   Pain/symptoms eased by C. Rest;E. Changing positions   Progression of symptoms since onset:  Improved   Current / Previous Interventions   Diagnostic Tests: X-ray   X-ray Results   (2017 R shoulder with notable degenerative changes)   Prior Level of Function   Prior Level of Function-Mobility modifying reaching with R arm so not overhead or behind   Prior Level of Function-ADLs independent with modification over the past few years due to intermittent R shoulder pain   Functional Level Prior Comment has been this way since July, 2021 and also prior to that before the flare-up   Current Level of Function   Current Community Support Family/friend caregiver   Patient role/employment history C. Homemaker   Living environment Apartment/condo   Home/community accessibility independent however uses public transportation   Current equipment-Gait/Locomotion None   Current equipment-ADL None   Fall Risk Screen   Fall screen completed by PT   Have you fallen 2 or more times in the past year? No   Have you fallen and had an injury in the past year? No   Is patient a fall risk? No   Abuse Screen (yes response referral indicated)   Feels Unsafe at Home or Work/School no   Feels Threatened by Someone no   Does Anyone Try to Keep You From Having Contact with Others or Doing Things Outside Your Home? no   Physical Signs of Abuse Present no   Shoulder Objective Findings   Observation independent gait and transfers   Posture forward shoulders, forward head   Cervical Screen (ROM, quadrant) R cervical rotation and lateral flexion mod loss with end range pain R   Shoulder ROM Comment pain with movement   Pec Minor (supine) Flexibility moderately tight   Crossover Test +   Palpation Pain at R upper trap and R rhomboids mm    Right Shoulder Flexion AROM 95    Right Shoulder Abduction AROM 122   Right Shoulder ER AROM 32 supine   Right Shoulder IR AROM 60 supine/ standing reach behind with hand to right lateral hip   Right Shoulder Flexion Strength 4   Right Shoulder Abduction Strength 4 with end range pain at right lateral  shoulder   Right Shoulder ER Strength 4   Right Shoulder IR Strength 4   Right Shoulder Extension Strength 3 with end range pain   Planned Therapy Interventions   Planned Therapy Interventions manual therapy;neuromuscular re-education;ROM;strengthening;stretching   Planned Modality Interventions   Planned Modality Interventions Cryotherapy;Hot packs   Planned Modality Interventions Comments to do these at home   Clinical Impression   Criteria for Skilled Therapeutic Interventions Met yes, treatment indicated   PT Diagnosis R shoulder pain with decreased ROM of R shoulder and neck   Influenced by the following impairments degenerative changes of R shoulder   Functional limitations due to impairments reaching overhead, reaching behind for dressing and self cares, lifting,cleaning   Clinical Presentation Stable/Uncomplicated   Clinical Decision Making (Complexity) Low complexity   Therapy Frequency 1 time/week   Predicted Duration of Therapy Intervention (days/wks) 6 weeks   Risk & Benefits of therapy have been explained Yes   Patient, Family & other staff in agreement with plan of care Yes   Clinical Impression Comments patient has had remarkable improvement just prior to PT evaluation today, was going to cancel session, but decided to follow through with appointment as pain had been present since July   Education Assessment   Preferred Learning Style Demonstration   ORTHO GOALS   PT Ortho Eval Goals 1;2;3   Ortho Goal 1   Goal Identifier home ex program   Goal Description independent with home exercise program and self management of symptoms   Target Date 01/07/22   Ortho Goal 2   Goal Identifier reaching to don shirt   Goal Description able to reach forward to 120 degrees to don shirt with greater ease   Target Date 01/07/22   Ortho Goal 3   Goal Identifier wash back   Goal Description able to reach behind back with R UE to wash low back and wipe self after bathroom with    Target Date 01/07/22   Total Evaluation  Time   PT Eval, Low Complexity Minutes (46023) 35   Therapy Certification   Certification date from 11/08/21   Certification date to 01/07/22   Medical Diagnosis R shoulder pain   [facility]          OUTPATIENT PHYSICAL THERAPY ORTHOPEDIC EVALUATION  PLAN OF TREATMENT FOR OUTPATIENT REHABILITATION  (COMPLETE FOR INITIAL CLAIMS ONLY)  Patient's Last Name, First Name, M.I.  YOB: 1955  Grace Chinchilla    Provider s Name:  [facility]   Medical Record No.  [id number]   Start of Care Date:  11/08/21   Onset Date:  07/01/21   Type:     _X__PT   ___OT   ___SLP Medical Diagnosis:  (P) R shoulder pain     PT Diagnosis:  (P) R shoulder pain with decreased ROM of R shoulder and neck   Visits from SOC:  1      _________________________________________________________________________________  Plan of Treatment/Functional Goals:  (P) manual therapy,neuromuscular re-education,ROM,strengthening,stretching     (P) Cryotherapy,Hot packs  (P) to do these at home  Goals  Goal Identifier: (P) home ex program  Goal Description: (P) independent with home exercise program and self management of symptoms  Target Date: (P) 01/07/22    Goal Identifier: (P) reaching to don shirt  Goal Description: (P) able to reach forward to 120 degrees to don shirt with greater ease  Target Date: (P) 01/07/22    Goal Identifier: (P) wash back  Goal Description: (P) able to reach behind back with R UE to wash low back and wipe self after bathroom with   Target Date: (P) 01/07/22                                                           Therapy Frequency:  (P) 1 time/week  Predicted Duration of Therapy Intervention:  (P) 6 weeks    ALVERTO SCHULZ, PT                 I CERTIFY THE NEED FOR THESE SERVICES FURNISHED UNDER        THIS PLAN OF TREATMENT AND WHILE UNDER MY CARE     (Physician co-signature of this document indicates review and certification of the therapy plan).                        Certification Date From:  (P) 11/08/21   Certification Date To:  (P) 01/07/22    Referring Provider:  Cuca Fong CNP    Initial Assessment        See Epic Evaluation Start of Care Date: 11/08/21

## 2021-11-09 NOTE — PROGRESS NOTES
11/08/21 1100   Signing Clinician's Name / Credentials   Signing clinician's name / credentials Alice Kiser PT   Session Number   Session Number 1/6   Progress Note/Recertification   Progress Note Due Date 01/07/22   Recertification Due Date 01/07/22   Adult Goals   PT Ortho Eval Goals 1;2;3   Ortho Goal 1   Goal Identifier home ex program   Goal Description independent with home exercise program and self management of symptoms   Target Date 01/07/22   Ortho Goal 2   Goal Identifier reaching to don shirt   Goal Description able to reach forward to 120 degrees to don shirt with greater ease   Target Date 01/07/22   Ortho Goal 3   Goal Identifier wash back   Goal Description able to reach behind back with R UE to wash low back and wipe self after bathroom with    Target Date 01/07/22   Subjective Report   Subjective Report onset of R shoulder pain July,2021 after helping her mother move/lift boxes. Recurring R shoulder pain. started Naproxyn last week for Bilateral knee pain and now R shoulder pain almost resolved.  X-rays 2017 revealed degenerative changes and has limited ROM R shoulder affecting reaching forward, overhead, and behind back for ADLs, household chores   Objective Measures   Objective Measures Objective Measure 1;Objective Measure 2   Objective Measure 1   Objective Measure R shoulder flexion   Objective Measure 2   Objective Measure R shoulder IR and ER    Objective Measure 3   Objective Measure cervical rotation R   Therapeutic Procedure/exercise   Therapeutic Procedures: strength, endurance, ROM, flexibillity minutes (78223) 20   Skilled Intervention therapeutic exercise   Patient Response needs a lot of verbal cueing and demonstration   Treatment Detail backward shoulder rolls x 10, scap retraction x 10, wall glides for shoulder flexion R x 30 seconds, supine active sholder flexion hands clasped 3x, ER stretch standing R shoulder 30 seconds   Neuromuscular Re-education   Neuromuscular re-ed  of mvmt, balance, coord, kinesthetic sense, posture, proprioception minutes (77411) 5   Skilled Intervention neuro muscular re-ed   Patient Response radames well   Treatment Detail posture education and importance of   Manual Therapy   Manual Therapy: Mobilization, MFR, MLD, friction massage minutes (42058) 0   Skilled Intervention MT   Education   Learner Patient   Readiness Eager   Method Booklet/handout;Demonstration   Response Needs Reinforcement   Plan   Homework PTRx handouts with shoulder rolls, scap retraction, shoulder flexion wall glides, supine shoulder flexion hands clasped, standing ER stretch R   Home program PTRx handouts with shoulder rolls, scap retraction, shoulder flexion wall glides, supine shoulder flexion hands clasped, standing ER stretch R   Plan for next session review all ex,add MT for R posterior shoulder mm tightness, add CAROM, theraband L1 for rowing, pulldowns, ER/IR   Comments   Comments good candidate for PT training for shoulder ROM and strength gains 1-2x/week x 3-6 weeks   Total Session Time   Timed Code Treatment Minutes 25   Total Treatment Time (sum of timed and untimed services) 25   AMBULATORY CLINICS ONLY-MEDICAL AND TREATMENT DIAGNOSIS   Medical Diagnosis chronic right shoulder pain   PT Diagnosis R shoulder pain with decreased ROM of R shoulder and neck

## 2021-11-15 ENCOUNTER — HOSPITAL ENCOUNTER (OUTPATIENT)
Dept: PHYSICAL THERAPY | Facility: REHABILITATION | Age: 66
End: 2021-11-15
Payer: COMMERCIAL

## 2021-11-15 ENCOUNTER — TELEPHONE (OUTPATIENT)
Dept: FAMILY MEDICINE | Facility: CLINIC | Age: 66
End: 2021-11-15

## 2021-11-15 DIAGNOSIS — M25.611 DECREASED ROM OF RIGHT SHOULDER: ICD-10-CM

## 2021-11-15 DIAGNOSIS — M25.511 CHRONIC RIGHT SHOULDER PAIN: Primary | ICD-10-CM

## 2021-11-15 DIAGNOSIS — G89.29 CHRONIC RIGHT SHOULDER PAIN: Primary | ICD-10-CM

## 2021-11-15 DIAGNOSIS — R29.898 DECREASED ROM OF NECK: ICD-10-CM

## 2021-11-15 PROCEDURE — 97110 THERAPEUTIC EXERCISES: CPT | Mod: GP | Performed by: PHYSICAL THERAPIST

## 2021-11-15 PROCEDURE — 97140 MANUAL THERAPY 1/> REGIONS: CPT | Mod: GP | Performed by: PHYSICAL THERAPIST

## 2021-11-15 NOTE — TELEPHONE ENCOUNTER
----- Message from Cuca Fong CNP sent at 11/10/2021  9:47 AM CST -----  Please call Grace and let her know her pelvic MRI showed two small simple cysts on the ovaries.  These are benign.   No need to follow up on these.

## 2021-11-15 NOTE — PROGRESS NOTES
"   11/15/21 1200   Signing Clinician's Name / Credentials   Signing clinician's name / credentials Alice Kiser PT   Session Number   Session Number 2/6   Progress Note/Recertification   Progress Note Due Date 01/07/22   Recertification Due Date 01/07/22   Adult Goals   PT Ortho Eval Goals 1;2;3   Ortho Goal 1   Goal Identifier home ex program   Goal Description independent with home exercise program and self management of symptoms   Goal Progress needed verbal cues today   Target Date 01/07/22   Ortho Goal 2   Goal Identifier reaching to don shirt   Goal Description able to reach forward to 120 degrees to don shirt with greater ease   Target Date 01/07/22   Ortho Goal 3   Goal Identifier wash back   Goal Description able to reach behind back with R UE to wash low back and wipe self after bathroom with    Target Date 01/07/22   Subjective Report   Subjective Report R shoulder pain 0/10 at rest and 3/10 past 24 hours with reaching; onset of R shoulder pain July,2021 after helping her mother move/lift boxes. Recurring R shoulder pain. started Naproxyn last week for Bilateral knee pain and now R shoulder pain almost resolved.  X-rays 2017 revealed degenerative changes and has limited ROM R shoulder affecting reaching forward, overhead, and behind back for ADLs, household chores   Objective Measures   Objective Measures Objective Measure 1;Objective Measure 2   Objective Measure 1   Objective Measure R shoulder flexion   Details 116 flexion; 119 abduction in standing   Objective Measure 2   Objective Measure R shoulder IR and ER    Details 64 IR and 54 ER   Objective Measure 3   Objective Measure cervical rotation R   Details supine \"stiff\" and mod loss 22 degrees R and 30 degrees L measured in supine   Therapeutic Procedure/exercise   Therapeutic Procedures: strength, endurance, ROM, flexibillity minutes (03948) 25   Skilled Intervention therapeutic exercise   Patient Response needs a lot of verbal cueing and " demonstration   Treatment Detail backward shoulder rolls x 10, scap retraction with elbows flexed x 10, wall glides for shoulder flexion R x 10 seconds x 3 , supine active shoulder flexion hands clasped and elbows bent 3x with 10 second hold, ER stretch standing R shoulder 30 seconds   Neuromuscular Re-education   Neuromuscular re-ed of mvmt, balance, coord, kinesthetic sense, posture, proprioception minutes (25273) 0   Skilled Intervention neuro muscular re-ed   Patient Response radames well   Treatment Detail posture education and importance of   Manual Therapy   Manual Therapy: Mobilization, MFR, MLD, friction massage minutes (68236) 8   Skilled Intervention MT   Patient Response some tenderness reported   Treatment Detail supine for R ant/lateral/posterior shoulder trigger point release with light to moderate pressure   Assessments Completed   Assessments Completed ROM improving compared to initial visit of right shoulder   Education   Learner Patient   Readiness Eager   Method Booklet/handout;Demonstration   Response Needs Reinforcement   Plan   Homework continue with all ex 2x/day   Home program PTRx handouts with shoulder rolls, scap retraction, shoulder flexion wall glides, supine shoulder flexion hands clasped, standing ER stretch R   Updates to plan of care modified current exercises for technique and holds   Plan for next session add tband for rowing, ER, IR memo, MT R shoulder   Comments   Comments improvined right shoulder AROM today; pain still persists R shoulder   Total Session Time   Timed Code Treatment Minutes 33   Total Treatment Time (sum of timed and untimed services) 33   AMBULATORY CLINICS ONLY-MEDICAL AND TREATMENT DIAGNOSIS   Medical Diagnosis chronic right shoulder pain   PT Diagnosis R shoulder pain with decreased ROM of R shoulder and neck   Alice Kiser, PT

## 2021-11-16 ENCOUNTER — TRANSFERRED RECORDS (OUTPATIENT)
Dept: HEALTH INFORMATION MANAGEMENT | Facility: CLINIC | Age: 66
End: 2021-11-16
Payer: COMMERCIAL

## 2021-11-19 ENCOUNTER — HOSPITAL ENCOUNTER (OUTPATIENT)
Dept: MRI IMAGING | Facility: CLINIC | Age: 66
Discharge: HOME | End: 2021-11-19
Attending: NURSE PRACTITIONER | Admitting: NURSE PRACTITIONER
Payer: COMMERCIAL

## 2021-11-19 DIAGNOSIS — N28.1 CYST OF LEFT KIDNEY: ICD-10-CM

## 2021-11-19 PROCEDURE — 255N000002 HC RX 255 OP 636: Performed by: NURSE PRACTITIONER

## 2021-11-19 PROCEDURE — A9585 GADOBUTROL INJECTION: HCPCS | Performed by: NURSE PRACTITIONER

## 2021-11-19 PROCEDURE — 74183 MRI ABD W/O CNTR FLWD CNTR: CPT

## 2021-11-19 RX ORDER — GADOBUTROL 604.72 MG/ML
9 INJECTION INTRAVENOUS ONCE
Status: COMPLETED | OUTPATIENT
Start: 2021-11-19 | End: 2021-11-19

## 2021-11-19 RX ADMIN — GADOBUTROL 9 ML: 604.72 INJECTION INTRAVENOUS at 19:04

## 2021-11-23 ENCOUNTER — TELEPHONE (OUTPATIENT)
Dept: FAMILY MEDICINE | Facility: CLINIC | Age: 66
End: 2021-11-23

## 2021-11-23 ENCOUNTER — TELEPHONE (OUTPATIENT)
Dept: FAMILY MEDICINE | Facility: CLINIC | Age: 66
End: 2021-11-23
Payer: COMMERCIAL

## 2021-11-23 NOTE — TELEPHONE ENCOUNTER
----- Message from Cuca Fong CNP sent at 11/21/2021  4:06 PM CST -----  Please call patient with imaging result:     The MRI showed a benign (not concerning) cyst of the left kidney.  We do not need to look into this any further.

## 2021-11-29 ENCOUNTER — HOSPITAL ENCOUNTER (OUTPATIENT)
Dept: PHYSICAL THERAPY | Facility: REHABILITATION | Age: 66
End: 2021-11-29
Payer: COMMERCIAL

## 2021-11-29 DIAGNOSIS — M25.511 CHRONIC RIGHT SHOULDER PAIN: Primary | ICD-10-CM

## 2021-11-29 DIAGNOSIS — R29.898 DECREASED ROM OF NECK: ICD-10-CM

## 2021-11-29 DIAGNOSIS — G89.29 CHRONIC RIGHT SHOULDER PAIN: Primary | ICD-10-CM

## 2021-11-29 DIAGNOSIS — M25.611 DECREASED ROM OF RIGHT SHOULDER: ICD-10-CM

## 2021-11-29 PROCEDURE — 97110 THERAPEUTIC EXERCISES: CPT | Mod: GP | Performed by: PHYSICAL THERAPIST

## 2021-11-29 NOTE — PROGRESS NOTES
11/29/21 1200   Signing Clinician's Name / Credentials   Signing clinician's name / credentials Alice Kiser PT   Session Number   Session Number 3/6   Progress Note/Recertification   Progress Note Due Date 01/07/22   Recertification Due Date 01/07/22   Adult Goals   PT Ortho Eval Goals 1;2;3   Ortho Goal 1   Goal Identifier home ex program   Goal Description independent with home exercise program and self management of symptoms   Goal Progress needed verbal cues today and reports she has not been doing ex much   Target Date 01/07/22   Ortho Goal 2   Goal Identifier reaching to don shirt   Goal Description able to reach forward to 120 degrees to don shirt with greater ease   Goal Progress progressing well   Target Date 01/07/22   Ortho Goal 3   Goal Identifier wash back   Goal Description able to reach behind back with R UE to wash low back and wipe self after bathroom with    Target Date 01/07/22   Subjective Report   Subjective Report hasn't been doing exercises, R shoulder pain 3/10 at rest on top of shoulder with reaching; onset of R shoulder pain July,2021 after helping her mother move/lift boxes. Recurring R shoulder pain. started Naproxyn last week for Bilateral knee pain and now R shoulder pain almost resolved.  X-rays 2017 revealed degenerative changes and has limited ROM R shoulder affecting reaching forward, overhead, and behind back for ADLs, household chores;   Objective Measures   Objective Measures Objective Measure 1;Objective Measure 2   Objective Measure 1   Objective Measure R shoulder flexion   Details 136 flexion, 108    Objective Measure 2   Objective Measure R shoulder IR and ER    Details 55 ER/ 75 IR   Objective Measure 3   Objective Measure cervical rotation R   Therapeutic Procedure/exercise   Therapeutic Procedures: strength, endurance, ROM, flexibillity minutes (76013) 25   Skilled Intervention therapeutic exercise   Patient Response needs a lot of verbal cueing and demonstration    Treatment Detail pulleys for shoulder flexion bilateral seated 129 degrees R x 2', UBE standing x 4' F/B, nustep x 3' 109 steps,  backward shoulder rolls x 10, scap retraction with elbows flexed x 10, wall glides for shoulder flexion R x 10 seconds x 3 to 127 degrees today, supine active shoulder flexion hands clasped and elbows bent 3x with 10 second hold 131 degrees, ER stretch standing R shoulder 30 seconds    Neuromuscular Re-education   Neuromuscular re-ed of mvmt, balance, coord, kinesthetic sense, posture, proprioception minutes (90397) 0   Skilled Intervention neuro muscular re-ed   Patient Response radames well   Treatment Detail posture education and importance of   Manual Therapy   Manual Therapy: Mobilization, MFR, MLD, friction massage minutes (58168) 0   Skilled Intervention MT   Treatment Detail supine for R ant/lateral/posterior shoulder trigger point release with light to moderate pressure   Assessments Completed   Assessments Completed ROM improving compared to initial visit of right shoulder   Education   Learner Patient   Readiness Eager   Method Booklet/handout;Demonstration   Response Needs Reinforcement   Plan   Homework continue with all ex 2x/day   Home program PTRx handouts with shoulder rolls, scap retraction, shoulder flexion wall glides, supine shoulder flexion hands clasped, standing ER stretch R   Updates to plan of care modified current exercises for technique and holds   Plan for next session add tband for rowing, ER/IR, MT    Comments   Comments improving right shoulder AROM today; pain still persists R shoulder, not compliant with home program   Total Session Time   Timed Code Treatment Minutes 25   Total Treatment Time (sum of timed and untimed services) 25   AMBULATORY CLINICS ONLY-MEDICAL AND TREATMENT DIAGNOSIS   Medical Diagnosis chronic right shoulder pain   PT Diagnosis R shoulder pain with decreased ROM of R shoulder and neck   Alice Kiser, PT

## 2021-11-30 ENCOUNTER — TRANSFERRED RECORDS (OUTPATIENT)
Dept: HEALTH INFORMATION MANAGEMENT | Facility: CLINIC | Age: 66
End: 2021-11-30
Payer: COMMERCIAL

## 2021-12-06 ENCOUNTER — HOSPITAL ENCOUNTER (OUTPATIENT)
Dept: PHYSICAL THERAPY | Facility: REHABILITATION | Age: 66
End: 2021-12-06
Payer: COMMERCIAL

## 2021-12-06 DIAGNOSIS — M25.511 CHRONIC RIGHT SHOULDER PAIN: Primary | ICD-10-CM

## 2021-12-06 DIAGNOSIS — R29.898 DECREASED ROM OF NECK: ICD-10-CM

## 2021-12-06 DIAGNOSIS — M25.611 DECREASED ROM OF RIGHT SHOULDER: ICD-10-CM

## 2021-12-06 DIAGNOSIS — G89.29 CHRONIC RIGHT SHOULDER PAIN: Primary | ICD-10-CM

## 2021-12-06 PROCEDURE — 97110 THERAPEUTIC EXERCISES: CPT | Mod: GP | Performed by: PHYSICAL THERAPIST

## 2021-12-06 NOTE — PROGRESS NOTES
12/06/21 1100   Signing Clinician's Name / Credentials   Signing clinician's name / credentials Alice Kiser PT   Session Number   Session Number 4/6   Progress Note/Recertification   Progress Note Due Date 01/07/22   Recertification Due Date 01/07/22   Adult Goals   PT Ortho Eval Goals 1;2;3   Ortho Goal 1   Goal Identifier home ex program   Goal Description independent with home exercise program and self management of symptoms   Goal Progress progressing towards goal well   Target Date 01/07/22   Ortho Goal 2   Goal Identifier reaching to don shirt   Goal Description able to reach forward to 120 degrees to don shirt with greater ease   Goal Progress progressing well   Target Date 01/07/22   Ortho Goal 3   Goal Identifier wash back   Goal Description able to reach behind back with R UE to wash low back and wipe self after bathroom with    Goal Progress progressing well   Target Date 01/07/22   Subjective Report   Subjective Report Has been doing exercises more frequently than previous week. R shoulder pain better 1/10 at rest on top of shoulder with reaching; onset of R shoulder pain July, 2021 after helping her mother move/lift boxes. Recurring R shoulder pain. Started Naproxyn recently for Bilateral knee pain and now R shoulder pain almost resolved.  X-rays 2017 revealed degenerative changes and has limited ROM R shoulder affecting reaching forward, overhead, and behind back for ADLs, household chores;   Objective Measures   Objective Measures Objective Measure 1;Objective Measure 2   Objective Measure 1   Objective Measure R shoulder flexion   Details 129 with pulleys   Objective Measure 2   Objective Measure R shoulder IR and ER    Objective Measure 3   Objective Measure cervical rotation R   Therapeutic Procedure/exercise   Therapeutic Procedures: strength, endurance, ROM, flexibillity minutes (48816) 25   Skilled Intervention therapeutic exercise   Patient Response needs a lot of verbal cueing and  demonstration   Treatment Detail pulleys for shoulder flexion bilateral seated 129 degrees R x 2', UBE standing x 4' F/B, nustep x 3' level 4 136 steps,  backward shoulder rolls x 10, scap retraction with elbows flexed x 10, wall glides for shoulder flexion R x 10 seconds x 3 to 127 degrees today, ER stretch standing R shoulder 30 seconds ADDED L1 theraband for rowing x 10   Neuromuscular Re-education   Neuromuscular re-ed of mvmt, balance, coord, kinesthetic sense, posture, proprioception minutes (42248) 0   Skilled Intervention neuro muscular re-ed   Patient Response radames well   Treatment Detail posture education and importance of   Manual Therapy   Manual Therapy: Mobilization, MFR, MLD, friction massage minutes (68979) 0   Skilled Intervention MT   Treatment Detail supine for R ant/lateral/posterior shoulder trigger point release with light to moderate pressure   Assessments Completed   Assessments Completed ROM improving compared to initial visit of right shoulder, strength and activity tolerance improving also   Education   Learner Patient   Readiness Eager   Method Booklet/handout;Demonstration   Response Needs Reinforcement   Plan   Homework continue with all ex 2x/day   Home program PTRx handouts with shoulder rolls, scap retraction, shoulder flexion wall glides, supine shoulder flexion hands clasped, standing ER stretch R, tband rows   Updates to plan of care added tband L1 for rowing   Plan for next session add tband for pulldowns and  ER/IR, MT    Comments   Comments improving right shoulder AROM today; pain still persists R shoulder, not compliant with home program   Total Session Time   Timed Code Treatment Minutes 25   Total Treatment Time (sum of timed and untimed services) 25   AMBULATORY CLINICS ONLY-MEDICAL AND TREATMENT DIAGNOSIS   Medical Diagnosis chronic right shoulder pain   PT Diagnosis R shoulder pain with decreased ROM of R shoulder and neck   Alice Kiser, PT

## 2021-12-09 ENCOUNTER — IMMUNIZATION (OUTPATIENT)
Dept: NURSING | Facility: CLINIC | Age: 66
End: 2021-12-09
Payer: COMMERCIAL

## 2021-12-09 PROCEDURE — 91306 COVID-19,PF,MODERNA (18+ YRS BOOSTER .25ML): CPT

## 2021-12-09 PROCEDURE — 0064A COVID-19,PF,MODERNA (18+ YRS BOOSTER .25ML): CPT

## 2021-12-13 ENCOUNTER — HOSPITAL ENCOUNTER (OUTPATIENT)
Dept: PHYSICAL THERAPY | Facility: REHABILITATION | Age: 66
End: 2021-12-13
Payer: COMMERCIAL

## 2021-12-13 DIAGNOSIS — G89.29 CHRONIC RIGHT SHOULDER PAIN: Primary | ICD-10-CM

## 2021-12-13 DIAGNOSIS — R29.898 DECREASED ROM OF NECK: ICD-10-CM

## 2021-12-13 DIAGNOSIS — M25.611 DECREASED ROM OF RIGHT SHOULDER: ICD-10-CM

## 2021-12-13 DIAGNOSIS — M25.511 CHRONIC RIGHT SHOULDER PAIN: Primary | ICD-10-CM

## 2021-12-13 PROCEDURE — 97110 THERAPEUTIC EXERCISES: CPT | Mod: GP | Performed by: PHYSICAL THERAPIST

## 2021-12-13 NOTE — PROGRESS NOTES
12/13/21 1200   Signing Clinician's Name / Credentials   Signing clinician's name / credentials Alice Kiser PT   Session Number   Session Number 5/6   Progress Note/Recertification   Progress Note Due Date 01/07/22   Recertification Due Date 01/07/22   Adult Goals   PT Ortho Eval Goals 1;2;3   Ortho Goal 1   Goal Identifier home ex program   Goal Description independent with home exercise program and self management of symptoms   Goal Progress progressing towards goal well   Target Date 01/07/22   Ortho Goal 2   Goal Identifier reaching to don shirt   Goal Description able to reach forward to 120 degrees to don shirt with greater ease   Goal Progress progressing well   Target Date 01/07/22   Ortho Goal 3   Goal Identifier wash back   Goal Description able to reach behind back with R UE to wash low back and wipe self after bathroom with    Goal Progress progressing well   Target Date 01/07/22   Subjective Report   Subjective Report  R shoulder pain better. 1/10 at rest on top of shoulder with reaching; onset of R shoulder pain July, 2021 after helping her mother move/lift boxes. Recurring R shoulder pain. Started Naproxyn recently for Bilateral knee pain and now R shoulder pain almost resolved.  X-rays 2017 revealed degenerative changes and has limited ROM R shoulder affecting reaching forward, overhead, and behind back for ADLs, household chores;   Objective Measures   Objective Measures Objective Measure 1;Objective Measure 2   Objective Measure 1   Objective Measure R shoulder flexion   Details 139 with pulleys    Objective Measure 2   Objective Measure R shoulder IR and ER    Objective Measure 3   Objective Measure cervical rotation R   Therapeutic Procedure/exercise   Therapeutic Procedures: strength, endurance, ROM, flexibillity minutes (17901) 23   Skilled Intervention therapeutic exercise   Patient Response only needed cueing for hand placement with ER wall stretch today   Treatment Detail pulleys for  shoulder flexion bilateral 139 degrees R x 2', UBE standing x 4' F/B, nustep x 3' level 5 115 steps, wall glides for shoulder flexion R x 5 seconds 145 degrees today, ER stretch standing R shoulder 30 sec-needing cueing for hand placement,  L1 theraband for rowing and added pulldowns x 10   Neuromuscular Re-education   Neuromuscular re-ed of mvmt, balance, coord, kinesthetic sense, posture, proprioception minutes (40277) 0   Skilled Intervention neuro muscular re-ed   Patient Response radames well   Treatment Detail posture education and importance of   Manual Therapy   Manual Therapy: Mobilization, MFR, MLD, friction massage minutes (42452) 0   Skilled Intervention MT   Treatment Detail supine for R ant/lateral/posterior shoulder trigger point release with light to moderate pressure   Assessments Completed   Assessments Completed late 10' today due to lightrail disruption; ROM improving compared to initial visit of right shoulder, strength and activity tolerance improving also   Education   Learner Patient   Readiness Eager   Method Booklet/handout;Demonstration   Response Needs Reinforcement   Plan   Homework continue with all ex 1x/day   Home program PTRx handouts with shoulder rolls, scap retraction, shoulder flexion wall glides, supine shoulder flexion hands clasped, standing ER stretch R, tband rows and pulldowns   Updates to plan of care added pulldowns with tband   Plan for next session add tband for  ER/IR, MT ; possibly one more visit and determine if can move to a home ex program   Comments   Comments improving right shoulder AROM and lessening of R shoulder pain; improving with understanding of home exercise program   Total Session Time   Timed Code Treatment Minutes 23   Total Treatment Time (sum of timed and untimed services) 23   AMBULATORY CLINICS ONLY-MEDICAL AND TREATMENT DIAGNOSIS   Medical Diagnosis chronic right shoulder pain   PT Diagnosis R shoulder pain with decreased ROM of R shoulder and neck    Alice Kiser, PT

## 2022-01-11 ENCOUNTER — HOSPITAL ENCOUNTER (OUTPATIENT)
Dept: PHYSICAL THERAPY | Facility: REHABILITATION | Age: 67
End: 2022-01-11
Payer: COMMERCIAL

## 2022-01-11 DIAGNOSIS — M25.611 DECREASED ROM OF RIGHT SHOULDER: ICD-10-CM

## 2022-01-11 DIAGNOSIS — R29.898 DECREASED ROM OF NECK: ICD-10-CM

## 2022-01-11 DIAGNOSIS — G89.29 CHRONIC RIGHT SHOULDER PAIN: Primary | ICD-10-CM

## 2022-01-11 DIAGNOSIS — M25.511 CHRONIC RIGHT SHOULDER PAIN: Primary | ICD-10-CM

## 2022-01-11 PROCEDURE — 97110 THERAPEUTIC EXERCISES: CPT | Mod: GP | Performed by: PHYSICAL THERAPIST

## 2022-01-11 NOTE — PROGRESS NOTES
01/11/22 1100   Signing Clinician's Name / Credentials   Signing clinician's name / credentials Alice Kiser PT   Session Number   Session Number 6/6   Progress Note/Recertification   Progress Note Due Date 01/11/22   Recertification Due Date 01/11/22   Adult Goals   PT Ortho Eval Goals 1;2;3   Ortho Goal 1   Goal Identifier home ex program   Goal Description independent with home exercise program and self management of symptoms   Goal Progress hasn't been doing as well recently   Target Date 01/11/22   Date Met 01/11/22   Ortho Goal 2   Goal Identifier reaching to don shirt   Goal Description able to reach forward to 120 degrees to don shirt with greater ease   Goal Progress pain only 0-1/10   Target Date 01/11/22   Date Met 01/11/22   Ortho Goal 3   Goal Identifier wash back   Goal Description able to reach behind back with R UE to wash low back and wipe self after bathroom with    Goal Progress still a little tightness reaching behind back   Target Date 01/11/22   Date Met 01/11/22   Subjective Report   Subjective Report  R shoulder pain better. 0/10 at rest on top of shoulder with reaching 1/10; naproxyn today and in evening; onset of R shoulder pain July, 2021 after helping her mother move/lift boxes. Recurring R shoulder pain. Bilateral knee pain worse today. patient states she will talk with MD regarding this if needed.    Objective Measures   Objective Measures Objective Measure 1;Objective Measure 2   Objective Measure 1   Objective Measure R shoulder flexion   Details 140 with pulleys    Objective Measure 2   Objective Measure R shoulder IR and ER    Details IR 60/ ER 65    Objective Measure 3   Objective Measure cervical rotation R   Details WNL   Therapeutic Procedure/exercise   Therapeutic Procedures: strength, endurance, ROM, flexibillity minutes (22892) 28   Skilled Intervention therapeutic exercise   Patient Response only needed cueing for hand placement with ER wall stretch today   Treatment  Detail pulleys for shoulder flexion bilateral 140 degrees R x 2', UBE standing x3'' F/B, nustep x 3' level 5 145 steps (115 steps last visit), wall glides for shoulder flexion R x 5 seconds 153 degrees today (improved by 145) ER stretch standing R shoulder 30 sec-needing cueing for hand placement,  L1 theraband for rowing and pulldowns x 15; added ER/IR with L1 t-band x10 reps   Neuromuscular Re-education   Neuromuscular re-ed of mvmt, balance, coord, kinesthetic sense, posture, proprioception minutes (65714) 0   Skilled Intervention neuro muscular re-ed   Patient Response radames well   Treatment Detail posture education and importance of   Manual Therapy   Manual Therapy: Mobilization, MFR, MLD, friction massage minutes (06208) 0   Skilled Intervention MT   Treatment Detail supine for R ant/lateral/posterior shoulder trigger point release with light to moderate pressure   Assessments Completed   Assessments Completed late 10' today due to lightrail disruption; ROM improving compared to initial visit of right shoulder, strength and activity tolerance improving also   Education   Learner Patient   Readiness Eager   Method Booklet/handout;Demonstration   Response Needs Reinforcement   Plan   Homework continue with all ex 1x/day; theraband can reduce to 3-4x/week   Home program PTRx handouts with shoulder rolls, scap retraction, shoulder flexion wall glides, supine shoulder flexion hands clasped, standing ER stretch R, tband rows and pulldowns   Updates to plan of care added ER/IR with tband   Plan for next session dc to home program   Comments   Comments had to delay last visit due to PT illness last week. Pushed last visit past re-certification date.  Will be discharging from PT today so no recertification will be needed.   Total Session Time   Timed Code Treatment Minutes 28   Total Treatment Time (sum of timed and untimed services) 28   AMBULATORY CLINICS ONLY-MEDICAL AND TREATMENT DIAGNOSIS   Medical Diagnosis  chronic right shoulder pain   PT Diagnosis R shoulder pain with decreased ROM of R shoulder and neck                                                                              [facility]    Outpatient Physical Therapy Discharge Note  Patient: Grace Chinchilla  : 1955    Beginning/End Dates of Reporting Period:  2021 to 2022    Referring Provider: Cuca Fong CNP    Therapy Diagnosis: Chronic right shoulder pain     Client Self Report:  R shoulder pain better. 0/10 at rest on top of shoulder with reaching 1/10; naproxyn today and in evening; onset of R shoulder pain  after helping her mother move/lift boxes. Recurring R shoulder pain. Bilateral knee pain worse today. patient states she will talk with MD regarding this if needed.     Objective Measurements:  Objective Measure: R shoulder flexion  Details: 140 with pulleys   Objective Measure: R shoulder IR and ER   Details: IR 60/ ER 65   Objective Measure: cervical rotation R  Details: WNL                          Goals:  Goal Identifier home ex program   Goal Description independent with home exercise program and self management of symptoms   Target Date 22   Date Met  22   Progress (detail required for progress note): hasn't been doing as well recently     Goal Identifier reaching to don shirt   Goal Description able to reach forward to 120 degrees to don shirt with greater ease   Target Date 22   Date Met  22   Progress (detail required for progress note): pain only 0-1/10     Goal Identifier wash back   Goal Description able to reach behind back with R UE to wash low back and wipe self after bathroom with    Target Date 22   Date Met  22   Progress (detail required for progress note): still a little tightness reaching behind back     Goal Identifier     Goal Description     Target Date     Date Met      Progress (detail required for progress note):       Goal  Identifier     Goal Description     Target Date     Date Met      Progress (detail required for progress note):       Goal Identifier     Goal Description     Target Date     Date Met      Progress (detail required for progress note):       Goal Identifier     Goal Description     Target Date     Date Met      Progress (detail required for progress note):       Goal Identifier     Goal Description     Target Date     Date Met      Progress (detail required for progress note):             Plan:  Discharge from therapy.    Discharge:    Reason for Discharge: Patient has met all goals.    Equipment Issued: theraband    Discharge Plan: Patient to continue home program.

## 2022-03-14 ENCOUNTER — OFFICE VISIT (OUTPATIENT)
Dept: FAMILY MEDICINE | Facility: CLINIC | Age: 67
End: 2022-03-14
Payer: COMMERCIAL

## 2022-03-14 VITALS
BODY MASS INDEX: 29.61 KG/M2 | DIASTOLIC BLOOD PRESSURE: 72 MMHG | SYSTOLIC BLOOD PRESSURE: 134 MMHG | HEIGHT: 71 IN | HEART RATE: 70 BPM | WEIGHT: 211.5 LBS | OXYGEN SATURATION: 98 %

## 2022-03-14 DIAGNOSIS — R73.03 PRE-DIABETES: ICD-10-CM

## 2022-03-14 DIAGNOSIS — M17.11 PRIMARY OSTEOARTHRITIS OF RIGHT KNEE: ICD-10-CM

## 2022-03-14 DIAGNOSIS — E78.2 MIXED HYPERLIPIDEMIA: ICD-10-CM

## 2022-03-14 DIAGNOSIS — Z00.00 ENCOUNTER FOR MEDICARE ANNUAL WELLNESS EXAM: Primary | ICD-10-CM

## 2022-03-14 DIAGNOSIS — E06.3 HYPOTHYROIDISM DUE TO HASHIMOTO'S THYROIDITIS: ICD-10-CM

## 2022-03-14 DIAGNOSIS — F33.41 RECURRENT MAJOR DEPRESSIVE DISORDER, IN PARTIAL REMISSION (H): ICD-10-CM

## 2022-03-14 DIAGNOSIS — I35.2 NONRHEUMATIC AORTIC (VALVE) STENOSIS WITH INSUFFICIENCY: ICD-10-CM

## 2022-03-14 DIAGNOSIS — Z13.21 ENCOUNTER FOR VITAMIN DEFICIENCY SCREENING: ICD-10-CM

## 2022-03-14 DIAGNOSIS — Z12.31 ENCOUNTER FOR SCREENING MAMMOGRAM FOR BREAST CANCER: ICD-10-CM

## 2022-03-14 DIAGNOSIS — Z86.2 HISTORY OF ANEMIA: ICD-10-CM

## 2022-03-14 PROBLEM — E55.9 VITAMIN D DEFICIENCY: Status: ACTIVE | Noted: 2022-03-14

## 2022-03-14 PROBLEM — R10.30 LOWER ABDOMINAL PAIN: Status: RESOLVED | Noted: 2021-08-30 | Resolved: 2022-03-14

## 2022-03-14 PROBLEM — E66.01 MORBID OBESITY (H): Status: RESOLVED | Noted: 2021-08-30 | Resolved: 2022-03-14

## 2022-03-14 PROBLEM — R19.5 LOOSE STOOLS: Status: RESOLVED | Noted: 2021-08-30 | Resolved: 2022-03-14

## 2022-03-14 PROBLEM — E27.8 ADRENAL MASS (H): Status: ACTIVE | Noted: 2022-03-14

## 2022-03-14 PROBLEM — F41.1 ANXIETY STATE: Status: RESOLVED | Noted: 2021-10-04 | Resolved: 2022-03-14

## 2022-03-14 LAB
ALBUMIN SERPL-MCNC: 3.8 G/DL (ref 3.5–5)
ALP SERPL-CCNC: 81 U/L (ref 45–120)
ALT SERPL W P-5'-P-CCNC: 18 U/L (ref 0–45)
ANION GAP SERPL CALCULATED.3IONS-SCNC: 8 MMOL/L (ref 5–18)
AST SERPL W P-5'-P-CCNC: 15 U/L (ref 0–40)
BILIRUB SERPL-MCNC: 0.4 MG/DL (ref 0–1)
BUN SERPL-MCNC: 20 MG/DL (ref 8–22)
CALCIUM SERPL-MCNC: 9.6 MG/DL (ref 8.5–10.5)
CHLORIDE BLD-SCNC: 104 MMOL/L (ref 98–107)
CHOLEST SERPL-MCNC: 183 MG/DL
CO2 SERPL-SCNC: 28 MMOL/L (ref 22–31)
CREAT SERPL-MCNC: 0.81 MG/DL (ref 0.6–1.1)
ERYTHROCYTE [DISTWIDTH] IN BLOOD BY AUTOMATED COUNT: 14.4 % (ref 10–15)
FASTING STATUS PATIENT QL REPORTED: YES
FERRITIN SERPL-MCNC: 293 NG/ML (ref 10–130)
GFR SERPL CREATININE-BSD FRML MDRD: 79 ML/MIN/1.73M2
GLUCOSE BLD-MCNC: 83 MG/DL (ref 70–125)
HBA1C MFR BLD: 6.1 % (ref 0–5.6)
HCT VFR BLD AUTO: 35.5 % (ref 35–47)
HDLC SERPL-MCNC: 44 MG/DL
HGB BLD-MCNC: 11.2 G/DL (ref 11.7–15.7)
IRON SATN MFR SERPL: 17 % (ref 15–46)
IRON SERPL-MCNC: 66 UG/DL (ref 35–180)
LDLC SERPL CALC-MCNC: 114 MG/DL
MCH RBC QN AUTO: 26.4 PG (ref 26.5–33)
MCHC RBC AUTO-ENTMCNC: 31.5 G/DL (ref 31.5–36.5)
MCV RBC AUTO: 84 FL (ref 78–100)
PLATELET # BLD AUTO: 292 10E3/UL (ref 150–450)
POTASSIUM BLD-SCNC: 5 MMOL/L (ref 3.5–5)
PROT SERPL-MCNC: 6.6 G/DL (ref 6–8)
RBC # BLD AUTO: 4.25 10E6/UL (ref 3.8–5.2)
SODIUM SERPL-SCNC: 140 MMOL/L (ref 136–145)
TIBC SERPL-MCNC: 387 UG/DL (ref 240–430)
TRIGL SERPL-MCNC: 125 MG/DL
TSH SERPL DL<=0.005 MIU/L-ACNC: 3.25 UIU/ML (ref 0.3–5)
VIT B12 SERPL-MCNC: 934 PG/ML (ref 213–816)
WBC # BLD AUTO: 6.6 10E3/UL (ref 4–11)

## 2022-03-14 PROCEDURE — 82306 VITAMIN D 25 HYDROXY: CPT | Performed by: NURSE PRACTITIONER

## 2022-03-14 PROCEDURE — 84443 ASSAY THYROID STIM HORMONE: CPT | Performed by: NURSE PRACTITIONER

## 2022-03-14 PROCEDURE — 83550 IRON BINDING TEST: CPT | Performed by: NURSE PRACTITIONER

## 2022-03-14 PROCEDURE — 85027 COMPLETE CBC AUTOMATED: CPT | Performed by: NURSE PRACTITIONER

## 2022-03-14 PROCEDURE — 99397 PER PM REEVAL EST PAT 65+ YR: CPT | Performed by: NURSE PRACTITIONER

## 2022-03-14 PROCEDURE — 99213 OFFICE O/P EST LOW 20 MIN: CPT | Mod: 25 | Performed by: NURSE PRACTITIONER

## 2022-03-14 PROCEDURE — 83036 HEMOGLOBIN GLYCOSYLATED A1C: CPT | Performed by: NURSE PRACTITIONER

## 2022-03-14 PROCEDURE — 80053 COMPREHEN METABOLIC PANEL: CPT | Performed by: NURSE PRACTITIONER

## 2022-03-14 PROCEDURE — 82728 ASSAY OF FERRITIN: CPT | Performed by: NURSE PRACTITIONER

## 2022-03-14 PROCEDURE — 36415 COLL VENOUS BLD VENIPUNCTURE: CPT | Performed by: NURSE PRACTITIONER

## 2022-03-14 PROCEDURE — 82607 VITAMIN B-12: CPT | Performed by: NURSE PRACTITIONER

## 2022-03-14 PROCEDURE — 80061 LIPID PANEL: CPT | Performed by: NURSE PRACTITIONER

## 2022-03-14 ASSESSMENT — ACTIVITIES OF DAILY LIVING (ADL): CURRENT_FUNCTION: NO ASSISTANCE NEEDED

## 2022-03-14 NOTE — PATIENT INSTRUCTIONS
Patient Education   Personalized Prevention Plan  You are due for the preventive services outlined below.  Your care team is available to assist you in scheduling these services.  If you have already completed any of these items, please share that information with your care team to update in your medical record.  Health Maintenance Due   Topic Date Due     Zoster (Shingles) Vaccine (2 of 3) 11/11/2015     Mammogram  03/18/2021

## 2022-03-14 NOTE — PROGRESS NOTES
"SUBJECTIVE:   Grace Chinchilla is a 66 year old female who presents for Preventive Visit.    Her main concern today is weight. She states her sister has been trying to  her to lose weight and has her track her weight/measurements weekly.  She has found this difficult.  She is not exercising regularly. Can walk for some time without pain in her knees but eventually right knee becomes too painful.  [facility] no [facility] with [facility]. Interested in more help.     Aortic valve stenosis: last echo 2019 - mild. Due for follow up. Denies dyspnea. Not exercising at all    B12 - 1000?   Vitamin D - 3000IU  Iron supplement daily     HM: Due mammogram. CRC screening up to date. Due Shingrix    Are you in the first 12 months of your Medicare coverage?  No    Healthy Habits:     In general, how would you rate your overall health?  Fair    Frequency of exercise:  None    Do you usually eat at least 4 servings of fruit and vegetables a day, include whole grains    & fiber and avoid regularly eating high fat or \"junk\" foods?  No    Taking medications regularly:  Yes    Medication side effects:  None    Ability to successfully perform activities of daily living:  No assistance needed    Home Safety:  No safety concerns identified    Hearing Impairment:  Difficulty following a conversation in a noisy restaurant or crowded room, feel that people are mumbling or not speaking clearly, difficulty following dialogue in the theater, difficult to understand a speaker at a public meeting or [employer] service, need to ask people to speak up or repeat themselves, find that men's voices are easier to understand than woman's, difficulty understanding soft or whispered speech and difficulty understanding speech on the telephone    In the past 6 months, have you been bothered by leaking of urine? Yes    In general, how would you rate your overall mental or emotional health?  Fair     "  PHQ-2 Total Score: 1    Additional concerns today:  No    Do you feel safe in your environment? Yes    Have you ever done Advance Care Planning? (For example, a Health Directive, POLST, or a discussion with a medical provider or your loved ones about your wishes): No, advance care planning information given to patient to review.  Patient declined advance care planning discussion at this time.       Fall risk  Fallen 2 or more times in the past year?: No  Any fall with injury in the past year?: No    Cognitive Screening   1) Repeat 3 items (Leader, Season, Table)    2) Clock draw: ABNORMAL  numbers/hands  3) 3 item recall: Recalls 3 objects  Results: ABNORMAL clock, 1-2 items recalled: PROBABLE COGNITIVE IMPAIRMENT, **INFORM PROVIDER**    Mini-CogTM Copyright VENKATESH Diez. Licensed by the author for use in [facility]; reprinted with permission (bruna@[facility]). All rights reserved.      Do you have sleep apnea, excessive snoring or daytime drowsiness?: yes    Reviewed and updated as needed this visit by clinical staff   Tobacco  Allergies  Meds              Reviewed and updated as needed this visit by Provider                 Social History     Tobacco Use     Smoking status: Never Smoker     Smokeless tobacco: Never Used   Substance Use Topics     Alcohol use: No         Alcohol Use 3/14/2022   Prescreen: >3 drinks/day or >7 drinks/week? No               Current providers sharing in care for this patient include:   Patient Care Team:  Cuca Fong CNP as PCP - General (Nurse Practitioner)  Cuca Fong CNP as Assigned PCP    The following health maintenance items are reviewed in Epic and correct as of today:  Health Maintenance Due   Topic Date Due     ZOSTER IMMUNIZATION (2 of 3) 11/11/2015     MAMMO SCREENING  03/18/2021     Lab work is in process      Breast CA Risk Assessment (FHS-7) 3/14/2022   Do you have a family history of breast, colon, or ovarian cancer? No / Unknown         Mammogram  "Screening: Recommended mammography every 1-2 years with patient discussion and risk factor consideration  Pertinent mammograms are reviewed under the imaging tab.    Review of Systems  Constitutional, HEENT, cardiovascular, pulmonary, gi and gu systems are negative, except as otherwise noted.    OBJECTIVE:   /72 (BP Location: Left arm, Patient Position: Sitting, Cuff Size: Adult Large)   Pulse 70   Ht 1.803 m (5' 11\")   Wt 95.9 kg (211 lb 8 oz)   LMP 09/13/2006   SpO2 98%   BMI 29.50 kg/m   Estimated body mass index is 29.5 kg/m  as calculated from the following:    Height as of this encounter: 1.803 m (5' 11\").    Weight as of this encounter: 95.9 kg (211 lb 8 oz).  Physical Exam  GENERAL: healthy, alert and no distress  EYES: Eyes grossly normal to inspection, PERRL and conjunctivae and sclerae normal  HENT: ear canals and TM's normal, nose and mouth without ulcers or lesions  NECK: no adenopathy, no asymmetry, masses, or scars and thyroid normal to palpation  RESP: lungs clear to auscultation - no rales, rhonchi or wheezes  BREAST: normal without masses, tenderness or nipple discharge and no palpable axillary masses or adenopathy  CV: regular rate and rhythm, normal S1 S2, no S3 or S4, no murmur, click or rub, no peripheral edema and peripheral pulses strong  ABDOMEN: soft, nontender, no hepatosplenomegaly, no masses and bowel sounds normal  MS: no gross musculoskeletal defects noted, no edema  SKIN: no suspicious lesions or rashes  NEURO: Normal strength and tone, mentation intact and speech normal  PSYCH: mentation appears normal, affect normal/bright    Diagnostic Test Results:  Labs reviewed in Epic    ASSESSMENT / PLAN:   1. Encounter for Medicare annual wellness exam  Order for mammogram. Up to date on CRC screening.  She will get Shingrix at the pharmacy    2. Primary osteoarthritis of right knee  Following with orthopedics. Voltaren gel is helpful.   - diclofenac (VOLTAREN) 1 % topical gel; " Apply 4 g topically 4 times daily  Dispense: 350 g; Refill: 3    3. Body mass index (BMI) 30.0-30.9, adult   Struggling with weight loss and desires more support.  Discussed options and she is interested in referral to the weight management program.   - Vitamin D Deficiency; Future  - Comprehensive Weight Management; Future  - Vitamin D Deficiency    4. Nonrheumatic aortic (valve) stenosis with insufficiency  Mild. Due for follow up echocardiogram for monitoring. Asymptomatic currently  - Echocardiogram Complete; Future    5. Pre-diabetes  Discussed dietary changes. Recheck A1C  - Comprehensive Weight Management; Future  - Hemoglobin A1c; Future  - Hemoglobin A1c    6. Hypothyroidism due to Hashimoto's thyroiditis  Recheck and advise on dosing  - TSH with free T4 reflex; Future  - TSH with free T4 reflex    7. Recurrent major depressive disorder, in partial remission (H)  Follows with psychiatry. No changes in medications recently    8. Encounter for vitamin deficiency screening  - Vitamin D Deficiency; Future  - Ferritin; Future  - Iron and iron binding capacity; Future  - Vitamin B12; Future  - Vitamin D Deficiency  - Ferritin  - Iron and iron binding capacity  - Vitamin B12    9. History of anemia  - CBC with platelets; Future  - CBC with platelets    10. Mixed hyperlipidemia  - Lipid Profile (Chol, Trig, HDL, LDL calc); Future  - Comprehensive metabolic panel (BMP + Alb, Alk Phos, ALT, AST, Total. Bili, TP); Future  - Lipid Profile (Chol, Trig, HDL, LDL calc)  - Comprehensive metabolic panel (BMP + Alb, Alk Phos, ALT, AST, Total. Bili, TP)    11. Encounter for screening mammogram for breast cancer  - *MA Screening Digital Bilateral; Future    Patient has been advised of split billing requirements and indicates understanding: Yes    COUNSELING:  Reviewed preventive health counseling, as reflected in patient instructions    Estimated body mass index is 29.5 kg/m  as calculated from the following:    Height as of  "this encounter: 1.803 m (5' 11\").    Weight as of this encounter: 95.9 kg (211 lb 8 oz).    Weight management plan: Discussed healthy diet and exercise guidelines    She reports that she has never smoked. She has never used smokeless tobacco.      Appropriate preventive services were discussed with this patient, including applicable screening as appropriate for cardiovascular disease, diabetes, osteopenia/osteoporosis, and glaucoma.  As appropriate for age/gender, discussed screening for colorectal cancer, prostate cancer, breast cancer, and cervical cancer. Checklist reviewing preventive services available has been given to the patient.    Reviewed patients plan of care and provided an AVS. The Intermediate Care Plan ( asthma action plan, low back pain action plan, and migraine action plan) for Grace meets the Care Plan requirement. This Care Plan has been established and reviewed with the Patient.    Counseling Resources:  ATP IV Guidelines  Pooled Cohorts Equation Calculator  Breast Cancer Risk Calculator  Breast Cancer: Medication to Reduce Risk  FRAX Risk Assessment  ICSI Preventive Guidelines  Dietary Guidelines for Americans, 2010  [employer]'s MyPlate  ASA Prophylaxis  Lung CA Screening    Cuca Fong CNP  [facility]    Identified Health Risks:  "

## 2022-03-15 LAB — DEPRECATED CALCIDIOL+CALCIFEROL SERPL-MC: 34 UG/L (ref 30–80)

## 2022-03-17 ENCOUNTER — TELEPHONE (OUTPATIENT)
Dept: FAMILY MEDICINE | Facility: CLINIC | Age: 67
End: 2022-03-17
Payer: COMMERCIAL

## 2022-03-17 NOTE — TELEPHONE ENCOUNTER
----- Message from Cuca Fong CNP sent at 3/15/2022  1:01 PM CDT -----  Please call Grace with labs:      - Pre-diabetes number is a little higher than previously.  Follow up with weight management consult that we ordered.    - continue with current vitamin D and iron supplements   - B12 is a little high. She can cut her supplement dose in half.     - Other labs look good

## 2022-03-24 ENCOUNTER — TELEPHONE (OUTPATIENT)
Dept: FAMILY MEDICINE | Facility: CLINIC | Age: 67
End: 2022-03-24

## 2022-03-24 ENCOUNTER — ANCILLARY PROCEDURE (OUTPATIENT)
Dept: MAMMOGRAPHY | Facility: CLINIC | Age: 67
End: 2022-03-24
Attending: NURSE PRACTITIONER
Payer: COMMERCIAL

## 2022-03-24 DIAGNOSIS — Z12.31 ENCOUNTER FOR SCREENING MAMMOGRAM FOR BREAST CANCER: ICD-10-CM

## 2022-03-24 PROCEDURE — 77067 SCR MAMMO BI INCL CAD: CPT | Mod: TC | Performed by: RADIOLOGY

## 2022-03-24 NOTE — TELEPHONE ENCOUNTER
Patient came into clinic today for a mammo appt. Patient states she has arthritis in both knees and is wondering if it is ok to walk up and down the stairs or to avoid that? Please advise. Patient is awaiting a call back.

## 2022-03-24 NOTE — TELEPHONE ENCOUNTER
It is okay to walk up and down stairs with knee arthritis, but if doing it causes worsened knee pain or unsteadiness, then I would avoid it.  However, I would not recommend stair climbing as a form of exercise with knee arthritis.

## 2022-05-09 ENCOUNTER — TELEPHONE (OUTPATIENT)
Dept: FAMILY MEDICINE | Facility: CLINIC | Age: 67
End: 2022-05-09
Payer: COMMERCIAL

## 2022-05-09 NOTE — TELEPHONE ENCOUNTER
Pt has lost her ins cards and wants to make sure she can still come to appt    [phone] / ok to leave a detailed msg

## 2022-05-17 ENCOUNTER — NURSE TRIAGE (OUTPATIENT)
Dept: NURSING | Facility: CLINIC | Age: 67
End: 2022-05-17
Payer: COMMERCIAL

## 2022-05-17 NOTE — TELEPHONE ENCOUNTER
Patient calling - says she has been having pain on top of her right foot.  This started last Wednesday.  Pain is off and on.  Pain can last 15-40 minutes at a time.  Pain is worse if she move her foot a certain way.  Rates pain 3-4/10.  Says foot is a little swollen.    Triaged to disposition of See in Office Today.  Transferred to scheduling.  Advised Urgent Care if no open appointments.  Advised to call back if symptoms worsen.    Cheryl German RN  Triage Nurse Advisor    Reason for Disposition    Swollen foot (Exceptions: localized bump from bunions, calluses, insect bite, sting)    Additional Information    Negative: Followed an ankle or foot injury    Negative: Ankle pain is the main symptom    Negative: Entire foot is cool or blue in comparison to other foot    Negative: Purple or black skin on foot or toe    Negative: Red area or streak and fever    Negative: Swollen foot and fever    Negative: Patient sounds very sick or weak to the triager    Negative: SEVERE pain (e.g., excruciating, unable to do any normal activities)    Negative: Looks like a boil, infected sore, deep ulcer, or other infected rash (spreading redness, pus)    Protocols used: FOOT PAIN-A-OH

## 2022-05-20 DIAGNOSIS — E06.3 HYPOTHYROIDISM DUE TO HASHIMOTO'S THYROIDITIS: Primary | ICD-10-CM

## 2022-05-22 NOTE — TELEPHONE ENCOUNTER
"Routing refill request to provider for review/approval because:  Medication is reported/historical    Last Written Prescription Date:  8/30/21  Last Fill Quantity: ,  # refills:    Last office visit provider:  3/14/22     Requested Prescriptions   Pending Prescriptions Disp Refills     levothyroxine (SYNTHROID/LEVOTHROID) 125 MCG tablet [Pharmacy Med Name: levothyroxine 125 mcg tablet (SYNTHROID)] 90 tablet 3     Sig: Take 1 tablet (125 mcg total) by mouth daily.       Thyroid Protocol Passed - 5/20/2022  4:27 PM        Passed - Patient is 12 years or older        Passed - Recent (12 mo) or future (30 days) visit within the authorizing provider's specialty     Patient has had an office visit with the authorizing provider or a provider within the authorizing providers department within the previous 12 mos or has a future within next 30 days. See \"Patient Info\" tab in inbasket, or \"Choose Columns\" in Meds & Orders section of the refill encounter.              Passed - Medication is active on med list        Passed - Normal TSH on file in past 12 months     Recent Labs   Lab Test 03/14/22  1049   TSH 3.25              Passed - No active pregnancy on record     If patient is pregnant or has had a positive pregnancy test, please check TSH.          Passed - No positive pregnancy test in past 12 months     If patient is pregnant or has had a positive pregnancy test, please check TSH.               Volborg, Janelle, RN 05/22/22 5:37 PM  "

## 2022-05-23 RX ORDER — LEVOTHYROXINE SODIUM 125 UG/1
TABLET ORAL
Qty: 90 TABLET | Refills: 3 | OUTPATIENT
Start: 2022-05-23

## 2022-05-23 RX ORDER — LEVOTHYROXINE SODIUM 125 UG/1
TABLET ORAL
Qty: 90 TABLET | Refills: 3 | Status: SHIPPED | OUTPATIENT
Start: 2022-05-23 | End: 2022-06-16

## 2022-06-03 ENCOUNTER — OFFICE VISIT (OUTPATIENT)
Dept: FAMILY MEDICINE | Facility: CLINIC | Age: 67
End: 2022-06-03
Attending: NURSE PRACTITIONER
Payer: COMMERCIAL

## 2022-06-03 VITALS
HEIGHT: 62 IN | BODY MASS INDEX: 38.86 KG/M2 | WEIGHT: 211.19 LBS | TEMPERATURE: 97.6 F | RESPIRATION RATE: 16 BRPM | HEART RATE: 80 BPM | DIASTOLIC BLOOD PRESSURE: 64 MMHG | SYSTOLIC BLOOD PRESSURE: 152 MMHG

## 2022-06-03 DIAGNOSIS — E66.01 CLASS 2 SEVERE OBESITY DUE TO EXCESS CALORIES WITH SERIOUS COMORBIDITY AND BODY MASS INDEX (BMI) OF 39.0 TO 39.9 IN ADULT (H): Primary | ICD-10-CM

## 2022-06-03 DIAGNOSIS — G47.33 OBSTRUCTIVE SLEEP APNEA: ICD-10-CM

## 2022-06-03 DIAGNOSIS — E78.2 MIXED HYPERLIPIDEMIA: ICD-10-CM

## 2022-06-03 DIAGNOSIS — R73.03 PRE-DIABETES: ICD-10-CM

## 2022-06-03 DIAGNOSIS — E03.9 HYPOTHYROIDISM, UNSPECIFIED TYPE: ICD-10-CM

## 2022-06-03 DIAGNOSIS — E55.9 VITAMIN D DEFICIENCY: ICD-10-CM

## 2022-06-03 DIAGNOSIS — E27.8 ADRENAL MASS (H): ICD-10-CM

## 2022-06-03 DIAGNOSIS — E66.812 CLASS 2 SEVERE OBESITY DUE TO EXCESS CALORIES WITH SERIOUS COMORBIDITY AND BODY MASS INDEX (BMI) OF 39.0 TO 39.9 IN ADULT (H): Primary | ICD-10-CM

## 2022-06-03 PROBLEM — M17.10 PRIMARY LOCALIZED OSTEOARTHROSIS, LOWER LEG: Status: ACTIVE | Noted: 2021-10-04

## 2022-06-03 PROBLEM — N39.41 URGE INCONTINENCE OF URINE: Status: ACTIVE | Noted: 2021-10-04

## 2022-06-03 PROBLEM — F31.89 OTHER BIPOLAR DISORDER (H): Status: ACTIVE | Noted: 2021-10-04

## 2022-06-03 PROCEDURE — 99215 OFFICE O/P EST HI 40 MIN: CPT | Performed by: FAMILY MEDICINE

## 2022-06-03 ASSESSMENT — PATIENT HEALTH QUESTIONNAIRE - PHQ9
10. IF YOU CHECKED OFF ANY PROBLEMS, HOW DIFFICULT HAVE THESE PROBLEMS MADE IT FOR YOU TO DO YOUR WORK, TAKE CARE OF THINGS AT HOME, OR GET ALONG WITH OTHER PEOPLE: SOMEWHAT DIFFICULT
SUM OF ALL RESPONSES TO PHQ QUESTIONS 1-9: 14
SUM OF ALL RESPONSES TO PHQ QUESTIONS 1-9: 14

## 2022-06-03 NOTE — ASSESSMENT & PLAN NOTE
67 year old year old female in clinic today to discuss treatment of the following conditions through diet and lifestyle modification and weight loss:  1. Class 2 severe obesity due to excess calories with serious comorbidity and body mass index (BMI) of 39.0 to 39.9 in adult (H)    2. Pre-diabetes    3. Adrenal mass (H)    4. Obstructive sleep apnea    5. Hypothyroidism, unspecified type    6. Mixed hyperlipidemia    7. Obesity (BMI 30-39.9)      INTAKE:

## 2022-06-03 NOTE — PROGRESS NOTES
"    New Medical Weight Management Consult    PATIENT:  Grace Chinchilla  MRN:         [id number]  :         1955  THIERNO:         6/3/2022    Dear Cuca Fong,    I had the pleasure of seeing your patient, Grace Chinchilla. Full intake/assessment was done to determine barriers to weight loss success and develop a treatment plan. Grace Chinchilla is a 67 year old female interested in treatment of medical problems associated with excess weight. She has a height of 5' 1.5\", a weight of 211 lbs 3 oz, and the calculated Body mass index is 39.26 kg/m .    ASSESSMENT/PLAN:    Adrenal mass (H)  Found to be a benign cyst on repeat imaging.  No additional evaluation recommended.    Obesity (BMI 30-39.9)  67 year old year old female in clinic today to discuss treatment of the following conditions through diet and lifestyle modification and weight loss:  1. Class 2 severe obesity due to excess calories with serious comorbidity and body mass index (BMI) of 39.0 to 39.9 in adult (H)    2. Pre-diabetes    3. Adrenal mass (H)    4. Obstructive sleep apnea    5. Hypothyroidism, unspecified type    6. Mixed hyperlipidemia    7. Obesity (BMI 30-39.9)      INTAKE:     Class 2 severe obesity due to excess calories with serious comorbidity and body mass index (BMI) of 39.0 to 39.9 in adult (H)  67 year old year old female in clinic today to discuss treatment of the following conditions through diet and lifestyle modification and weight loss:  1. Class 2 severe obesity due to excess calories with serious comorbidity and body mass index (BMI) of 39.0 to 39.9 in adult (H)    2. Pre-diabetes    3. Adrenal mass (H)    4. Obstructive sleep apnea    5. Hypothyroidism, unspecified type    6. Mixed hyperlipidemia    7. Vitamin D deficiency      Intake: This patient was referred for discussion of medical weight loss by her primary care provider.  She has a history of mild intellectual disability, prediabetes, hypothyroidism.  Her " weight has gone up quite a bit in the context of the COVID-19 pandemic as she has been socially isolated.  Prior to the pandemic she took part in a structured program in which there was competitions to lose weight, maintain weight and eat healthily from week to week.  Without that structure she has been eating more freely.  Our visit was limited due to time as the patient arrived late.  Much of the food that she describes is processed/refined in nature.  We discussed diabetes and foods that would minimize blood sugar.  For the first month, I suggested that she focus on eating a protein-based breakfast (as an alternative to breakfast cereal).  She will meet with our dietitian in the near future (I did place an order it was not already scheduled).  She has some medications on her list that might cause iatrogenic weight gain.  Given her history of insulin resistance/prediabetes she might do well on a GLP-1 receptor agonist.  Ozempic was sent to her pharmacy.  We printed out instructions for her to call her insurance plan and ask about coverage.  The patient describes her sister is being heavily involved in helping her manage her finances.  I do not want her to  the medicine if it is more than 20 or $30 per month as I do not believe it is worth it.  I would consider trying metformin in a strict behavioral plan before expensive medications.  Phentermine is contraindicated based on age and based on stimulant use.  I have asked the patient to meet with me again in 4-6 weeks.    FOLLOW-UP:   6 weeks.    SUBJECTIVE:     She has the following co-morbidities:       6/3/2022   I have the following health issues associated with obesity: Pre-Diabetes, High Cholesterol, Sleep Apnea   I have the following symptoms associated with obesity: Knee Pain, Depression, Fatigue       Narrative History:    - the patient arrived 16 minutes late.  The visit started 30 minutes late without paperwork/questionairres.   - Looking for  "structure   - weight up since COVID-19   - previously participated in [email or web address].  \"I could have been successful.\"  She liked the competitive component.   - nutrition is standard american diet.   - \"I don't cook very well.\"     - she feels like she does not have control over body.   - history of dry mouth.  She will suck on candy.   - sister tries to help her with nutrition.     Patient Goals 6/3/2022   I am interested in having a healthier weight to diminish current health problems: Yes   I am interested in having a healthier weight in order to prevent future health problems: Yes   I am interested in having a healthier weight in order to have a future surgery: No       Referring Provider 6/3/2022   Please name the provider who referred you to Medical Weight Management.  If you do not know, please answer: \"I Don't Know\". Doctor Cuca Fong       Weight History 6/3/2022   How concerned are you about your weight? Very Concerned   Would you describe your weight gain as gradual? No   I became overweight: As an Adult   The following factors have contributed to my weight gain:  Mental Health Issues, Started on Medication that Caused Weight Gain, Eating Wrong Types of Food, Eating Too Much, Lack of Exercise, Genetic (Runs in the Family)   The most weight I have ever lost was: (lbs) 185   I have the following family history of obesity/being overweight:  My mother is overweight, My father is overweight   Has anyone in your family had weight loss surgery? No   How has your weight changed over the last year?  Gained   How many pounds? 20       Diet Recall Review with Patient 6/3/2022   Do you typically eat breakfast? Yes   If you do eat breakfast, what types of food do you eat? Cereal   Do you typically eat lunch? No   Do you typically eat supper? Yes   If you do eat supper, what types of food do you typically eat? Salad chicken   Do you typically eat snacks? Yes   If you do snack, what types of food do you typically eat? Candy "   Do you like vegetables?  Yes   Do you drink water? Yes   How many glasses of juice do you drink in a typical day? 6   How many of glasses of milk do you drink in a typical day? 3   If you do drink milk, what type? 2%   How many 8oz glasses of sugar containing drinks such as Jayme-Aid/sweet tea do you drink in a day? 1   How many cans/bottles of sugar pop/soda/tea/sports drinks do you drink in a day? 1   How many cans/bottles of diet pop/soda/tea or sports drink do you drink in a day? 0   How often do you have a drink of alcohol? Never       Eating Habits 6/3/2022   Generally, my meals include foods like these: bread, pasta, rice, potatoes, corn, crackers, sweet dessert, pop, or juice. Almost Everyday   Generally, my meals include foods like these: fried meats, brats, burgers, french fries, pizza, cheese, chips, or ice cream. A Few Times a Week   Eat fast food (like [employer], [employer], [employer]). Less Than Weekly   Eat at a buffet or sit-down restaurant. Less Than Weekly   Eat most of my meals in front of the TV or computer. Everyday   Often skip meals, eat at random times, have no regular eating times. Almost Everyday   Rarely sit down for a meal but snack or graze throughout.  A Few Times a Week   Eat extra snacks between meals. Almost Everyday   Eat most of my food at the end of the day. A Few Times a Week   Eat in the middle of the night or wake up at night to eat. Almost Everyday   Eat extra snacks to prevent or correct low blood sugar. Less Than Weekly   Eat to prevent acid reflux or stomach pain. Less Than Weekly   Worry about not having enough food to eat. A Few Times a Week   Have you been to the food shelf at least a few times this year? No   I eat when I am depressed. Almost Everyday   I eat when I am stressed. A Few Times a Week   I eat when I am bored. Almost Everyday   I eat when I am anxious. Almost Everyday   I eat when I am happy or as a reward. Almost Everyday   I feel hungry all the time even  if I just have eaten. Less Than Weekly   Feeling full is important to me. Less Than Weekly   I finish all the food on my plate even if I am already full. Almost Everyday   I can't resist eating delicious food or walk past the good food/smell. Once a Week   I eat/snack without noticing that I am eating. A Few Times a Week   I eat when I am preparing the meal. Once a Week   I eat more than usual when I see others eating. Less Than Weekly   I have trouble not eating sweets, ice cream, cookies, or chips if they are around the house. Everyday   I think about food all day. Less Than Weekly   What foods, if any, do you crave? Sweets/Candy/Chocolate   Please list any other foods you crave? Chips       Amount of Food 6/3/2022   I make myself vomit what I have eaten or use laxatives to get rid of food. Never   I eat a large amount of food, like a loaf of bread, a box of cookies, a pint/quart of ice cream, all at once. Never   I eat a large amount of food even when I am not hungry. Never   I eat rapidly. Never   I eat alone because I feel embarrassed and do not want others to see how much I have eaten. Never   I eat until I am uncomfortably full. Never   I feel bad, disgusted, or guilty after I overeat. Never   I make myself vomit what I have eaten or use laxatives to get rid of food. Never       Activity/Exercise History 6/3/2022   How much of a typical 12 hour day do you spend sitting? Most of the Day   How much of a typical 12 hour day do you spend lying down? Half the Day   How much of a typical day do you spend walking/standing? Less Than Half the Day   How many hours (not including work) do you spend on the TV/Video Games/Computer/Tablet/Phone? 6 Hours or More   How many times a week are you active for the purpose of exercise? Once a Week   What keeps you from being more active? Pain, Too tired   How many total minutes do you spend doing some activity for the purpose of exercising when you exercise? Less Than 15 Minutes        PAST MEDICAL HISTORY:  Past Medical History:   Diagnosis Date     Adrenal mass (H) 3/14/2022     Anxiety      Depression      Depressive disorder, not elsewhere classified      Diaphragmatic hernia without mention of obstruction or gangrene      Disease of thyroid gland      Dysthymic disorder     bipolar - sees  Dr. Jason Langley [facility]     Esophageal reflux      Hyperlipidemia      Mixed hyperlipidemia     Hyperlipidemia     Obstructive sleep apnea (adult) (pediatric)     uses CPAP     Plantar fascial fibromatosis      Temporomandibular joint disorders, unspecified      Temporomandibular joint disorders, unspecified      Tietze's disease     costochondritis     Unspecified hypothyroidism        Work/Social History Reviewed With Patient 6/3/2022   My employment status is: Retired, Disabled   My job is: [employer]   How much of your job is spent on the computer or phone? Less Than 50%   What is your marital status? /In a Relationship   If in a relationship, is your significant other overweight? Yes   Do you have children? No   If you have children, are they overweight? N/A   Who do you live with?   Jesse   Are they supportive of your health goals? No   Who does the food shopping?  We both do       Mental Health History Reviewed With Patient 6/3/2022   Have you ever been physically or sexually abused? No   If yes, do you feel that the abuse is affecting your weight? N/A   If yes, would you like to talk to a counselor about the abuse? N/A   How often in the past 2 weeks have you felt little interest or pleasure in doing things? More Than Half the Days   Over the past 2 weeks how often have you felt down, depressed, or hopeless? Not at all       Sleep History Reviewed With Patient 6/3/2022   How many hours do you sleep at night? 6       MEDICATIONS:   Current Outpatient Medications   Medication Sig Dispense Refill     amphetamine-dextroamphetamine (ADDERALL XR) 30 MG 24 hr capsule         atorvastatin (LIPITOR) 80 MG tablet Take 1 tablet (80 mg) by mouth daily 90 tablet 3     Calcium-Magnesium-Zinc 333-133-5 MG TABS per tablet Take 2 tablets by mouth       cholecalciferol 25 MCG (1000 UT) TABS Take 1,000 Units by mouth        clonazePAM (KLONOPIN) 0.5 MG tablet        Cyanocobalamin (VITAMIN B-12 PO)        diclofenac (VOLTAREN) 1 % topical gel Apply 4 g topically 4 times daily 350 g 3     divalproex sodium extended-release (DEPAKOTE ER) 250 MG 24 hr tablet        divalproex sodium extended-release (DEPAKOTE ER) 500 MG 24 hr tablet        DULoxetine (CYMBALTA) 30 MG capsule        levothyroxine (SYNTHROID/LEVOTHROID) 125 MCG tablet Take 1 tablet (125 mcg total) by mouth daily. 90 tablet 3     multivitamin w/minerals (THERA-VIT-M) tablet Take 1 tablet by mouth daily       naproxen (NAPROSYN) 500 MG tablet Take 1 tablet (500 mg) by mouth 2 times daily (with meals) 20 tablet 0     Nutritional Supplements (GLUCOSAMINE COMPLEX PO)        semaglutide (OZEMPIC) 2 MG/1.5ML SOPN pen Inject 0.25 mg Subcutaneous every 7 days for 28 days, THEN 0.5 mg every 7 days for 28 days. 3 mg 0       ALLERGIES:   Allergies   Allergen Reactions     Bupropion        PHYSICAL EXAM:  Gen: Alert, pleasant, cooperative, no distress, appears stated age, patient is obese.  The patient hasgynoid body morphology.  Eyes: No conjunctival injection, no scleral icterus.  Neck: Supple, symmetrical, trachea midline.  No adenopathy.  Thyroid is without enlargement/tenderness/nodules.  Cardiac: Regular rate and rhythm, normal S1/S2, no murmurs or gallops, no edema at ankles bilaterally.  Respiratory: Clear to auscultation bilaterally.  Abdomen: Soft, nontender, no hepatosplenomegaly.  Extremities: Warm, well-perfused, no edema.   Skin: Skin, turgor, texture color is normal. Skin tags: Yes, striae: No, hirsutism: no, acanthosis nigricans: No.  Neurologic: Cranial nerves II through XII grossly intact.  2+ reflexes at the patella  bilaterally.  Psych: Normal affect.  Normal rate of speech.  No tangentiality.      50 minutes spent on the date of the encounter doing chart review, history and exam, documentation and further activities per the note    This note has been dictated using voice recognition software. Any grammatical or context distortions are unintentional and inherent to the software    Sincerely,    Yaya Enciso MD

## 2022-06-03 NOTE — PATIENT INSTRUCTIONS
Early nutrition goals:   - start the day with protein. You listed foods like cottage cheese, tuna, chicken.  I included a handout with some other ideas.  You should meet with our dietician in the near future.    New medication: OZEMPIC for prediabetes.  Please check with your insurance and ask if this is covered.  The goal of this therapy is to help regulate your appetite.  This medication may be cost-prohibitive.     An alternative medication could be metformin.

## 2022-06-03 NOTE — ASSESSMENT & PLAN NOTE
67 year old year old female in clinic today to discuss treatment of the following conditions through diet and lifestyle modification and weight loss:  1. Class 2 severe obesity due to excess calories with serious comorbidity and body mass index (BMI) of 39.0 to 39.9 in adult (H)    2. Pre-diabetes    3. Adrenal mass (H)    4. Obstructive sleep apnea    5. Hypothyroidism, unspecified type    6. Mixed hyperlipidemia    7. Vitamin D deficiency      Intake: This patient was referred for discussion of medical weight loss by her primary care provider.  She has a history of mild intellectual disability, prediabetes, hypothyroidism.  Her weight has gone up quite a bit in the context of the COVID-19 pandemic as she has been socially isolated.  Prior to the pandemic she took part in a structured program in which there was competitions to lose weight, maintain weight and eat healthily from week to week.  Without that structure she has been eating more freely.  Our visit was limited due to time as the patient arrived late.  Much of the food that she describes is processed/refined in nature.  We discussed diabetes and foods that would minimize blood sugar.  For the first month, I suggested that she focus on eating a protein-based breakfast (as an alternative to breakfast cereal).  She will meet with our dietitian in the near future (I did place an order it was not already scheduled).  She has some medications on her list that might cause iatrogenic weight gain.  Given her history of insulin resistance/prediabetes she might do well on a GLP-1 receptor agonist.  Ozempic was sent to her pharmacy.  We printed out instructions for her to call her insurance plan and ask about coverage.  The patient describes her sister is being heavily involved in helping her manage her finances.  I do not want her to  the medicine if it is more than 20 or $30 per month as I do not believe it is worth it.  I would consider trying metformin in a  strict behavioral plan before expensive medications.  Phentermine is contraindicated based on age and based on stimulant use.  I have asked the patient to meet with me again in 4-6 weeks.

## 2022-06-06 ENCOUNTER — TELEPHONE (OUTPATIENT)
Dept: FAMILY MEDICINE | Facility: CLINIC | Age: 67
End: 2022-06-06
Payer: COMMERCIAL

## 2022-06-06 DIAGNOSIS — R73.03 PRE-DIABETES: ICD-10-CM

## 2022-06-06 DIAGNOSIS — E66.812 CLASS 2 SEVERE OBESITY DUE TO EXCESS CALORIES WITH SERIOUS COMORBIDITY AND BODY MASS INDEX (BMI) OF 39.0 TO 39.9 IN ADULT (H): Primary | ICD-10-CM

## 2022-06-06 DIAGNOSIS — E66.01 CLASS 2 SEVERE OBESITY DUE TO EXCESS CALORIES WITH SERIOUS COMORBIDITY AND BODY MASS INDEX (BMI) OF 39.0 TO 39.9 IN ADULT (H): Primary | ICD-10-CM

## 2022-06-06 NOTE — TELEPHONE ENCOUNTER
Central Prior Authorization Team   Phone: [phone]      Received question set for Case ID [id number] for EPA being processed currently.  Faxed to [facility].

## 2022-06-07 NOTE — TELEPHONE ENCOUNTER
Central Prior Authorization Team   Phone: [phone]      PRIOR AUTHORIZATION DENIED    Medication: semaglutide (OZEMPIC) 2 MG/1.5ML SOPN pen - EPA Denied     Denial Date: 6/7/2022    Denial Rational: The patient's diagnosis of Pre-diabetes [R73.03]  Does not meet criteria for approval as it is not an FDA Approved Diagnosis for the requested medication.        Appeal Information: If the Provider/Patient would like to appeal this denial, please provide a letter of medical necessity explaining why Preferred Formulary medications are not appropriate in the treatment of the patient's condition; along with any previous therapies tried/failed.    Once completed, please route back to me directly: Rufus Avila

## 2022-06-16 ENCOUNTER — OFFICE VISIT (OUTPATIENT)
Dept: FAMILY MEDICINE | Facility: CLINIC | Age: 67
End: 2022-06-16
Payer: COMMERCIAL

## 2022-06-16 VITALS
WEIGHT: 211 LBS | DIASTOLIC BLOOD PRESSURE: 60 MMHG | OXYGEN SATURATION: 98 % | HEART RATE: 76 BPM | SYSTOLIC BLOOD PRESSURE: 138 MMHG | BODY MASS INDEX: 39.22 KG/M2

## 2022-06-16 DIAGNOSIS — Z23 COVID-19 VACCINE ADMINISTERED: ICD-10-CM

## 2022-06-16 DIAGNOSIS — E66.812 CLASS 2 SEVERE OBESITY DUE TO EXCESS CALORIES WITH SERIOUS COMORBIDITY AND BODY MASS INDEX (BMI) OF 39.0 TO 39.9 IN ADULT (H): ICD-10-CM

## 2022-06-16 DIAGNOSIS — E66.01 CLASS 2 SEVERE OBESITY DUE TO EXCESS CALORIES WITH SERIOUS COMORBIDITY AND BODY MASS INDEX (BMI) OF 39.0 TO 39.9 IN ADULT (H): ICD-10-CM

## 2022-06-16 DIAGNOSIS — S93.492A SPRAIN OF POSTERIOR TALOFIBULAR LIGAMENT OF LEFT ANKLE, INITIAL ENCOUNTER: Primary | ICD-10-CM

## 2022-06-16 PROCEDURE — 0064A COVID-19,PF,MODERNA (18+ YRS BOOSTER .25ML): CPT | Performed by: NURSE PRACTITIONER

## 2022-06-16 PROCEDURE — 91306 COVID-19,PF,MODERNA (18+ YRS BOOSTER .25ML): CPT | Performed by: NURSE PRACTITIONER

## 2022-06-16 PROCEDURE — 99214 OFFICE O/P EST MOD 30 MIN: CPT | Mod: 25 | Performed by: NURSE PRACTITIONER

## 2022-06-16 RX ORDER — LEVOTHYROXINE SODIUM 125 UG/1
125 TABLET ORAL
COMMUNITY
Start: 2022-05-23 | End: 2023-07-24

## 2022-06-16 NOTE — PROGRESS NOTES
Assessment & Plan   1. Sprain of posterior talofibular ligament of left ankle, initial encounter  Symptoms and exam consistent with mild left ankle sprain. No bony tenderness, low concern for fracture.  Advised on wrapping, supportive footwear, gentle stretching and icing.  Follow up if no improvement in 2-4 weeks    2. Class 2 severe obesity due to excess calories with serious comorbidity and body mass index (BMI) of 39.0 to 39.9 in adult (H)  Established with Dr. Steve. Many questions today regarding plan. Reinforced recommendations and encouraged her to follow up as planned    3. COVID-19 vaccine administered  - COVID-19,PF,MODERNA (18+ YRS BOOSTER .25ML)    Cuca Fong, CNP    Subjective     HPI:   Grace Chinchilla is a 67 year old female who presents for follow up and foot concern.    Beginning of May developed bilateral foot pain, forefoot. This resolved. Now pain lateral side of left foot. Posterior to lateral malleolus. Mild and intermittent.  No instability.     Would also like to discuss transition of care    Weight:  Met with weight management.  Has many questions regarding plan.  GLP1 not covered by insurance under diagnosis of pre-diabetes.  Difficult for her as her  is not motivated to make any changes and buys foods she 'shouldn't eat'.     HM:  Due COVID booster       Allergies:  is allergic to bupropion.    SH/FH:  Social History and Family History reviewed and updated.   Tobacco Status:  She  reports that she has never smoked. She has never used smokeless tobacco.    Review of Systems:  A complete head to toe ROS is negative unless otherwise noted in HPI    Objective     Vitals:    06/16/22 0959   BP: 138/60   Pulse: 76   SpO2: 98%   Weight: 95.7 kg (211 lb)       Physical Exam:  GENERAL: Alert, well-appearing   PSYCH: Pleasant mood, affect appropriate.    SKIN: No edema, erythema, ecchymosis  MSK: No edema of left ankle.  TTP over PTFL. No TTP of lateral malleolus.  Full ROM.  No pain with flexion,extension, eversion, inversion          Answers for HPI/ROS submitted by the patient on 6/16/2022  What is the reason for your visit today? : Weight control program and occasional sore ankle  How many servings of fruits and vegetables do you eat daily?: 0-1  On average, how many sweetened beverages do you drink each day (Examples: soda, juice, sweet tea, etc.  Do NOT count diet or artificially sweetened beverages)?: 1  How many minutes a day do you exercise enough to make your heart beat faster?: 10 to 19  How many days a week do you exercise enough to make your heart beat faster?: 3 or less  How many days per week do you miss taking your medication?: 1  What makes it hard for you to take your medication every day?: remembering to take, other

## 2022-06-16 NOTE — PATIENT INSTRUCTIONS
It is hard to say goodbye, but I wish you the best! For continued care, I recommend establishing with one of the following:      - [facility]    - Laila Sanches DNP, family medicine, [place]     - Dr. Jodie Marquez, [facility]    Left ankle:  This seems like mild tendonitis.  I recommend wearing supportive shoes and supportive slippers inside the house.  Gentle stretching exercises (handout) and avoid strenuous activities like running/jumping for the next few weeks.

## 2022-06-17 ENCOUNTER — TELEPHONE (OUTPATIENT)
Dept: FAMILY MEDICINE | Facility: CLINIC | Age: 67
End: 2022-06-17

## 2022-06-17 RX ORDER — METFORMIN HCL 500 MG
500 TABLET, EXTENDED RELEASE 24 HR ORAL
Qty: 30 TABLET | Refills: 2 | Status: SHIPPED | OUTPATIENT
Start: 2022-06-17 | End: 2022-07-05

## 2022-06-17 NOTE — TELEPHONE ENCOUNTER
Reason for Call:  Other call back    Detailed comments: pt was seen on Thursday by Cuca and she said her foot pain in actually on left foot on the right lside.  She does have some pain on the left but more so on the right side.    She was supposed to get some stretches for her foot and did nto receive those either.    Please call pt.    Phone Number Patient can be reached at: Home number on file [phone] (home)    Best Time: any    Can we leave a detailed message on this number? YES    Call taken on 6/17/2022 at 10:35 AM by Pam J. Behr

## 2022-06-17 NOTE — TELEPHONE ENCOUNTER
Metformin sent as alternative.  Please call patient and inform that this will be at the [facility] pharmacy.

## 2022-06-20 NOTE — TELEPHONE ENCOUNTER
Call placed to Grace and informed of alternative medication sent to pharmacy. Pt verbalized understanding.     German Singleton RN  [facility]

## 2022-06-21 ENCOUNTER — TELEPHONE (OUTPATIENT)
Dept: FAMILY MEDICINE | Facility: CLINIC | Age: 67
End: 2022-06-21

## 2022-06-21 NOTE — TELEPHONE ENCOUNTER
General Call, FYI:     Who is calling:  Patient, Grace Chinchilla    Reason for Call:  Patient received notification that Ozempic prescription was approved after intial prior authorization denial. Patient would like Dr. Steve to know she will begin Ozempic and will not start Metformin.    Date of last appointment with provider: 6/3/2022    Okay to leave a detailed message:Yes at Cell number on file:    Telephone Information:   Mobile [phone]

## 2022-06-22 NOTE — TELEPHONE ENCOUNTER
I did document this as a mild sprain of the left PTFL in my note.  I'm unclear about the confusion with the foot side.     Will print off exercises and we can mail them to her.

## 2022-06-23 NOTE — TELEPHONE ENCOUNTER
LM per note, that we will mail out the exercises for her.  If she has any other questions she should call back    Lena De Leon CMA ([facility])

## 2022-06-30 ENCOUNTER — TELEPHONE (OUTPATIENT)
Dept: FAMILY MEDICINE | Facility: CLINIC | Age: 67
End: 2022-06-30

## 2022-06-30 DIAGNOSIS — E66.01 CLASS 2 SEVERE OBESITY DUE TO EXCESS CALORIES WITH SERIOUS COMORBIDITY AND BODY MASS INDEX (BMI) OF 39.0 TO 39.9 IN ADULT (H): Primary | ICD-10-CM

## 2022-06-30 DIAGNOSIS — E66.812 CLASS 2 SEVERE OBESITY DUE TO EXCESS CALORIES WITH SERIOUS COMORBIDITY AND BODY MASS INDEX (BMI) OF 39.0 TO 39.9 IN ADULT (H): Primary | ICD-10-CM

## 2022-06-30 NOTE — TELEPHONE ENCOUNTER
Pt calling about weight loss intake appt she had on 6/3  She thought she would be meeting with a dietitian to assist her in there weight loss goals.  Please advise if you will place orders for her to see dietitian as I do not see one in her chart.

## 2022-07-05 ENCOUNTER — OFFICE VISIT (OUTPATIENT)
Dept: FAMILY MEDICINE | Facility: CLINIC | Age: 67
End: 2022-07-05
Payer: COMMERCIAL

## 2022-07-05 VITALS
DIASTOLIC BLOOD PRESSURE: 78 MMHG | BODY MASS INDEX: 37.23 KG/M2 | SYSTOLIC BLOOD PRESSURE: 148 MMHG | OXYGEN SATURATION: 98 % | HEART RATE: 78 BPM | WEIGHT: 200.3 LBS

## 2022-07-05 DIAGNOSIS — E66.01 CLASS 2 SEVERE OBESITY DUE TO EXCESS CALORIES WITH SERIOUS COMORBIDITY AND BODY MASS INDEX (BMI) OF 37.0 TO 37.9 IN ADULT (H): Primary | ICD-10-CM

## 2022-07-05 DIAGNOSIS — E55.9 VITAMIN D DEFICIENCY: ICD-10-CM

## 2022-07-05 DIAGNOSIS — G47.33 OBSTRUCTIVE SLEEP APNEA: ICD-10-CM

## 2022-07-05 DIAGNOSIS — E66.812 CLASS 2 SEVERE OBESITY DUE TO EXCESS CALORIES WITH SERIOUS COMORBIDITY AND BODY MASS INDEX (BMI) OF 37.0 TO 37.9 IN ADULT (H): Primary | ICD-10-CM

## 2022-07-05 DIAGNOSIS — R73.03 PRE-DIABETES: ICD-10-CM

## 2022-07-05 DIAGNOSIS — E78.2 MIXED HYPERLIPIDEMIA: ICD-10-CM

## 2022-07-05 DIAGNOSIS — R03.0 ELEVATED BP WITHOUT DIAGNOSIS OF HYPERTENSION: ICD-10-CM

## 2022-07-05 PROBLEM — F70 MILD INTELLECTUAL DISABILITIES: Status: ACTIVE | Noted: 2021-10-04

## 2022-07-05 PROBLEM — D64.9 ANEMIA: Status: ACTIVE | Noted: 2021-10-04

## 2022-07-05 PROBLEM — G56.00 CARPAL TUNNEL SYNDROME: Status: ACTIVE | Noted: 2021-10-04

## 2022-07-05 PROBLEM — F44.9 CONVERSION DISORDER: Status: ACTIVE | Noted: 2021-10-04

## 2022-07-05 PROCEDURE — 99214 OFFICE O/P EST MOD 30 MIN: CPT | Performed by: FAMILY MEDICINE

## 2022-07-05 ASSESSMENT — PAIN SCALES - GENERAL: PAINLEVEL: NO PAIN (0)

## 2022-07-05 ASSESSMENT — ENCOUNTER SYMPTOMS: CONSTITUTIONAL NEGATIVE: 1

## 2022-07-05 NOTE — ASSESSMENT & PLAN NOTE
67 year old year old female in clinic today to discuss treatment of the following conditions through diet and lifestyle modification and weight loss:  1. Class 2 severe obesity due to excess calories with serious comorbidity and body mass index (BMI) of 37.0 to 37.9 in adult (H)    2. Pre-diabetes    3. Obstructive sleep apnea    4. Vitamin D deficiency    5. Mixed hyperlipidemia      The patient's weight loss result since the last visit was successful based on weight loss.  Doing well so far. More physically active. Nutrition improved.  BP elevated today.  Recheck in one month.   - continue semaglutide.  Titrate as able.  Target dose of 1.0 mg / week.     - do not start metformin   - follow-up in 4 weeks.

## 2022-07-05 NOTE — PROGRESS NOTES
Problem List Items Addressed This Visit     Class 2 severe obesity due to excess calories with serious comorbidity and body mass index (BMI) of 37.0 to 37.9 in adult (H) - Primary     67 year old year old female in clinic today to discuss treatment of the following conditions through diet and lifestyle modification and weight loss:  1. Class 2 severe obesity due to excess calories with serious comorbidity and body mass index (BMI) of 37.0 to 37.9 in adult (H)    2. Pre-diabetes    3. Obstructive sleep apnea    4. Vitamin D deficiency    5. Mixed hyperlipidemia      The patient's weight loss result since the last visit was successful based on weight loss.  Doing well so far. More physically active. Nutrition improved.  BP elevated today.  Recheck in one month.   - continue semaglutide.  Titrate as able.  Target dose of 1.0 mg / week.     - do not start metformin   - follow-up in 4 weeks.            Elevated BP without diagnosis of hypertension     Mildly elevated today. Plan to recheck in 3-4 weeks.             Mixed hyperlipidemia    Obstructive sleep apnea    Pre-diabetes    Vitamin D deficiency         Follow-up Visit   Expected date:  Aug 05, 2022 (Approximate)      Follow Up Appointment Details:     Follow-up with whom?: Me    Follow-Up for what?: Chronic Disease f/u    Chronic Disease f/u: General (Other)    Additional Details: Weight loss Follow up    How?: In Person    Is this an as-needed follow-up?: No                  Subjective   Grace is a 67 year old who presents for the following health issues   Chief Complaint   Patient presents with     Follow Up       Weight loss       Patient presents for treatment of chronic, comorbid conditions using intensive therapeutic lifestyle interventions including nutrition, physical activity, and behavior management.   - there was a delay in starting ozempic. She has taken 2 doses so far.     - successes: eating veggies and protein. She has been measuring portions.   "So far she likes the foods that she has been eating. Knees are less painful.   is eating same food but more.   - struggles: \"I feel that my health is good.\"   - exercise plan: walking more. Increased time of walking.     - tracking/journaling: ad abrahan.  Some tracking.  Lean meats.    - following nutritional plan: low carb.  Deviations from plan: infrequent   - hunger: improved.   - medication benefits: helpful. side effects: none.       History of Present Illness       Reason for visit:  Weight lose program    She eats 2-3 servings of fruits and vegetables daily.She consumes 0 sweetened beverage(s) daily.She exercises with enough effort to increase her heart rate 10 to 19 minutes per day.  She exercises with enough effort to increase her heart rate 3 or less days per week.   She is taking medications regularly.       Review of Systems   Constitutional: Negative.    All other systems reviewed and are negative.           Objective    BP (!) 148/78   Pulse 78   Wt 90.9 kg (200 lb 4.8 oz)   LMP 09/13/2006   SpO2 98%   BMI 37.23 kg/m    Body mass index is 37.23 kg/m .  Physical Exam  Nursing note reviewed.   Constitutional:       General: She is not in acute distress.     Appearance: Normal appearance. She is not ill-appearing.   HENT:      Head: Normocephalic and atraumatic.   Eyes:      Extraocular Movements: Extraocular movements intact.      Conjunctiva/sclera: Conjunctivae normal.   Pulmonary:      Effort: Pulmonary effort is normal.   Neurological:      Mental Status: She is alert and oriented to person, place, and time.   Psychiatric:         Attention and Perception: Attention normal.         Mood and Affect: Mood normal.         Speech: Speech normal.         Thought Content: Thought content normal.             This note has been dictated using voice recognition software. Any grammatical or context distortions are unintentional and inherent to the software      "

## 2022-07-19 ENCOUNTER — TELEPHONE (OUTPATIENT)
Dept: FAMILY MEDICINE | Facility: CLINIC | Age: 67
End: 2022-07-19

## 2022-07-19 DIAGNOSIS — R73.03 PRE-DIABETES: ICD-10-CM

## 2022-07-19 NOTE — TELEPHONE ENCOUNTER
Call to patient.   States she is tolerating semaglutide (OZEMPIC) 2 MG/1.5ML SOPN pen fine, reports an occasional stomach ache. Denies nausea, vomiting, HA. She if comfortable with titrating medication as you suggest. Will see you in clinic on 8/5

## 2022-07-19 NOTE — TELEPHONE ENCOUNTER
I will refill the medicine.  However before I send in the refill, please check with her about how she thinks the medication is doing.  If she is tolerating the medicine I suggest that we increase it to 1.0 mg/week (which was always the plan).  We can then discuss how she is doing when she is in clinic in August.  If she has any questions at all, I would continue her at 0.5 mg/week.

## 2022-07-19 NOTE — TELEPHONE ENCOUNTER
General Call:     Who is calling:  PatientGrace    Reason for Call:  Patient has weight loss follow-up appt 8/5/2022. She has two doses left of Ozempic and would like to speak with care team if she should get a refill prior to appointment or wait until appointment to discuss medication options with provider. Patient would be without medication for 1 week.    Date of last appointment with provider: 7/5/2022    Okay to leave a detailed message:Yes at Home number on file [phone] (home)

## 2022-08-05 ENCOUNTER — OFFICE VISIT (OUTPATIENT)
Dept: FAMILY MEDICINE | Facility: CLINIC | Age: 67
End: 2022-08-05
Payer: COMMERCIAL

## 2022-08-05 ENCOUNTER — TELEPHONE (OUTPATIENT)
Dept: FAMILY MEDICINE | Facility: CLINIC | Age: 67
End: 2022-08-05

## 2022-08-05 VITALS
SYSTOLIC BLOOD PRESSURE: 146 MMHG | RESPIRATION RATE: 16 BRPM | HEIGHT: 62 IN | HEART RATE: 76 BPM | DIASTOLIC BLOOD PRESSURE: 64 MMHG | WEIGHT: 189 LBS | BODY MASS INDEX: 34.78 KG/M2 | TEMPERATURE: 98 F

## 2022-08-05 DIAGNOSIS — G47.33 OBSTRUCTIVE SLEEP APNEA: ICD-10-CM

## 2022-08-05 DIAGNOSIS — E66.01 CLASS 2 SEVERE OBESITY DUE TO EXCESS CALORIES WITH SERIOUS COMORBIDITY AND BODY MASS INDEX (BMI) OF 35.0 TO 35.9 IN ADULT (H): Primary | ICD-10-CM

## 2022-08-05 DIAGNOSIS — E55.9 VITAMIN D DEFICIENCY: ICD-10-CM

## 2022-08-05 DIAGNOSIS — E66.812 CLASS 2 SEVERE OBESITY DUE TO EXCESS CALORIES WITH SERIOUS COMORBIDITY AND BODY MASS INDEX (BMI) OF 35.0 TO 35.9 IN ADULT (H): Primary | ICD-10-CM

## 2022-08-05 DIAGNOSIS — R73.03 PRE-DIABETES: ICD-10-CM

## 2022-08-05 DIAGNOSIS — R03.0 ELEVATED BP WITHOUT DIAGNOSIS OF HYPERTENSION: ICD-10-CM

## 2022-08-05 PROCEDURE — 99215 OFFICE O/P EST HI 40 MIN: CPT | Performed by: FAMILY MEDICINE

## 2022-08-05 ASSESSMENT — PATIENT HEALTH QUESTIONNAIRE - PHQ9
SUM OF ALL RESPONSES TO PHQ QUESTIONS 1-9: 10
SUM OF ALL RESPONSES TO PHQ QUESTIONS 1-9: 10

## 2022-08-05 ASSESSMENT — ENCOUNTER SYMPTOMS: CONSTITUTIONAL NEGATIVE: 1

## 2022-08-05 NOTE — TELEPHONE ENCOUNTER
Left message to call back for: Grace  Information to relay to patient: see below and relay to pt

## 2022-08-05 NOTE — ASSESSMENT & PLAN NOTE
67 year old year old female in clinic today to discuss treatment of the following conditions through diet and lifestyle modification and weight loss:  1. Class 2 severe obesity due to excess calories with serious comorbidity and body mass index (BMI) of 35.0 to 35.9 in adult (H)    2. Elevated BP without diagnosis of hypertension    3. Obstructive sleep apnea    4. Pre-diabetes    5. Vitamin D deficiency      The patient's weight loss result since the last visit was successful based on weight loss and improved nutrition.  More protein and veggies. Some fluctuations of energy but she describes eating sufficient overall calories. Mild side effects. She has found that she craves veggies.    - doing well. Continue 1.0 mg/week dosing of semaglutide.   - down 10.4%   - work to add variety.    - discuss weakness at next visit   - labs at next visit.    Follow-up: 2-3 months

## 2022-08-05 NOTE — PROGRESS NOTES
Problem List Items Addressed This Visit     Class 2 severe obesity due to excess calories with serious comorbidity and body mass index (BMI) of 35.0 to 35.9 in adult (H) - Primary     67 year old year old female in clinic today to discuss treatment of the following conditions through diet and lifestyle modification and weight loss:  1. Class 2 severe obesity due to excess calories with serious comorbidity and body mass index (BMI) of 35.0 to 35.9 in adult (H)    2. Elevated BP without diagnosis of hypertension    3. Obstructive sleep apnea    4. Pre-diabetes    5. Vitamin D deficiency      The patient's weight loss result since the last visit was successful based on weight loss and improved nutrition.  More protein and veggies. Some fluctuations of energy but she describes eating sufficient overall calories. Mild side effects. She has found that she craves veggies.    - doing well. Continue 1.0 mg/week dosing of semaglutide.   - down 10.4%   - work to add variety.    - discuss weakness at next visit   - labs at next visit.    Follow-up: 2-3 months             Relevant Medications    Semaglutide, 1 MG/DOSE, 4 MG/3ML SOPN    Elevated BP without diagnosis of hypertension     BP a bit improved.  Continue to monitor.            Obstructive sleep apnea    Pre-diabetes    Relevant Medications    Semaglutide, 1 MG/DOSE, 4 MG/3ML SOPN    Vitamin D deficiency         Follow-up Visit   Expected date:  Sep 05, 2022 (Approximate)      Follow Up Appointment Details:     Follow-up with whom?: Me    Follow-Up for what?: Chronic Disease f/u    Chronic Disease f/u: General (Other)    Additional Details: Weight loss Follow up - 6-8 weeks    How?: In Person    Is this an as-needed follow-up?: No                  Subjective   Grace is a 67 year old who presents for the following health issues   Chief Complaint   Patient presents with     Weight Loss     Follow-up        Patient presents for treatment of chronic, comorbid conditions  "using intensive therapeutic lifestyle interventions including nutrition, physical activity, and behavior management.   - successes: feels good overall   - struggles: energy has been low, perhaps a bit better over the past week.  She was able to clean the apartment.     - exercise plan: able to walk a bit faster   - tracking/journaling: carb restriction   - following nutritional plan: yes.  Deviations from plan: infrequent   - hunger: improved.   - medication benefits: suppresses appetite. side effects: mild nausea with recent ozempic dose adjustment.          History of Present Illness       Reason for visit:  Follow-up on weight management program.    She eats 2-3 servings of fruits and vegetables daily.She consumes 0 sweetened beverage(s) daily.She exercises with enough effort to increase her heart rate 10 to 19 minutes per day.  She exercises with enough effort to increase her heart rate 3 or less days per week. She is missing 2 dose(s) of medications per week.  She is not taking prescribed medications regularly due to remembering to take.    Today's PHQ-9         PHQ-9 Total Score: 10    PHQ-9 Q9 Thoughts of better off dead/self-harm past 2 weeks :   Not at all        Review of Systems   Constitutional: Negative.    All other systems reviewed and are negative.        Objective    BP (!) 146/64 (BP Location: Left arm, Patient Position: Sitting, Cuff Size: Adult Regular)   Pulse 76   Temp 98  F (36.7  C) (Oral)   Resp 16   Ht 1.562 m (5' 1.5\")   Wt 85.7 kg (189 lb)   LMP 09/13/2006   BMI 35.13 kg/m    Body mass index is 35.13 kg/m .  Physical Exam  Nursing note reviewed.   Constitutional:       General: She is not in acute distress.     Appearance: Normal appearance. She is not ill-appearing.   HENT:      Head: Normocephalic and atraumatic.   Eyes:      Extraocular Movements: Extraocular movements intact.      Conjunctiva/sclera: Conjunctivae normal.   Pulmonary:      Effort: Pulmonary effort is normal. "   Neurological:      Mental Status: She is alert and oriented to person, place, and time.   Psychiatric:         Attention and Perception: Attention normal.         Mood and Affect: Mood normal.         Speech: Speech normal.         Thought Content: Thought content normal.       40 minutes of time spent with patient on date of service.           This note has been dictated using voice recognition software. Any grammatical or context distortions are unintentional and inherent to the software

## 2022-08-05 NOTE — TELEPHONE ENCOUNTER
Yaya Enciso MD [facility] Care Team Pool  Please let the patient know that her insurance will only cover a visit with the dietician if she had diabetes or kidney disease.  She has neither.

## 2022-08-09 NOTE — TELEPHONE ENCOUNTER
Patient returned call. Notified patient of providers message as written below. Patient verbalized understanding.

## 2022-08-22 ENCOUNTER — VIRTUAL VISIT (OUTPATIENT)
Dept: INTERNAL MEDICINE | Facility: CLINIC | Age: 67
End: 2022-08-22
Payer: COMMERCIAL

## 2022-08-22 DIAGNOSIS — B02.30 HERPES ZOSTER WITH OPHTHALMIC COMPLICATION, UNSPECIFIED HERPES ZOSTER EYE DISEASE: Primary | ICD-10-CM

## 2022-08-22 DIAGNOSIS — Z00.00 PREVENTATIVE HEALTH CARE: ICD-10-CM

## 2022-08-22 DIAGNOSIS — R21 FACIAL RASH: ICD-10-CM

## 2022-08-22 PROCEDURE — 99443 PR PHYSICIAN TELEPHONE EVALUATION 21-30 MIN: CPT | Mod: 95 | Performed by: INTERNAL MEDICINE

## 2022-08-22 RX ORDER — VALACYCLOVIR HYDROCHLORIDE 1 G/1
1000 TABLET, FILM COATED ORAL 3 TIMES DAILY
Qty: 21 TABLET | Refills: 0 | Status: SHIPPED | OUTPATIENT
Start: 2022-08-22 | End: 2022-09-16

## 2022-08-22 RX ORDER — GABAPENTIN 300 MG/1
300 CAPSULE ORAL 2 TIMES DAILY
Qty: 60 CAPSULE | Refills: 11 | Status: SHIPPED | OUTPATIENT
Start: 2022-08-22 | End: 2022-10-26

## 2022-08-22 NOTE — PROGRESS NOTES
"Grace is a 67 year old who is being evaluated via a billable telephone visit.      What phone number would you like to be contacted at? [phone]  How would you like to obtain your AVS? Mail a copy    {PROVIDER CHARTING PREFERENCE:334338}    Subjective   Grace is a 67 year old presenting for the following health issues:    Derm Problem (On forehead just appeared end of last week not itchy, sensitive to touch applying cortisone and having pain in head periodically since Fri)      HPI     {SUPERLIST (Optional):687393}  {additonal problems for provider to add (Optional):598945}    Review of Systems   {ROS COMP (Optional):278825}      Objective           Vitals:  No vitals were obtained today due to virtual visit.    Physical Exam   {GENERAL APPEARANCE:50::\"healthy\",\"alert\",\"no distress\"}  PSYCH: Alert and oriented times 3; coherent speech, normal   rate and volume, able to articulate logical thoughts, able   to abstract reason, no tangential thoughts, no hallucinations   or delusions  Her affect is { :1318982::\"normal\"}  RESP: No cough, no audible wheezing, able to talk in full sentences  Remainder of exam unable to be completed due to telephone visits    {Diagnostic Test Results (Optional):659072}    {AMBULATORY ATTESTATION (Optional):592877}        Phone call duration: *** minutes    .  ..  "

## 2022-08-22 NOTE — PATIENT INSTRUCTIONS
Valtrex 1000 mg 3 times daily for 7 days    Gabapentin 300 mg twice daily    Ophthalmology referral in the next few days    Shingrix in 6 months

## 2022-08-22 NOTE — PROGRESS NOTES
Grace is a 67 year old female being evaluated via a billable phone visit, and would like to be contacted via the following  Home number on file [phone] (home)    ASSESSMENT and PLAN:  1. Herpes zoster with ophthalmic complication, unspecified herpes zoster eye disease  Presumptive without visual prove however history and location with eye involvement strongly suggestive.  Essential to err on side of treatment rather than missing.  Begin treatment.  Valacyclovir plus gabapentin and urgent IV referral  - valACYclovir (VALTREX) 1000 mg tablet; Take 1 tablet (1,000 mg) by mouth 3 times daily for 7 days  Dispense: 21 tablet; Refill: 0  - Adult Eye  Referral; Future  - gabapentin (NEURONTIN) 300 MG capsule; Take 1 capsule (300 mg) by mouth 2 times daily  Dispense: 60 capsule; Refill: 11    2. Facial rash  Primary diagnosis as above but also consider seborrheic dermatitis amongst others    3. Preventative health care  Should wait for the 6 months after this episode before Shingrix  - ZOSTER VACCINE RECOMBINANT ADJUVANTED (SHINGRIX); Future  - REVIEW OF HEALTH MAINTENANCE PROTOCOL ORDERS     Patient Instructions   Valtrex 1000 mg 3 times daily for 7 days    Gabapentin 300 mg twice daily    Ophthalmology referral in the next few days    Shingrix in 6 months            Return in about 4 months (around 12/22/2022) for using a video visit.       CHIEF COMPLAINT:  Chief Complaint   Patient presents with     Derm Problem     On forehead just appeared end of last week not itchy, sensitive to touch applying cortisone and having pain in head periodically since Fri       HISTORY OF PRESENT ILLNESS:  Grace is a 67 year old female contacting the clinic today via phone for complaints of a rash.  On approximately August 18 she developed a rash on her left forehead and at the base of her scalp.  It is red, slightly raised, and accompanied by shooting stabbing pains.  She also feels some irritation of her eye and some  "grittiness and pain.  She has never had a rash like this before.  She did have Zostrix injection several years ago.  Her left jaw is also hurting a little bit more than normal    We attempted to create a video visit with an email text from her listed email however that would not work.  We began the visit with a phone conversation, attempted to convert over to video, and finished with phone again    REVIEW OF SYSTEMS:  History of peripheral neuropathy    PFSH:  Social History     Social History Narrative     nulligravida      Enjoys going to her Worship group    TOBACCO USE:  History   Smoking Status     Never Smoker   Smokeless Tobacco     Never Used       VITALS:  There were no vitals filed for this visit.  LMP 09/13/2006  Estimated body mass index is 35.13 kg/m  as calculated from the following:    Height as of 8/5/22: 1.562 m (5' 1.5\").    Weight as of 8/5/22: 85.7 kg (189 lb).    PHYSICAL EXAM:  (observations via Phone)  Slightly anxious but otherwise alert and oriented.    MEDICATIONS  Current Outpatient Medications   Medication Sig Dispense Refill     amphetamine-dextroamphetamine (ADDERALL XR) 30 MG 24 hr capsule        atorvastatin (LIPITOR) 80 MG tablet Take 1 tablet (80 mg) by mouth daily 90 tablet 3     cholecalciferol 25 MCG (1000 UT) TABS Take 1,000 Units by mouth        clonazePAM (KLONOPIN) 0.5 MG tablet        Cyanocobalamin (VITAMIN B-12 PO)        diclofenac (VOLTAREN) 1 % topical gel Apply 4 g topically 4 times daily 350 g 3     divalproex sodium extended-release (DEPAKOTE ER) 250 MG 24 hr tablet        divalproex sodium extended-release (DEPAKOTE ER) 500 MG 24 hr tablet        DULoxetine (CYMBALTA) 30 MG capsule        gabapentin (NEURONTIN) 300 MG capsule Take 1 capsule (300 mg) by mouth 2 times daily 60 capsule 11     levothyroxine (SYNTHROID/LEVOTHROID) 125 MCG tablet Take 125 mcg by mouth       multivitamin w/minerals (THERA-VIT-M) tablet Take 1 tablet by mouth daily       Nutritional " Supplements (GLUCOSAMINE COMPLEX PO)        Semaglutide, 1 MG/DOSE, 4 MG/3ML SOPN Inject 1 mg Subcutaneous once a week 3 mL 5     valACYclovir (VALTREX) 1000 mg tablet Take 1 tablet (1,000 mg) by mouth 3 times daily for 7 days 21 tablet 0     Calcium-Magnesium-Zinc 333-133-5 MG TABS per tablet Take 2 tablets by mouth       naproxen (NAPROSYN) 500 MG tablet Take 1 tablet (500 mg) by mouth 2 times daily (with meals) 20 tablet 0       Notes summarized:   Labs, x-rays, cardiology, GI tests reviewed: White cell count normal  Recent Labs   Lab Test 03/14/22  1049 10/01/21  1335 08/30/21  1640   HGB 11.2*  --  12.7   WBC 6.6  --  8.7     --  137   POTASSIUM 5.0  --  3.9   CR 0.81 1.0 0.85   A1C 6.1*  --   --    B12 934*  --   --    TSH 3.25  --  3.10   VITDT 34  --   --      No results found for: [id number]  Lab Results   Component Value Date    CHOL 183 03/14/2022    CHOL 140 04/02/2007     New orders:   Orders Placed This Encounter   Procedures     REVIEW OF HEALTH MAINTENANCE PROTOCOL ORDERS     ZOSTER VACCINE RECOMBINANT ADJUVANTED (SHINGRIX)     Adult Eye  Referral       Independent review of:  Supplemental history by:      Patient would like to receive their AVS by Mail a copy    HPI    Arley Abraham MD  [facility]    Phone Start Time: 3:21 PM  Phone End time:  3:48 PM  Conversation plus orders: 27 minutes  Dictation time:  3 minutes    The visit lasted a total of 30 minutes

## 2022-08-24 ENCOUNTER — TELEPHONE (OUTPATIENT)
Dept: OPHTHALMOLOGY | Facility: CLINIC | Age: 67
End: 2022-08-24

## 2022-08-24 NOTE — TELEPHONE ENCOUNTER
Home; [phone] left message with direct number at [phone]    Cell [phone]:  Left message with direct number at [phone]    Heraclio Wells RN 12:02 PM 08/25/22            Grace Chinchilla [phone]      Left message with direct number at [phone]    Heraclio Wells RN 2:50 PM 08/24/22             Health Call Center    Phone Message    May a detailed message be left on voicemail: yes     Reason for Call: Other: Patient called to schedule an appointment based on referral. Per protocols sending message high priority to clinical pool. She is needing to be seen for herpes-(zoster/shingles) related eye problems. Please call to advise. Thanks.      Action Taken: Other: eye    Travel Screening: Not Applicable                                                                       Patients IV d/c'd, catheter intact. CED and penrose drain teaching completed with patient and spouse, supplies given. All discharge instructions explained, all questions answered. Patient will be discharged via wheelchair with support staff.

## 2022-08-25 NOTE — PROGRESS NOTES
HPI:  Patient presents for ophthalmic evaluation of HZO. Patient was seen by internal medicine on 08/22/2022 - was given valrex 1 g 3 times daily for 7 days. There is no vision change. The rash is on the left scalp (started on 08/16/2022). There is also shooting pain on the scalp area.       Pertinent Medical History:    Obstructive sleep apnea    Obesity    Hypothyroidism - Hashimoto's thyroiditis.     Hyperlipidemia    Pre-diabetes    Nonrheumatic aortic valve stenosis with insufficiency    Anemia    Conversion disorder    Mild intellectual disabilities.     Thyroid nodule    Dizzy spells    Adrenal cyst    Pre-diabetes    Osteoarthritis right knee    Ocular History:    None    Eye Medications:    None    Assessment and Plan:  1.   Herpes Zoster    Valtrex 1 g daily for 1 week. Started on 08/22/2022.     Done with valtrex.     No ocular sequelae. The lesions on the left scalp ear.     2.   Hypothyroidism - Hashimoto's thyroiditis.     No ocular sequelae.     3.   Epiretinal Membrane with Pseudohole/Scar, left eye.     Visual significant.     Macular OCT 09/01/2022.     Optic nerve OCT was also done incidentally - looks like thinning of RNFL on left optic nerve - likely from an obliquely inserted disc vs severe retinal condition affecting the RNFL vs. True thinning.     Recommend follow up / consult with the retina service on the left retina and left optic nerve oct reading.     4.   Cataract, both eyes.     Monitor.       Patient consented to a dilated eye exam:    Yes. Side effects discussed.    Medical History:  Past Medical History:   Diagnosis Date     Adrenal mass (H) 3/14/2022     Anxiety      Depression      Depressive disorder, not elsewhere classified      Diaphragmatic hernia without mention of obstruction or gangrene      Disease of thyroid gland      Dysthymic disorder     [facility]     Esophageal reflux      Hyperlipidemia      Mixed hyperlipidemia     Hyperlipidemia      Obstructive sleep apnea (adult) (pediatric)     uses CPAP     Plantar fascial fibromatosis      Temporomandibular joint disorders, unspecified      Temporomandibular joint disorders, unspecified      Tietze's disease     costochondritis     Unspecified hypothyroidism        Medications:  Current Outpatient Medications   Medication Sig Dispense Refill     amphetamine-dextroamphetamine (ADDERALL XR) 30 MG 24 hr capsule        atorvastatin (LIPITOR) 80 MG tablet Take 1 tablet (80 mg) by mouth daily 90 tablet 3     Calcium-Magnesium-Zinc 333-133-5 MG TABS per tablet Take 2 tablets by mouth       cholecalciferol 25 MCG (1000 UT) TABS Take 1,000 Units by mouth        clonazePAM (KLONOPIN) 0.5 MG tablet        Cyanocobalamin (VITAMIN B-12 PO)        diclofenac (VOLTAREN) 1 % topical gel Apply 4 g topically 4 times daily 350 g 3     divalproex sodium extended-release (DEPAKOTE ER) 250 MG 24 hr tablet        divalproex sodium extended-release (DEPAKOTE ER) 500 MG 24 hr tablet        DULoxetine (CYMBALTA) 30 MG capsule        gabapentin (NEURONTIN) 300 MG capsule Take 1 capsule (300 mg) by mouth 2 times daily 60 capsule 11     levothyroxine (SYNTHROID/LEVOTHROID) 125 MCG tablet Take 125 mcg by mouth       multivitamin w/minerals (THERA-VIT-M) tablet Take 1 tablet by mouth daily       naproxen (NAPROSYN) 500 MG tablet Take 1 tablet (500 mg) by mouth 2 times daily (with meals) 20 tablet 0     Nutritional Supplements (GLUCOSAMINE COMPLEX PO)        Semaglutide, 1 MG/DOSE, 4 MG/3ML SOPN Inject 1 mg Subcutaneous once a week 3 mL 5     valACYclovir (VALTREX) 1000 mg tablet Take 1 tablet (1,000 mg) by mouth 3 times daily for 7 days 21 tablet 0   Complete documentation of historical and exam elements from today's encounter can be found in the full encounter summary report (not reduplicated in this progress note). I personally obtained the chief complaint(s) and history of present illness.  I confirmed and edited as necessary the  review of systems, past medical/surgical history, family history, social history, and examination findings as documented by others; and I examined the patient myself. I personally reviewed the relevant tests, images, and reports as documented above. I formulated and edited as necessary the assessment and plan and discussed the findings and management plan with the patient and family. - Payam Cline OD

## 2022-09-01 ENCOUNTER — OFFICE VISIT (OUTPATIENT)
Dept: OPHTHALMOLOGY | Facility: CLINIC | Age: 67
End: 2022-09-01
Attending: OPTOMETRIST
Payer: COMMERCIAL

## 2022-09-01 DIAGNOSIS — H35.372 EPIRETINAL MEMBRANE (ERM) OF LEFT EYE: Primary | ICD-10-CM

## 2022-09-01 DIAGNOSIS — B02.8 HERPES ZOSTER WITH OTHER COMPLICATION: ICD-10-CM

## 2022-09-01 PROCEDURE — 92134 CPTRZ OPH DX IMG PST SGM RTA: CPT | Performed by: OPTOMETRIST

## 2022-09-01 PROCEDURE — G0463 HOSPITAL OUTPT CLINIC VISIT: HCPCS

## 2022-09-01 PROCEDURE — 99207 FUNDUS PHOTOS OU (BOTH EYES): CPT | Mod: 26 | Performed by: OPTOMETRIST

## 2022-09-01 PROCEDURE — 92250 FUNDUS PHOTOGRAPHY W/I&R: CPT | Performed by: OPTOMETRIST

## 2022-09-01 PROCEDURE — 92002 INTRM OPH EXAM NEW PATIENT: CPT | Performed by: OPTOMETRIST

## 2022-09-01 ASSESSMENT — REFRACTION_WEARINGRX
OS_CYLINDER: +2.75
OS_AXIS: 010
OD_CYLINDER: +2.50
OS_ADD: +2.50
OD_ADD: +2.50
OD_AXIS: 147
OD_SPHERE: -2.50
OS_SPHERE: -1.75

## 2022-09-01 ASSESSMENT — VISUAL ACUITY
METHOD: SNELLEN - LINEAR
OS_CC+: -1
OD_CC: 20/20
OS_CC: 20/100
CORRECTION_TYPE: GLASSES

## 2022-09-01 ASSESSMENT — SLIT LAMP EXAM - LIDS
COMMENTS: NORMAL
COMMENTS: NORMAL

## 2022-09-01 ASSESSMENT — TONOMETRY
OD_IOP_MMHG: 18
IOP_METHOD: TONOPEN
OS_IOP_MMHG: 20

## 2022-09-01 ASSESSMENT — CONF VISUAL FIELD
OD_NORMAL: 1
OS_NORMAL: 1

## 2022-09-01 ASSESSMENT — EXTERNAL EXAM - RIGHT EYE: OD_EXAM: NORMAL

## 2022-09-01 ASSESSMENT — PATIENT HEALTH QUESTIONNAIRE - PHQ9: SUM OF ALL RESPONSES TO PHQ QUESTIONS 1-9: 0

## 2022-09-01 ASSESSMENT — EXTERNAL EXAM - LEFT EYE: OS_EXAM: NORMAL

## 2022-09-01 NOTE — NURSING NOTE
Chief Complaints and History of Present Illnesses   Patient presents with     Herpes Zoster Evaluation     LEwas  sore and tender started 3 weeks ago. Getting better no pain now  Slight tenderness to touch  No vision changes  Laurel STYLES 9:41 AM September 1, 2022        Chief Complaint(s) and History of Present Illness(es)     Herpes Zoster Evaluation     Associated symptoms: floaters.  Negative for eye pain, pain with eye movement and flashes    Pain scale: 0/10    Comments: LEwas  sore and tender started 3 weeks ago. Getting better no pain now  Slight tenderness to touch  No vision changes  Laurel STYLES 9:41 AM September 1, 2022

## 2022-09-16 ENCOUNTER — OFFICE VISIT (OUTPATIENT)
Dept: FAMILY MEDICINE | Facility: CLINIC | Age: 67
End: 2022-09-16
Payer: COMMERCIAL

## 2022-09-16 VITALS
DIASTOLIC BLOOD PRESSURE: 62 MMHG | HEART RATE: 68 BPM | SYSTOLIC BLOOD PRESSURE: 130 MMHG | TEMPERATURE: 98.3 F | BODY MASS INDEX: 33.24 KG/M2 | WEIGHT: 178.8 LBS | RESPIRATION RATE: 12 BRPM

## 2022-09-16 DIAGNOSIS — R73.03 PRE-DIABETES: ICD-10-CM

## 2022-09-16 DIAGNOSIS — R03.0 ELEVATED BP WITHOUT DIAGNOSIS OF HYPERTENSION: ICD-10-CM

## 2022-09-16 DIAGNOSIS — E78.2 MIXED HYPERLIPIDEMIA: ICD-10-CM

## 2022-09-16 DIAGNOSIS — E03.9 HYPOTHYROIDISM, UNSPECIFIED TYPE: ICD-10-CM

## 2022-09-16 DIAGNOSIS — E66.811 CLASS 1 OBESITY DUE TO EXCESS CALORIES WITH SERIOUS COMORBIDITY AND BODY MASS INDEX (BMI) OF 33.0 TO 33.9 IN ADULT: Primary | ICD-10-CM

## 2022-09-16 DIAGNOSIS — F31.89 OTHER BIPOLAR DISORDER (H): ICD-10-CM

## 2022-09-16 DIAGNOSIS — E66.09 CLASS 1 OBESITY DUE TO EXCESS CALORIES WITH SERIOUS COMORBIDITY AND BODY MASS INDEX (BMI) OF 33.0 TO 33.9 IN ADULT: Primary | ICD-10-CM

## 2022-09-16 LAB
ALBUMIN SERPL BCG-MCNC: 4.1 G/DL (ref 3.5–5.2)
ALP SERPL-CCNC: 63 U/L (ref 35–104)
ALT SERPL W P-5'-P-CCNC: 12 U/L (ref 10–35)
ANION GAP SERPL CALCULATED.3IONS-SCNC: 9 MMOL/L (ref 7–15)
AST SERPL W P-5'-P-CCNC: 16 U/L (ref 10–35)
BILIRUB SERPL-MCNC: 0.3 MG/DL
BUN SERPL-MCNC: 27.6 MG/DL (ref 8–23)
CALCIUM SERPL-MCNC: 9.6 MG/DL (ref 8.8–10.2)
CHLORIDE SERPL-SCNC: 103 MMOL/L (ref 98–107)
CREAT SERPL-MCNC: 0.78 MG/DL (ref 0.51–0.95)
DEPRECATED HCO3 PLAS-SCNC: 28 MMOL/L (ref 22–29)
ERYTHROCYTE [DISTWIDTH] IN BLOOD BY AUTOMATED COUNT: 16.3 % (ref 10–15)
GFR SERPL CREATININE-BSD FRML MDRD: 82 ML/MIN/1.73M2
GLUCOSE SERPL-MCNC: 86 MG/DL (ref 70–99)
HBA1C MFR BLD: 5.3 % (ref 0–5.6)
HCT VFR BLD AUTO: 34.6 % (ref 35–47)
HGB BLD-MCNC: 11.4 G/DL (ref 11.7–15.7)
MCH RBC QN AUTO: 27.3 PG (ref 26.5–33)
MCHC RBC AUTO-ENTMCNC: 32.9 G/DL (ref 31.5–36.5)
MCV RBC AUTO: 83 FL (ref 78–100)
PLATELET # BLD AUTO: 308 10E3/UL (ref 150–450)
POTASSIUM SERPL-SCNC: 4.2 MMOL/L (ref 3.4–5.3)
PROT SERPL-MCNC: 6.7 G/DL (ref 6.4–8.3)
RBC # BLD AUTO: 4.17 10E6/UL (ref 3.8–5.2)
SODIUM SERPL-SCNC: 140 MMOL/L (ref 136–145)
VALPROATE SERPL-MCNC: 64.7 UG/ML
WBC # BLD AUTO: 8 10E3/UL (ref 4–11)

## 2022-09-16 PROCEDURE — 80053 COMPREHEN METABOLIC PANEL: CPT | Performed by: FAMILY MEDICINE

## 2022-09-16 PROCEDURE — 36415 COLL VENOUS BLD VENIPUNCTURE: CPT | Performed by: FAMILY MEDICINE

## 2022-09-16 PROCEDURE — 99214 OFFICE O/P EST MOD 30 MIN: CPT | Performed by: FAMILY MEDICINE

## 2022-09-16 PROCEDURE — 85027 COMPLETE CBC AUTOMATED: CPT | Performed by: FAMILY MEDICINE

## 2022-09-16 PROCEDURE — 80164 ASSAY DIPROPYLACETIC ACD TOT: CPT | Performed by: FAMILY MEDICINE

## 2022-09-16 PROCEDURE — 83036 HEMOGLOBIN GLYCOSYLATED A1C: CPT | Performed by: FAMILY MEDICINE

## 2022-09-16 RX ORDER — FERROUS SULFATE 325(65) MG
325 TABLET ORAL
COMMUNITY

## 2022-09-16 ASSESSMENT — ENCOUNTER SYMPTOMS: CONSTITUTIONAL NEGATIVE: 1

## 2022-09-16 NOTE — ASSESSMENT & PLAN NOTE
67 year old year old female in clinic today to discuss treatment of the following conditions through diet and lifestyle modification and weight loss:  1. Class 1 obesity due to excess calories with serious comorbidity and body mass index (BMI) of 33.0 to 33.9 in adult    2. Elevated BP without diagnosis of hypertension    3. Hypothyroidism, unspecified type    4. Mixed hyperlipidemia    5. Pre-diabetes      The patient's weight loss result since the last visit was successful based on weight loss.  Continues to regulate appetite.  Medication helpful. No side effects.  She continues to focus on protein.     - knee pain: PT referral?   - prediabetes.  Suspect improved. Check a1c.   - doing well.  She is diligent with her nutrition.

## 2022-09-16 NOTE — PROGRESS NOTES
Problem List Items Addressed This Visit     Class 1 obesity due to excess calories with serious comorbidity and body mass index (BMI) of 33.0 to 33.9 in adult - Primary     67 year old year old female in clinic today to discuss treatment of the following conditions through diet and lifestyle modification and weight loss:  1. Class 1 obesity due to excess calories with serious comorbidity and body mass index (BMI) of 33.0 to 33.9 in adult    2. Elevated BP without diagnosis of hypertension    3. Hypothyroidism, unspecified type    4. Mixed hyperlipidemia    5. Pre-diabetes      The patient's weight loss result since the last visit was successful based on weight loss.  Continues to regulate appetite.  Medication helpful. No side effects.  She continues to focus on protein.     - knee pain: PT referral?   - prediabetes.  Suspect improved. Check a1c.   - doing well.  She is diligent with her nutrition.            Relevant Orders    Hemoglobin A1c    Elevated BP without diagnosis of hypertension    Relevant Orders    Comprehensive metabolic panel (BMP + Alb, Alk Phos, ALT, AST, Total. Bili, TP)    CBC with platelets    Valproic acid    Hypothyroidism    Mixed hyperlipidemia    Other bipolar disorder (H)     Stable.  Doing well. Will complete labs for psychiatry.            Relevant Orders    Valproic acid    Pre-diabetes    Relevant Orders    Hemoglobin A1c         Follow-up Visit   Expected date:  Dec 16, 2022 (Approximate)      Follow Up Appointment Details:     Follow-up with whom?: Me    Follow-Up for what?: Chronic Disease f/u    Chronic Disease f/u: General (Other)    Additional Details: Weight loss visit    How?: In Person    Is this an as-needed follow-up?: No                  Subjective   Grace is a 67 year old who presents for the following health issues   Chief Complaint   Patient presents with     Weight Check     Follow up on weight loss program.       Patient presents for treatment of chronic, comorbid  "conditions using intensive therapeutic lifestyle interventions including nutrition, physical activity, and behavior management.   - successes: continues to eat protain in the morning. She then struggles with appetite during the day.     - struggles: healthy nutrition while at [employer] last week.  She tried to eat protein.  There were minimal served veggies.  \"Only 4 days.\"     - she has struggled with talking about her participation in our program with her sisters.     - she is concerned about $$ of ozempic ($47/month). Sister manages finances.    - exercise plan: trying to walk more.  She is trying to add more time.  Knees has been achy recently.  Previously she was not walking at all.  [place]  \"I walked [place].\"     - tracking/journaling: ad abrahan.   - following nutritional plan: protein first..  Deviations from plan: [employer] food.  \"I spent 5 hours making vegetable soup.\"    - hunger: controlled.   - medication benefits: helpful. side effects: nausea now resolved.       History of Present Illness       Diabetes:   She presents for follow up of diabetes.  She is not checking blood glucose. She has no concerns regarding her diabetes at this time.  She is having excessive thirst.         Reason for visit:  Weight management check in    She eats 2-3 servings of fruits and vegetables daily.She consumes 0 sweetened beverage(s) daily.She exercises with enough effort to increase her heart rate 9 or less minutes per day.  She exercises with enough effort to increase her heart rate 3 or less days per week. She is missing 2 dose(s) of medications per week.  She is not taking prescribed medications regularly due to remembering to take.       Review of Systems   Constitutional: Negative.    All other systems reviewed and are negative.           Objective    /62   Pulse 68   Temp 98.3  F (36.8  C) (Oral)   Resp 12   Wt 81.1 kg (178 lb 12.8 oz)   LMP 09/13/2006   BMI 33.24 kg/m  "   Body mass index is 33.24 kg/m .  Physical Exam  Nursing note reviewed.   Constitutional:       General: She is not in acute distress.     Appearance: Normal appearance. She is not ill-appearing.   HENT:      Head: Normocephalic and atraumatic.   Eyes:      Extraocular Movements: Extraocular movements intact.      Conjunctiva/sclera: Conjunctivae normal.   Pulmonary:      Effort: Pulmonary effort is normal.   Neurological:      Mental Status: She is alert and oriented to person, place, and time.   Psychiatric:         Attention and Perception: Attention normal.         Mood and Affect: Mood normal.         Speech: Speech normal.         Thought Content: Thought content normal.                This note has been dictated using voice recognition software. Any grammatical or context distortions are unintentional and inherent to the software

## 2022-09-16 NOTE — LETTER
September 19, 2022      Grace GELLER Amor  [address] [place] [address]        Dear ,    We are writing to inform you of your test results.    Keep up the great work.  Your hemoglobin A1c is now 5.3% which is a dramatic improvement from the last measurement.  Previously, the results showed prediabetes.  It is now normal.  All other testing is stable.    Resulted Orders   Hemoglobin A1c   Result Value Ref Range    Hemoglobin A1C 5.3 0.0 - 5.6 %      Comment:      Normal <5.7%   Prediabetes 5.7-6.4%    Diabetes 6.5% or higher     Note: Adopted from ADA consensus guidelines.   Comprehensive metabolic panel (BMP + Alb, Alk Phos, ALT, AST, Total. Bili, TP)   Result Value Ref Range    Sodium 140 136 - 145 mmol/L    Potassium 4.2 3.4 - 5.3 mmol/L    Chloride 103 98 - 107 mmol/L    Carbon Dioxide (CO2) 28 22 - 29 mmol/L    Anion Gap 9 7 - 15 mmol/L    Urea Nitrogen 27.6 (H) 8.0 - 23.0 mg/dL    Creatinine 0.78 0.51 - 0.95 mg/dL    Calcium 9.6 8.8 - 10.2 mg/dL    Glucose 86 70 - 99 mg/dL    Alkaline Phosphatase 63 35 - 104 U/L    AST 16 10 - 35 U/L    ALT 12 10 - 35 U/L    Protein Total 6.7 6.4 - 8.3 g/dL    Albumin 4.1 3.5 - 5.2 g/dL    Bilirubin Total 0.3 <=1.2 mg/dL    GFR Estimate 82 >60 mL/min/1.73m2      Comment:      Effective December 21, 2021 eGFRcr in adults is calculated using the 2021 CKD-EPI creatinine equation which includes age and gender (Rico et al., NEJM, DOI: 10.1056/CPQVxv6470848)   CBC with platelets   Result Value Ref Range    WBC Count 8.0 4.0 - 11.0 10e3/uL    RBC Count 4.17 3.80 - 5.20 10e6/uL    Hemoglobin 11.4 (L) 11.7 - 15.7 g/dL    Hematocrit 34.6 (L) 35.0 - 47.0 %    MCV 83 78 - 100 fL    MCH 27.3 26.5 - 33.0 pg    MCHC 32.9 31.5 - 36.5 g/dL    RDW 16.3 (H) 10.0 - 15.0 %    Platelet Count 308 150 - 450 10e3/uL   Valproic acid   Result Value Ref Range    Valproic acid 64.7   ug/mL      Comment:      Therapeutic Range:  ug/mL   Epilepsy and yanira patients:   ug/mL   Some patients require and can tolerate values up to 150 ug/mL     Critical: Greater than 150 ug/mL       If you have any questions or concerns, please call the clinic at the number listed above.       Sincerely,      Yaya Enciso MD

## 2022-09-27 DIAGNOSIS — H35.372 EPIRETINAL MEMBRANE (ERM) OF LEFT EYE: Primary | ICD-10-CM

## 2022-10-04 ENCOUNTER — OFFICE VISIT (OUTPATIENT)
Dept: OPHTHALMOLOGY | Facility: CLINIC | Age: 67
End: 2022-10-04
Attending: OPHTHALMOLOGY
Payer: COMMERCIAL

## 2022-10-04 DIAGNOSIS — H35.372 EPIRETINAL MEMBRANE (ERM) OF LEFT EYE: ICD-10-CM

## 2022-10-04 PROCEDURE — 92134 CPTRZ OPH DX IMG PST SGM RTA: CPT | Performed by: OPHTHALMOLOGY

## 2022-10-04 PROCEDURE — 99204 OFFICE O/P NEW MOD 45 MIN: CPT | Performed by: OPHTHALMOLOGY

## 2022-10-04 PROCEDURE — 92250 FUNDUS PHOTOGRAPHY W/I&R: CPT | Performed by: OPHTHALMOLOGY

## 2022-10-04 PROCEDURE — 99207 FUNDUS PHOTOS OU (BOTH EYES): CPT | Mod: 26 | Performed by: OPHTHALMOLOGY

## 2022-10-04 PROCEDURE — G0463 HOSPITAL OUTPT CLINIC VISIT: HCPCS | Mod: 25

## 2022-10-04 ASSESSMENT — CUP TO DISC RATIO
OD_RATIO: 0.3
OS_RATIO: 0.3

## 2022-10-04 ASSESSMENT — CONF VISUAL FIELD
METHOD: COUNTING FINGERS
OD_NORMAL: 1
OS_NORMAL: 1

## 2022-10-04 ASSESSMENT — SLIT LAMP EXAM - LIDS
COMMENTS: NORMAL
COMMENTS: NORMAL

## 2022-10-04 ASSESSMENT — VISUAL ACUITY
OD_CC+: -2
METHOD: SNELLEN - LINEAR
OS_CC: 20/100
CORRECTION_TYPE: GLASSES
OD_CC: 20/30
OS_CC+: -1

## 2022-10-04 ASSESSMENT — REFRACTION_WEARINGRX
OD_SPHERE: -2.50
OS_SPHERE: -1.75
OD_CYLINDER: +2.50
OS_ADD: +2.50
OS_AXIS: 010
OD_ADD: +2.50
OD_AXIS: 147
OS_CYLINDER: +2.75

## 2022-10-04 ASSESSMENT — EXTERNAL EXAM - LEFT EYE: OS_EXAM: NORMAL

## 2022-10-04 ASSESSMENT — TONOMETRY
IOP_METHOD: TONOPEN
OD_IOP_MMHG: 21
OS_IOP_MMHG: 19

## 2022-10-04 ASSESSMENT — EXTERNAL EXAM - RIGHT EYE: OD_EXAM: NORMAL

## 2022-10-04 NOTE — NURSING NOTE
Chief Complaints and History of Present Illnesses   Patient presents with     Epiretinal Membrane Evaluation     Chief Complaint(s) and History of Present Illness(es)     Epiretinal Membrane Evaluation     Laterality: left eye    Associated symptoms: floaters.  Negative for eye pain and flashes              Comments     Here for ERM left eye evaluation. Vision is about the same since last visit. Stable floaters without flashes. No eye pain. No flashes. No distortion.    Saul Mcguire COT 1:27 PM October 4, 2022

## 2022-10-04 NOTE — PROGRESS NOTES
CC -   ERM OS    INTERVAL HISTORY - Initial visit with me. Referred by Lakshmi for ERM OS noted 9/2022    PMH -   Grace Chinchilla is a  67 year old year-old patient with history of ERM OS.  Noted 9/2022, uncertain when GELACIO prior was, likely 7-8 years per patient. Had not noticed vision change OS         PAST OCULAR SURGERY  None      RETINAL IMAGING:  OCT  10/04/22   OD - retina normal, PVD  OS - severe ERM with CME, PVD      ASSESSMENT & PLAN    # ERM OS   - noted 9/2022 uncertain onset   - likely cause of 20/100 vision OS   - advise PPV/MS      - r/b/a d/w patient: vision loss, blindness, infection, bleeding   - retinal detachment, need for more surgeries, need for gas or oil bubble and bubble restrictions   - cataract, diplopia, refractive change   - persistent blurriness, distortion, or scotoma   - participation/performance by fellow or resident      - has severe anxiety on clonazepam may need general anesthesia   - patient will consider (10/2022)      # PVD OU   - advised S/Sx RD 10/2022      # NS OU   - early VS      return to clinic: 4 months if no surgery, DFE OU, OCT OU    ATTESTATION     Attending Attestation:     Complete documentation of historical and exam elements from today's encounter can be found in the full encounter summary report (not reduplicated in this progress note).  I personally obtained the chief complaint(s) and history of present illness.  I confirmed and edited as necessary the review of systems, past medical/surgical history, family history, social history, and examination findings as documented by others; and I examined the patient myself.  I personally reviewed the relevant tests, images, and reports as documented above.  I formulated and edited as necessary the assessment and plan and discussed the findings and management plan with the patient and family    Jessica Motley MD, PhD  , Vitreoretinal Surgery  Department of Ophthalmology  [facility]  [place]

## 2022-10-04 NOTE — PATIENT INSTRUCTIONS
"--------------------------------------  EPIRETINAL MEMBRANE      You have an epiretinal membrane.  This is a growth of fibrous tissue overlying your retina.  It is also sometimes called \"macular pucker\" or \"cellophane maculopathy\".     It can cause blurry vision or distorted vision.   After it initially develops, it usually stabilizes and does not progress further, though it usually does not improve spontaneously.   Surgery may be justified in certain cases.  This surgery can partially improve the vision and distortion, though some vision defects and distortion may remain.      Your epiretinal membrane is severe enough that surgery would be recommended.  The surgery does have risks  including retinal detachment, need for more surgeries, and cataract.    "

## 2022-10-05 ENCOUNTER — TELEPHONE (OUTPATIENT)
Dept: FAMILY MEDICINE | Facility: CLINIC | Age: 67
End: 2022-10-05

## 2022-10-05 NOTE — TELEPHONE ENCOUNTER
General Call      Reason for Call:  Requests a call back from provider to discuss other options.    What are your questions or concerns:    Patient said that she has lost interest and heart in following this health plan.  The price of ozempic is costly, and price keeps going up (recently went up another $100). She doesn't think this is the right plan for her. She is nervous about giving herself injections weekly.  She would like a call back at her home phone to discuss new options.    Date of last appointment with provider: 09/16/22    Okay to leave a detailed message?: Yes at Home number on file [phone] (home)

## 2022-10-06 NOTE — TELEPHONE ENCOUNTER
Appointment scheduled for 10/13.     Patient has not had a dose of ozempic since 9/25. Has 2 pens left in the home, wondering if she should give herself another one or wait until appointment to discuss?   Primarily concerned about cost and long term effects of medication.    Please advise.

## 2022-10-13 ENCOUNTER — TELEPHONE (OUTPATIENT)
Dept: FAMILY MEDICINE | Facility: CLINIC | Age: 67
End: 2022-10-13

## 2022-10-13 ENCOUNTER — VIRTUAL VISIT (OUTPATIENT)
Dept: FAMILY MEDICINE | Facility: CLINIC | Age: 67
End: 2022-10-13
Payer: COMMERCIAL

## 2022-10-13 DIAGNOSIS — Z53.9 ERRONEOUS ENCOUNTER--DISREGARD: Primary | ICD-10-CM

## 2022-10-13 NOTE — PROGRESS NOTES
[12:18 PM] Khalida Mandi   Jay Jay 11:00am called back and said she didn't want to have the VV. She was really overwhelmed with trying figure it out. She asked for his next in person appt. I used a same day 10/26 because she did not want to stay on the phone. She said she would be back home in about 30 minutes if we needed to call to change that.

## 2022-10-13 NOTE — TELEPHONE ENCOUNTER
I left a detailed message for patient apologizing that we were unable to connect with her today. Her appointment on 10/26/22 is fine to keep and we will see her then.

## 2022-10-26 ENCOUNTER — OFFICE VISIT (OUTPATIENT)
Dept: FAMILY MEDICINE | Facility: CLINIC | Age: 67
End: 2022-10-26
Payer: COMMERCIAL

## 2022-10-26 VITALS
TEMPERATURE: 98.4 F | RESPIRATION RATE: 16 BRPM | BODY MASS INDEX: 33.13 KG/M2 | WEIGHT: 180.06 LBS | DIASTOLIC BLOOD PRESSURE: 64 MMHG | HEIGHT: 62 IN | HEART RATE: 80 BPM | SYSTOLIC BLOOD PRESSURE: 146 MMHG

## 2022-10-26 DIAGNOSIS — E66.811 CLASS 1 OBESITY DUE TO EXCESS CALORIES WITH SERIOUS COMORBIDITY AND BODY MASS INDEX (BMI) OF 33.0 TO 33.9 IN ADULT: Primary | ICD-10-CM

## 2022-10-26 DIAGNOSIS — E66.09 CLASS 1 OBESITY DUE TO EXCESS CALORIES WITH SERIOUS COMORBIDITY AND BODY MASS INDEX (BMI) OF 33.0 TO 33.9 IN ADULT: Primary | ICD-10-CM

## 2022-10-26 DIAGNOSIS — R03.0 ELEVATED BP WITHOUT DIAGNOSIS OF HYPERTENSION: ICD-10-CM

## 2022-10-26 DIAGNOSIS — R73.03 PRE-DIABETES: ICD-10-CM

## 2022-10-26 DIAGNOSIS — E03.9 HYPOTHYROIDISM, UNSPECIFIED TYPE: ICD-10-CM

## 2022-10-26 PROCEDURE — 99214 OFFICE O/P EST MOD 30 MIN: CPT | Performed by: FAMILY MEDICINE

## 2022-10-26 ASSESSMENT — ENCOUNTER SYMPTOMS: CONSTITUTIONAL NEGATIVE: 1

## 2022-10-26 NOTE — PROGRESS NOTES
Problem List Items Addressed This Visit     Class 1 obesity due to excess calories with serious comorbidity and body mass index (BMI) of 33.0 to 33.9 in adult - Primary     67 year old year old female in clinic today to discuss treatment of the following conditions through diet and lifestyle modification and weight loss:  1. Class 1 obesity due to excess calories with serious comorbidity and body mass index (BMI) of 33.0 to 33.9 in adult    2. Elevated BP without diagnosis of hypertension    3. Hypothyroidism, unspecified type    4. Pre-diabetes      The patient's weight loss result since the last visit was successful based on weight loss overall.  Cost has become a factor with ozempic.  Discussed nutrition.  Discussed timing of nutrition.  I encouraged the patient to discuss her focus on metabolic health with her sisters as this does affect her finances.  I asked whether or not this patient is her own medical decision maker.  Up until now, I assume that she has.  She indicated that she is generally on her own with respect to healthcare but that her family helps manage her finances.  I drafted a letter to help her communicate with her sisters regarding use of Ozempic and the cost implications.  It sounds like she is in the Medicare donut hole which is why the cost has increased over the last month or so.  Nevertheless, I believe she has done quite well and that the medicine is helping her improve her metabolic health and avoid diabetes.         Elevated BP without diagnosis of hypertension    Hypothyroidism    Pre-diabetes       Melita Edwards is a 67 year old who presents for the following health issues   Chief Complaint   Patient presents with     Weight Loss     Follow-up        Patient presents for treatment of chronic, comorbid conditions using intensive therapeutic lifestyle interventions including nutrition, physical activity, and behavior management.   - successes: She finds that she has more  "energy while she is on ozempic.  Example: walking outside clinic this afternoon.     - struggles: concerned about ozempic.  Cost increased when she had her medication filled in September ($130).  More ice cream.   - tracking/journaling: ad abrahan.  Eats senior dinners or frozen dinners.  \"I haven't been eating as healthy as I ought to.\"  Less fruits and veggies.  She continues to eat protein (chicken, cottage cheese).  More carbs (bread).    - hunger: portions have gotten larger.     - medication benefits: helpful when she takes it.  side effects: none.        History of Present Illness       Reason for visit:  Weight loss follow up    She eats 0-1 servings of fruits and vegetables daily.She consumes 1 sweetened beverage(s) daily.She exercises with enough effort to increase her heart rate 10 to 19 minutes per day.  She exercises with enough effort to increase her heart rate 3 or less days per week. She is missing 2 dose(s) of medications per week.  She is not taking prescribed medications regularly due to remembering to take and other.     Review of Systems   Constitutional: Negative.    All other systems reviewed and are negative.           Objective    BP (!) 146/64 (BP Location: Left arm, Patient Position: Sitting, Cuff Size: Adult Large)   Pulse 80   Temp 98.4  F (36.9  C) (Oral)   Resp 16   Ht 1.562 m (5' 1.5\")   Wt 81.7 kg (180 lb 1 oz)   LMP 09/13/2006   BMI 33.47 kg/m    Body mass index is 33.47 kg/m .   Down about 14%.    Physical Exam  Nursing note reviewed.   Constitutional:       General: She is not in acute distress.     Appearance: Normal appearance. She is not ill-appearing.   HENT:      Head: Normocephalic and atraumatic.   Eyes:      Extraocular Movements: Extraocular movements intact.      Conjunctiva/sclera: Conjunctivae normal.   Pulmonary:      Effort: Pulmonary effort is normal.   Neurological:      Mental Status: She is alert and oriented to person, place, and time.   Psychiatric:         " Attention and Perception: Attention normal.         Mood and Affect: Mood normal.         Speech: Speech normal.         Thought Content: Thought content normal.             This note has been dictated using voice recognition software. Any grammatical or context distortions are unintentional and inherent to the software

## 2022-10-26 NOTE — LETTER
October 26, 2022      Grace Chinchilla  [address]  [address] [place] [address]        To Whom It May Concern:    Grace Chinchilla was seen in our clinic in the context of our medical weight loss program.  She has been seeing us since the month of June and reading utilizing a medication called semaglutide (Ozempic, Wegovy) which has been helping regulate her appetite.  At that time, she had prediabetic laboratory testing.  Since then, she is lost approximately 14% of her weight and her prediabetic labs (hemoglobin A1c) have normalized.  Over the last month, she has been increasingly concerned of the cost of this medication.  She tells me today that her sisters assist in managing her finances.  She is concerned that her sisters will not be supportive of the ongoing use of this medication.  Grace asked me to draft a letter to her sisters to help her in the process of informing her sisters of her participation in her weight loss program and starting a discussion whether or not the benefits of semaglutide (Ozempic, Wegovy) outweigh the cost in terms of finances.  My apologies if we have not reached out to Sarai and Puja sooner as it sounds like they are not stakeholders in this discussion.  I think Grace is done a great job improving her nutrition, physical activity.  This medications been very helpful in facilitating behavioral/nutrition changes.  If you would like to discuss, please feel free to reach out to me at the phone number above.      Sincerely,        Yaya Enciso MD  Diplomate - American Board of Obesity Medication.

## 2022-10-26 NOTE — ASSESSMENT & PLAN NOTE
67 year old year old female in clinic today to discuss treatment of the following conditions through diet and lifestyle modification and weight loss:  1. Class 1 obesity due to excess calories with serious comorbidity and body mass index (BMI) of 33.0 to 33.9 in adult    2. Elevated BP without diagnosis of hypertension    3. Hypothyroidism, unspecified type    4. Pre-diabetes      The patient's weight loss result since the last visit was successful based on weight loss overall.  Cost has become a factor with ozempic.  Discussed nutrition.  Discussed timing of nutrition.  I encouraged the patient to discuss her focus on metabolic health with her sisters as this does affect her finances.  I asked whether or not this patient is her own medical decision maker.  Up until now, I assume that she has.  She indicated that she is generally on her own with respect to healthcare but that her family helps manage her finances.  I drafted a letter to help her communicate with her sisters regarding use of Ozempic and the cost implications.  It sounds like she is in the Medicare donut hole which is why the cost has increased over the last month or so.  Nevertheless, I believe she has done quite well and that the medicine is helping her improve her metabolic health and avoid diabetes.

## 2022-10-31 ENCOUNTER — TELEPHONE (OUTPATIENT)
Dept: FAMILY MEDICINE | Facility: CLINIC | Age: 67
End: 2022-10-31

## 2022-10-31 DIAGNOSIS — E66.09 CLASS 1 OBESITY DUE TO EXCESS CALORIES WITH SERIOUS COMORBIDITY AND BODY MASS INDEX (BMI) OF 33.0 TO 33.9 IN ADULT: ICD-10-CM

## 2022-10-31 DIAGNOSIS — R73.03 PRE-DIABETES: Primary | ICD-10-CM

## 2022-10-31 DIAGNOSIS — E66.811 CLASS 1 OBESITY DUE TO EXCESS CALORIES WITH SERIOUS COMORBIDITY AND BODY MASS INDEX (BMI) OF 33.0 TO 33.9 IN ADULT: ICD-10-CM

## 2022-10-31 NOTE — TELEPHONE ENCOUNTER
Medication Question or Refill    Contacts       Type Contact Phone/Fax    10/31/2022 10:10 AM CDT Phone (Incoming) Genia Chinchillaara YIMI (Self) [phone] (H)          What medication are you calling about (include dose and sig)?:   Semaglutide, 1 MG/DOSE, 4 MG/3ML  Inject 1 mg Subcutaneous once a week    Who prescribed the medication?: Dr Enciso     Do you need a refill? No    When did you use the medication last? 10/24/22    Patient offered an appointment? 12/20/22    Do you have any questions or concerns?   Price went up from $134 to $217. Patient wondering if this medication is necessary if there are alternatives, patient doesn't like paying that much out of pocket for medications. Wondering what she can do and what Dr Enciso suggests.    Preferred Pharmacy:   [facility] - [place], [place] - [address] [place] [address]  Phone: [phone] Fax: [phone]      Okay to leave a detailed message?: Yes at Home number on file [phone] (home)

## 2022-11-09 RX ORDER — METFORMIN HCL 500 MG
500 TABLET, EXTENDED RELEASE 24 HR ORAL
Qty: 90 TABLET | Refills: 1 | Status: SHIPPED | OUTPATIENT
Start: 2022-11-09 | End: 2023-02-27

## 2022-11-09 NOTE — ASSESSMENT & PLAN NOTE
Metformin sent as alternative.  We could try a lower dose of Saxenda which may reduce the cost someone if metformin is ineffective.  Recommend clinic follow-up in approximately 2 months.

## 2022-11-09 NOTE — CONFIDENTIAL NOTE
Class 1 obesity due to excess calories with serious comorbidity and body mass index (BMI) of 33.0 to 33.9 in adult  Metformin sent as alternative.  We could try a lower dose of Saxenda which may reduce the cost someone if metformin is ineffective.  Recommend clinic follow-up in approximately 2 months.

## 2022-11-10 NOTE — TELEPHONE ENCOUNTER
Relayed info to patient, she voiced understanding she has the metformin and will be starting that. She has a follow-up for 12/20/22 already and said she would like to keep that one for her follow-up and no reschedule.

## 2022-11-10 NOTE — TELEPHONE ENCOUNTER
Left message to call back for: Grace  Information to relay to patient: see below and relay to patient schedule follow up 2 months

## 2022-11-30 DIAGNOSIS — E78.2 MIXED HYPERLIPIDEMIA: ICD-10-CM

## 2022-12-02 RX ORDER — ATORVASTATIN CALCIUM 80 MG/1
80 TABLET, FILM COATED ORAL DAILY
Qty: 90 TABLET | Refills: 3 | Status: SHIPPED | OUTPATIENT
Start: 2022-12-02 | End: 2023-08-03

## 2023-01-05 ENCOUNTER — OFFICE VISIT (OUTPATIENT)
Dept: FAMILY MEDICINE | Facility: CLINIC | Age: 68
End: 2023-01-05
Payer: COMMERCIAL

## 2023-01-05 VITALS
BODY MASS INDEX: 35.18 KG/M2 | HEIGHT: 62 IN | WEIGHT: 191.19 LBS | OXYGEN SATURATION: 97 % | TEMPERATURE: 98.6 F | HEART RATE: 72 BPM | SYSTOLIC BLOOD PRESSURE: 152 MMHG | RESPIRATION RATE: 16 BRPM | DIASTOLIC BLOOD PRESSURE: 60 MMHG

## 2023-01-05 DIAGNOSIS — R03.0 ELEVATED BP WITHOUT DIAGNOSIS OF HYPERTENSION: ICD-10-CM

## 2023-01-05 DIAGNOSIS — R73.03 PRE-DIABETES: ICD-10-CM

## 2023-01-05 DIAGNOSIS — E66.812 CLASS 2 SEVERE OBESITY DUE TO EXCESS CALORIES WITH SERIOUS COMORBIDITY AND BODY MASS INDEX (BMI) OF 35.0 TO 35.9 IN ADULT (H): Primary | ICD-10-CM

## 2023-01-05 DIAGNOSIS — F31.89 OTHER BIPOLAR DISORDER (H): ICD-10-CM

## 2023-01-05 DIAGNOSIS — E66.01 CLASS 2 SEVERE OBESITY DUE TO EXCESS CALORIES WITH SERIOUS COMORBIDITY AND BODY MASS INDEX (BMI) OF 35.0 TO 35.9 IN ADULT (H): Primary | ICD-10-CM

## 2023-01-05 DIAGNOSIS — G47.33 OBSTRUCTIVE SLEEP APNEA: ICD-10-CM

## 2023-01-05 DIAGNOSIS — E03.9 HYPOTHYROIDISM, UNSPECIFIED TYPE: ICD-10-CM

## 2023-01-05 PROCEDURE — 99213 OFFICE O/P EST LOW 20 MIN: CPT | Performed by: FAMILY MEDICINE

## 2023-01-05 ASSESSMENT — ENCOUNTER SYMPTOMS: CONSTITUTIONAL NEGATIVE: 1

## 2023-01-05 NOTE — PROGRESS NOTES
Problem List Items Addressed This Visit     Class 2 severe obesity due to excess calories with serious comorbidity and body mass index (BMI) of 35.0 to 35.9 in adult (H) - Primary     67 year old year old female in clinic today to discuss treatment of the following conditions through diet and lifestyle modification and weight loss:  1. Class 2 severe obesity due to excess calories with serious comorbidity and body mass index (BMI) of 35.0 to 35.9 in adult (H)    2. Elevated BP without diagnosis of hypertension    3. Hypothyroidism, unspecified type    4. Obstructive sleep apnea    5. Pre-diabetes    6. Other bipolar disorder (H)      The patient's weight loss result since the last visit was unsuccessful based on weight gain.  Her diet has slid back to grain based.  Struggling with mindless eating. Looking to get more protein in her diet.  Side effects from metformin. She did great on ozempic but this was DCed due to cost.  Agree with [facility], a program she has done before.  There is not a medication that can be prescribed that she can tolerate of afford.  Will focus on limiting her shopping to the foods that are on her nutrition plan.  Focus on protein.           Elevated BP without diagnosis of hypertension     BP elevated.  Recommended that she establish care and discuss BP.           Hypothyroidism    Obstructive sleep apnea    Other bipolar disorder (H)     Mood stable. No recent changes.           Pre-diabetes      Follow-up Visit   Expected date:  Apr 05, 2023 (Approximate)      Follow Up Appointment Details:     Follow-up with whom?: Me    Follow-Up for what?: Chronic Disease f/u    Chronic Disease f/u: General (Other)    Additional Details: Weight loss visit    How?: In Person    Is this an as-needed follow-up?: No                     Subjective   Grace is a 67 year old who presents for the following health issues   Chief Complaint   Patient presents with     Weight Loss     Follow-up        Patient  "presents for treatment of chronic, comorbid conditions using intensive therapeutic lifestyle interventions including nutrition, physical activity, and behavior management.   - successes: she is trying to listen to her body.     - struggles: \"not following plan.\"  She sometimes struggles to stay focused on a task.  \"It is hard for me to complete a task.\"   has bought foods that are not on her menu.  \"I eat just to eat.\"    - exercise plan: she expresses a desire to get back to exercise.    - tracking/journaling: no. Ad abrahan.   - hunger: She is not sure why she is eating.  \"I go to the fridge.    - medication benefits: unclear with meformin. side effects: she forgets to take the medication a couple of times a week.  Diarrhea is better today.  But has been problematic over the past few months    History of Present Illness       Reason for visit:  Weight loss follow-up    She eats 0-1 servings of fruits and vegetables daily.She consumes 1 sweetened beverage(s) daily.She exercises with enough effort to increase her heart rate 9 or less minutes per day.  She exercises with enough effort to increase her heart rate 3 or less days per week. She is missing 2 dose(s) of medications per week.  She is not taking prescribed medications regularly due to remembering to take.    Review of Systems   Constitutional: Negative.    All other systems reviewed and are negative.           Objective    BP (!) 152/60 (BP Location: Left arm, Patient Position: Sitting, Cuff Size: Adult Large)   Pulse 72   Temp 98.6  F (37  C) (Oral)   Resp 16   Ht 1.562 m (5' 1.5\")   Wt 86.7 kg (191 lb 3 oz)   LMP 09/13/2006   SpO2 97%   BMI 35.54 kg/m    Body mass index is 35.54 kg/m .  Physical Exam  Nursing note reviewed.   Constitutional:       General: She is not in acute distress.     Appearance: Normal appearance. She is not ill-appearing.   HENT:      Head: Normocephalic and atraumatic.   Eyes:      Extraocular Movements: Extraocular " movements intact.      Conjunctiva/sclera: Conjunctivae normal.   Pulmonary:      Effort: Pulmonary effort is normal.   Neurological:      Mental Status: She is alert and oriented to person, place, and time.   Psychiatric:         Attention and Perception: Attention normal.         Mood and Affect: Mood normal.         Speech: Speech normal.         Thought Content: Thought content normal.                This note has been dictated using voice recognition software. Any grammatical or context distortions are unintentional and inherent to the software

## 2023-01-05 NOTE — PATIENT INSTRUCTIONS
[email or web address]    ([facility])  [address]    [place]  [place]  [address]    Wed:  10:45/11:00 AM (Weigh In Time/Program Start Time)  Details:  Max Scale Weight: 300

## 2023-01-05 NOTE — ASSESSMENT & PLAN NOTE
67 year old year old female in clinic today to discuss treatment of the following conditions through diet and lifestyle modification and weight loss:  1. Class 2 severe obesity due to excess calories with serious comorbidity and body mass index (BMI) of 35.0 to 35.9 in adult (H)    2. Elevated BP without diagnosis of hypertension    3. Hypothyroidism, unspecified type    4. Obstructive sleep apnea    5. Pre-diabetes    6. Other bipolar disorder (H)      The patient's weight loss result since the last visit was unsuccessful based on weight gain.  Her diet has slid back to grain based.  Struggling with mindless eating. Looking to get more protein in her diet.  Side effects from metformin. She did great on ozempic but this was DCed due to cost.  Agree with [facility], a program she has done before.  There is not a medication that can be prescribed that she can tolerate of afford.  Will focus on limiting her shopping to the foods that are on her nutrition plan.  Focus on protein.

## 2023-01-12 ENCOUNTER — TELEPHONE (OUTPATIENT)
Dept: OPHTHALMOLOGY | Facility: CLINIC | Age: 68
End: 2023-01-12

## 2023-01-31 DIAGNOSIS — H35.372 EPIRETINAL MEMBRANE (ERM) OF LEFT EYE: Primary | ICD-10-CM

## 2023-02-01 ENCOUNTER — NURSE TRIAGE (OUTPATIENT)
Dept: NURSING | Facility: CLINIC | Age: 68
End: 2023-02-01
Payer: COMMERCIAL

## 2023-02-01 NOTE — TELEPHONE ENCOUNTER
"Patient reporting right foot \"arch\" pain starting 1-2 weeks ago.    Reporting pain from pinky toe across foot.     Pain increases when pulling/flexing toes back.    Denies swelling, redness, numbness or tingling.    Foot is tender to the touch.    Afebrile.       0-10 rating for pain at rest pain resolves with flexing pain is a  \"3.\"    Disposition to see provider with in 2 weeks.    Transferred to Central Scheduling.    Celsa Case RN  [facility]      Reason for Disposition    MILD pain (e.g., does not interfere with normal activities) and present > 7 days    Additional Information    Negative: Followed an ankle or foot injury    Negative: Toe pain is main symptom    Negative: Ankle pain is main symptom    Negative: Entire foot is cool or blue in comparison to other foot    Negative: Purple or black skin on foot or toe    Negative: Red area or streak and fever    Negative: Swollen foot and fever    Negative: Patient sounds very sick or weak to the triager    Negative: SEVERE pain (e.g., excruciating, unable to do any normal activities)    Negative: Looks like a boil, infected sore, deep ulcer, or other infected rash (spreading redness, pus)    Negative: Swollen foot  (Exceptions: Localized bump from bunions, calluses, insect bite, sting.)    Negative: Weakness (i.e., loss of strength) of new-onset in foot or toes (Exceptions: Not truly weak, foot feels weak because of pain; weakness present > 2 weeks.)    Negative: Numbness (i.e., loss of sensation) in foot or toes (Exception: Just tingling; numbness present > 2 weeks.)    Negative: MODERATE pain (e.g., interferes with normal activities, limping) and present > 3 days    Negative: Weakness or numbness in foot or toes and present > 2 weeks    Negative: Tingling in feet and new or increased    Negative: Patient wants to be seen    Negative: Pain in the big toe joint    Protocols used: FOOT PAIN-A-OH      "

## 2023-02-08 ENCOUNTER — HOSPITAL ENCOUNTER (OUTPATIENT)
Dept: GENERAL RADIOLOGY | Facility: HOSPITAL | Age: 68
Discharge: HOME | End: 2023-02-08
Attending: NURSE PRACTITIONER | Admitting: NURSE PRACTITIONER
Payer: COMMERCIAL

## 2023-02-08 ENCOUNTER — OFFICE VISIT (OUTPATIENT)
Dept: INTERNAL MEDICINE | Facility: CLINIC | Age: 68
End: 2023-02-08
Payer: COMMERCIAL

## 2023-02-08 VITALS
HEART RATE: 76 BPM | DIASTOLIC BLOOD PRESSURE: 72 MMHG | HEIGHT: 62 IN | BODY MASS INDEX: 35.7 KG/M2 | RESPIRATION RATE: 18 BRPM | SYSTOLIC BLOOD PRESSURE: 142 MMHG | WEIGHT: 194 LBS

## 2023-02-08 DIAGNOSIS — M79.671 RIGHT FOOT PAIN: Primary | ICD-10-CM

## 2023-02-08 DIAGNOSIS — M79.671 RIGHT FOOT PAIN: ICD-10-CM

## 2023-02-08 LAB
CRP SERPL-MCNC: <3 MG/L
URATE SERPL-MCNC: 5.1 MG/DL (ref 2.4–5.7)

## 2023-02-08 PROCEDURE — 84550 ASSAY OF BLOOD/URIC ACID: CPT | Performed by: NURSE PRACTITIONER

## 2023-02-08 PROCEDURE — 73630 X-RAY EXAM OF FOOT: CPT | Mod: RT

## 2023-02-08 PROCEDURE — 86140 C-REACTIVE PROTEIN: CPT | Performed by: NURSE PRACTITIONER

## 2023-02-08 PROCEDURE — 99213 OFFICE O/P EST LOW 20 MIN: CPT | Performed by: NURSE PRACTITIONER

## 2023-02-08 PROCEDURE — 36415 COLL VENOUS BLD VENIPUNCTURE: CPT | Performed by: NURSE PRACTITIONER

## 2023-02-08 ASSESSMENT — PAIN SCALES - GENERAL: PAINLEVEL: MODERATE PAIN (4)

## 2023-02-08 NOTE — PROGRESS NOTES
"  Assessment & Plan   Problem List Items Addressed This Visit    None  Visit Diagnoses     Right foot pain    -  Primary    Relevant Orders    CRP, inflammation    Uric acid    XR Foot Right G/E 3 Views       will await results and treat accordingly      BMI:   Estimated body mass index is 36.06 kg/m  as calculated from the following:    Height as of this encounter: 1.562 m (5' 1.5\").    Weight as of this encounter: 88 kg (194 lb).           No follow-ups on file.    Angelita Molina NP  [facility] MARIN Edwards is a 67 year old, presenting for the following health issues:  Foot Pain (Right foot pain)      HPI     Pain History:  When did you first notice your pain? - Acute Pain   Have you seen anyone else for your pain? No  Where in your body do you have pain? Musculoskeletal problem/pain  Onset/Duration: started about 2-3 weeks ago  Description  Location: foot - right  Joint Swelling: YES  Redness: No  Pain: YES  Warmth: No  Intensity:  4/10  Progression of Symptoms:  Intermittent, staying the same  Accompanying signs and symptoms:   Fevers: No  Numbness/tingling/weakness: No  History  Trauma to the area: No  Recent illness:  No  Previous similar problem: No  Previous evaluation:  No  Precipitating or alleviating factors:  Aggravating factors include: walking  Therapies tried and outcome: acetaminophen          Review of Systems   Unremarkable other than listed above and below         Objective    LMP 09/13/2006   There is no height or weight on file to calculate BMI.  Physical Exam   GENERAL: healthy, alert and no distress  EYES: Eyes grossly normal to inspection conjunctivae and sclerae normal  RESP: respirations regular nonlabored   MS: no gross musculoskeletal defects noted, pain upon palpation dorsal aspect right foot without erythema edema or ecchymosis noted strength is equal throughout pulses intact  PSYCH: mentation appears normal, affect normal/bright                    "

## 2023-02-09 ENCOUNTER — TELEPHONE (OUTPATIENT)
Dept: INTERNAL MEDICINE | Facility: CLINIC | Age: 68
End: 2023-02-09
Payer: COMMERCIAL

## 2023-02-09 RX ORDER — METHYLPREDNISOLONE 4 MG
TABLET, DOSE PACK ORAL
Qty: 21 TABLET | Refills: 0 | Status: SHIPPED | OUTPATIENT
Start: 2023-02-09 | End: 2023-02-27

## 2023-02-10 NOTE — TELEPHONE ENCOUNTER
LMTCB if patient calls back please relay below message.    She already has an est care appointment scheduled.

## 2023-02-10 NOTE — TELEPHONE ENCOUNTER
----- Message from Angelita Molina NP sent at 2/9/2023  2:09 PM CST -----  Please call patient. Negative imaging. Labs relatively unremarkable . Recommend treatment with medrol dose pack sent to pharmacy and schedule a follow up with primary in 2 weeks. Patient will need an est care appt.

## 2023-02-14 ENCOUNTER — OFFICE VISIT (OUTPATIENT)
Dept: OPHTHALMOLOGY | Facility: CLINIC | Age: 68
End: 2023-02-14
Attending: OPHTHALMOLOGY
Payer: COMMERCIAL

## 2023-02-14 DIAGNOSIS — H35.372 EPIRETINAL MEMBRANE (ERM) OF LEFT EYE: ICD-10-CM

## 2023-02-14 PROCEDURE — 92134 CPTRZ OPH DX IMG PST SGM RTA: CPT | Performed by: OPHTHALMOLOGY

## 2023-02-14 PROCEDURE — 99213 OFFICE O/P EST LOW 20 MIN: CPT | Mod: GC | Performed by: OPHTHALMOLOGY

## 2023-02-14 PROCEDURE — G0463 HOSPITAL OUTPT CLINIC VISIT: HCPCS | Mod: 25

## 2023-02-14 ASSESSMENT — CONF VISUAL FIELD
OS_SUPERIOR_TEMPORAL_RESTRICTION: 3
OD_SUPERIOR_NASAL_RESTRICTION: 0
OD_INFERIOR_NASAL_RESTRICTION: 0
OS_INFERIOR_TEMPORAL_RESTRICTION: 3
OD_INFERIOR_TEMPORAL_RESTRICTION: 0
METHOD: COUNTING FINGERS
OS_INFERIOR_NASAL_RESTRICTION: 0
OS_SUPERIOR_NASAL_RESTRICTION: 0
OD_NORMAL: 1
OD_SUPERIOR_TEMPORAL_RESTRICTION: 0

## 2023-02-14 ASSESSMENT — VISUAL ACUITY
OD_CC+: -1
METHOD: SNELLEN - LINEAR
CORRECTION_TYPE: GLASSES
OD_CC: 20/25
OS_CC: 20/100

## 2023-02-14 ASSESSMENT — SLIT LAMP EXAM - LIDS
COMMENTS: NORMAL
COMMENTS: NORMAL

## 2023-02-14 ASSESSMENT — TONOMETRY
IOP_METHOD: TONOPEN
OD_IOP_MMHG: 12
OS_IOP_MMHG: 20

## 2023-02-14 ASSESSMENT — REFRACTION_WEARINGRX
OS_AXIS: 010
OS_SPHERE: -1.75
OS_CYLINDER: +2.75
SPECS_TYPE: PAL
OS_ADD: +2.50
OD_ADD: +2.50
OD_CYLINDER: +2.50
OD_AXIS: 147
OD_SPHERE: -2.50

## 2023-02-14 ASSESSMENT — EXTERNAL EXAM - RIGHT EYE: OD_EXAM: NORMAL

## 2023-02-14 ASSESSMENT — EXTERNAL EXAM - LEFT EYE: OS_EXAM: NORMAL

## 2023-02-14 ASSESSMENT — CUP TO DISC RATIO
OD_RATIO: 0.3
OS_RATIO: 0.3

## 2023-02-14 NOTE — NURSING NOTE
Chief Complaints and History of Present Illnesses   Patient presents with     Epiretinal Membrane Follow Up     4 month follow-up for ERM left eye      Chief Complaint(s) and History of Present Illness(es)     Epiretinal Membrane Follow Up            Laterality: left eye    Associated symptoms: tearing.  Negative for eye pain, redness and flashes    Treatments tried: no treatments    Pain scale: 0/10    Comments: 4 month follow-up for ERM left eye           Comments    Pt states vision has been stable since their last visit. No pain, redness.  No flashes, but long term existing floaters in each eye. She also mentioned she sees shapes and colors with her eyes closed. Soreness and tearing in each eye when tired.  Pt denies using any eyedrops.    Heriberto Oates 9:59 AM February 14, 2023

## 2023-02-14 NOTE — PROGRESS NOTES
CC -   ERM OS    INTERVAL HISTORY - Stable vision with no new ocular symptoms.    PMH -   Grace Chinchilla is a  67 year old year-old patient with history of ERM OS.  Noted 9/2022, uncertain when GELACIO prior was, likely 7-8 years per patient. Had not noticed vision change OS       PAST OCULAR SURGERY  None      RETINAL IMAGING:  OCT  02/14/23  OD - retina normal, PVD  OS - severe ERM with CME, PVD - slight progression in disruption of fovea      ASSESSMENT & PLAN    # ERM OS   - noted 9/2022 uncertain onset   - likely cause of 20/100 vision OS   - advise PPV/MS      - r/b/a d/w patient: vision loss, blindness, infection, bleeding   - retinal detachment, need for more surgeries, need for gas or oil bubble and bubble restrictions   - cataract, diplopia, refractive change   - persistent blurriness, distortion, or scotoma   - participation/performance by fellow or resident      - has severe anxiety on clonazepam may need general anesthesia   - patient will consider (2/2023)    # PVD OU   - advised S/Sx RD 02/14/23      # NS OU   - early VS      return to clinic: 6  months if no surgery, DFE OU, OCT OU    Benitez Quinones MD MPH  Vitreoretinal Fellow PGY-5  [facility]     ATTESTATION     Attending Attestation:     Complete documentation of historical and exam elements from today's encounter can be found in the full encounter summary report (not reduplicated in this progress note).  I personally obtained the chief complaint(s) and history of present illness.  I confirmed and edited as necessary the review of systems, past medical/surgical history, family history, social history, and examination findings as documented by others; and I examined the patient myself.  I personally reviewed the relevant tests, images, and reports as documented above.  I personally reviewed the ophthalmic test(s) associated with this encounter, agree with the interpretation(s) as documented by the resident/fellow, and have edited the  corresponding report(s) as necessary.   I formulated and edited as necessary the assessment and plan and discussed the findings and management plan with the patient and family    Jessica Motlye MD, PhD  , Vitreoretinal Surgery  Department of Ophthalmology  [facility]

## 2023-02-27 ENCOUNTER — VIRTUAL VISIT (OUTPATIENT)
Dept: FAMILY MEDICINE | Facility: CLINIC | Age: 68
End: 2023-02-27
Payer: COMMERCIAL

## 2023-02-27 DIAGNOSIS — U07.1 INFECTION DUE TO 2019 NOVEL CORONAVIRUS: Primary | ICD-10-CM

## 2023-02-27 PROCEDURE — 99441 PR PHYSICIAN TELEPHONE EVALUATION 5-10 MIN: CPT | Mod: 95 | Performed by: FAMILY MEDICINE

## 2023-02-27 ASSESSMENT — PATIENT HEALTH QUESTIONNAIRE - PHQ9: SUM OF ALL RESPONSES TO PHQ QUESTIONS 1-9: 7

## 2023-02-27 NOTE — PROGRESS NOTES
"Grace is a 67 year old who is being evaluated via a billable telephone visit.      What phone number would you like to be contacted at? [phone]  How would you like to obtain your AVS? Edis    Distant Location (provider location):  On-site    Assessment & Plan     Infection due to 2019 novel coronavirus  She is interested in Paxlovid therapy.  She does meet criteria for treatment.  Renal function is normal.  Discussed that she should hold atorvastatin and clonazepam while taking Paxlovid and restart 3 days after she completes treatment.  Counseled her on use of medication and side effects.  Continue other symptomatic cares.  Follow-up if symptoms not improving, worsening of symptoms or new concerns develop.  - nirmatrelvir and ritonavir (PAXLOVID) therapy pack  Dispense: 30 tablet; Refill: 0               BMI:   Estimated body mass index is 36.06 kg/m  as calculated from the following:    Height as of 2/8/23: 1.562 m (5' 1.5\").    Weight as of 2/8/23: 88 kg (194 lb).           No follow-ups on file.    Shaunna Robertson MD  [facility]   Grace is a 67 year old, presenting for the following health issues:  covid treatment (Covid positive on 2-26-23. Head stuffed off, tired, sleepy, coughing, raspy voice, sneezing and runny nose. Scratchy throat)      HPI   She is evaluated via telephone to discuss COVID treatment.  She began experiencing symptoms of COVID on 2/25/2023.  Tested positive at home yesterday.  She has had head congestion, fatigue, cough, sneezing, rhinorrhea as well as sore and scratchy throat.  No fevers.  No shortness of breath or difficulty breathing.  She is eating and drinking okay.  Has been taking Tylenol and cough syrup for symptom relief.  She is fully vaccinated for COVID.  She does have risk factors including age over 65 and obesity.  Reviewed her current medications and allergies.  She is on atorvastatin.  She is also prescribed clonazepam but does " not take that regularly.  She has no other concerns or questions        Review of Systems         Objective           Vitals:  No vitals were obtained today due to virtual visit.    Physical Exam   , alert and no distress  PSYCH: Alert and oriented times 3; coherent speech, normal   rate and volume, able to articulate logical thoughts, able   to abstract reason, no tangential thoughts, no hallucinations   or delusions  Her affect is normal  RESP: No cough, no audible wheezing, able to talk in full sentences  Remainder of exam unable to be completed due to telephone visits                Phone call duration: 6 minutes

## 2023-03-13 ENCOUNTER — NURSE TRIAGE (OUTPATIENT)
Dept: FAMILY MEDICINE | Facility: CLINIC | Age: 68
End: 2023-03-13
Payer: COMMERCIAL

## 2023-03-13 NOTE — TELEPHONE ENCOUNTER
"Patient calling in     She had covid end of Feb, tested negative this week.     Starting noticing shortness of breath on Friday 3/10 and each day has gotten worse.    The shortness of breath progresses as it gets later in the day.     She has sleep apnea, wears a CPAP at night and when napping.    Does not have a pulse oximeter.     Chest pain is also present in between her breasts on the sternum. Rates this pain 7/10.     I advised she go to the emergency department now and have someone drive her. She says no, she cannot do that.    RN informed of risks due to symptoms and let her know she can make her own decision, but this is what I recommend. Patient says \"I appreciate you trying to help but I just can't do that. I can't okay.\"    RN did inform to call 911 if she gets worse and/or changes her mind and doesn't have a ride to ER. Patient said she will think about this.    Reason for Disposition    MODERATE difficulty breathing (e.g., speaks in phrases, SOB even at rest, pulse 100-120) of new-onset or worse than normal    Additional Information    Negative: SEVERE difficulty breathing (e.g., struggling for each breath, speaks in single words, pulse > 120)    Negative: Breathing stopped and hasn't returned    Negative: Choking on something    Negative: Bluish (or gray) lips or face    Negative: Difficult to awaken or acting confused (e.g., disoriented, slurred speech)    Negative: Passed out (i.e., fainted, collapsed and was not responding)    Negative: Wheezing started suddenly after medicine, an allergic food, or bee sting    Negative: Stridor    Negative: Slow, shallow and weak breathing    Negative: Sounds like a life-threatening emergency to the triager    Negative: Chest pain    Negative: Wheezing (high pitched whistling sound) and previous asthma attacks or use of asthma medicines    Negative: Difficulty breathing and within 14 days of COVID-19 Exposure    Negative: Difficulty breathing and only present when " coughing    Negative: Difficulty breathing and only from stuffy nose    Negative: Difficulty breathing and only from stuffy nose or runny nose from common cold    Protocols used: BREATHING DIFFICULTY-A-[place]    VAN BailonN RN  [facility]

## 2023-03-14 ENCOUNTER — TELEPHONE (OUTPATIENT)
Dept: NURSING | Facility: CLINIC | Age: 68
End: 2023-03-14
Payer: COMMERCIAL

## 2023-03-14 NOTE — TELEPHONE ENCOUNTER
Pt calling with insurance question.    Advised Pt she could call [facility] cost of care phone or number on back of her insurance card.    Pt verbalized understanding and agrees with this plan.    Rachel Ying RN, Nurse Advisor 12:33 PM 3/14/2023

## 2023-03-15 ENCOUNTER — NURSE TRIAGE (OUTPATIENT)
Dept: FAMILY MEDICINE | Facility: CLINIC | Age: 68
End: 2023-03-15

## 2023-03-15 NOTE — TELEPHONE ENCOUNTER
"Diana --  The recommendation to the RNs for this quick turn around of covid is to do a virtual appointment with PCP.   As you can see below, the patient did test negative in between bouts of covid. But the patient states that she never really felt like she returned to normal in between bouts of covid.     1. COVID-19 DIAGNOSIS: \"Who made your COVID-19 diagnosis?\" \"Was it confirmed by a positive lab test or self-test?\" If not diagnosed by a doctor (or NP/PA), ask \"Are there lots of cases (community spread) where you live?\" Note: See public health department website, if unsure.      Home test.   2. COVID-19 EXPOSURE: \"Was there any known exposure to COVID before the symptoms began?\" CDC Definition of close contact: within 6 feet (2 meters) for a total of 15 minutes or more over a 24-hour period.         3. ONSET: \"When did the COVID-19 symptoms start?\"        2/24 -- symptom onset for round 1  2/26 -- tested positive  2/26 -- Paxlovid started  3/5 -- tested negative (but still having some symptoms)    3/13 -- symptom onset for round 2  3/14 -- tested positive for round 2    4. WORST SYMPTOM: \"What is your worst symptom?\" (e.g., cough, fever, shortness of breath, muscle aches)      Chest discomfort, difficulty breathing, tightness in chest. Pain in chest when walking around or with any exertion. Sitting - no pain.   5. COUGH: \"Do you have a cough?\" If Yes, ask: \"How bad is the cough?\"        Slight cough  6. FEVER: \"Do you have a fever?\" If Yes, ask: \"What is your temperature, how was it measured, and when did it start?\"      No.   7. RESPIRATORY STATUS: \"Describe your breathing?\" (e.g., shortness of breath, wheezing, unable to speak)       No.  8. BETTER-SAME-WORSE: \"Are you getting better, staying the same or getting worse compared to yesterday?\"  If getting worse, ask, \"In what way?\"      Same. Tired and discomfort  9. HIGH RISK DISEASE: \"Do you have any chronic medical problems?\" (e.g., asthma, heart or lung " "disease, weak immune system, obesity, etc.)      High cholesterol. Depression. Anxiety. ADD.   10. VACCINE: \"Have you had the COVID-19 vaccine?\" If Yes, ask: \"Which one, how many shots, when did you get it?\"        Yes.   11. BOOSTER: \"Have you received your COVID-19 booster?\" If Yes, ask: \"Which one and when did you get it?\"        Moderna. 3 boosters  12. PREGNANCY: \"Is there any chance you are pregnant?\" \"When was your last menstrual period?\"        NA  13. OTHER SYMPTOMS: \"Do you have any other symptoms?\"  (e.g., chills, fatigue, headache, loss of smell or taste, muscle pain, sore throat)        Fatigue. Chills. Chest pain/tenderness. Loss of smell/taste. Can't quite take a deep breath -- shaky.  Diarrhea on and off.   14. O2 SATURATION MONITOR:  \"Do you use an oxygen saturation monitor (pulse oximeter) at home?\" If Yes, ask \"What is your reading (oxygen level) today?\" \"What is your usual oxygen saturation reading?\" (e.g., 95%)        NA      Reason for Disposition    [1] COVID-19 diagnosed by positive lab test (e.g., PCR, rapid self-test kit) AND [2] mild symptoms (e.g., cough, fever, others) AND [3] no complications or SOB    Additional Information    Negative: SEVERE difficulty breathing (e.g., struggling for each breath, speaks in single words)    Negative: Difficult to awaken or acting confused (e.g., disoriented, slurred speech)    Negative: Bluish (or gray) lips or face now    Negative: Shock suspected (e.g., cold/pale/clammy skin, too weak to stand, low BP, rapid pulse)    Negative: Sounds like a life-threatening emergency to the triager    Negative: [1] Diagnosed or suspected COVID-19 AND [2] symptoms lasting 3 or more weeks    Negative: [1] COVID-19 exposure AND [2] no symptoms    Negative: COVID-19 vaccine reaction suspected (e.g., fever, headache, muscle aches) occurring 1 to 3 days after getting vaccine    Negative: COVID-19 vaccine, questions about    Negative: [1] Lives with someone known to have " influenza (flu test positive) AND [2] flu-like symptoms (e.g., cough, runny nose, sore throat, SOB; with or without fever)    Negative: [1] Adult with possible COVID-19 symptoms AND [2] triager concerned about severity of symptoms or other causes    Negative: COVID-19 and breastfeeding, questions about    Negative: Chest pain or pressure  (Exception: MILD central chest pain, present only when coughing)    Negative: Patient sounds very sick or weak to the triager    Negative: MILD difficulty breathing (e.g., minimal/no SOB at rest, SOB with walking, pulse <100)    Negative: Fever > 103 F (39.4 C)    Negative: [1] Fever > 101 F (38.3 C) AND [2] over 60 years of age    Negative: [1] Fever > 100.0 F (37.8 C) AND [2] bedridden (e.g., nursing home patient, CVA, chronic illness, recovering from surgery)    Negative: [1] HIGH RISK for severe COVID complications (e.g., weak immune system, age > 64 years, obesity with BMI of 30 or higher, pregnant, chronic lung disease or other chronic medical condition) AND [2] COVID symptoms (e.g., cough, fever)  (Exceptions: Already seen by PCP and no new or worsening symptoms.)    Negative: [1] HIGH RISK patient AND [2] influenza is widespread in the community AND [3] ONE OR MORE respiratory symptoms: cough, sore throat, runny or stuffy nose    Negative: [1] HIGH RISK patient AND [2] influenza exposure within the last 7 days AND [3] ONE OR MORE respiratory symptoms: cough, sore throat, runny or stuffy nose    Negative: Oxygen level (e.g., pulse oximetry) 91 to 94 percent    Negative: [1] COVID-19 infection suspected by caller or triager AND [2] mild symptoms (cough, fever, or others) AND [3] negative COVID-19 rapid test    Negative: Fever present > 3 days (72 hours)    Negative: [1] Fever returns after gone for over 24 hours AND [2] symptoms worse or not improved    Negative: [1] Continuous (nonstop) coughing interferes with work or school AND [2] no improvement using cough treatment per  Care Advice    Negative: Cough present > 3 weeks    Protocols used: CORONAVIRUS (COVID-19) DIAGNOSED OR [id number]      VAN DurhamN RN  [facility]

## 2023-03-16 ENCOUNTER — VIRTUAL VISIT (OUTPATIENT)
Dept: FAMILY MEDICINE | Facility: CLINIC | Age: 68
End: 2023-03-16
Payer: COMMERCIAL

## 2023-03-16 DIAGNOSIS — R53.81 PHYSICAL DECONDITIONING: Primary | ICD-10-CM

## 2023-03-16 DIAGNOSIS — G93.31 POSTVIRAL FATIGUE SYNDROME: ICD-10-CM

## 2023-03-16 DIAGNOSIS — Z86.16 HISTORY OF 2019 NOVEL CORONAVIRUS DISEASE (COVID-19): ICD-10-CM

## 2023-03-16 DIAGNOSIS — R06.09 DYSPNEA ON EXERTION: ICD-10-CM

## 2023-03-16 PROCEDURE — 99214 OFFICE O/P EST MOD 30 MIN: CPT | Mod: 95 | Performed by: FAMILY MEDICINE

## 2023-03-16 NOTE — PROGRESS NOTES
"Grace is a 67 year old who is being evaluated via a billable telephone visit.          Distant Location (provider location):  Off-site    Assessment & Plan     Physical deconditioning  Dyspnea on exertion  Postviral fatigue syndrome  History of 2019 novel coronavirus disease (COVID-19)  -Treated for covid with paxlovid 2/26. Fatigued after recovery. Was sick again with URI symptoms but symptoms resolved.  -Possible covid rebound  -Currently feeling fatigue and mild dyspnea with ADLs. Having been home last month with little other activity in addition to illness like experiencing physical deconditioning  -Discussed home PT, pt amendable to this. If home PT unavailable she will try to get her sister to take her to PT at [place]  - Home Care Referral    30 minutes spent on the date of the encounter doing chart review, history and exam, documentation and further activities per the note    DO DEANA [facility]    Subjective   Grace is a 67 year old, presenting for the following health issues:  Covid Concern      HPI   Hx of HLD, hypothyroidism, obesity, prediabetes.  Had covid in Feb, started on paxlovid then. Took Paxlovid without any issues. Acute illness symptoms resolved. Has had fatigue and \"chest tenderness\" after doing ADLs. Tenderness seems to be coming from breast bone. States able to preform ADLs but is tired after and this has not improved.  Denies fever, chills, cough, congestion.    Had a \"head cold\" shortly after her covid infection, states this resolved. Tested negative for covid on 3/5. Retested herself on 3/14 and was positive, did not have any symptoms.    States she has been at home for the last month. Usually active and gets out of her home.             Review of Systems         Objective           Vitals:  No vitals were obtained today due to virtual visit.    Physical Exam   healthy, alert and no distress  PSYCH: Alert and oriented times 3; coherent " speech, normal   rate and volume, able to articulate logical thoughts, able   to abstract reason, no tangential thoughts, no hallucinations   or delusions  Her affect is normal  RESP: No cough, no audible wheezing, able to talk in full sentences  Remainder of exam unable to be completed due to telephone visits                Phone call duration: 15 minutes

## 2023-03-31 ENCOUNTER — THERAPY VISIT (OUTPATIENT)
Dept: PHYSICAL THERAPY | Facility: CLINIC | Age: 68
End: 2023-03-31
Payer: COMMERCIAL

## 2023-03-31 ENCOUNTER — OFFICE VISIT (OUTPATIENT)
Dept: FAMILY MEDICINE | Facility: CLINIC | Age: 68
End: 2023-03-31
Payer: COMMERCIAL

## 2023-03-31 VITALS
HEIGHT: 61 IN | TEMPERATURE: 97.8 F | WEIGHT: 197 LBS | OXYGEN SATURATION: 98 % | SYSTOLIC BLOOD PRESSURE: 149 MMHG | RESPIRATION RATE: 15 BRPM | BODY MASS INDEX: 37.19 KG/M2 | HEART RATE: 78 BPM | DIASTOLIC BLOOD PRESSURE: 76 MMHG

## 2023-03-31 DIAGNOSIS — R06.09 DYSPNEA ON EXERTION: ICD-10-CM

## 2023-03-31 DIAGNOSIS — Z76.89 ENCOUNTER TO ESTABLISH CARE: Primary | ICD-10-CM

## 2023-03-31 DIAGNOSIS — M25.562 ACUTE PAIN OF BOTH KNEES: ICD-10-CM

## 2023-03-31 DIAGNOSIS — G93.31 POSTVIRAL FATIGUE SYNDROME: ICD-10-CM

## 2023-03-31 DIAGNOSIS — I10 BENIGN ESSENTIAL HYPERTENSION: ICD-10-CM

## 2023-03-31 DIAGNOSIS — M25.561 ACUTE PAIN OF BOTH KNEES: ICD-10-CM

## 2023-03-31 DIAGNOSIS — R53.81 PHYSICAL DECONDITIONING: ICD-10-CM

## 2023-03-31 DIAGNOSIS — Z86.16 HISTORY OF 2019 NOVEL CORONAVIRUS DISEASE (COVID-19): ICD-10-CM

## 2023-03-31 PROCEDURE — 97110 THERAPEUTIC EXERCISES: CPT | Mod: GP | Performed by: PHYSICAL THERAPIST

## 2023-03-31 PROCEDURE — 97161 PT EVAL LOW COMPLEX 20 MIN: CPT | Mod: GP | Performed by: PHYSICAL THERAPIST

## 2023-03-31 PROCEDURE — 99214 OFFICE O/P EST MOD 30 MIN: CPT | Performed by: NURSE PRACTITIONER

## 2023-03-31 RX ORDER — LOSARTAN POTASSIUM 25 MG/1
25 TABLET ORAL DAILY
Qty: 90 TABLET | Refills: 1 | Status: SHIPPED | OUTPATIENT
Start: 2023-03-31 | End: 2023-08-03

## 2023-03-31 ASSESSMENT — PAIN SCALES - GENERAL: PAINLEVEL: NO PAIN (0)

## 2023-03-31 ASSESSMENT — PATIENT HEALTH QUESTIONNAIRE - PHQ9: SUM OF ALL RESPONSES TO PHQ QUESTIONS 1-9: 16

## 2023-03-31 NOTE — PROGRESS NOTES
[facility]    OUTPATIENT Physical Therapy ORTHOPEDIC EVALUATION  PLAN OF TREATMENT FOR OUTPATIENT REHABILITATION  (COMPLETE FOR INITIAL CLAIMS ONLY)  Patient's Last Name, First Name, M.I.  YOB: 1955  Grace Chinchilla    Provider s Name:  DEANA [facility]   Medical Record No.  [id number]   Start of Care Date:  03/31/23   Onset Date:   03/16/23   Treatment Diagnosis:  B knee pain/general deconditioning Medical Diagnosis:     Physical deconditioning  Dyspnea on exertion  History of 2019 novel coronavirus disease (COVID-19)  Postviral fatigue syndrome       Goals:     03/31/23 0500   Body Part   Goals listed below are for B knee pain   Goal #1   Goal #1 stairs   Current Functional Level Ascend/descend stairs;one step at a time   Performance Level pain 4/10   STG [employer] Performance Ascend stairs;one step at a time   Performance Level pain free   Rationale for safe community ambulaton   Due Date 04/28/23   LTG [employer] Performance Ascend/descend stairs;one step at a time   Performance Level pain free   Rationale for safe community ambulaton   Due Date 05/26/23         Therapy Frequency:  1x/wk  Predicted Duration of Therapy Intervention:  6 wks then 2x/month for 1 month    Serafin Woo, PT                 I CERTIFY THE NEED FOR THESE SERVICES FURNISHED UNDER        THIS PLAN OF TREATMENT AND WHILE UNDER MY CARE     (Physician attestation of this document indicates review and certification of the therapy plan).                     Certification Date From:  03/31/23   Certification Date To:  06/19/23    Referring Provider:  Diego Rivera    Initial Assessment        See Epic Evaluation SOC Date: 03/31/23

## 2023-03-31 NOTE — PROGRESS NOTES
"  Assessment & Plan     Encounter to establish care  Reviewed medical/social/family history and health maintenance    Benign essential hypertension  Elevated on multiple occasions while in clinic.  She is agreeable to starting losartan today.  We will plan to follow-up in 6 weeks when she returns for her annual wellness visit.  Plan to check labs at that time.  - losartan (COZAAR) 25 MG tablet; Take 1 tablet (25 mg) by mouth daily    Prescription drug management  30 minutes spent by me on the date of the encounter doing chart review, history and exam, documentation and further activities per the note       BMI:   Estimated body mass index is 37.22 kg/m  as calculated from the following:    Height as of this encounter: 1.549 m (5' 1\").    Weight as of this encounter: 89.4 kg (197 lb).   Weight management plan: Discussed healthy diet and exercise guidelines        ZACH Melara CNP  [facility]    Melita Edwards is a 67 year old, presenting for the following health issues:  Establish Care (Est Care ) and Weight Problem (Weight issues )    Additional Questions 3/31/2023   Roomed by Diana Mason   Accompanied by -     History of Present Illness       Reason for visit:  Wellness checlk    She eats 0-1 servings of fruits and vegetables daily.She consumes 1 sweetened beverage(s) daily.She exercises with enough effort to increase her heart rate 9 or less minutes per day.  She exercises with enough effort to increase her heart rate 3 or less days per week.   She is taking medications regularly.       Establishing care, previous patient of Cuca Fong CNP  Was on Ozempic, got to expensive.  Gained back a lot of weight.   Has tried weight programs.      BP Readings from Last 3 Encounters:   03/31/23 (!) 149/76   02/08/23 (!) 142/72   01/05/23 (!) 152/60             Review of Systems   Constitutional, HEENT, cardiovascular, pulmonary, gi and gu systems are negative, except as otherwise " "noted.      Objective    BP (!) 149/76 (BP Location: Right arm, Patient Position: Sitting, Cuff Size: Adult Large)   Pulse 78   Temp 97.8  F (36.6  C) (Temporal)   Resp 15   Ht 1.549 m (5' 1\")   Wt 89.4 kg (197 lb)   LMP 09/13/2006   SpO2 98%   BMI 37.22 kg/m    Body mass index is 37.22 kg/m .  Physical Exam   GENERAL: healthy, alert and no distress  NECK: no adenopathy, no asymmetry, masses, or scars and thyroid normal to palpation  RESP: lungs clear to auscultation - no rales, rhonchi or wheezes  CV: regular rate and rhythm, normal S1 S2, no S3 or S4, no murmur, click or rub, no peripheral edema and peripheral pulses strong  ABDOMEN: soft, nontender, no hepatosplenomegaly, no masses and bowel sounds normal  MS: no gross musculoskeletal defects noted, no edema  PSYCH: mentation appears normal, tangential and judgement and insight intact                    "

## 2023-03-31 NOTE — PROGRESS NOTES
Physical Therapy Initial Evaluation  Subjective:    Therapist Generated HPI Evaluation  Problem details: Pt presents to PT 3/31/23 with chief complaint of general deconditioning as well as B knee pain after falling off chair onto B knees ~2 wks ago at home. Pt reports difficulty with stairs, standing, prolonged walking and transfers. .     Grace Chinchilla is a 67 year old female with deconditioning and weakness condition which occurred with .   This is a new condition.  Where condition occurred: at home.        Patient reports pain:  Right knee and left knee. Pain is described as aching (stiff) and is intermittent.  Pain is worse during the day.  Associated symptoms:  Locking, loss of balance and painful arc.  Symptoms are exacerbated by bending/squatting, descending stairs, ascending stairs, kneeling and walking  and relieved by rest, ice and NSAID's.                      Since onset symptoms are gradually worsening.     There was mild improvement following previous treatment.       Home/work barriers: pt admits she is slightly overweight.      Patient Health History         Pain is reported as 4/10 on pain scale.  General health as reported by patient is fair.  Pertinent medical history includes: anemia, concussions/dizziness, depression, incontinence, mental illness, sleep disorder/apnea, thyroid problems, heart problems and other (TMJ, retonpathy, hiatal hernia, plantar fasciatis).     Medical allergies: none.   Surgeries: 4 small biopsies and carpal tunnel.    Current medications:  Anti-depressants, high blood pressure medication and thyroid medication.       Primary job tasks include:  Prolonged sitting (basic household tasks).                                    Objective:    Gait:    Gait Type:  Antalgic     Deviations:  Lumbar:  Trunk lean L and trunk lean RGeneral Deviations:  Stride length decr          Ankle/Foot Evaluation  ROM:  AROM is normal.      Strength:    Dorsiflexion:  Left: /5 Strong/pain  free    Pain:   Right: /5 Strong/pain free  Pain:  Plantarflexion: Left: /5 Strong/pain free  Pain:   Right: /5 strong/pain free Pain:                        SPECIAL TESTS: Special tests ankle: 30s STS: 13 with UE, 4 w/out ++ pain B knees.                                                 Hip Evaluation  HIP AROM:  AROM:    Left Hip:     Normal    Right Hip:   Normal                    Hip Strength:    Flexion:   Left: /5  Pain: strong/painful  Right: /5  Pain: strong/painful                      Abduction:  Left: /5    Pain:weak/pain freeRight: /5   Pain:weak/pain free  Adduction:  Left: /5   Pain:weak/pain freeRight: /5  Pain:weak/pain free                         Knee Evaluation:  ROM:  AROM: normal            Strength:     Extension:  Left:/5  Strong/painful  Pain:      Right:/5  Strong/painful  Pain:                        General     ROS    Assessment/Plan:    Patient is a 67 year old female with both sides knee complaints.    Patient has the following significant findings with corresponding treatment plan.                Diagnosis 1:  B knee pain with general deconditioning  Pain -  hot/cold therapy, manual therapy, self management and education  Decreased strength - therapeutic exercise and therapeutic activities  Impaired gait - gait training  Impaired muscle performance - neuro re-education  Decreased function - therapeutic activities    Therapy Evaluation Codes:   Cumulative Therapy Evaluation is: Low complexity.    Previous and current functional limitations:  (See Goal Flow Sheet for this information)    Short term and Long term goals: (See Goal Flow Sheet for this information)     Communication ability:  Patient appears to be able to clearly communicate and understand verbal and written communication and follow directions correctly.  Treatment Explanation - The following has been discussed with the patient:   RX ordered/plan of care  Anticipated outcomes  Possible risks and side effects  This patient  would benefit from PT intervention to resume normal activities.   Rehab potential is good.    Frequency:  1 X week, once daily  Duration:  for 6 weeks tapering to 2 X a month over 4 weeks  Discharge Plan:  Achieve all LTG.  Independent in home treatment program.  Reach maximal therapeutic benefit.    Please refer to the daily flowsheet for treatment today, total treatment time and time spent performing 1:1 timed codes.

## 2023-04-04 ENCOUNTER — TELEPHONE (OUTPATIENT)
Dept: OPHTHALMOLOGY | Facility: CLINIC | Age: 68
End: 2023-04-04
Payer: COMMERCIAL

## 2023-04-04 NOTE — TELEPHONE ENCOUNTER
Returned Grace's voicemail about scheduling with Dr. Jessica Motley. I explained to Grace that I have not received orders for her procedure yet. I let Grace know I will let the clinic team know that she would like to schedule and once the clinic team enters orders I will call to schedule.

## 2023-04-05 DIAGNOSIS — H35.372 EPIRETINAL MEMBRANE (ERM) OF LEFT EYE: Primary | ICD-10-CM

## 2023-04-05 NOTE — TELEPHONE ENCOUNTER
I called Grace to schedule surgery with Dr. Jessica Motley, I left a voicemail with callback # [phone]

## 2023-04-10 ENCOUNTER — THERAPY VISIT (OUTPATIENT)
Dept: PHYSICAL THERAPY | Facility: CLINIC | Age: 68
End: 2023-04-10
Payer: COMMERCIAL

## 2023-04-10 DIAGNOSIS — R53.81 PHYSICAL DECONDITIONING: Primary | ICD-10-CM

## 2023-04-10 DIAGNOSIS — M25.562 ACUTE PAIN OF BOTH KNEES: ICD-10-CM

## 2023-04-10 DIAGNOSIS — M25.561 ACUTE PAIN OF BOTH KNEES: ICD-10-CM

## 2023-04-10 PROCEDURE — 97110 THERAPEUTIC EXERCISES: CPT | Mod: GP | Performed by: PHYSICAL THERAPIST

## 2023-04-10 PROCEDURE — 97112 NEUROMUSCULAR REEDUCATION: CPT | Mod: GP | Performed by: PHYSICAL THERAPIST

## 2023-04-12 ENCOUNTER — TELEPHONE (OUTPATIENT)
Dept: OPHTHALMOLOGY | Facility: CLINIC | Age: 68
End: 2023-04-12
Payer: COMMERCIAL

## 2023-04-12 NOTE — TELEPHONE ENCOUNTER
I called Grace to schedule surgery with Dr. Jessica Motley, I left a voicemail with callback # [phone]

## 2023-04-13 ENCOUNTER — TELEPHONE (OUTPATIENT)
Dept: OPHTHALMOLOGY | Facility: CLINIC | Age: 68
End: 2023-04-13
Payer: COMMERCIAL

## 2023-04-13 NOTE — TELEPHONE ENCOUNTER
I called Grace to schedule surgery with Dr. Jessica Motley, I left a voicemail with callback # [phone]

## 2023-04-18 ENCOUNTER — THERAPY VISIT (OUTPATIENT)
Dept: PHYSICAL THERAPY | Facility: CLINIC | Age: 68
End: 2023-04-18
Payer: COMMERCIAL

## 2023-04-18 ENCOUNTER — TELEPHONE (OUTPATIENT)
Dept: OPHTHALMOLOGY | Facility: CLINIC | Age: 68
End: 2023-04-18

## 2023-04-18 DIAGNOSIS — M25.562 ACUTE PAIN OF BOTH KNEES: ICD-10-CM

## 2023-04-18 DIAGNOSIS — M25.561 ACUTE PAIN OF BOTH KNEES: ICD-10-CM

## 2023-04-18 DIAGNOSIS — R53.81 PHYSICAL DECONDITIONING: Primary | ICD-10-CM

## 2023-04-18 PROBLEM — H35.372 EPIRETINAL MEMBRANE (ERM) OF LEFT EYE: Status: ACTIVE | Noted: 2023-04-18

## 2023-04-18 PROCEDURE — 97112 NEUROMUSCULAR REEDUCATION: CPT | Mod: GP | Performed by: PHYSICAL THERAPIST

## 2023-04-18 PROCEDURE — 97110 THERAPEUTIC EXERCISES: CPT | Mod: GP | Performed by: PHYSICAL THERAPIST

## 2023-04-18 NOTE — TELEPHONE ENCOUNTER
Patient is scheduled for surgery with Dr. Jessica Motley     Spoke with: Grace     Date of Surgery: 05/25/23     Location: [facility]     H&P will be completed at: [facility]     Post Op scheduled on 05/26, 06/02, and 06/20     Surgery packet was mailed 04/18     Additional comments: Advised RN will call 1 - 3 business days prior with arrival time and instructions. Informed patient they will need an adult  and responsible adult to stay with for 24 hours following surgery    Returned Grace's call after computer froze. Grace was looking for the [facility] number to schedule her pre-op as the number she had was no longer in service. I provided [phone] to reach [facility].

## 2023-04-28 ENCOUNTER — THERAPY VISIT (OUTPATIENT)
Dept: PHYSICAL THERAPY | Facility: CLINIC | Age: 68
End: 2023-04-28
Payer: COMMERCIAL

## 2023-04-28 DIAGNOSIS — R53.81 PHYSICAL DECONDITIONING: Primary | ICD-10-CM

## 2023-04-28 DIAGNOSIS — M25.561 ACUTE PAIN OF BOTH KNEES: ICD-10-CM

## 2023-04-28 DIAGNOSIS — M25.562 ACUTE PAIN OF BOTH KNEES: ICD-10-CM

## 2023-04-28 PROCEDURE — 97110 THERAPEUTIC EXERCISES: CPT | Mod: GP | Performed by: PHYSICAL THERAPIST

## 2023-04-28 PROCEDURE — 97112 NEUROMUSCULAR REEDUCATION: CPT | Mod: GP | Performed by: PHYSICAL THERAPIST

## 2023-05-02 ENCOUNTER — OFFICE VISIT (OUTPATIENT)
Dept: FAMILY MEDICINE | Facility: CLINIC | Age: 68
End: 2023-05-02
Payer: COMMERCIAL

## 2023-05-02 VITALS
TEMPERATURE: 98.1 F | WEIGHT: 195 LBS | DIASTOLIC BLOOD PRESSURE: 71 MMHG | RESPIRATION RATE: 20 BRPM | HEIGHT: 61 IN | BODY MASS INDEX: 36.82 KG/M2 | HEART RATE: 91 BPM | OXYGEN SATURATION: 100 % | SYSTOLIC BLOOD PRESSURE: 137 MMHG

## 2023-05-02 DIAGNOSIS — F31.89 OTHER BIPOLAR DISORDER (H): ICD-10-CM

## 2023-05-02 DIAGNOSIS — H35.372 EPIRETINAL MEMBRANE (ERM) OF LEFT EYE: ICD-10-CM

## 2023-05-02 DIAGNOSIS — I35.2 NONRHEUMATIC AORTIC (VALVE) STENOSIS WITH INSUFFICIENCY: ICD-10-CM

## 2023-05-02 DIAGNOSIS — E03.9 HYPOTHYROIDISM, UNSPECIFIED TYPE: ICD-10-CM

## 2023-05-02 DIAGNOSIS — F33.41 RECURRENT MAJOR DEPRESSIVE DISORDER, IN PARTIAL REMISSION (H): ICD-10-CM

## 2023-05-02 DIAGNOSIS — Z01.818 PRE-OP EXAMINATION: Primary | ICD-10-CM

## 2023-05-02 DIAGNOSIS — R73.03 PRE-DIABETES: ICD-10-CM

## 2023-05-02 PROCEDURE — 99214 OFFICE O/P EST MOD 30 MIN: CPT | Performed by: PHYSICIAN ASSISTANT

## 2023-05-02 ASSESSMENT — PATIENT HEALTH QUESTIONNAIRE - PHQ9: SUM OF ALL RESPONSES TO PHQ QUESTIONS 1-9: 14

## 2023-05-02 NOTE — PROGRESS NOTES
[facility] [address] [place] [address]  Phone: [phone]  Fax: [phone]  Primary Provider: No Ref-Primary, Physician  Pre-op Performing Provider: TANMAY AMADO      PREOPERATIVE EVALUATION:  Today's date: 5/2/2023    Grace Chinchilla is a 68 year old female who presents for a preoperative evaluation.      5/2/2023     9:27 AM   Additional Questions   Roomed by gallo   Accompanied by self     Surgical Information:  Surgery/Procedure: Left Eye Retinopathy, vitrectomy  Surgery Location: [facility]   Surgeon:   Surgery Date: 05/25/2023  Time of Surgery: 8:00am  Where patient plans to recover: At home with family  Fax number for surgical facility: Note does not need to be faxed, will be available electronically in Epic.    Assessment & Plan     The proposed surgical procedure is considered LOW risk.    1. Pre-op examination  2. Epiretinal membrane (ERM) of left eye  - Echocardiogram Complete; Future    3. Nonrheumatic aortic (valve) stenosis with insufficiency  Chronic, stable.  No new symptoms.  Exam otherwise normal, vitals stable.  She has had several echoes in the past.  Last echocardiogram done on 3/27/2019.  When she had her last visit with her previous PCP in 2022, provider had recommended she get another echocardiogram.  This was never done.  New echo ordered today.  I do not think this should affect surgery, especially because surgery is low risk.  - Echocardiogram Complete; Future    4. Hypothyroidism, unspecified type  Chronic, stable.  Taking levothyroxine as directed.  She has a follow-up visit with her PCP within the next month or so.  She will check TSH at that time.    5. Pre-diabetes  Chronic, stable.  Last A1c checked 7 months ago = 5.3%.  She will follow-up with her PCP regarding this.    6. Other bipolar disorder (H)  7. Recurrent major depressive disorder, in partial remission (H)  Chronic,  stable.  She reports symptoms are well controlled with present medications.      Antiplatelet or Anticoagulation Medication Instructions:   - Patient is on no antiplatelet or anticoagulation medications.    Additional Medication Instructions:  Patient is to take all scheduled medications on the day of surgery    RECOMMENDATION:  APPROVAL GIVEN to proceed with proposed procedure, without further diagnostic evaluation.        Subjective     HPI related to upcoming procedure:   Chronic problems with left retina.  Has been following with specialist.  I reviewed last available visit with ophthalmology from 2/14/2023.  She was told she also has cataracts in her eyes.          5/2/2023     9:19 AM   Preop Questions   1. Have you ever had a heart attack or stroke? No   2. Have you ever had surgery on your heart or blood vessels, such as a stent placement, a coronary artery bypass, or surgery on an artery in your head, neck, heart, or legs? No   3. Do you have chest pain with activity? No   4. Do you have a history of  heart failure? No   5. Do you currently have a cold, bronchitis or symptoms of other infection? No   6. Do you have a cough, shortness of breath, or wheezing? No   7. Do you or anyone in your family have previous history of blood clots? No   8. Do you or does anyone in your family have a serious bleeding problem such as prolonged bleeding following surgeries or cuts? No   9. Have you ever had problems with anemia or been told to take iron pills? YES - chronic mild   10. Have you had any abnormal blood loss such as black, tarry or bloody stools, or abnormal vaginal bleeding? No   11. Have you ever had a blood transfusion? No   12. Are you willing to have a blood transfusion if it is medically needed before, during, or after your surgery? Yes   13. Have you or any of your relatives ever had problems with anesthesia? No   14. Do you have sleep apnea, excessive snoring or daytime drowsiness? YES - sleep apnea, has  machine   14a. Do you have a CPAP machine? Yes   15. Do you have any artifical heart valves or other implanted medical devices like a pacemaker, defibrillator, or continuous glucose monitor? No   16. Do you have artificial joints? No   17. Are you allergic to latex? No         Review of Systems  Complete review of systems discussed with patient is negative except as noted above in HPI.  She does notice occasional chronic diarrhea or loose stools.    Patient Active Problem List    Diagnosis Date Noted     Epiretinal membrane (ERM) of left eye 04/18/2023     Priority: Medium     Added automatically from request for surgery [id number]       Physical deconditioning 03/31/2023     Priority: Medium     Pain in both knees 03/31/2023     Priority: Medium     Elevated BP without diagnosis of hypertension 07/05/2022     Priority: Medium     Vitamin D deficiency 03/14/2022     Priority: Medium     Anemia 10/04/2021     Priority: Medium     Carpal tunnel syndrome 10/04/2021     Priority: Medium     Conversion disorder 10/04/2021     Priority: Medium     Mild intellectual disabilities 10/04/2021     Priority: Medium     Other bipolar disorder (H) 10/04/2021     Priority: Medium     Primary localized osteoarthrosis, lower leg 10/04/2021     Priority: Medium     Urge incontinence of urine 10/04/2021     Priority: Medium     Major depression, recurrent (H) 09/15/2021     Priority: Medium     Class 2 severe obesity due to excess calories with serious comorbidity and body mass index (BMI) of 35.0 to 35.9 in adult (H) 08/30/2021     Priority: Medium     Pre-diabetes 03/13/2019     Priority: Medium     Nonrheumatic aortic (valve) stenosis with insufficiency 03/22/2017     Priority: Medium     Plantar fascial fibromatosis      Priority: Medium     Obstructive sleep apnea      Priority: Medium     uses CPAP       Hypothyroidism 08/17/2004     Priority: Medium     Mixed hyperlipidemia 08/17/2004     Priority: Medium     Hyperlipidemia         Past Medical History:   Diagnosis Date     Adrenal mass (H) 3/14/2022     Anxiety      Depression      Depressive disorder, not elsewhere classified      Diaphragmatic hernia without mention of obstruction or gangrene      Disease of thyroid gland      Dysthymic disorder     bipolar - sees  Dr. Jason Langley [facility]     Esophageal reflux      Hyperlipidemia      Mixed hyperlipidemia     Hyperlipidemia     Obstructive sleep apnea (adult) (pediatric)     uses CPAP     Plantar fascial fibromatosis      Temporomandibular joint disorders, unspecified      Temporomandibular joint disorders, unspecified      Tietze's disease     costochondritis     Unspecified hypothyroidism      Past Surgical History:   Procedure Laterality Date     BIOPSY BREAST      benign     BIOPSY OF BREAST, INCISIONAL      Description: Biopsy Breast Open;  Recorded: 07/14/2009;  Comments: right, benign     CERVICAL BIOPSY       NO HISTORY OF SURGERY       RELEASE CARPAL TUNNEL Right      US BIOPSY THYROID FINE NEEDLE ASPIRATION  10/1/2019     Current Outpatient Medications   Medication Sig Dispense Refill     amphetamine-dextroamphetamine (ADDERALL XR) 30 MG 24 hr capsule        atorvastatin (LIPITOR) 80 MG tablet Take 1 tablet (80 mg) by mouth daily 90 tablet 3     Calcium-Magnesium-Zinc 333-133-5 MG TABS per tablet Take 1 tablet by mouth       cholecalciferol 25 MCG (1000 UT) TABS Take 1,000 Units by mouth        clonazePAM (KLONOPIN) 0.5 MG tablet        Cyanocobalamin (VITAMIN B-12) 500 MCG LOZG Take 1,500 mcg by mouth       diclofenac (VOLTAREN) 1 % topical gel Apply 4 g topically 4 times daily 350 g 3     divalproex sodium extended-release (DEPAKOTE ER) 250 MG 24 hr tablet Take 250 mg by mouth daily Taken with Depakote 500 x 2 for a total of 1250mg daily       divalproex sodium extended-release (DEPAKOTE ER) 500 MG 24 hr tablet Take 1,000 mg by mouth daily In addition to Depakote 250mg       DULoxetine (CYMBALTA) 30 MG capsule         "ferrous sulfate (FEROSUL) 325 (65 Fe) MG tablet Take 325 mg by mouth daily (with breakfast)       Flaxseed, Linseed, (FLAX SEEDS PO)        levothyroxine (SYNTHROID/LEVOTHROID) 125 MCG tablet Take 125 mcg by mouth       losartan (COZAAR) 25 MG tablet Take 1 tablet (25 mg) by mouth daily 90 tablet 1     multivitamin w/minerals (THERA-VIT-M) tablet Take 1 tablet by mouth daily       Nutritional Supplements (GLUCOSAMINE COMPLEX PO)          Allergies   Allergen Reactions     Bupropion         Social History     Tobacco Use     Smoking status: Never     Smokeless tobacco: Never   Vaping Use     Vaping status: Never Used   Substance Use Topics     Alcohol use: No         Objective     /71 (BP Location: Left arm, Patient Position: Sitting, Cuff Size: Adult Large)   Pulse 91   Temp 98.1  F (36.7  C) (Temporal)   Resp 20   Ht 1.549 m (5' 1\")   Wt 88.5 kg (195 lb)   LMP 09/13/2006   SpO2 100%   BMI 36.84 kg/m      Physical Exam    GENERAL APPEARANCE: healthy, alert and no distress     EYES: EOMI, PERRL     HENT: ear canals and TM's normal and nose and mouth without ulcers or lesions     NECK: no adenopathy, no asymmetry, masses, or scars and thyroid normal to palpation     RESP: lungs clear to auscultation - no rales, rhonchi or wheezes     CV: regular rates and rhythm, normal S1 S2, no S3 or S4 and no murmur, click or rub     ABDOMEN:  soft, nontender, no HSM or masses and bowel sounds normal     MS: extremities normal- no gross deformities noted, no evidence of inflammation in joints, FROM in all extremities.     SKIN: no suspicious lesions or rashes     NEURO: Normal strength and tone, sensory exam grossly normal, mentation intact and speech normal     PSYCH: mentation appears normal. and affect normal/bright     LYMPHATICS: No cervical adenopathy    Recent Labs   Lab Test 09/16/22  1345 03/14/22  1049   HGB 11.4* 11.2*    292    140   POTASSIUM 4.2 5.0   CR 0.78 0.81   A1C 5.3 6.1*    "     Diagnostics:  No results found for this or any previous visit (from the past 168 hour(s)).  No EKG required for low risk surgery (cataract, skin procedure, breast biopsy, etc).    Revised Cardiac Risk Index (RCRI):  The patient has the following serious cardiovascular risks for perioperative complications:   - No serious cardiac risks = 0 points     RCRI Interpretation: Score = 0           Signed Electronically by: Ayana Chauhan PA-C  Copy of this evaluation report is provided to requesting physician.

## 2023-05-08 ENCOUNTER — TELEPHONE (OUTPATIENT)
Dept: OPHTHALMOLOGY | Facility: CLINIC | Age: 68
End: 2023-05-08
Payer: COMMERCIAL

## 2023-05-08 NOTE — TELEPHONE ENCOUNTER
Health Call Center    Phone Message    May a detailed message be left on voicemail: yes     Reason for Call: Other: Pt is scheduled for surgery with Dr. Motley on 5/25. She is wondering if she needs someone physically with her for the first 24 hours after the surgery. She would also like to make Dr. Motley aware that for about the past week she has had double vision in the Lt eye. Please call pt to discuss. Thank you.      Action Taken: Message routed to:  [facility] E[facility]    Travel Screening: Not Applicable

## 2023-05-08 NOTE — TELEPHONE ENCOUNTER
Returned Grace's voicemail asking if she could have someone pick her up from surgery drop her off at home then pick her up in the morning. I explained Grace must arrange to be with someone for the first 24 hours following surgery. This requirement is for safety concerns following anesthesia to make sure Grace can get assistance if need be should she have a bad reaction or need help. Left my callback number if she has further questions in regards to having someone stay with her for 24 hours.

## 2023-05-18 ENCOUNTER — HOSPITAL ENCOUNTER (OUTPATIENT)
Dept: CARDIOLOGY | Facility: CLINIC | Age: 68
Discharge: HOME | End: 2023-05-18
Attending: PHYSICIAN ASSISTANT | Admitting: PHYSICIAN ASSISTANT
Payer: COMMERCIAL

## 2023-05-18 DIAGNOSIS — I35.2 NONRHEUMATIC AORTIC (VALVE) STENOSIS WITH INSUFFICIENCY: ICD-10-CM

## 2023-05-18 DIAGNOSIS — Z01.818 PRE-OP EXAMINATION: ICD-10-CM

## 2023-05-18 DIAGNOSIS — H35.372 EPIRETINAL MEMBRANE (ERM) OF LEFT EYE: ICD-10-CM

## 2023-05-18 LAB — LVEF ECHO: NORMAL

## 2023-05-18 PROCEDURE — 93306 TTE W/DOPPLER COMPLETE: CPT

## 2023-05-18 PROCEDURE — 93306 TTE W/DOPPLER COMPLETE: CPT | Mod: 26 | Performed by: INTERNAL MEDICINE

## 2023-05-23 ENCOUNTER — THERAPY VISIT (OUTPATIENT)
Dept: PHYSICAL THERAPY | Facility: CLINIC | Age: 68
End: 2023-05-23
Payer: COMMERCIAL

## 2023-05-23 DIAGNOSIS — R53.81 PHYSICAL DECONDITIONING: Primary | ICD-10-CM

## 2023-05-23 DIAGNOSIS — M25.562 ACUTE PAIN OF BOTH KNEES: ICD-10-CM

## 2023-05-23 DIAGNOSIS — M25.561 ACUTE PAIN OF BOTH KNEES: ICD-10-CM

## 2023-05-23 PROCEDURE — 97110 THERAPEUTIC EXERCISES: CPT | Mod: GP | Performed by: PHYSICAL THERAPIST

## 2023-05-23 PROCEDURE — 97112 NEUROMUSCULAR REEDUCATION: CPT | Mod: GP | Performed by: PHYSICAL THERAPIST

## 2023-05-24 ENCOUNTER — ANESTHESIA EVENT (OUTPATIENT)
Dept: SURGERY | Facility: AMBULATORY SURGERY CENTER | Age: 68
End: 2023-05-24
Payer: COMMERCIAL

## 2023-05-24 NOTE — ANESTHESIA PREPROCEDURE EVALUATION
Anesthesia Pre-Procedure Evaluation    Patient: Grace Chinchilla   MRN: [id number] : 1955        Procedure : Procedure(s):  LEFT EYE VITRECTOMY, PARS PLANA APPROACH, USING 25-GAUGE INSTRUMENTS, Membrane peel, possible endo laser, possible gas vs oil          Past Medical History:   Diagnosis Date     Adrenal mass (H) 3/14/2022     Anxiety      Depression      Depressive disorder, not elsewhere classified      Diaphragmatic hernia without mention of obstruction or gangrene      Disease of thyroid gland      Dysthymic disorder     bipolar - sees  Dr. Gomez [facility]     Esophageal reflux      Hyperlipidemia      Mixed hyperlipidemia     Hyperlipidemia     Obstructive sleep apnea (adult) (pediatric)     uses CPAP     Plantar fascial fibromatosis      Temporomandibular joint disorders, unspecified      Temporomandibular joint disorders, unspecified      Tietze's disease     costochondritis     Unspecified hypothyroidism       Past Surgical History:   Procedure Laterality Date     BIOPSY BREAST      benign     BIOPSY OF BREAST, INCISIONAL      Description: Biopsy Breast Open;  Recorded: 2009;  Comments: right, benign     CERVICAL BIOPSY       NO HISTORY OF SURGERY       RELEASE CARPAL TUNNEL Right      US BIOPSY THYROID FINE NEEDLE ASPIRATION  10/1/2019      Allergies   Allergen Reactions     Bupropion       Social History     Tobacco Use     Smoking status: Never     Smokeless tobacco: Never   Vaping Use     Vaping status: Never Used   Substance Use Topics     Alcohol use: No      Wt Readings from Last 1 Encounters:   23 88.5 kg (195 lb)        Anesthesia Evaluation            ROS/MED HX  ENT/Pulmonary:     (+) sleep apnea, uses CPAP,     Neurologic:       Cardiovascular:       METS/Exercise Tolerance:     Hematologic:       Musculoskeletal:       GI/Hepatic:     (+) GERD,     Renal/Genitourinary:       Endo:     (+) thyroid problem, hypothyroidism, Obesity,     Psychiatric/Substance Use:      (+) psychiatric history anxiety and depression     Infectious Disease:       Malignancy:       Other:            Physical Exam    Airway        Mallampati: III       Respiratory Devices and Support         Dental           Cardiovascular          Rhythm and rate: regular     Pulmonary           breath sounds clear to auscultation           OUTSIDE LABS:  CBC:   Lab Results   Component Value Date    WBC 8.0 09/16/2022    WBC 6.6 03/14/2022    HGB 11.4 (L) 09/16/2022    HGB 11.2 (L) 03/14/2022    HCT 34.6 (L) 09/16/2022    HCT 35.5 03/14/2022     09/16/2022     03/14/2022     BMP:   Lab Results   Component Value Date     09/16/2022     03/14/2022    POTASSIUM 4.2 09/16/2022    POTASSIUM 5.0 03/14/2022    CHLORIDE 103 09/16/2022    CHLORIDE 104 03/14/2022    CO2 28 09/16/2022    CO2 28 03/14/2022    BUN 27.6 (H) 09/16/2022    BUN 20 03/14/2022    CR 0.78 09/16/2022    CR 0.81 03/14/2022    GLC 86 09/16/2022    GLC 83 03/14/2022     COAGS: No results found for: PTT, INR, FIBR  POC:   Lab Results   Component Value Date    HCG Negative 01/07/2003     HEPATIC:   Lab Results   Component Value Date    ALBUMIN 4.1 09/16/2022    PROTTOTAL 6.7 09/16/2022    ALT 12 09/16/2022    AST 16 09/16/2022    ALKPHOS 63 09/16/2022    BILITOTAL 0.3 09/16/2022     OTHER:   Lab Results   Component Value Date    A1C 5.3 09/16/2022    DAYAN 9.6 09/16/2022    LIPASE 31 08/30/2021    TSH 3.25 03/14/2022    T4 0.91 04/02/2007       Anesthesia Plan    ASA Status:  3   NPO Status:  NPO Appropriate    Anesthesia Type: General.     - Airway: LMA   Induction: Intravenous.   Maintenance: TIVA.        Consents    Anesthesia Plan(s) and associated risks, benefits, and realistic alternatives discussed. Questions answered and patient/representative(s) expressed understanding.    - Discussed:     - Discussed with:  Patient         Postoperative Care    Pain management: IV analgesics, Oral pain medications, Multi-modal analgesia.    PONV prophylaxis: Ondansetron (or other 5HT-3), Dexamethasone or Solumedrol     Comments:                Luis Armando Collins MD

## 2023-05-25 ENCOUNTER — HOSPITAL ENCOUNTER (OUTPATIENT)
Facility: AMBULATORY SURGERY CENTER | Age: 68
Discharge: HOME | End: 2023-05-25
Attending: OPHTHALMOLOGY
Payer: COMMERCIAL

## 2023-05-25 ENCOUNTER — ANESTHESIA (OUTPATIENT)
Dept: SURGERY | Facility: AMBULATORY SURGERY CENTER | Age: 68
End: 2023-05-25
Payer: COMMERCIAL

## 2023-05-25 VITALS
OXYGEN SATURATION: 98 % | BODY MASS INDEX: 36.82 KG/M2 | RESPIRATION RATE: 18 BRPM | SYSTOLIC BLOOD PRESSURE: 134 MMHG | WEIGHT: 195 LBS | TEMPERATURE: 97.7 F | HEART RATE: 65 BPM | DIASTOLIC BLOOD PRESSURE: 55 MMHG | HEIGHT: 61 IN

## 2023-05-25 DIAGNOSIS — H35.372 EPIRETINAL MEMBRANE (ERM) OF LEFT EYE: ICD-10-CM

## 2023-05-25 DIAGNOSIS — Z48.810 AFTERCARE FOLLOWING SURGERY OF A SENSE ORGAN: Primary | ICD-10-CM

## 2023-05-25 PROCEDURE — 67042 VIT FOR MACULAR HOLE: CPT | Mod: LT | Performed by: OPHTHALMOLOGY

## 2023-05-25 PROCEDURE — 67042 VIT FOR MACULAR HOLE: CPT | Mod: LT

## 2023-05-25 RX ORDER — ONDANSETRON 2 MG/ML
4 INJECTION INTRAMUSCULAR; INTRAVENOUS EVERY 30 MIN PRN
Status: DISCONTINUED | OUTPATIENT
Start: 2023-05-25 | End: 2023-05-26 | Stop reason: HOSPADM

## 2023-05-25 RX ORDER — GLYCOPYRROLATE 0.2 MG/ML
INJECTION, SOLUTION INTRAMUSCULAR; INTRAVENOUS PRN
Status: DISCONTINUED | OUTPATIENT
Start: 2023-05-25 | End: 2023-05-25

## 2023-05-25 RX ORDER — PROPOFOL 10 MG/ML
INJECTION, EMULSION INTRAVENOUS CONTINUOUS PRN
Status: DISCONTINUED | OUTPATIENT
Start: 2023-05-25 | End: 2023-05-25

## 2023-05-25 RX ORDER — ACETAMINOPHEN 325 MG/1
975 TABLET ORAL ONCE
Status: COMPLETED | OUTPATIENT
Start: 2023-05-25 | End: 2023-05-25

## 2023-05-25 RX ORDER — ONDANSETRON 4 MG/1
4 TABLET, ORALLY DISINTEGRATING ORAL EVERY 30 MIN PRN
Status: DISCONTINUED | OUTPATIENT
Start: 2023-05-25 | End: 2023-05-26 | Stop reason: HOSPADM

## 2023-05-25 RX ORDER — DEXAMETHASONE SODIUM PHOSPHATE 4 MG/ML
INJECTION, SOLUTION INTRA-ARTICULAR; INTRALESIONAL; INTRAMUSCULAR; INTRAVENOUS; SOFT TISSUE PRN
Status: DISCONTINUED | OUTPATIENT
Start: 2023-05-25 | End: 2023-05-25

## 2023-05-25 RX ORDER — PREDNISOLONE ACETATE 10 MG/ML
1 SUSPENSION/ DROPS OPHTHALMIC 4 TIMES DAILY
Qty: 5 ML | Refills: 1 | Status: SHIPPED | OUTPATIENT
Start: 2023-05-25 | End: 2023-08-03

## 2023-05-25 RX ORDER — CYCLOPENTOLAT/TROPIC/PHENYLEPH 1%-1%-2.5%
1 DROPS (EA) OPHTHALMIC (EYE)
Status: COMPLETED | OUTPATIENT
Start: 2023-05-25 | End: 2023-05-25

## 2023-05-25 RX ORDER — BALANCED SALT SOLUTION 6.4; .75; .48; .3; 3.9; 1.7 MG/ML; MG/ML; MG/ML; MG/ML; MG/ML; MG/ML
SOLUTION OPHTHALMIC PRN
Status: DISCONTINUED | OUTPATIENT
Start: 2023-05-25 | End: 2023-05-25 | Stop reason: HOSPADM

## 2023-05-25 RX ORDER — ATROPINE SULFATE 10 MG/ML
SOLUTION/ DROPS OPHTHALMIC PRN
Status: DISCONTINUED | OUTPATIENT
Start: 2023-05-25 | End: 2023-05-25 | Stop reason: HOSPADM

## 2023-05-25 RX ORDER — PROPOFOL 10 MG/ML
INJECTION, EMULSION INTRAVENOUS PRN
Status: DISCONTINUED | OUTPATIENT
Start: 2023-05-25 | End: 2023-05-25

## 2023-05-25 RX ORDER — LIDOCAINE 40 MG/G
CREAM TOPICAL
Status: DISCONTINUED | OUTPATIENT
Start: 2023-05-25 | End: 2023-05-26 | Stop reason: HOSPADM

## 2023-05-25 RX ORDER — OFLOXACIN 3 MG/ML
1 SOLUTION/ DROPS OPHTHALMIC 4 TIMES DAILY
Qty: 5 ML | Refills: 0 | Status: SHIPPED | OUTPATIENT
Start: 2023-05-25 | End: 2023-08-03

## 2023-05-25 RX ORDER — ONDANSETRON 2 MG/ML
INJECTION INTRAMUSCULAR; INTRAVENOUS PRN
Status: DISCONTINUED | OUTPATIENT
Start: 2023-05-25 | End: 2023-05-25

## 2023-05-25 RX ORDER — HYDROMORPHONE HYDROCHLORIDE 1 MG/ML
0.2 INJECTION, SOLUTION INTRAMUSCULAR; INTRAVENOUS; SUBCUTANEOUS EVERY 5 MIN PRN
Status: DISCONTINUED | OUTPATIENT
Start: 2023-05-25 | End: 2023-05-26 | Stop reason: HOSPADM

## 2023-05-25 RX ORDER — LIDOCAINE HYDROCHLORIDE 20 MG/ML
INJECTION, SOLUTION INFILTRATION; PERINEURAL PRN
Status: DISCONTINUED | OUTPATIENT
Start: 2023-05-25 | End: 2023-05-25

## 2023-05-25 RX ORDER — FENTANYL CITRATE 50 UG/ML
INJECTION, SOLUTION INTRAMUSCULAR; INTRAVENOUS PRN
Status: DISCONTINUED | OUTPATIENT
Start: 2023-05-25 | End: 2023-05-25

## 2023-05-25 RX ORDER — OXYCODONE HYDROCHLORIDE 5 MG/1
5 TABLET ORAL
Status: DISCONTINUED | OUTPATIENT
Start: 2023-05-25 | End: 2023-05-26 | Stop reason: HOSPADM

## 2023-05-25 RX ORDER — DEXAMETHASONE SODIUM PHOSPHATE 4 MG/ML
INJECTION, SOLUTION INTRA-ARTICULAR; INTRALESIONAL; INTRAMUSCULAR; INTRAVENOUS; SOFT TISSUE PRN
Status: DISCONTINUED | OUTPATIENT
Start: 2023-05-25 | End: 2023-05-25 | Stop reason: HOSPADM

## 2023-05-25 RX ORDER — FENTANYL CITRATE 50 UG/ML
50 INJECTION, SOLUTION INTRAMUSCULAR; INTRAVENOUS EVERY 5 MIN PRN
Status: DISCONTINUED | OUTPATIENT
Start: 2023-05-25 | End: 2023-05-26 | Stop reason: HOSPADM

## 2023-05-25 RX ORDER — FENTANYL CITRATE 50 UG/ML
25 INJECTION, SOLUTION INTRAMUSCULAR; INTRAVENOUS EVERY 5 MIN PRN
Status: DISCONTINUED | OUTPATIENT
Start: 2023-05-25 | End: 2023-05-26 | Stop reason: HOSPADM

## 2023-05-25 RX ORDER — HYDROMORPHONE HYDROCHLORIDE 1 MG/ML
0.4 INJECTION, SOLUTION INTRAMUSCULAR; INTRAVENOUS; SUBCUTANEOUS EVERY 5 MIN PRN
Status: DISCONTINUED | OUTPATIENT
Start: 2023-05-25 | End: 2023-05-26 | Stop reason: HOSPADM

## 2023-05-25 RX ORDER — SODIUM CHLORIDE, SODIUM LACTATE, POTASSIUM CHLORIDE, CALCIUM CHLORIDE 600; 310; 30; 20 MG/100ML; MG/100ML; MG/100ML; MG/100ML
INJECTION, SOLUTION INTRAVENOUS CONTINUOUS
Status: DISCONTINUED | OUTPATIENT
Start: 2023-05-25 | End: 2023-05-26 | Stop reason: HOSPADM

## 2023-05-25 RX ORDER — ERYTHROMYCIN 5 MG/G
OINTMENT OPHTHALMIC PRN
Status: DISCONTINUED | OUTPATIENT
Start: 2023-05-25 | End: 2023-05-25 | Stop reason: HOSPADM

## 2023-05-25 RX ORDER — OXYCODONE HYDROCHLORIDE 5 MG/1
10 TABLET ORAL
Status: DISCONTINUED | OUTPATIENT
Start: 2023-05-25 | End: 2023-05-26 | Stop reason: HOSPADM

## 2023-05-25 RX ORDER — PROPARACAINE HYDROCHLORIDE 5 MG/ML
1 SOLUTION/ DROPS OPHTHALMIC ONCE
Status: COMPLETED | OUTPATIENT
Start: 2023-05-25 | End: 2023-05-25

## 2023-05-25 RX ADMIN — Medication 1 DROP: at 06:28

## 2023-05-25 RX ADMIN — FENTANYL CITRATE 50 MCG: 50 INJECTION, SOLUTION INTRAMUSCULAR; INTRAVENOUS at 07:40

## 2023-05-25 RX ADMIN — ACETAMINOPHEN 975 MG: 325 TABLET ORAL at 06:28

## 2023-05-25 RX ADMIN — LIDOCAINE HYDROCHLORIDE 100 MG: 20 INJECTION, SOLUTION INFILTRATION; PERINEURAL at 07:40

## 2023-05-25 RX ADMIN — PROPARACAINE HYDROCHLORIDE 1 DROP: 5 SOLUTION/ DROPS OPHTHALMIC at 06:28

## 2023-05-25 RX ADMIN — GLYCOPYRROLATE 0.2 MG: 0.2 INJECTION, SOLUTION INTRAMUSCULAR; INTRAVENOUS at 07:44

## 2023-05-25 RX ADMIN — PROPOFOL 200 MG: 10 INJECTION, EMULSION INTRAVENOUS at 07:40

## 2023-05-25 RX ADMIN — DEXAMETHASONE SODIUM PHOSPHATE 4 MG: 4 INJECTION, SOLUTION INTRA-ARTICULAR; INTRALESIONAL; INTRAMUSCULAR; INTRAVENOUS; SOFT TISSUE at 07:55

## 2023-05-25 RX ADMIN — Medication 100 MCG: at 08:24

## 2023-05-25 RX ADMIN — GLYCOPYRROLATE 0.2 MG: 0.2 INJECTION, SOLUTION INTRAMUSCULAR; INTRAVENOUS at 07:56

## 2023-05-25 RX ADMIN — ONDANSETRON 4 MG: 2 INJECTION INTRAMUSCULAR; INTRAVENOUS at 07:55

## 2023-05-25 RX ADMIN — Medication 1 DROP: at 06:39

## 2023-05-25 RX ADMIN — SODIUM CHLORIDE, SODIUM LACTATE, POTASSIUM CHLORIDE, CALCIUM CHLORIDE: 600; 310; 30; 20 INJECTION, SOLUTION INTRAVENOUS at 07:34

## 2023-05-25 RX ADMIN — Medication 1 DROP: at 06:44

## 2023-05-25 RX ADMIN — PROPOFOL 150 MCG/KG/MIN: 10 INJECTION, EMULSION INTRAVENOUS at 07:40

## 2023-05-25 NOTE — BRIEF OP NOTE
[facility]    Brief Operative Note    Pre-operative diagnosis: Epiretinal membrane (ERM) of left eye [H35.372]  Post-operative diagnosis Same as pre-operative diagnosis    Procedure: Procedure(s):  LEFT EYE VITRECTOMY, PARS PLANA APPROACH, USING 25-GAUGE INSTRUMENTS, Membrane peel, endo laser  Surgeon: Surgeon(s) and Role:     * Jessica Motley MD - Primary     * Benitez Quinones MD - Fellow - Assisting  Anesthesia: General with Block   Estimated Blood Loss: Minimal    Drains: None  Specimens: * No specimens in log *  Findings:   None.  Complications: None.  Implants: * No implants in log *

## 2023-05-25 NOTE — DISCHARGE INSTRUCTIONS
"POST-OPERATIVE INSTRUCTIONS FOLLOWING SURGERY    Jessica Motley MD, PhD  Department of Ophthalmology  [facility]  [phone]    FOLLOW UP:  You have a follow up appointment tomorrow at 9:50 AM at the Eye Clinic in the [facility] 9th floor. Please arrive 15 minutes prior to your scheduled appointment time.      EYE DROPS  Drops will be given to you after the surgery.  They DO NOT need to be used until after you are seen in clinic.  DO NOT disturb your bandage the first night.      When using more than one drop, separate them by 3 minutes between drops.  Common times to place drops are breakfast, lunch, dinner, and bedtime.  Do not stop your drops without discussing with our office.  If you run out before your appointment, call and we will send in a refill.    Ofloxacin (tan top) --- 4 times per day  Pred Forte (prednisolone acetate) --- 4 times per day    ACTIVITY:  No heavy lifting after surgery  Keep the bandage in place until you are seen tomorrow   Do not get your bandage wet    PAIN MEDICATION   It is common to have some mild or moderate discomfort after eye surgery.  Tylenol or ibuprofen may be taken if you don't have any other general health conditions that prevent you from taking these.    WHAT TO EXPECT  It is common for the eye to to have a blood tinged discharge for a few days after surgery  It may feel irritated (as if something were in your eye), for there to be clear discharge (thicker in the mornings upon awakening), and for it to be bloodshot for 2-3 weeks following retina surgery.     WHAT TO WATCH OUT FOR  If you experience any of the following \"RSVP Symptoms\", you should call immediately:  Worsening Redness  Worsening Sensitivity to light  Worsening Vision, including new flashing lights or floaters  Worsening Pain, including nausea/vomiting      For any of the symptoms listed above, or for other concerns, call [phone] and ask to speak to the clinic nurse. "  If you call after hours, follow to prompts to reach the doctor on call.            [facility]  Home Care Following Anesthesia  For 24 hours after surgery:  Get plenty of rest.  A responsible adult must stay with you for at least 24 hours after you leave the surgery center.  Do not drive or use heavy equipment.  If you have weakness or tingling, don't drive or use heavy equipment until this feeling goes away.   Do not drink alcohol.   Avoid strenuous or risky activities.  Ask for help when climbing stairs.  You may feel lightheaded.  IF so, sit for a few minutes before standing.  Have someone help you get up.   If you have nausea (feel sick to your stomach): Drink only clear liquids such as apple juice, ginger ale, broth or 7-Up.  Rest may also help.  Be sure to drink enough fluids.  Move to a regular diet as you feel able.   You may have a slight fever.  Call the doctor if your fever is over 100 F (37.7 C) (taken under the tongue) or lasts longer than 24 hours.  You may have a dry mouth, a sore throat, muscle aches or trouble sleeping. These should go away after 24 hours.  Do not make important or legal decisions.   It is recommended to avoid smoking.               Tips for taking pain medications  To get the best pain relief possible, remember these points:  Take pain medications as directed, before pain becomes severe.  Pain medication can upset your stomach: taking it with food may help.  Constipation is a common side effect of pain medication. Drink plenty of  fluids.  Eat foods high in fiber. Take a stool softener if recommended by your doctor or pharmacist.  Do not drink alcohol, drive or operate machinery while taking pain medications.  Ask about other ways to control pain, such as with heat, ice or relaxation.    Tylenol/Acetaminophen Consumption  To help encourage the safe use of acetaminophen, the makers of TYLENOL  have lowered the maximum daily dose for  single-ingredient Extra Strength TYLENOL  (acetaminophen) products sold in the [place] from 8 pills per day (4,000 mg) to 6 pills per day (3,000 mg). The dosing interval has also changed from 2 pills every 4-6 hours to 2 pills every 6 hours.  If you feel your pain relief is insufficient, you may take Tylenol/Acetaminophen in addition to your narcotic pain medication.   Be careful not to exceed 3,000 mg of Tylenol/Acetaminophen in a 24 hour period from all sources.  If you are taking extra strength Tylenol/acetaminophen (500 mg), the maximum dose is 6 tablets in 24 hours.  If you are taking regular strength acetaminophen (325 mg), the maximum dose is 9 tablets in 24 hours.  975 mg of Tylenol given at 6:30 am, ok to take more after 12:30 pm today.    Call a doctor for any of the following:  Signs of infection (fever, growing tenderness at the surgery site, a large amount of drainage or bleeding, severe pain, foul-smelling drainage, redness, swelling).  It has been over 8 to 10 hours since surgery and you are still not able to urinate (pass water).  Headache for over 24 hours.  Numbness, tingling or weakness the day after surgery (if you had spinal anesthesia).  Signs of Covid-19 infection (temperature over 100 degrees, shortness of breath, cough, loss of taste/smell, generalized body aches, persistent headache, chills, sore throat, nausea/vomiting/diarrhea)    Your doctor is:  Dr. Jessica Motley: Opthalmology: [phone]                  Or dial [phone] and ask for the resident on call for:  Ophthalmology  For emergency care, call the:  [facility]:  [phone] (TTY for hearing impaired: [phone])

## 2023-05-25 NOTE — OP NOTE
PRE-OP Dx:    1) ERM OS      Post-OP Dx: same    Attending:  HIRAM Motley MD  Fellow: YIMI Quinones MD, MPH    Anesthesia: General with block  Procedures: (ALL OS)   1) Pars plana vitrectomy (PPV) 25g   2) Membrane stripping and stripping of  internal limiting membrane   3) Endolaser    Findings: broad ERM tightly adherent to retina, VRT earl ST    EBL: scant  Specimens: none  Complications: none      Procedure Description:    Grace Chinchilla is a AGE: 68 year old year old patient with a history of ERM OS .  After informed consent was obtained, they were brought into the OR where general  anesthesia was administered.  The eye was then prepped and draped in the usual fashion for ophthalmic surgery.    Attention was then turned to the vitrectomy.  Marks were made on the sclera inferotemporally, superotemporally, and superonasally 3.5 mm posterior to the limbus.  The 25g transscleral cannulas were inserted through the sclera using the trocars.  The infusion cannula was connected to the inferonasal cannula and directly visualized to verify it was in the correct location.      A core vitrectomy was performed and the vitreous was stained with kenalog.  The hyaloid was verified to be raised.    Next Briliant blue was instilled into the eye to stain the ERM/ILM complex. Barbed MVR blade and Sharkskin Forceps were used to peel the ERM/ILM complex in a broad diameter around the fovea.       The periphery was examined with depression. No breaks or tears were found. However, 2 earl with traction were noted nasally. Each was treated with endolaser barricade as a precaution.      The cannulas were removed and the sclerotomies were tight.  The intraocular pressure was appropriate.   A block of 0.75% bupivacaine and 2% lidocaine with Hylenex was given. Decadron and Ancef were injected subconjunctivally.  Erythromycin ointment was placed into the eye.  An eyepad and forrest shield were taped over the eye.    The patient left the OR  with no complications.    I was present for all critical portions of the entire surgery.  Jessica Motley MD

## 2023-05-25 NOTE — ANESTHESIA POSTPROCEDURE EVALUATION
Patient: Grace Chinchilla    Procedure: Procedure(s):  LEFT EYE VITRECTOMY, PARS PLANA APPROACH, USING 25-GAUGE INSTRUMENTS, Membrane peel, endo laser       Anesthesia Type:  General    Note:  Disposition: Outpatient   Postop Pain Control: Uneventful            Sign Out: Well controlled pain   PONV: No   Neuro/Psych: Uneventful            Sign Out: Acceptable/Baseline neuro status   Airway/Respiratory: Uneventful            Sign Out: Acceptable/Baseline resp. status   CV/Hemodynamics: Uneventful            Sign Out: Acceptable CV status; No obvious hypovolemia; No obvious fluid overload   Other NRE: NONE   DID A NON-ROUTINE EVENT OCCUR?            Last vitals:  Vitals Value Taken Time   /61 05/25/23 0945   Temp 36.5  C (97.7  F) 05/25/23 0945   Pulse 65 05/25/23 0952   Resp 28 05/25/23 0952   SpO2 92 % 05/25/23 0952   Vitals shown include unvalidated device data.    Electronically Signed By: Luis Armando Collins MD  May 25, 2023  11:16 AM

## 2023-05-25 NOTE — ANESTHESIA CARE TRANSFER NOTE
Patient: Grace Chinchilla    Procedure: Procedure(s):  LEFT EYE VITRECTOMY, PARS PLANA APPROACH, USING 25-GAUGE INSTRUMENTS, Membrane peel, endo laser       Diagnosis: Epiretinal membrane (ERM) of left eye [H35.372]  Diagnosis Additional Information: No value filed.    Anesthesia Type:   General     Note:    Oropharynx: oropharynx clear of all foreign objects  Level of Consciousness: awake  Oxygen Supplementation: face mask    Independent Airway: airway patency satisfactory and stable  Dentition: dentition unchanged  Vital Signs Stable: post-procedure vital signs reviewed and stable  Report to RN Given: handoff report given  Patient transferred to: PACU    Handoff Report: Identifed the Patient, Identified the Reponsible Provider, Reviewed the pertinent medical history, Discussed the surgical course, Reviewed Intra-OP anesthesia mangement and issues during anesthesia, Set expectations for post-procedure period and Allowed opportunity for questions and acknowledgement of understanding      Vitals:  Vitals Value Taken Time   BP     Temp     Pulse 66 05/25/23 0932   Resp 18 05/25/23 0932   SpO2 97 % 05/25/23 0932   Vitals shown include unvalidated device data.    Electronically Signed By: ZACH Medrano CRNA  May 25, 2023  9:33 AM

## 2023-05-26 ENCOUNTER — OFFICE VISIT (OUTPATIENT)
Dept: OPHTHALMOLOGY | Facility: CLINIC | Age: 68
End: 2023-05-26
Attending: STUDENT IN AN ORGANIZED HEALTH CARE EDUCATION/TRAINING PROGRAM
Payer: COMMERCIAL

## 2023-05-26 DIAGNOSIS — Z48.810 AFTERCARE FOLLOWING SURGERY OF A SENSE ORGAN: Primary | ICD-10-CM

## 2023-05-26 PROCEDURE — G0463 HOSPITAL OUTPT CLINIC VISIT: HCPCS | Performed by: STUDENT IN AN ORGANIZED HEALTH CARE EDUCATION/TRAINING PROGRAM

## 2023-05-26 PROCEDURE — 99024 POSTOP FOLLOW-UP VISIT: CPT | Performed by: STUDENT IN AN ORGANIZED HEALTH CARE EDUCATION/TRAINING PROGRAM

## 2023-05-26 ASSESSMENT — CONF VISUAL FIELD
OS_SUPERIOR_TEMPORAL_RESTRICTION: 0
OD_INFERIOR_NASAL_RESTRICTION: 0
METHOD: COUNTING FINGERS
OS_INFERIOR_NASAL_RESTRICTION: 0
OS_SUPERIOR_NASAL_RESTRICTION: 0
OD_SUPERIOR_TEMPORAL_RESTRICTION: 0
OD_INFERIOR_TEMPORAL_RESTRICTION: 0
OS_NORMAL: 1
OD_NORMAL: 1
OS_INFERIOR_TEMPORAL_RESTRICTION: 0
OD_SUPERIOR_NASAL_RESTRICTION: 0

## 2023-05-26 ASSESSMENT — VISUAL ACUITY
OD_SC+: -1
METHOD: SNELLEN - LINEAR
OD_PH_SC: 20/30
OS_PH_SC: 20/500
OD_PH_SC+: +2
OD_SC: 20/125

## 2023-05-26 ASSESSMENT — EXTERNAL EXAM - RIGHT EYE: OD_EXAM: NORMAL

## 2023-05-26 ASSESSMENT — TONOMETRY
OD_IOP_MMHG: 12
OS_IOP_MMHG: 8
IOP_METHOD: ICARE

## 2023-05-26 ASSESSMENT — CUP TO DISC RATIO: OS_RATIO: 0.3

## 2023-05-26 ASSESSMENT — SLIT LAMP EXAM - LIDS: COMMENTS: NORMAL

## 2023-05-26 ASSESSMENT — EXTERNAL EXAM - LEFT EYE: OS_EXAM: NORMAL

## 2023-05-26 NOTE — PROGRESS NOTES
CC -   ERM OS    INTERVAL HISTORY - Pt reports double vision out of the left eye that does not disappear when she closes her right eye. She reports blurred vision OS and irritation, but denies pain.    PMH -   Grace Chinchilla is a  68 year old year-old patient with history of ERM OS.  Noted 9/2022, uncertain when GELACIO prior was, likely 7-8 years per patient. Had not noticed vision change OS         PAST OCULAR SURGERY  None      RETINAL IMAGING:  OCT  10/04/22   OD - retina normal, PVD  OS - severe ERM with CME, PVD      ASSESSMENT & PLAN    # ERM OS  # s/p 25g PPV, ERM/ILM peel, endolaser OS   - noted 9/2022 uncertain onset   -IOP good, Retina Attached, doing well   -Discussed post-op precautions including endophthalmitis precautions   -Discussed that no positioning is required   -Provided reassurance about monocular diplopia OS   -PF QID, Oflox QID, emycin ointment PRN for discomfort      # PVD OU   - advised S/Sx RD 10/2022      # NS OU   - early VS      return to clinic: POW1    ATTESTATION     Attending Attestation:     Complete documentation of historical and exam elements from today's encounter can be found in the full encounter summary report (not reduplicated in this progress note).  I personally obtained the chief complaint(s) and history of present illness.  I confirmed and edited as necessary the review of systems, past medical/surgical history, family history, social history, and examination findings as documented by others; and I examined the patient myself.  I personally reviewed the relevant tests, images, and reports as documented above.  I formulated and edited as necessary the assessment and plan and discussed the findings and management plan with the patient and family    Benitez Quinones MD MPH  Vitreoretinal Fellow PGY-5  [facility]

## 2023-05-26 NOTE — NURSING NOTE
Chief Complaints and History of Present Illnesses   Patient presents with     Post Op (Ophthalmology) Left Eye     1 day post op of vitrectomy and membrane peel, endo laser left eye 5/25/2023.      Chief Complaint(s) and History of Present Illness(es)     Post Op (Ophthalmology) Left Eye            Laterality: left eye    Associated symptoms: flashes.  Negative for eye pain and floaters    Treatments tried: no treatments    Pain scale: 5/10    Comments: 1 day post op of vitrectomy and membrane peel, endo laser left eye 5/25/2023.          Comments    Eye meds: none  Was not told how to sleep last night, slept on right side.  Some itchiness left eye.  Saw blue [place] of light left eye all day yesterday.  Small light still in left eye.  Eye pain inner corner of left eye, on side of nose.    SHELLI Young 5/26/2023 9:53 AM

## 2023-06-02 ENCOUNTER — OFFICE VISIT (OUTPATIENT)
Dept: OPHTHALMOLOGY | Facility: CLINIC | Age: 68
End: 2023-06-02
Attending: STUDENT IN AN ORGANIZED HEALTH CARE EDUCATION/TRAINING PROGRAM
Payer: COMMERCIAL

## 2023-06-02 DIAGNOSIS — H35.372 EPIRETINAL MEMBRANE (ERM) OF LEFT EYE: Primary | ICD-10-CM

## 2023-06-02 PROCEDURE — 92134 CPTRZ OPH DX IMG PST SGM RTA: CPT | Performed by: OPHTHALMOLOGY

## 2023-06-02 PROCEDURE — G0463 HOSPITAL OUTPT CLINIC VISIT: HCPCS | Performed by: OPHTHALMOLOGY

## 2023-06-02 PROCEDURE — 99207 OCT RETINA SPECTRALIS OU (BOTH EYE): CPT | Mod: 26 | Performed by: OPHTHALMOLOGY

## 2023-06-02 PROCEDURE — 99024 POSTOP FOLLOW-UP VISIT: CPT | Mod: GC | Performed by: OPHTHALMOLOGY

## 2023-06-02 ASSESSMENT — CUP TO DISC RATIO
OS_RATIO: 0.3
OD_RATIO: 0.3

## 2023-06-02 ASSESSMENT — CONF VISUAL FIELD
OD_INFERIOR_TEMPORAL_RESTRICTION: 0
OD_SUPERIOR_TEMPORAL_RESTRICTION: 0
OD_INFERIOR_NASAL_RESTRICTION: 0
OD_SUPERIOR_NASAL_RESTRICTION: 0
OS_INFERIOR_TEMPORAL_RESTRICTION: 3
OD_NORMAL: 1
METHOD: COUNTING FINGERS

## 2023-06-02 ASSESSMENT — EXTERNAL EXAM - RIGHT EYE: OD_EXAM: NORMAL

## 2023-06-02 ASSESSMENT — TONOMETRY
OD_IOP_MMHG: 14
OS_IOP_MMHG: 19
IOP_METHOD: TONOPEN

## 2023-06-02 ASSESSMENT — REFRACTION_WEARINGRX
OS_AXIS: 010
SPECS_TYPE: PAL
OD_SPHERE: -2.50
OS_CYLINDER: +2.75
OS_ADD: +2.50
OD_CYLINDER: +2.50
OS_SPHERE: -1.75
OD_AXIS: 147
OD_ADD: +2.50

## 2023-06-02 ASSESSMENT — VISUAL ACUITY
OD_CC: 20/30
METHOD: SNELLEN - LINEAR
CORRECTION_TYPE: GLASSES
OS_CC: 20/250

## 2023-06-02 ASSESSMENT — SLIT LAMP EXAM - LIDS
COMMENTS: NORMAL
COMMENTS: NORMAL

## 2023-06-02 ASSESSMENT — EXTERNAL EXAM - LEFT EYE: OS_EXAM: NORMAL

## 2023-06-02 NOTE — NURSING NOTE
"Patient reports vision still feels \"fuzzy\" worse in the mornings, also some light sensitivity.   Will notice some double vision but not as bad as prior per pt. Blurriness is consistent.  No concerns / changes in right eye vision.    Eye Drops:   Prednisolone QID left eye , lgtts 9 AM  Ofloxacin QID left eye , lgtts 9 : 10 AM       PENNY GUILLAUME , June 2, 2023   "

## 2023-06-02 NOTE — PATIENT INSTRUCTIONS
POST-OPERATIVE INSTRUCTIONS FOLLOWING RETINA SURGERY AFTER THE FIRST WEEK    Jessica Motley MD, PhD  Department of Ophthalmology  [facility]  [phone]      ACTIVITY:  No heavy lifting for 2 weeks after surgery.  No swimming for 3 weeks after surgery.  It is OK for you to shower, to wash your face, and to wash your hair.  Allow the shower to hit the top of your head and wash down your face.  Do not hit your eye directly with the jet from the shower.  You can stop using the shield at night        EYE DROPS  From now on use the drops as instructed below.  When using more than one drop, separate them by 3 minutes between drops.   Do not stop your drops without discussing with our office.  If you run out before your appointment, call and we will send in a refill.        STOP the ofloxacin  Pred Forte (prednisolone acetate) --- use 3 times per day for 1 week, 2 times per day for 1 week, 1 time per day for 1 week, then stop    WHAT TO EXPECT  It is common for the eye to to have a blood tinged discharge for a few days after surgery  It may feel irritated (as if something were in your eye), for there to be clear discharge (thicker in the mornings upon awakening), and for it to be bloodshot for 2-3 weeks following retina surgery.             WHAT TO WATCH OUT FOR  If you experience any of the following, you should call immediately:  Increasing pain  Increasing nausea or vomiting  Increasing redness  Worsening or darkening of the vision  New flashing lights or floaters      For any of the symptoms listed above, or for other concerns, call [phone] and ask to speak to the clinic nurse.  If you call after hours, follow to prompts to reach the doctor on call.

## 2023-06-02 NOTE — PROGRESS NOTES
CC -   ERM OS    INTERVAL HISTORY - POW1. Pt feels like left eye is sore (mostly mornings). Using drops. Feels like right eye is the same. The left eye is blurry.  Has diplopia was present before surgery, and is partly improved from pre-op      Drops:  Eye  Frequency    Prednisolone Left  4x/day  Ofloxacin Left  4x/day      PMH -   Grace Chinchilla is a  68 year old year-old patient with history of ERM OS.  Noted 9/2022, uncertain when GELACIO prior was, likely 7-8 years per patient. Had not noticed vision change OS         PAST OCULAR SURGERY  PPV/MS OS 5/25/23      RETINAL IMAGING:  OCT  06/02/23    OD - retina normal, PVD  OS -  tr residual ERM, inner retina thin superior & inferior macula similar to pre-op      ASSESSMENT & PLAN    # POW #1 OS   - s/p PPV/MS 5/25/23 for ERM    - IOP OK   - retina flat   - no infection     - stop ofloxacin   - taper PF 3/2/1/0     - pre-op diplopia improving   - VA better than last week,    - monitor   - recheck 1 month        # H/o ERM OS   - noted 9/2022 uncertain onset   - s/p 25g PPV, ERM/ILM peel, endolaser OS 5/25/23        # PVD OD   - advised S/Sx RD 6/2023      # NS OU   - early VS      # retinal anomalies   - ?thin inner retina noted pre-op 2/2023   - no glaucoma, no e/o CHOW   - monitor        return to clinic: POM1 with MRx VTD and OCT Mac    Freddy Echols MD  Vitreoretinal Surgical Fellow, PGY6  [facility]    ATTESTATION     Attending Physician Attestation:      Complete documentation of historical and exam elements from today's encounter can be found in the full encounter summary report (not reduplicated in this progress note).  I personally obtained the chief complaint(s) and history of present illness.  I confirmed and edited as necessary the review of systems, past medical/surgical history, family history, social history, and examination findings as documented by others; and I examined the patient myself.  I personally reviewed the relevant tests,  images, and reports as documented above.  I personally reviewed the ophthalmic test(s) associated with this encounter, agree with the interpretation(s) as documented by the resident/fellow, and have edited the corresponding report(s) as necessary.   I formulated and edited as necessary the assessment and plan and discussed the findings and management plan with the patient and family    Jessica Motley MD, PhD  , Vitreoretinal Surgery  Department of Ophthalmology  [facility]

## 2023-06-14 DIAGNOSIS — H35.372 EPIRETINAL MEMBRANE (ERM) OF LEFT EYE: Primary | ICD-10-CM

## 2023-06-23 ENCOUNTER — TELEPHONE (OUTPATIENT)
Dept: OPHTHALMOLOGY | Facility: CLINIC | Age: 68
End: 2023-06-23
Payer: COMMERCIAL

## 2023-06-23 ENCOUNTER — NURSE TRIAGE (OUTPATIENT)
Dept: NURSING | Facility: CLINIC | Age: 68
End: 2023-06-23
Payer: COMMERCIAL

## 2023-06-23 NOTE — TELEPHONE ENCOUNTER
Nurse Triage SBAR    Is this a 2nd Level Triage? YES, LICENSED PRACTITIONER REVIEW IS REQUIRED    Situation: Left EYEBROW pain with redness to eye, pain is ache for a few days.  Denies blurred or double vision.  Has an appt on Next Friday, should she be seen sooner.  Not available on Tuesday.    Background:   Complaint of Left eyebrow area hurts with blinking that comes and goes,  Pain is more ache, not using anything for it currently.  White of eye red, more towards the ear side than the nose side,  She is able to see the clock, read a book and use her phone.  She wears sun glasses, so unsure of light sentivity.  She does have floaters.  She did have an episode after surgery of a larger black  dot on the left eye x 1, that she thought it was a bug, but has not had this since then.  She does not have flashes of light.  She states that she finished her drops yesterday as directed, the ones with the pink top.  She does state that she put some Erythromycin oint, which she was prescribed, because it has her name on it.  She puts this in her eye every night at bedtime to left eye.  She is almost out of this.  Denies fever, blurred or double vision.  She denies drainage, but she does state that when she closes her eyes, it is like they want to stick together and not open      Protocol Recommended Disposition:   No disposition on file.    Recommendation:      Routed to provider    Reason for Disposition    Eye pain present > 24 hours    Additional Information    Negative: SEVERE eye pain    Negative: Complete loss of vision in one or both eyes    Negative: Eyelids are very swollen (shut or almost) and fever    Negative: Eyelid (outer) is very red and fever    Negative: Foreign body sensation ('feels like something is in there') and irrigation didn't help    Negative: Vomiting    Negative: Ulcer or sore seen on the cornea (clear center part of the eye)    Negative: Recent eye surgery and increasing eye pain    Negative:  "Blurred vision and new or worsening    Negative: Patient sounds very sick or weak to the triager    Negative: MODERATE eye pain or discomfort (e.g., interferes with normal activities or awakens from sleep; more than mild)    Negative: Looking at light causes MODERATE to SEVERE eye pain (i.e., photophobia)    Negative: Eyelids are very swollen (shut or almost) and no fever    Negative: Painful rash near eye and multiple small blisters grouped together    Negative: Pain followed bright light exposure from welding    Negative: Yellow or green pus occurs    Negative: Patient wants to be seen    Negative: MILD eye pains are a recurrent problem    Answer Assessment - Initial Assessment Questions  1. ONSET: \"When did the pain start?\" (e.g., minutes, hours, days)      Left EYEBROW pain x couple of days hurts with blinking  2. TIMING: \"Does the pain come and go, or has it been constant since it started?\" (e.g., constant, intermittent, fleeting)      Comes and goes  3. SEVERITY: \"How bad is the pain?\"   (Scale 1-10; mild, moderate or severe)    - MILD (1-3): doesn't interfere with normal activities     - MODERATE (4-7): interferes with normal activities or awakens from sleep     - SEVERE (8-10): excruciating pain and patient unable to do normal activities      Aches, but not throbbing, comes and goes  4. LOCATION: \"Where does it hurt?\"  (e.g., eyelid, eye, cheekbone)      Left eye brow area  5. CAUSE: \"What do you think is causing the pain?\"      She does not know  6. VISION: \"Do you have blurred vision or changes in your vision?\"   She denies blurred or double vision  7. EYE DISCHARGE: \"Is there any discharge (pus) from the eye(s)?\"  If yes, ask: \"What color is it?\"       She denies drainage, but she does state that when she closes her eyes, it is like they want to stick together and not open.  8. FEVER: \"Do you have a fever?\" If Yes, ask: \"What is it, how was it measured, and when did it start?\"       no  9. OTHER SYMPTOMS: " "\"Do you have any other symptoms?\" (e.g., headache, nasal discharge, facial rash)      She is able to see the clock, read a book and use her phone.  She wears sun glasses, so unsure of light sentivity.  She does have floaters.  She did have an episode after surgery of a larger dot on the left eye x 1, that she thought it was a bug, but has not had this since then.  She does not have flashes of light.  She states that she finished her drops yesterday as directed, the ones with the pink top.  She does state that she put some Erythromycin oint, which she was prescribed, because it has her name on it.  She puts this in her eye every night at bedtime to left eye.  She is almost out of this.  10. PREGNANCY: \"Is there any chance you are pregnant?\" \"When was your last menstrual period?\"  no    Protocols used: EYE PAIN AND OTHER SYMPTOMS-A-OH      "

## 2023-06-23 NOTE — TELEPHONE ENCOUNTER
Spoke to patient at 1645    S/p PPV/MP 5-25-23    Pt finished with prednisolone eye drop yesterday    Pt having intermittent type dull aches/brow pain times two days without visit changes    Pt states eye more red past two days    Will review/confirm plan with Dr. Quinones and call back    Heraclio Wells RN 4:52 PM 06/23/23

## 2023-06-23 NOTE — TELEPHONE ENCOUNTER
S/p PPV/MP 5-25-23     Pt finished with prednisolone eye drop yesterday     Pt having intermittent type dull aches/brow pain that started wednesday without visit changes, increased floaters or pain of the eyeball itself.    She reports some localized redness of the lateral canthus.     I provided reassurance and mentioned that I think it was appropriate for us to monitor at this time and see her in clinic for her next scheduled appt next week. I discussed red flag symptoms for endophthalmitis including worsening pain, vision, floaters, redness and told her to call back immediately if she notices any (even if over the weekend). She said she would do so.

## 2023-06-27 NOTE — TELEPHONE ENCOUNTER
Dr. Quinones confirmed last Friday would contact patient that evening    Heraclio Wells RN 8:09 AM 06/27/23

## 2023-06-30 ENCOUNTER — OFFICE VISIT (OUTPATIENT)
Dept: OPHTHALMOLOGY | Facility: CLINIC | Age: 68
End: 2023-06-30
Attending: OPHTHALMOLOGY
Payer: COMMERCIAL

## 2023-06-30 DIAGNOSIS — H35.372 EPIRETINAL MEMBRANE (ERM) OF LEFT EYE: ICD-10-CM

## 2023-06-30 PROCEDURE — G0463 HOSPITAL OUTPT CLINIC VISIT: HCPCS | Performed by: OPHTHALMOLOGY

## 2023-06-30 PROCEDURE — 92134 CPTRZ OPH DX IMG PST SGM RTA: CPT | Performed by: OPHTHALMOLOGY

## 2023-06-30 PROCEDURE — 99024 POSTOP FOLLOW-UP VISIT: CPT | Performed by: OPHTHALMOLOGY

## 2023-06-30 RX ORDER — PREDNISOLONE ACETATE 10 MG/ML
SUSPENSION/ DROPS OPHTHALMIC
Qty: 4 ML | Refills: 0 | Status: SHIPPED | OUTPATIENT
Start: 2023-06-30 | End: 2023-07-28

## 2023-06-30 ASSESSMENT — VISUAL ACUITY
OS_PH_CC: 20/150
METHOD: SNELLEN - LINEAR
OS_CC: 20/250-
OS_PH_CC+: +1
OD_CC: 20/30
OD_PH_CC: 20/25
CORRECTION_TYPE: GLASSES

## 2023-06-30 ASSESSMENT — SLIT LAMP EXAM - LIDS
COMMENTS: NORMAL
COMMENTS: NORMAL

## 2023-06-30 ASSESSMENT — CONF VISUAL FIELD
OD_NORMAL: 1
OS_NORMAL: 1
OS_SUPERIOR_TEMPORAL_RESTRICTION: 0
OD_SUPERIOR_TEMPORAL_RESTRICTION: 0
OD_INFERIOR_NASAL_RESTRICTION: 0
METHOD: COUNTING FINGERS
OD_SUPERIOR_NASAL_RESTRICTION: 0
OS_INFERIOR_TEMPORAL_RESTRICTION: 0
OS_INFERIOR_NASAL_RESTRICTION: 0
OS_SUPERIOR_NASAL_RESTRICTION: 0
OD_INFERIOR_TEMPORAL_RESTRICTION: 0

## 2023-06-30 ASSESSMENT — REFRACTION_WEARINGRX
SPECS_TYPE: PAL
OS_AXIS: 010
OS_SPHERE: -1.75
OD_ADD: +2.50
OD_AXIS: 147
OS_CYLINDER: +2.75
OD_SPHERE: -2.50
OS_ADD: +2.50
OD_CYLINDER: +2.50

## 2023-06-30 ASSESSMENT — REFRACTION_MANIFEST
OD_AXIS: 147
OS_AXIS: 010
OS_SPHERE: -1.75
OD_ADD: +2.50
OS_CYLINDER: +3.25
OD_SPHERE: -2.50
OD_CYLINDER: +2.00
OS_ADD: +2.50

## 2023-06-30 ASSESSMENT — EXTERNAL EXAM - LEFT EYE: OS_EXAM: NORMAL

## 2023-06-30 ASSESSMENT — TONOMETRY
IOP_METHOD: TONOPEN
OS_IOP_MMHG: 19
OD_IOP_MMHG: 17

## 2023-06-30 ASSESSMENT — EXTERNAL EXAM - RIGHT EYE: OD_EXAM: NORMAL

## 2023-06-30 ASSESSMENT — CUP TO DISC RATIO: OS_RATIO: 0.3

## 2023-06-30 NOTE — PROGRESS NOTES
CC -   Post Op OS ERM peel    INTERVAL HISTORY - POM#1, had ache OS over brow last week, today no change  fnished gtts      PMH -   Grace Chinchilla is a  68 year old year-old patient with history of ERM OS.  Noted 9/2022, uncertain when GELACIO prior was, likely 7-8 years per patient. Had not noticed vision change OS         PAST OCULAR SURGERY  PPV/MS OS 5/25/23      RETINAL IMAGING:  OCT 06/30/23     OD - retina normal, PVD  OS -  tr residual ERM, inner retina thin superior & inferior macula similar to pre-op      ASSESSMENT & PLAN    # POM #1 OS   - s/p PPV/MS 5/25/23 for ERM    - IOP OK   - retina flat   - no infection     - suture granulomas   - PF 4/3/2/1/0     - VA 20/80 MRx   - monitor   - recheck 1 month        # H/o ERM OS   - noted 9/2022 uncertain onset   - pre-op VA 20/100   - s/p 25g PPV, ERM/ILM peel, endolaser OS 5/25/23        # PVD OD   - advised S/Sx RD 6/2023      # NS OU   - early VS      # retinal anomalies   - ?thin inner retina noted pre-op 2/2023   - no glaucoma, no e/o CHOW   - monitor        return to clinic: 1 month, DFE OS, diagnositc MRx OS, OCT OS      ATTESTATION     Attending Physician Attestation:      Complete documentation of historical and exam elements from today's encounter can be found in the full encounter summary report (not reduplicated in this progress note).  I personally obtained the chief complaint(s) and history of present illness.  I confirmed and edited as necessary the review of systems, past medical/surgical history, family history, social history, and examination findings as documented by others; and I examined the patient myself.  I personally reviewed the relevant tests, images, and reports as documented above.  I formulated and edited as necessary the assessment and plan and discussed the findings and management plan with the patient and family    Jessica Motley MD, PhD  , Vitreoretinal Surgery  Department of Ophthalmology  [facility]  [place]

## 2023-06-30 NOTE — NURSING NOTE
Chief Complaints and History of Present Illnesses   Patient presents with     Post Op (Ophthalmology) Left Eye     1 month post op left eye vitrectomy 05/25/2023.      Chief Complaint(s) and History of Present Illness(es)     Post Op (Ophthalmology) Left Eye            Laterality: left eye    Associated symptoms: redness.  Negative for eye pain, flashes and floaters    Pain scale: 0/10    Comments: 1 month post op left eye vitrectomy 05/25/2023.          Comments    Eye meds: none  Last week redness left eye.  Left eye brow discomfort last week.  When waking up sometimes her left eye gets stuck closed.    SHELLI Young 6/30/2023 11:18 AM

## 2023-07-10 ENCOUNTER — TELEPHONE (OUTPATIENT)
Dept: FAMILY MEDICINE | Facility: CLINIC | Age: 68
End: 2023-07-10
Payer: COMMERCIAL

## 2023-07-10 NOTE — TELEPHONE ENCOUNTER
Patient informed      ----- Message from Ayana Chauhan PA-C sent at 7/9/2023 11:53 PM CDT -----  I'm not sure if she ever got these results?  Her echocardiogram was normal.

## 2023-07-10 NOTE — TELEPHONE ENCOUNTER
----- Message from Ayana Chauhan PA-C sent at 7/9/2023 11:53 PM CDT -----  I'm not sure if she ever got these results?  Her echocardiogram was normal.

## 2023-07-24 DIAGNOSIS — E03.9 HYPOTHYROIDISM, UNSPECIFIED TYPE: Primary | ICD-10-CM

## 2023-07-24 RX ORDER — LEVOTHYROXINE SODIUM 125 UG/1
125 TABLET ORAL DAILY
Qty: 30 TABLET | Refills: 0 | Status: SHIPPED | OUTPATIENT
Start: 2023-07-24 | End: 2023-08-03

## 2023-07-24 NOTE — TELEPHONE ENCOUNTER
Medication Question or Refill    Contacts         Type Contact Phone/Fax    07/24/2023 09:44 AM CDT Phone (Incoming) Grace Chinchilla (Self) [phone] (H)            What medication are you calling about (include dose and sig)?: Levothyroxine 125MCG     Preferred Pharmacy:    [facility] - [place], [place] - [address] [place] [address]  Phone: [phone] Fax: [phone]        Controlled Substance Agreement on file:   CSA -- Patient Level:    CSA: None found at the patient level.       Who prescribed the medication?:     Do you need a refill? Yes    When did you use the medication last? Today    Patient offered an appointment? No    Do you have any questions or concerns?  Yes: Is requesting bridge rx to last until visit next week, is out after tomorrow

## 2023-07-24 NOTE — TELEPHONE ENCOUNTER
Routing refill request to provider for review/approval because:  --Ordered by providers that I am not familiar with.  --Current provider to authorize this time.  --TSH is overdue.  --Patient says in previous documentation this encounter -  Is requesting bridge rx to last until visit next week, is out after tomorrow          --Last visit: 3/31/23 Myron to establish     --Future Visit:   Next 5 appointments (look out 90 days)      Aug 03, 2023 10:00 AM  (Arrive by 9:40 AM)  Annual Wellness Visit with ZACH Haas CNP  [facility] [address]  [address]  [place] [address] [phone]            --Last Written Prescription:     Disp Refills Start End NEEMA   levothyroxine (SYNTHROID/LEVOTHROID) 125 MCG tablet   5/23/2022  --   Sig - Route: Take 125 mcg by mouth - Oral   Class: Historical       TSH   Date Value Ref Range Status   03/14/2022 3.25 0.30 - 5.00 uIU/mL Final   08/30/2021 3.10 0.30 - 5.00 uIU/mL Final   01/08/2021 3.44 0.30 - 5.00 uIU/mL Final   12/20/2019 1.60 0.30 - 5.00 uIU/mL Final   09/17/2019 1.55 0.30 - 5.00 uIU/mL Final   04/02/2007 5.88 (H) 0.4 - 5.0 mU/L Final   08/29/2006 2.88 0.4 - 5.0 mU/L Final   07/18/2005 3.55 0.4 - 5.0 mU/L Final   08/17/2004 4.55 0.4 - 5.0 mU/L Final   04/17/2003 4.62 0.4 - 5.0 mU/L Final

## 2023-07-25 DIAGNOSIS — H35.372 EPIRETINAL MEMBRANE (ERM) OF LEFT EYE: Primary | ICD-10-CM

## 2023-08-03 ENCOUNTER — OFFICE VISIT (OUTPATIENT)
Dept: FAMILY MEDICINE | Facility: CLINIC | Age: 68
End: 2023-08-03
Payer: COMMERCIAL

## 2023-08-03 VITALS
WEIGHT: 198 LBS | HEART RATE: 71 BPM | HEIGHT: 61 IN | DIASTOLIC BLOOD PRESSURE: 73 MMHG | SYSTOLIC BLOOD PRESSURE: 123 MMHG | RESPIRATION RATE: 15 BRPM | TEMPERATURE: 97 F | OXYGEN SATURATION: 96 % | BODY MASS INDEX: 37.38 KG/M2

## 2023-08-03 DIAGNOSIS — E78.2 MIXED HYPERLIPIDEMIA: ICD-10-CM

## 2023-08-03 DIAGNOSIS — Z79.899 ENCOUNTER FOR LONG-TERM (CURRENT) USE OF MEDICATIONS: ICD-10-CM

## 2023-08-03 DIAGNOSIS — R79.89 ELEVATED FERRITIN: ICD-10-CM

## 2023-08-03 DIAGNOSIS — Z00.00 ENCOUNTER FOR MEDICARE ANNUAL WELLNESS EXAM: Primary | ICD-10-CM

## 2023-08-03 DIAGNOSIS — R79.89 ELEVATED VITAMIN B12 LEVEL: ICD-10-CM

## 2023-08-03 DIAGNOSIS — E03.9 HYPOTHYROIDISM, UNSPECIFIED TYPE: ICD-10-CM

## 2023-08-03 DIAGNOSIS — R73.03 PRE-DIABETES: ICD-10-CM

## 2023-08-03 DIAGNOSIS — I10 BENIGN ESSENTIAL HYPERTENSION: ICD-10-CM

## 2023-08-03 LAB
ALBUMIN SERPL BCG-MCNC: 4.6 G/DL (ref 3.5–5.2)
ALP SERPL-CCNC: 86 U/L (ref 35–104)
ALT SERPL W P-5'-P-CCNC: 13 U/L (ref 0–50)
ANION GAP SERPL CALCULATED.3IONS-SCNC: 9 MMOL/L (ref 7–15)
AST SERPL W P-5'-P-CCNC: 22 U/L (ref 0–45)
BASOPHILS # BLD AUTO: 0 10E3/UL (ref 0–0.2)
BASOPHILS NFR BLD AUTO: 0 %
BILIRUB SERPL-MCNC: 0.3 MG/DL
BUN SERPL-MCNC: 21.7 MG/DL (ref 8–23)
CALCIUM SERPL-MCNC: 9.8 MG/DL (ref 8.8–10.2)
CHLORIDE SERPL-SCNC: 103 MMOL/L (ref 98–107)
CHOLEST SERPL-MCNC: 187 MG/DL
CREAT SERPL-MCNC: 0.79 MG/DL (ref 0.51–0.95)
DEPRECATED HCO3 PLAS-SCNC: 28 MMOL/L (ref 22–29)
EOSINOPHIL # BLD AUTO: 0.2 10E3/UL (ref 0–0.7)
EOSINOPHIL NFR BLD AUTO: 3 %
ERYTHROCYTE [DISTWIDTH] IN BLOOD BY AUTOMATED COUNT: 14.5 % (ref 10–15)
FERRITIN SERPL-MCNC: 267 NG/ML (ref 11–328)
GFR SERPL CREATININE-BSD FRML MDRD: 81 ML/MIN/1.73M2
GLUCOSE SERPL-MCNC: 84 MG/DL (ref 70–99)
HBA1C MFR BLD: 6.2 % (ref 0–5.6)
HCT VFR BLD AUTO: 38.1 % (ref 35–47)
HDLC SERPL-MCNC: 50 MG/DL
HGB BLD-MCNC: 12.1 G/DL (ref 11.7–15.7)
IMM GRANULOCYTES # BLD: 0 10E3/UL
IMM GRANULOCYTES NFR BLD: 0 %
LDLC SERPL CALC-MCNC: 100 MG/DL
LYMPHOCYTES # BLD AUTO: 1.8 10E3/UL (ref 0.8–5.3)
LYMPHOCYTES NFR BLD AUTO: 27 %
MCH RBC QN AUTO: 26.9 PG (ref 26.5–33)
MCHC RBC AUTO-ENTMCNC: 31.8 G/DL (ref 31.5–36.5)
MCV RBC AUTO: 85 FL (ref 78–100)
MONOCYTES # BLD AUTO: 0.9 10E3/UL (ref 0–1.3)
MONOCYTES NFR BLD AUTO: 14 %
NEUTROPHILS # BLD AUTO: 3.8 10E3/UL (ref 1.6–8.3)
NEUTROPHILS NFR BLD AUTO: 56 %
NONHDLC SERPL-MCNC: 137 MG/DL
NRBC # BLD AUTO: 0 10E3/UL
NRBC BLD AUTO-RTO: 0 /100
PLATELET # BLD AUTO: 266 10E3/UL (ref 150–450)
POTASSIUM SERPL-SCNC: 4.6 MMOL/L (ref 3.4–5.3)
PROT SERPL-MCNC: 7.3 G/DL (ref 6.4–8.3)
RBC # BLD AUTO: 4.5 10E6/UL (ref 3.8–5.2)
SODIUM SERPL-SCNC: 140 MMOL/L (ref 136–145)
TRIGL SERPL-MCNC: 187 MG/DL
TSH SERPL DL<=0.005 MIU/L-ACNC: 0.49 UIU/ML (ref 0.3–4.2)
VALPROATE SERPL-MCNC: 88.7 UG/ML
VIT B12 SERPL-MCNC: 1200 PG/ML (ref 232–1245)
WBC # BLD AUTO: 6.7 10E3/UL (ref 4–11)

## 2023-08-03 PROCEDURE — 36415 COLL VENOUS BLD VENIPUNCTURE: CPT | Performed by: NURSE PRACTITIONER

## 2023-08-03 PROCEDURE — 85025 COMPLETE CBC W/AUTO DIFF WBC: CPT | Performed by: NURSE PRACTITIONER

## 2023-08-03 PROCEDURE — 83036 HEMOGLOBIN GLYCOSYLATED A1C: CPT | Performed by: NURSE PRACTITIONER

## 2023-08-03 PROCEDURE — 80164 ASSAY DIPROPYLACETIC ACD TOT: CPT | Performed by: NURSE PRACTITIONER

## 2023-08-03 PROCEDURE — 80053 COMPREHEN METABOLIC PANEL: CPT | Performed by: NURSE PRACTITIONER

## 2023-08-03 PROCEDURE — 82728 ASSAY OF FERRITIN: CPT | Performed by: NURSE PRACTITIONER

## 2023-08-03 PROCEDURE — 80061 LIPID PANEL: CPT | Performed by: NURSE PRACTITIONER

## 2023-08-03 PROCEDURE — 82607 VITAMIN B-12: CPT | Performed by: NURSE PRACTITIONER

## 2023-08-03 PROCEDURE — 84443 ASSAY THYROID STIM HORMONE: CPT | Performed by: NURSE PRACTITIONER

## 2023-08-03 PROCEDURE — G0439 PPPS, SUBSEQ VISIT: HCPCS | Performed by: NURSE PRACTITIONER

## 2023-08-03 PROCEDURE — 99214 OFFICE O/P EST MOD 30 MIN: CPT | Mod: 25 | Performed by: NURSE PRACTITIONER

## 2023-08-03 RX ORDER — LOSARTAN POTASSIUM 25 MG/1
25 TABLET ORAL DAILY
Qty: 90 TABLET | Refills: 3 | Status: SHIPPED | OUTPATIENT
Start: 2023-08-03 | End: 2024-08-06

## 2023-08-03 RX ORDER — LEVOTHYROXINE SODIUM 125 UG/1
125 TABLET ORAL DAILY
Qty: 90 TABLET | Refills: 1 | Status: SHIPPED | OUTPATIENT
Start: 2023-08-03 | End: 2024-04-03

## 2023-08-03 RX ORDER — ATORVASTATIN CALCIUM 80 MG/1
80 TABLET, FILM COATED ORAL DAILY
Qty: 90 TABLET | Refills: 3 | Status: SHIPPED | OUTPATIENT
Start: 2023-08-03 | End: 2024-08-06

## 2023-08-03 ASSESSMENT — ENCOUNTER SYMPTOMS
COUGH: 0
PARESTHESIAS: 0
EYE PAIN: 1
NERVOUS/ANXIOUS: 1
ABDOMINAL PAIN: 0
SORE THROAT: 0
FREQUENCY: 0
MYALGIAS: 0
DYSURIA: 0
CONSTIPATION: 0
HEMATOCHEZIA: 0
HEMATURIA: 0
HEADACHES: 0
DIARRHEA: 0
WEAKNESS: 1
FEVER: 0
SHORTNESS OF BREATH: 0
PALPITATIONS: 0
HEARTBURN: 0
BREAST MASS: 0
NAUSEA: 0
ARTHRALGIAS: 1
CHILLS: 0
JOINT SWELLING: 0
DIZZINESS: 0

## 2023-08-03 ASSESSMENT — PATIENT HEALTH QUESTIONNAIRE - PHQ9
SUM OF ALL RESPONSES TO PHQ QUESTIONS 1-9: 16
SUM OF ALL RESPONSES TO PHQ QUESTIONS 1-9: 16
10. IF YOU CHECKED OFF ANY PROBLEMS, HOW DIFFICULT HAVE THESE PROBLEMS MADE IT FOR YOU TO DO YOUR WORK, TAKE CARE OF THINGS AT HOME, OR GET ALONG WITH OTHER PEOPLE: SOMEWHAT DIFFICULT

## 2023-08-03 ASSESSMENT — ACTIVITIES OF DAILY LIVING (ADL)
CURRENT_FUNCTION: MONEY MANAGEMENT REQUIRES ASSISTANCE
CURRENT_FUNCTION: SHOPPING REQUIRES ASSISTANCE

## 2023-08-03 ASSESSMENT — PAIN SCALES - GENERAL: PAINLEVEL: NO PAIN (0)

## 2023-08-03 NOTE — PROGRESS NOTES
"SUBJECTIVE:   Grace is a 68 year old who presents for Preventive Visit.      8/3/2023     9:59 AM   Additional Questions   Roomed by Diana Mason       Are you in the first 12 months of your Medicare coverage?  No    Healthy Habits:     In general, how would you rate your overall health?  Fair    Frequency of exercise:  1 day/week    Duration of exercise:  15-30 minutes    Do you usually eat at least 4 servings of fruit and vegetables a day, include whole grains    & fiber and avoid regularly eating high fat or \"junk\" foods?  No    Taking medications regularly:  No    Barriers to taking medications:  Problems remembering to take them    Medication side effects:  Not applicable    Ability to successfully perform activities of daily living:  Shopping requires assistance and money management requires assistance    Home Safety:  No safety concerns identified    Hearing Impairment:  Need to ask people to speak up or repeat themselves    In the past 6 months, have you been bothered by leaking of urine? Yes    In general, how would you rate your overall mental or emotional health?  Fair    Additional concerns today:  Yes        Have you ever done Advance Care Planning? (For example, a Health Directive, POLST, or a discussion with a medical provider or your loved ones about your wishes): No, advance care planning information given to patient to review.  Patient declined advance care planning discussion at this time.       Fall risk  Fallen 2 or more times in the past year?: No  Any fall with injury in the past year?: No    Cognitive Screening   1) Repeat 3 items (Leader, Season, Table)    2) Clock draw: NORMAL  3) 3 item recall: Recalls 1 object   Results: NORMAL clock, 1-2 items recalled: COGNITIVE IMPAIRMENT LESS LIKELY    Mini-CogTM Copyright VENKATESH Diez. Licensed by the author for use in [facility]; reprinted with permission (bruna@.[email or web address]). All rights reserved.          Reviewed and updated as needed this " visit by clinical staff   Tobacco  Allergies  Meds              Reviewed and updated as needed this visit by Provider                 Social History     Tobacco Use     Smoking status: Never     Smokeless tobacco: Never   Substance Use Topics     Alcohol use: No           8/3/2023     9:46 AM   Alcohol Use   Prescreen: >3 drinks/day or >7 drinks/week? Not Applicable          No data to display              Do you have a current opioid prescription? No  Do you use any other controlled substances or medications that are not prescribed by a provider? None              Current providers sharing in care for this patient include:   Patient Care Team:  Laila Sanches APRN CNP as PCP - General (Nurse Practitioner Primary Care)  Yaya Enciso MD as Assigned PCP  Payam Martins Chi, OD as MD (Optometry)  Arley Abraham MD as Hospitalist (Internal Medicine)  Jessica Motley MD as Assigned Surgical Provider    The following health maintenance items are reviewed in Epic and correct as of today:  Health Maintenance   Topic Date Due     ZOSTER IMMUNIZATION (2 of 3) 11/11/2015     COVID-19 Vaccine (6 - Moderna series) 03/11/2023     MEDICARE ANNUAL WELLNESS VISIT  03/14/2023     TSH W/FREE T4 REFLEX  03/14/2023     ANNUAL REVIEW OF HM ORDERS  08/22/2023     INFLUENZA VACCINE (1) 09/01/2023     COLORECTAL CANCER SCREENING  12/04/2023     MAMMO SCREENING  03/24/2024     FALL RISK ASSESSMENT  08/03/2024     DTAP/TDAP/TD IMMUNIZATION (3 - Td or Tdap) 09/16/2025     LIPID  03/14/2027     ADVANCE CARE PLANNING  03/19/2027     DEXA  09/27/2034     HEPATITIS C SCREENING  Completed     Pneumococcal Vaccine: 65+ Years  Completed     IPV IMMUNIZATION  Aged Out     MENINGITIS IMMUNIZATION  Aged Out     Lab work is in process          3/14/2022     9:45 AM 3/24/2022    12:36 PM   Breast CA Risk Assessment (FHS-7)   Do you have a family history of breast, colon, or ovarian cancer? No / Unknown No / Unknown         Mammogram  "Screening: Recommended mammography every 1-2 years with patient discussion and risk factor consideration  Pertinent mammograms are reviewed under the imaging tab.    Review of Systems   Constitutional:  Negative for chills and fever.   HENT:  Negative for congestion, ear pain, hearing loss and sore throat.    Eyes:  Positive for pain. Negative for visual disturbance.   Respiratory:  Negative for cough and shortness of breath.    Cardiovascular:  Negative for chest pain, palpitations and peripheral edema.   Gastrointestinal:  Negative for abdominal pain, constipation, diarrhea, heartburn, hematochezia and nausea.   Breasts:  Negative for tenderness, breast mass and discharge.   Genitourinary:  Positive for urgency. Negative for dysuria, frequency, genital sores, hematuria, pelvic pain, vaginal bleeding and vaginal discharge.   Musculoskeletal:  Positive for arthralgias. Negative for joint swelling and myalgias.   Skin:  Negative for rash.   Neurological:  Positive for weakness. Negative for dizziness, headaches and paresthesias.   Psychiatric/Behavioral:  Positive for mood changes. The patient is nervous/anxious.          OBJECTIVE:   BP (!) 146/72 (BP Location: Right arm, Patient Position: Sitting, Cuff Size: Adult Regular)   Pulse 71   Temp 97  F (36.1  C) (Temporal)   Resp 15   Ht 1.549 m (5' 1\")   Wt 89.8 kg (198 lb)   LMP 09/13/2006   SpO2 96%   BMI 37.41 kg/m   Estimated body mass index is 37.41 kg/m  as calculated from the following:    Height as of this encounter: 1.549 m (5' 1\").    Weight as of this encounter: 89.8 kg (198 lb).  Physical Exam  GENERAL: healthy, alert and no distress  NECK: no adenopathy, no asymmetry, masses, or scars and thyroid normal to palpation  RESP: lungs clear to auscultation - no rales, rhonchi or wheezes  CV: regular rate and rhythm, normal S1 S2, no S3 or S4, no murmur, click or rub, no peripheral edema and peripheral pulses strong  MS: no gross musculoskeletal defects " noted, no edema  PSYCH: concentration poor and affect normal/bright        ASSESSMENT / PLAN:   (Z00.00) Encounter for Medicare annual wellness exam  (primary encounter diagnosis)  Comment: Reviewed medical/social/family history and health maintenance   Plan:     (E78.2) Mixed hyperlipidemia  Comment:  Stable, tolerating med, no changes at this time  Plan: Lipid panel reflex to direct LDL Non-fasting            (R73.03) Pre-diabetes  Comment:  Stable, tolerating med, no changes at this time   Plan: Comprehensive metabolic panel (BMP + Alb, Alk         Phos, ALT, AST, Total. Bili, TP), Hemoglobin         A1c            (E03.9) Hypothyroidism, unspecified type  Comment:  Stable, tolerating med, no changes at this time.  Plan: TSH WITH FREE T4 REFLEX, levothyroxine         (SYNTHROID/LEVOTHROID) 125 MCG tablet            (I10) Benign essential hypertension  Comment:  Stable, tolerating med, no changes at this time   Plan: Comprehensive metabolic panel (BMP + Alb, Alk         Phos, ALT, AST, Total. Bili, TP)            (R74.8) Elevated vitamin B12 level  Comment: Continues to take supplements, was high on her last check.    Plan: Vitamin B12, CBC with platelets            (R79.89) Elevated ferritin  Comment: Taking supplements, was high on her last check.    Plan: Ferritin, CBC with platelets            (Z79.899) Encounter for long-term (current) use of medications  Comment: Meds managed by psych, they are requesting labs today.  Plan: Valproic acid, CBC with Platelets &         Differential            Patient has been advised of split billing requirements and indicates understanding: Yes      COUNSELING:  Reviewed preventive health counseling, as reflected in patient instructions        She reports that she has never smoked. She has never used smokeless tobacco.      Appropriate preventive services were discussed with this patient, including applicable screening as appropriate for cardiovascular disease, diabetes,  osteopenia/osteoporosis, and glaucoma.  As appropriate for age/gender, discussed screening for colorectal cancer, prostate cancer, breast cancer, and cervical cancer. Checklist reviewing preventive services available has been given to the patient.    Reviewed patients plan of care and provided an AVS. The Intermediate Care Plan ( asthma action plan, low back pain action plan, and migraine action plan) for Grace meets the Care Plan requirement. This Care Plan has been established and reviewed with the Patient.          ZACH Melara [facility]    Identified Health Risks:  Answers submitted by the patient for this visit:  Patient Health Questionnaire (Submitted on 8/3/2023)  If you checked off any problems, how difficult have these problems made it for you to do your work, take care of things at home, or get along with other people?: Somewhat difficult  PHQ9 TOTAL SCORE: 16  The patient was provided with suggestions to help her develop a healthy physical lifestyle.  She is at risk for lack of exercise and has been provided with information to increase physical activity for the benefit of her well-being.  The patient was counseled and encouraged to consider modifying their diet and eating habits. She was provided with information on recommended healthy diet options.  The patient reports that she has difficulty with activities of daily living. I have asked that the patient make a follow up appointment in 53 weeks where this issue will be further evaluated and addressed.  The patient was provided with written information regarding signs of hearing loss.  Information on urinary incontinence and treatment options given to patient.  The patient was provided with suggestions to help her develop a healthy emotional lifestyle.  The patient s PHQ-9 score is consistent with moderate depression. She was provided with information regarding depression and was advised to schedule a follow up  appointment in 53 weeks to further address this issue

## 2023-08-03 NOTE — LETTER
August 8, 2023      Grace GELLER Amor  [address] [place] [address]        Dear ,    We are writing to inform you of your test results.    Your Hemoglobin A1C is slightly elevated in the pre-diabetes range.  Reducing carbohydrates, sugars, alcohol, and increasing activity such as walking is a great way to improve this number.     The rest of your labs look good/stable overall.     Resulted Orders   TSH WITH FREE T4 REFLEX   Result Value Ref Range    TSH 0.49 0.30 - 4.20 uIU/mL   Ferritin   Result Value Ref Range    Ferritin 267 11 - 328 ng/mL   Vitamin B12   Result Value Ref Range    Vitamin B12 1,200 232 - 1,245 pg/mL   Comprehensive metabolic panel (BMP + Alb, Alk Phos, ALT, AST, Total. Bili, TP)   Result Value Ref Range    Sodium 140 136 - 145 mmol/L    Potassium 4.6 3.4 - 5.3 mmol/L    Chloride 103 98 - 107 mmol/L    Carbon Dioxide (CO2) 28 22 - 29 mmol/L    Anion Gap 9 7 - 15 mmol/L    Urea Nitrogen 21.7 8.0 - 23.0 mg/dL    Creatinine 0.79 0.51 - 0.95 mg/dL    Calcium 9.8 8.8 - 10.2 mg/dL    Glucose 84 70 - 99 mg/dL    Alkaline Phosphatase 86 35 - 104 U/L    AST 22 0 - 45 U/L      Comment:      Reference intervals for this test were updated on 6/12/2023 to more accurately reflect our healthy population. There may be differences in the flagging of prior results with similar values performed with this method. Interpretation of those prior results can be made in the context of the updated reference intervals.    ALT 13 0 - 50 U/L      Comment:      Reference intervals for this test were updated on 6/12/2023 to more accurately reflect our healthy population. There may be differences in the flagging of prior results with similar values performed with this method. Interpretation of those prior results can be made in the context of the updated reference intervals.      Protein Total 7.3 6.4 - 8.3 g/dL    Albumin 4.6 3.5 - 5.2 g/dL    Bilirubin Total 0.3 <=1.2 mg/dL    GFR Estimate  81 >60 mL/min/1.73m2   Hemoglobin A1c   Result Value Ref Range    Hemoglobin A1C 6.2 (H) 0.0 - 5.6 %      Comment:      Normal <5.7%   Prediabetes 5.7-6.4%    Diabetes 6.5% or higher     Note: Adopted from ADA consensus guidelines.   Lipid panel reflex to direct LDL Non-fasting   Result Value Ref Range    Cholesterol 187 <200 mg/dL    Triglycerides 187 (H) <150 mg/dL    Direct Measure HDL 50 >=50 mg/dL    LDL Cholesterol Calculated 100 <=100 mg/dL    Non HDL Cholesterol 137 (H) <130 mg/dL    Narrative    Cholesterol  Desirable:  <200 mg/dL    Triglycerides  Normal:  Less than 150 mg/dL  Borderline High:  150-199 mg/dL  High:  200-499 mg/dL  Very High:  Greater than or equal to 500 mg/dL    Direct Measure HDL  Female:  Greater than or equal to 50 mg/dL   Male:  Greater than or equal to 40 mg/dL    LDL Cholesterol  Desirable:  <100mg/dL  Above Desirable:  100-129 mg/dL   Borderline High:  130-159 mg/dL   High:  160-189 mg/dL   Very High:  >= 190 mg/dL    Non HDL Cholesterol  Desirable:  130 mg/dL  Above Desirable:  130-159 mg/dL  Borderline High:  160-189 mg/dL  High:  190-219 mg/dL  Very High:  Greater than or equal to 220 mg/dL   CBC with platelets and differential   Result Value Ref Range    WBC Count 6.7 4.0 - 11.0 10e3/uL    RBC Count 4.50 3.80 - 5.20 10e6/uL    Hemoglobin 12.1 11.7 - 15.7 g/dL    Hematocrit 38.1 35.0 - 47.0 %    MCV 85 78 - 100 fL    MCH 26.9 26.5 - 33.0 pg    MCHC 31.8 31.5 - 36.5 g/dL    RDW 14.5 10.0 - 15.0 %    Platelet Count 266 150 - 450 10e3/uL    % Neutrophils 56 %    % Lymphocytes 27 %    % Monocytes 14 %    % Eosinophils 3 %    % Basophils 0 %    % Immature Granulocytes 0 %    NRBCs per 100 WBC 0 <1 /100    Absolute Neutrophils 3.8 1.6 - 8.3 10e3/uL    Absolute Lymphocytes 1.8 0.8 - 5.3 10e3/uL    Absolute Monocytes 0.9 0.0 - 1.3 10e3/uL    Absolute Eosinophils 0.2 0.0 - 0.7 10e3/uL    Absolute Basophils 0.0 0.0 - 0.2 10e3/uL    Absolute Immature Granulocytes 0.0 <=0.4 10e3/uL     Absolute NRBCs 0.0 10e3/uL   Valproic acid   Result Value Ref Range    Valproic acid 88.7   ug/mL      Comment:      Therapeutic Range:  ug/mL   Epilepsy and yanira patients:  ug/mL   Some patients require and can tolerate values up to 150 ug/mL     Critical: Greater than 150 ug/mL       If you have any questions or concerns, please call the clinic at the number listed above.       Sincerely,      VANESSA/ ZACH Haas CNP

## 2023-08-03 NOTE — PATIENT INSTRUCTIONS
Patient Education   Personalized Prevention Plan  You are due for the preventive services outlined below.  Your care team is available to assist you in scheduling these services.  If you have already completed any of these items, please share that information with your care team to update in your medical record.  Health Maintenance Due   Topic Date Due     Zoster (Shingles) Vaccine (2 of 3) 11/11/2015     COVID-19 Vaccine (6 - Moderna series) 03/11/2023     Annual Wellness Visit  03/14/2023     Thyroid Function Lab  03/14/2023     Your Health Risk Assessment indicates you feel you are not in good health    A healthy lifestyle helps keep the body fit and the mind alert. It helps protect you from disease, helps you fight disease, and helps prevent chronic disease (disease that doesn't go away) from getting worse. This is important as you get older and begin to notice twinges in muscles and joints and a decline in the strength and stamina you once took for granted. A healthy lifestyle includes good healthcare, good nutrition, weight control, recreation, and regular exercise. Avoid harmful substances and do what you can to keep safe. Another part of a healthy lifestyle is stay mentally active and socially involved.    Good healthcare   Have a wellness visit every year.   If you have new symptoms, let us know right away. Don't wait until the next checkup.   Take medicines exactly as prescribed and keep your medicines in a safe place. Tell us if your medicine causes problems.   Healthy diet and weight control   Eat 3 or 4 small, nutritious, low-fat, high-fiber meals a day. Include a variety of fruits, vegetables, and whole-grain foods.   Make sure you get enough calcium in your diet. Calcium, vitamin D, and exercise help prevent osteoporosis (bone thinning).   If you live alone, try eating with others when you can. That way you get a good meal and have company while you eat it.   Try to keep a healthy weight. If you eat  "more calories than your body uses for energy, it will be stored as fat and you will gain weight.     Recreation   Recreation is not limited to sports and team events. It includes any activity that provides relaxation, interest, enjoyment, and exercise. Recreation provides an outlet for physical, mental, and social energy. It can give a sense of worth and achievement. It can help you stay healthy.    Mental Exercise and Social Involvement  Mental and emotional health is as important as physical health. Keep in touch with friends and family. Stay as active as possible. Continue to learn and challenge yourself.   Things you can do to stay mentally active are:  Learn something new, like a foreign language or musical instrument.   Play SCRABBLE or do crossword puzzles. If you cannot find people to play these games with you at home, you can play them with others on your computer through the Internet.   Join a games club--anything from card games to chess or checkers or lawn bowling.   Start a new hobby.   Go back to school.   Volunteer.   Read.   Keep up with world events.  Learning About Being Physically Active  What is physical activity?     Being physically active means doing any kind of activity that gets your body moving.  The types of physical activity that can help you get fit and stay healthy include:  Aerobic or \"cardio\" activities. These make your heart beat faster and make you breathe harder, such as brisk walking, riding a bike, or running. They strengthen your heart and lungs and build up your endurance.  Strength training activities. These make your muscles work against, or \"resist,\" something. Examples include lifting weights or doing push-ups. These activities help tone and strengthen your muscles and bones.  Stretches. These let you move your joints and muscles through their full range of motion. Stretching helps you be more flexible.  Reaching a balance between these three types of physical activity is " "important because each one contributes to your overall fitness.  What are the benefits of being active?  Being active is one of the best things you can do for your health. It helps you to:  Feel stronger and have more energy to do all the things you like to do.  Focus better at school or work.  Feel, think, and sleep better.  Reach and stay at a healthy weight.  Lose fat and build lean muscle.  Lower your risk for serious health problems, including diabetes, heart attack, high blood pressure, and some cancers.  Keep your heart, lungs, bones, muscles, and joints strong and healthy.  How can you make being active part of your life?  Start slowly. Make it your long-term goal to get at least 30 minutes of exercise on most days of the week. Walking is a good choice. You also may want to do other activities, such as running, swimming, cycling, or playing tennis or team sports.  Pick activities that you like--ones that make your heart beat faster, your muscles stronger, and your muscles and joints more flexible. If you find more than one thing you like doing, do them all. You don't have to do the same thing every day.  Get your heart pumping every day. Any activity that makes your heart beat faster and keeps it at that rate for a while counts.  Here are some great ways to get your heart beating faster:  Go for a brisk walk, run, or bike ride.  Go for a hike or swim.  Go in-line skating.  Play a game of touch football, basketball, or soccer.  Ride a bike.  Play tennis or racquetball.  Climb stairs.  Even some household chores can be aerobic--just do them at a faster pace. Vacuuming, raking or mowing the lawn, sweeping the garage, and washing and waxing the car all can help get your heart rate up.  Strengthen your muscles during the week. You don't have to lift heavy weights or grow big, bulky muscles to get stronger. Doing a few simple activities that make your muscles work against, or \"resist,\" something can help you get " "stronger.  For example, you can:  Do push-ups or sit-ups, which use your own body weight as resistance.  Lift weights or dumbbells or use stretch bands at home or in a gym or community center.  Stretch your muscles often. Stretching will help you as you become more active. It can help you stay flexible, loosen tight muscles, and avoid injury. It can also help improve your balance and posture and can be a great way to relax.  Be sure to stretch the muscles you'll be using when you work out. It's best to warm your muscles slightly before you stretch them. Walk or do some other light aerobic activity for a few minutes, and then start stretching.  When you stretch your muscles:  Do it slowly. Stretching is not about going fast or making sudden movements.  Don't push or bounce during a stretch.  Hold each stretch for at least 15 to 30 seconds, if you can. You should feel a stretch in the muscle, but not pain.  Breathe out as you do the stretch. Then breathe in as you hold the stretch. Don't hold your breath.  If you're worried about how more activity might affect your health, have a checkup before you start. Follow any special advice your doctor gives you for getting a smart start.  Where can you learn more?  Go to https://www.[employer]opi.net/patiented  Enter W332 in the search box to learn more about \"Learning About Being Physically Active.\"  Current as of: October 10, 2022               Content Version: 13.7    0579-7121 [employer].   Care instructions adapted under license by your healthcare professional. If you have questions about a medical condition or this instruction, always ask your healthcare professional. [employer] disclaims any warranty or liability for your use of this information.      Learning About Dietary Guidelines  What are the Dietary Guidelines for Americans?     Dietary Guidelines for Americans provide tips for eating well and staying healthy. This helps reduce the risk " for long-term (chronic) diseases.  These guidelines recommend that you:  Eat and drink the right amount for you. The U.[place] government's food guide is called MyPlate. It can help you make your own well-balanced eating plan.  Try to balance your eating with your activity. This helps you stay at a healthy weight.  Drink alcohol in moderation, if at all.  Limit foods high in salt, saturated fat, trans fat, and added sugar.  These guidelines are from the U.S. Department of Agriculture and the U.S. Department of Health and Human Services. They are updated every 5 years.  What is MyPlate?  MyPlate is the U.[place] government's food guide. It can help you make your own well-balanced eating plan. A balanced eating plan means that you eat enough, but not too much, and that your food gives you the nutrients you need to stay healthy.  MyPlate focuses on eating plenty of whole grains, fruits, and vegetables, and on limiting fat and sugar. It is available online at www.ChooseMyPlate.gov.  How can you get started?  If you're trying to eat healthier, you can slowly change your eating habits over time. You don't have to make big changes all at once. Start by adding one or two healthy foods to your meals each day.  Grains  Choose whole-grain breads and cereals and whole-wheat pasta and whole-grain crackers.  Vegetables  Eat a variety of vegetables every day. They have lots of nutrients and are part of a heart-healthy diet.  Fruits  Eat a variety of fruits every day. Fruits contain lots of nutrients. Choose fresh fruit instead of fruit juice.  Protein foods  Choose fish and lean poultry more often. Eat red meat and fried meats less often. Dried beans, tofu, and nuts are also good sources of protein.  Dairy  Choose low-fat or fat-free products from this food group. If you have problems digesting milk, try eating cheese or yogurt instead.  Fats and oils  Limit fats and oils if you're trying to cut calories. Choose healthy fats when you cook.  "These include canola oil and olive oil.  Where can you learn more?  Go to https://www.[employer]quaporin.net/patiented  Enter D676 in the search box to learn more about \"Learning About Dietary Guidelines.\"  Current as of: March 1, 2023               Content Version: 13.7    8123-9779 [email or web address].   Care instructions adapted under license by your healthcare professional. If you have questions about a medical condition or this instruction, always ask your healthcare professional. [email or web address] disclaims any warranty or liability for your use of this information.      Activities of Daily Living    Your Health Risk Assessment indicates you have difficulties with activities of daily living such as housework, bathing, preparing meals, taking medication, etc. Please make a follow up appointment for us to address this issue in more detail.  Hearing Loss: Care Instructions  Overview     Hearing loss is a sudden or slow decrease in how well you hear. It can range from slight to profound. Permanent hearing loss can occur with aging. It also can happen when you are exposed long-term to loud noise. Examples include listening to loud music, riding motorcycles, or being around other loud machines.  Hearing loss can affect your work and home life. It can make you feel lonely or depressed. You may feel that you have lost your independence. But hearing aids and other devices can help you hear better and feel connected to others.  Follow-up care is a key part of your treatment and safety. Be sure to make and go to all appointments, and call your doctor if you are having problems. It's also a good idea to know your test results and keep a list of the medicines you take.  How can you care for yourself at home?  Avoid loud noises whenever possible. This helps keep your hearing from getting worse.  Always wear hearing protection around loud noises.  Wear a hearing aid as directed.  A professional can help you pick a " "hearing aid that will work best for you.  You can also get hearing aids over the counter for mild to moderate hearing loss.  Have hearing tests as your doctor suggests. They can show whether your hearing has changed. Your hearing aid may need to be adjusted.  Use other devices as needed. These may include:  Telephone amplifiers and hearing aids that can connect to a television, stereo, radio, or microphone.  Devices that use lights or vibrations. These alert you to the doorbell, a ringing telephone, or a baby monitor.  Television closed-captioning. This shows the words at the bottom of the screen. Most new TVs can do this.  TTY (text telephone). This lets you type messages back and forth on the telephone instead of talking or listening. These devices are also called TDD. When messages are typed on the keyboard, they are sent over the phone line to a receiving TTY. The message is shown on a monitor.  Use text messaging, social media, and email if it is hard for you to communicate by telephone.  Try to learn a listening technique called speechreading. It is not lipreading. You pay attention to people's gestures, expressions, posture, and tone of voice. These clues can help you understand what a person is saying. Face the person you are talking to, and have them face you. Make sure the lighting is good. You need to see the other person's face clearly.  Think about counseling if you need help to adjust to your hearing loss.  When should you call for help?  Watch closely for changes in your health, and be sure to contact your doctor if:    You think your hearing is getting worse.     You have new symptoms, such as dizziness or nausea.   Where can you learn more?  Go to https://www.healthDeckerton.net/patiented  Enter R798 in the search box to learn more about \"Hearing Loss: Care Instructions.\"  Current as of: March 1, 2023               Content Version: 13.7    3729-4129 [employer].   Care instructions adapted " under license by your healthcare professional. If you have questions about a medical condition or this instruction, always ask your healthcare professional. [employer] disclaims any warranty or liability for your use of this information.      Bladder Training: Care Instructions  Your Care Instructions     Bladder training is used to treat urge incontinence and stress incontinence. Urge incontinence means that the need to urinate comes on so fast that you can't get to a toilet in time. Stress incontinence means that you leak urine because of pressure on your bladder. For example, it may happen when you laugh, cough, or lift something heavy.  Bladder training can increase how long you can wait before you have to urinate. It can also help your bladder hold more urine. And it can give you better control over the urge to urinate.  It is important to remember that bladder training takes a few weeks to a few months to make a difference. You may not see results right away, but don't give up.  Follow-up care is a key part of your treatment and safety. Be sure to make and go to all appointments, and call your doctor if you are having problems. It's also a good idea to know your test results and keep a list of the medicines you take.  How can you care for yourself at home?  Work with your doctor to come up with a bladder training program that is right for you. You may use one or more of the following methods.  Delayed urination  In the beginning, try to keep from urinating for 5 minutes after you first feel the need to go.  While you wait, take deep, slow breaths to relax. Kegel exercises can also help you delay the need to go to the bathroom.  After some practice, when you can easily wait 5 minutes to urinate, try to wait 10 minutes before you urinate.  Slowly increase the waiting period until you are able to control when you have to urinate.  Scheduled urination  Empty your bladder when you first wake up in the  "morning.  Schedule times throughout the day when you will urinate.  Start by going to the bathroom every hour, even if you don't need to go.  Slowly increase the time between trips to the bathroom.  When you have found a schedule that works well for you, keep doing it.  If you wake up during the night and have to urinate, do it. Apply your schedule to waking hours only.  Kegel exercises  These tighten and strengthen pelvic muscles, which can help you control the flow of urine. (If doing these exercises causes pain, stop doing them and talk with your doctor.) To do Kegel exercises:  Squeeze your muscles as if you were trying not to pass gas. Or squeeze your muscles as if you were stopping the flow of urine. Your belly, legs, and buttocks shouldn't move.  Hold the squeeze for 3 seconds, then relax for 5 to 10 seconds.  Start with 3 seconds, then add 1 second each week until you are able to squeeze for 10 seconds.  Repeat the exercise 10 times a session. Do 3 to 8 sessions a day.  When should you call for help?  Watch closely for changes in your health, and be sure to contact your doctor if:    Your incontinence is getting worse.     You do not get better as expected.   Where can you learn more?  Go to https://www.[employer]Trak Software.net/patiented  Enter V684 in the search box to learn more about \"Bladder Training: Care Instructions.\"  Current as of: March 1, 2023               Content Version: 13.7    3577-8606 [employer].   Care instructions adapted under license by your healthcare professional. If you have questions about a medical condition or this instruction, always ask your healthcare professional. [employer] disclaims any warranty or liability for your use of this information.      Your Health Risk Assessment indicates you feel you are not in good emotional health.    Recreation   Recreation is not limited to sports and team events. It includes any activity that provides relaxation, interest, " enjoyment, and exercise. Recreation provides an outlet for physical, mental, and social energy. It can give a sense of worth and achievement. It can help you stay healthy.    Mental Exercise and Social Involvement  Mental and emotional health is as important as physical health. Keep in touch with friends and family. Stay as active as possible. Continue to learn and challenge yourself.   Things you can do to stay mentally active are:  Learn something new, like a foreign language or musical instrument.   Play SCRABBLE or do crossword puzzles. If you cannot find people to play these games with you at home, you can play them with others on your computer through the Internet.   Join a games club--anything from card games to chess or checkers or lawn bowling.   Start a new hobby.   Go back to school.   Volunteer.   Read.   Keep up with world events.  Learning About Depression Screening  What is depression screening?  Depression screening is a way to see if you have depression symptoms. It may be done by a doctor or counselor. It's often part of a routine checkup. That's because your mental health is just as important as your physical health.  Depression is a mental health condition that affects how you feel, think, and act. You may:  Have less energy.  Lose interest in your daily activities.  Feel sad and grouchy for a long time.  Depression is very common. It affects people of all ages.  Many things can lead to depression. Some people become depressed after they have a stroke or find out they have a major illness like cancer or heart disease. The death of a loved one or a breakup may lead to depression. It can run in families. Most experts believe that a combination of inherited genes and stressful life events can cause it.  What happens during screening?  You may be asked to fill out a form about your depression symptoms. You and the doctor will discuss your answers. The doctor may ask you more questions to learn more  "about how you think, act, and feel.  What happens after screening?  If you have symptoms of depression, your doctor will talk to you about your options.  Doctors usually treat depression with medicines or counseling. Often, combining the two works best. Many people don't get help because they think that they'll get over the depression on their own. But people with depression may not get better unless they get treatment.  The cause of depression is not well understood. There may be many factors involved. But if you have depression, it's not your fault.  A serious symptom of depression is thinking about death or suicide. If you or someone you care about talks about this or about feeling hopeless, get help right away.  It's important to know that depression can be treated. Medicine, counseling, and self-care may help.  Where can you learn more?  Go to https://www.Sponge.net/patiented  Enter T185 in the search box to learn more about \"Learning About Depression Screening.\"  Current as of: October 20, 2022               Content Version: 13.7    8695-4114 [employer].   Care instructions adapted under license by your healthcare professional. If you have questions about a medical condition or this instruction, always ask your healthcare professional. [employer] disclaims any warranty or liability for your use of this information.         "

## 2023-08-04 ENCOUNTER — OFFICE VISIT (OUTPATIENT)
Dept: OPHTHALMOLOGY | Facility: CLINIC | Age: 68
End: 2023-08-04
Attending: OPHTHALMOLOGY
Payer: COMMERCIAL

## 2023-08-04 DIAGNOSIS — H35.372 EPIRETINAL MEMBRANE (ERM) OF LEFT EYE: ICD-10-CM

## 2023-08-04 PROCEDURE — G0463 HOSPITAL OUTPT CLINIC VISIT: HCPCS | Performed by: OPHTHALMOLOGY

## 2023-08-04 PROCEDURE — 99207 OCT RETINA SPECTRALIS OU (BOTH EYE): CPT | Mod: 26 | Performed by: OPHTHALMOLOGY

## 2023-08-04 PROCEDURE — 99024 POSTOP FOLLOW-UP VISIT: CPT | Mod: GC | Performed by: OPHTHALMOLOGY

## 2023-08-04 PROCEDURE — 92134 CPTRZ OPH DX IMG PST SGM RTA: CPT | Performed by: OPHTHALMOLOGY

## 2023-08-04 ASSESSMENT — REFRACTION_MANIFEST
OS_ADD: +2.50
OS_AXIS: 025
OS_CYLINDER: +2.75
OS_SPHERE: -2.25

## 2023-08-04 ASSESSMENT — REFRACTION_WEARINGRX
OS_SPHERE: -1.75
OS_CYLINDER: +2.75
OD_ADD: +2.50
OD_CYLINDER: +2.50
SPECS_TYPE: PAL
OS_ADD: +2.50
OD_SPHERE: -2.50
OS_AXIS: 010
OD_AXIS: 147

## 2023-08-04 ASSESSMENT — CUP TO DISC RATIO: OS_RATIO: 0.3

## 2023-08-04 ASSESSMENT — VISUAL ACUITY
OD_CC: 20/25
OS_CC: 20/250
METHOD: SNELLEN - LINEAR
OS_PH_CC: 20/150
CORRECTION_TYPE: GLASSES

## 2023-08-04 ASSESSMENT — TONOMETRY
OS_IOP_MMHG: 18
IOP_METHOD: TONOPEN
OD_IOP_MMHG: 21

## 2023-08-04 ASSESSMENT — EXTERNAL EXAM - RIGHT EYE: OD_EXAM: NORMAL

## 2023-08-04 ASSESSMENT — CONF VISUAL FIELD
METHOD: COUNTING FINGERS
OD_INFERIOR_NASAL_RESTRICTION: 0
OD_SUPERIOR_NASAL_RESTRICTION: 0
OD_INFERIOR_TEMPORAL_RESTRICTION: 0
OD_SUPERIOR_TEMPORAL_RESTRICTION: 0
OS_INFERIOR_TEMPORAL_RESTRICTION: 3
OD_NORMAL: 1

## 2023-08-04 ASSESSMENT — EXTERNAL EXAM - LEFT EYE: OS_EXAM: NORMAL

## 2023-08-04 ASSESSMENT — SLIT LAMP EXAM - LIDS
COMMENTS: NORMAL
COMMENTS: NORMAL

## 2023-08-04 NOTE — NURSING NOTE
Chief Complaints and History of Present Illnesses   Patient presents with    Follow Up     5 week follow up s/p PPV/MS 5/25/23 for ERM     Chief Complaint(s) and History of Present Illness(es)       Follow Up              Comments: 5 week follow up s/p PPV/MS 5/25/23 for ERM              Comments    Pt states vision is about the same as last visit.   Occasional floaters in each eye, no changes.  No eye pain today.    VLAD Perry August 4, 2023 11:02 AM

## 2023-08-04 NOTE — PROGRESS NOTES
CC -   Post Op OS ERM peel on 05/25/2023    INTERVAL HISTORY - POM#2, vision has remained about the same, no new floaters, does not report flashing lights.   off gtts    PMH -   Grace Cihnchilla is a  68 year old year-old patient with history of ERM OS.  Noted 9/2022, uncertain when GELACIO prior was, likely 7-8 years per patient. Had not noticed vision change OS       PAST OCULAR SURGERY  PPV/MS OS 5/25/23    RETINAL IMAGING:  OCT 08/4/23     OD - retina normal, PVD  OS -  tr residual ERM, parafoveal intraretinal fluid, improved compared to previous scan      ASSESSMENT & PLAN    # POM #2 OS   - s/p PPV/MS 5/25/23 for ERM    - IOP OK   - retina flat   - no infection   - off gtts       - VA 20/70 MRx   - monitor   - recheck 1- 2 months        # H/o ERM OS   - noted 9/2022 uncertain onset   - pre-op VA 20/100   - s/p 25g PPV, ERM/ILM peel, endolaser OS 5/25/23   - Diagnostic Mrx shows a little myopic shift sphere.         # PVD OD   - advised S/Sx RD 6/2023      # NS OU   - early VS   - refer for eval      # retinal anomalies   - ?thin inner retina noted pre-op 2/2023   - no glaucoma, no e/o CHOW   - monitor    Return in about 3 months (around 11/4/2023) for DFE OU, OCT OU, diagnostic MRx.      Ferny Mann MD  PGY-5 Vitreo-retina surgery Fellow  Department of Ophthalmology   [facility]    ATTESTATION     Attending Physician Attestation:      Complete documentation of historical and exam elements from today's encounter can be found in the full encounter summary report (not reduplicated in this progress note).  I personally obtained the chief complaint(s) and history of present illness.  I confirmed and edited as necessary the review of systems, past medical/surgical history, family history, social history, and examination findings as documented by others; and I examined the patient myself.  I personally reviewed the relevant tests, images, and reports as documented above.  I personally reviewed the  ophthalmic test(s) associated with this encounter, agree with the interpretation(s) as documented by the resident/fellow, and have edited the corresponding report(s) as necessary.   I formulated and edited as necessary the assessment and plan and discussed the findings and management plan with the patient and family    Jessica Motley MD, PhD  , Vitreoretinal Surgery  Department of Ophthalmology  [facility]

## 2023-08-10 ENCOUNTER — TELEPHONE (OUTPATIENT)
Dept: OPHTHALMOLOGY | Facility: CLINIC | Age: 68
End: 2023-08-10
Payer: COMMERCIAL

## 2023-08-10 NOTE — TELEPHONE ENCOUNTER
Health Call Center    Phone Message    May a detailed message be left on voicemail: yes     Reason for Call: Other: Patient was seen on 8/4 and Dr Motley told her to purchase some over the counter eye drops and use them 3-4 times a day. Patient cannot remember the names of the ey drops and would like someone to call her back with that information.     Please call patient back at [phone]. Thank you.    Action Taken: Message routed to:  [facility] Eye    Travel Screening: Not Applicable

## 2023-08-17 ENCOUNTER — TELEPHONE (OUTPATIENT)
Dept: FAMILY MEDICINE | Facility: CLINIC | Age: 68
End: 2023-08-17
Payer: COMMERCIAL

## 2023-08-17 NOTE — TELEPHONE ENCOUNTER
Faxed results to [phone]  Patient informed and advised to get JESUS on file for future needs.  In this case, written order was brought with request for results to be sent which has not been completed prior to today.

## 2023-08-17 NOTE — TELEPHONE ENCOUNTER
Yes, that is fine.  I am not sure if she requires anything else to be sent.  I would have her do a release of information.

## 2023-08-17 NOTE — TELEPHONE ENCOUNTER
Patient is asking for copy of recent labs to be sent to Dr Juan Montana her psychiatrist with [facility] at [phone] (fax).  He will need Valporic Acid level and her LFTs for sure. States she had left a paper with Laila when she was seen last.  OK to send?  Anything else she might need sent?  Kalina Velasco RN  [facility]

## 2023-08-18 ENCOUNTER — OFFICE VISIT (OUTPATIENT)
Dept: OPHTHALMOLOGY | Facility: CLINIC | Age: 68
End: 2023-08-18
Attending: STUDENT IN AN ORGANIZED HEALTH CARE EDUCATION/TRAINING PROGRAM
Payer: COMMERCIAL

## 2023-08-18 DIAGNOSIS — H43.811 PVD (POSTERIOR VITREOUS DETACHMENT), RIGHT EYE: ICD-10-CM

## 2023-08-18 DIAGNOSIS — H35.372 EPIRETINAL MEMBRANE (ERM) OF LEFT EYE: ICD-10-CM

## 2023-08-18 DIAGNOSIS — H25.13 NUCLEAR SCLEROTIC CATARACT OF BOTH EYES: Primary | ICD-10-CM

## 2023-08-18 DIAGNOSIS — H52.7 REFRACTIVE ERROR: ICD-10-CM

## 2023-08-18 PROCEDURE — 92025 CPTRIZED CORNEAL TOPOGRAPHY: CPT | Performed by: STUDENT IN AN ORGANIZED HEALTH CARE EDUCATION/TRAINING PROGRAM

## 2023-08-18 PROCEDURE — 76519 ECHO EXAM OF EYE: CPT | Performed by: STUDENT IN AN ORGANIZED HEALTH CARE EDUCATION/TRAINING PROGRAM

## 2023-08-18 PROCEDURE — 99214 OFFICE O/P EST MOD 30 MIN: CPT | Mod: 24 | Performed by: STUDENT IN AN ORGANIZED HEALTH CARE EDUCATION/TRAINING PROGRAM

## 2023-08-18 PROCEDURE — G0463 HOSPITAL OUTPT CLINIC VISIT: HCPCS | Performed by: STUDENT IN AN ORGANIZED HEALTH CARE EDUCATION/TRAINING PROGRAM

## 2023-08-18 ASSESSMENT — REFRACTION_MANIFEST
OD_ADD: +2.50
OS_ADD: +2.50
OD_CYLINDER: +2.50
OS_CYLINDER: +2.75
OS_AXIS: 025
OD_AXIS: 155
OD_SPHERE: -2.25
OS_SPHERE: -2.50

## 2023-08-18 ASSESSMENT — SLIT LAMP EXAM - LIDS
COMMENTS: NORMAL
COMMENTS: NORMAL

## 2023-08-18 ASSESSMENT — REFRACTION_WEARINGRX
OS_ADD: +2.50
OS_CYLINDER: +2.75
OS_AXIS: 010
OD_CYLINDER: +2.50
OD_ADD: +2.50
OS_SPHERE: -1.75
SPECS_TYPE: PAL
OD_AXIS: 147
OD_SPHERE: -2.50

## 2023-08-18 ASSESSMENT — CONF VISUAL FIELD
OS_SUPERIOR_TEMPORAL_RESTRICTION: 0
METHOD: COUNTING FINGERS
OD_INFERIOR_TEMPORAL_RESTRICTION: 0
OS_NORMAL: 1
OD_NORMAL: 1
OS_SUPERIOR_NASAL_RESTRICTION: 0
OD_SUPERIOR_TEMPORAL_RESTRICTION: 0
OD_SUPERIOR_NASAL_RESTRICTION: 0
OS_INFERIOR_NASAL_RESTRICTION: 0
OS_INFERIOR_TEMPORAL_RESTRICTION: 0
OD_INFERIOR_NASAL_RESTRICTION: 0

## 2023-08-18 ASSESSMENT — VISUAL ACUITY
OS_CC: 20/200
OD_CC: 20/25
OD_BAT_HIGH: 20/30
OD_CC+: -2
METHOD_MR: DX PURPOSES
METHOD: SNELLEN - LINEAR
CORRECTION_TYPE: GLASSES
OS_BAT_HIGH: <20/600

## 2023-08-18 ASSESSMENT — TONOMETRY
OS_IOP_MMHG: 20
OD_IOP_MMHG: 15
IOP_METHOD: ICARE

## 2023-08-18 ASSESSMENT — EXTERNAL EXAM - LEFT EYE: OS_EXAM: NORMAL

## 2023-08-18 ASSESSMENT — CUP TO DISC RATIO
OD_RATIO: 0.3
OS_RATIO: 0.3

## 2023-08-18 ASSESSMENT — EXTERNAL EXAM - RIGHT EYE: OD_EXAM: NORMAL

## 2023-08-18 NOTE — PROGRESS NOTES
HPI       Cataract Evaluation    In both eyes.  Associated symptoms include blurred vision, glare and a need for brighter lights.  Onset was gradual.             Comments    Here for cataracts evaluation. Vision has been gradually worsening in the left eye. Glare is bothersome. Occasional floaters without flashes. No eye pain.     Saul Mcguire COT 1:58 PM August 18, 2023             Last edited by Saul Mcguire on 8/18/2023  1:58 PM.          Review of systems for the eyes was negative other than the pertinent positives/negatives listed in the HPI.    Ocular Meds: none    Ocular Hx: PPV/MS OS 5/25/23 with Dr Motley; herpes zoster V1 distribution without ocular involvement, left sided    FOHx: no family history of glaucoma or blindness    PMHx:   Past Medical History:   Diagnosis Date    Adrenal mass (H) 3/14/2022    Anxiety     Depression     Depressive disorder, not elsewhere classified     Diaphragmatic hernia without mention of obstruction or gangrene     Disease of thyroid gland     Dysthymic disorder     [facility]    Esophageal reflux     Hyperlipidemia     Mixed hyperlipidemia     Hyperlipidemia    Obstructive sleep apnea (adult) (pediatric)     uses CPAP    Plantar fascial fibromatosis     Temporomandibular joint disorders, unspecified     Temporomandibular joint disorders, unspecified     Tietze's disease     costochondritis    Unspecified hypothyroidism        Assessment & Plan      Grace Chinchilla is a 68 year old female with the following diagnoses:    Nuclear sclerotic cataract OU  - referral from Dr Motley  - visually significant and affecting ADLs OS  - patient is right eye dominant  - various lens options discussed with patient including premium, toric, and monofocal lenses; patient would like monofocal lenses aimed at -1.00 and understands the need for glasses for distance and near vision; this is also done to minimize significant anisometropia upon discussion  with patient  - r/b/a of cataract extraction with intraocular lens insertion including blindness, decreased vision, damage to surrounding structures, bleeding, infection, risk of anesthesia, and need for additional surgeries d/w pt, pt expressed understanding and would like to proceed; and informed consent was obtained. Patient also understands increased risk of needing a second surgery due to prior ocular surgery/PPV in left eye, and patient would like to proceed with surgery.  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 Or sooner changes    Attending Physician Attestation:  Complete documentation of historical and exam elements from today's encounter can be found in the full encounter summary report (not reduplicated in this progress note).  I personally obtained the chief complaint(s) and history of present illness.  I confirmed and edited as necessary the review of systems, past  medical/surgical history, family history, social history, and examination findings as documented by others; and I examined the patient myself.  I personally reviewed the relevant tests, images, and reports as documented above.  I formulated and edited as necessary the assessment and plan and discussed the findings and management plan with the patient and family. Today with Grace Chinchilla, I reviewed the indications, risks, benefits, and alternatives of the proposed surgical procedure including, but not limited to, failure obtain the desired result  and need for additional surgery, bleeding, infection, loss of vision, loss of the eye, and the remote possibility of permanent damage to any organ system or death with the use of anesthesia.  I provided multiple opportunities for the questions, answered all questions to the best of my ability, and confirmed that my answers and my discussion were understood- Rose Amin MD

## 2023-08-18 NOTE — NURSING NOTE
Chief Complaints and History of Present Illnesses   Patient presents with    Cataract Evaluation     Chief Complaint(s) and History of Present Illness(es)       Cataract Evaluation              Laterality: both eyes    Associated symptoms: blurred vision, glare and a need for brighter lights    Onset: gradual              Comments    Here for cataracts evaluation. Vision has been gradually worsening in the left eye. Glare is bothersome. Occasional floaters without flashes. No eye pain.     Saul Woodardo COT 1:58 PM August 18, 2023

## 2023-08-21 ENCOUNTER — TELEPHONE (OUTPATIENT)
Dept: OPHTHALMOLOGY | Facility: CLINIC | Age: 68
End: 2023-08-21
Payer: COMMERCIAL

## 2023-08-21 PROBLEM — H25.13 NUCLEAR SCLEROTIC CATARACT OF BOTH EYES: Status: ACTIVE | Noted: 2023-08-18

## 2023-08-21 NOTE — TELEPHONE ENCOUNTER
Called patient to schedule surgery with Dr. Amin    Date of Surgery: 10/26    Location of surgery: [facility]    Pre-Op H&P: PCP    Pre/Post Imaging:  No    Post-Op Appt Date: 10/27,11/3,11/24    Surgery Packet Mailed: yes    Additional comments: Spoke with patient, they are aware of above dates, will review packet and reach out with any questions           Anna C. Schoenecker on 8/21/2023 at 12:36 PM

## 2023-08-22 ENCOUNTER — NURSE TRIAGE (OUTPATIENT)
Dept: FAMILY MEDICINE | Facility: CLINIC | Age: 68
End: 2023-08-22
Payer: COMMERCIAL

## 2023-08-22 NOTE — TELEPHONE ENCOUNTER
I did review situation with Dr Bagley and although probability of tetanus low she did advise tetanus shot as it is over 5 years since last one. While speaking to MD pt had hung up    Attempted to reach, unable. Left message  to call back. Called both home, mobile and other phone      Marley Hernandez RN
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
## 2023-09-12 ENCOUNTER — TELEPHONE (OUTPATIENT)
Dept: FAMILY MEDICINE | Facility: CLINIC | Age: 68
End: 2023-09-12
Payer: COMMERCIAL

## 2023-09-12 DIAGNOSIS — R73.03 PRE-DIABETES: Primary | ICD-10-CM

## 2023-09-12 DIAGNOSIS — E66.01 CLASS 2 SEVERE OBESITY DUE TO EXCESS CALORIES WITH SERIOUS COMORBIDITY AND BODY MASS INDEX (BMI) OF 39.0 TO 39.9 IN ADULT (H): ICD-10-CM

## 2023-09-12 DIAGNOSIS — E66.812 CLASS 2 SEVERE OBESITY DUE TO EXCESS CALORIES WITH SERIOUS COMORBIDITY AND BODY MASS INDEX (BMI) OF 39.0 TO 39.9 IN ADULT (H): ICD-10-CM

## 2023-09-12 NOTE — TELEPHONE ENCOUNTER
"  Order/Referral Request    Who is requesting: Patient     Orders being requested: Weight loss programs    Reason service is needed/diagnosis: Desiring something to try to keep pre diabetes \"pre\"    When are orders needed by: N/A    Has this been discussed with Provider: Yes, discussion was had at last visit and patient was advised A1C needed to be reviewed to see if patient was diabetic. Per patient there was programs available if she was. As she is pre diabetic she is wondering if any of these programs would still apply    Does patient have a preference on a Group/Provider/Facility? Open    Does patient have an appointment scheduled?: No    Where to send orders: Place orders within Epic    Okay to leave a detailed message?: Yes at Home number on file [phone] (home)  "

## 2023-09-14 NOTE — TELEPHONE ENCOUNTER
Writer called patient regarding referral with [facility].   Patient stated that she was willing to do that.     VAN DurhamN RN  [facility]

## 2023-09-18 ENCOUNTER — TELEPHONE (OUTPATIENT)
Dept: FAMILY MEDICINE | Facility: CLINIC | Age: 68
End: 2023-09-18
Payer: COMMERCIAL

## 2023-09-18 NOTE — TELEPHONE ENCOUNTER
[facility] referral from: [facility] visit (referral by provider)    [facility] referral outreach attempt #2 on September 18, 2023 at 3:57 PM      Outcome: Patient not reachable after several attempts, will route to [facility] Pharmacist/Provider as an FYI.  MT scheduling number is [phone].  Thank you for the referral.    Use [facility] part d map for the carrier/Plan on the flowsheet    Missy Reed  MT

## 2023-09-26 DIAGNOSIS — H40.003 GLAUCOMA SUSPECT OF BOTH EYES: Primary | ICD-10-CM

## 2023-10-06 ENCOUNTER — OFFICE VISIT (OUTPATIENT)
Dept: OPHTHALMOLOGY | Facility: CLINIC | Age: 68
End: 2023-10-06
Attending: OPHTHALMOLOGY
Payer: COMMERCIAL

## 2023-10-06 DIAGNOSIS — H40.003 GLAUCOMA SUSPECT OF BOTH EYES: ICD-10-CM

## 2023-10-06 DIAGNOSIS — H25.13 NUCLEAR SCLEROTIC CATARACT OF BOTH EYES: ICD-10-CM

## 2023-10-06 DIAGNOSIS — H52.7 REFRACTIVE ERROR: ICD-10-CM

## 2023-10-06 PROCEDURE — G0463 HOSPITAL OUTPT CLINIC VISIT: HCPCS | Performed by: OPHTHALMOLOGY

## 2023-10-06 PROCEDURE — 92133 CPTRZD OPH DX IMG PST SGM ON: CPT | Performed by: OPHTHALMOLOGY

## 2023-10-06 PROCEDURE — 92134 CPTRZ OPH DX IMG PST SGM RTA: CPT | Performed by: OPHTHALMOLOGY

## 2023-10-06 PROCEDURE — 99213 OFFICE O/P EST LOW 20 MIN: CPT | Performed by: OPHTHALMOLOGY

## 2023-10-06 ASSESSMENT — REFRACTION_WEARINGRX
OD_AXIS: 147
OD_ADD: +2.50
OS_AXIS: 010
SPECS_TYPE: PAL
OD_CYLINDER: +2.50
OD_SPHERE: -2.50
OS_ADD: +2.50
OS_SPHERE: -1.75
OS_CYLINDER: +2.75

## 2023-10-06 ASSESSMENT — VISUAL ACUITY
OD_CC: 20/25
OD_CC+: +
CORRECTION_TYPE: GLASSES
OS_CC: 20/300
METHOD: SNELLEN - LINEAR

## 2023-10-06 ASSESSMENT — CONF VISUAL FIELD
OD_INFERIOR_TEMPORAL_RESTRICTION: 3
OS_INFERIOR_TEMPORAL_RESTRICTION: 3
METHOD: COUNTING FINGERS

## 2023-10-06 ASSESSMENT — TONOMETRY
IOP_METHOD: TONOPEN
OS_IOP_MMHG: 16
OD_IOP_MMHG: 16

## 2023-10-06 ASSESSMENT — SLIT LAMP EXAM - LIDS
COMMENTS: NORMAL
COMMENTS: NORMAL

## 2023-10-06 ASSESSMENT — EXTERNAL EXAM - RIGHT EYE: OD_EXAM: NORMAL

## 2023-10-06 ASSESSMENT — CUP TO DISC RATIO
OS_RATIO: 0.5
OD_RATIO: 0.3

## 2023-10-06 ASSESSMENT — EXTERNAL EXAM - LEFT EYE: OS_EXAM: NORMAL

## 2023-10-06 NOTE — PROGRESS NOTES
CC -   Post Op OS ERM peel on 05/25/2023    INTERVAL HISTORY - plans CE/IOL in few weeks    PMH -   Grace Chinchilla is a  68 year old year-old patient with history of ERM OS.  Noted 9/2022, uncertain when GELACIO prior was, likely 7-8 years per patient. Had not noticed vision change OS       PAST OCULAR SURGERY  PPV/MS OS 5/25/23    RETINAL IMAGING:  OCT 10/06/2023  OD - retina normal, PVD  OS -  tr residual ERM, parafoveal intraretinal fluid, thin RNFL    OCT RNFL 10/06/23  OD - focal SN borderline  OS - abnl ST/IT, borderline SN & T, gobal 67      ASSESSMENT & PLAN        # H/o ERM OS   - noted 9/2022 uncertain onset   - pre-op VA 20/100   - s/p 25g PPV, ERM/ILM peel, endolaser OS 5/25/23   - VA likely 2/2 cataract     - stable today      # OAG Suspect OS > OD   - abnl RNFL, thin rim on DFE   - seeing Brennan        # PVD OD   - advised S/Sx RD 6/2023      # NS OU   - early VS   - refer for eval      # retinal anomalies   - ?thin inner retina noted pre-op 2/2023   - ?glaucoma / NTG   - monitor    Return in about 1 year (around 10/6/2024) for Tech Instructions:, DFE OU, OCT OU.      ATTESTATION     Attending Physician Attestation:      Complete documentation of historical and exam elements from today's encounter can be found in the full encounter summary report (not reduplicated in this progress note).  I personally obtained the chief complaint(s) and history of present illness.  I confirmed and edited as necessary the review of systems, past medical/surgical history, family history, social history, and examination findings as documented by others; and I examined the patient myself.  I personally reviewed the relevant tests, images, and reports as documented above.  I formulated and edited as necessary the assessment and plan and discussed the findings and management plan with the patient and family    Jessica Motley MD, PhD  , Vitreoretinal Surgery  Department of Ophthalmology  [facility]  [place]

## 2023-10-06 NOTE — NURSING NOTE
Chief Complaints and History of Present Illnesses   Patient presents with    Follow Up     Epiretinal membrane (ERM) of left eye     Chief Complaint(s) and History of Present Illness(es)       Follow Up              Laterality: left eye    Course: stable    Associated symptoms: eye pain (when closed), flashes and floaters.  Negative for dryness    Treatments tried: artificial tears    Pain scale: 0/10    Comments: Epiretinal membrane (ERM) of left eye              Comments    She states that her vision has seemed stable in both eyes since her last eye exam.  She tells me that her eyes feel sore when she closes them, but not when they are open or when she blinks.  She sees more flashing and floaters with her right eye than her left eye.    GLORIA Barfield 10:29 AM  October 6, 2023

## 2023-10-06 NOTE — Clinical Note
Her RNFL OS is abnormal (I thought it was thin before the ERM peel as well), do you think she could have NTG?

## 2023-10-09 NOTE — PROGRESS NOTES
Medication Therapy Management (MTM) Encounter    ASSESSMENT:                            Medication Adherence/Access: No issues identified    Obesity/Pre-Diabetes:  Start metformin --see plan for dose titration --also long discussion on non drug plan --lower carbs + intermittent fasting , daily walking.     Hypertension:   Stable. Patient is meeting blood pressure goal of < 140/90mmHg.    Hyperlipidemia:   Stable.  Patient is on high intensity statin which is indicated based on 2019 ACC/AHA guidelines for lipid management.      Hypothyroidism:   Stable :Pt would benefit from rechecking thyroid labs in 2024.        PLAN:                                1.  Todays weight 198.9lbs , A1c is 6.2% --pre-diabetic -- lets try to reverse that --lets start Metformin 500mg ER tablet --1 tab daily after dinner daily x 7 days , then if well tolerated --week-2 is 1 tab after brunch and dinner , then week-3 is 1 tab after brunch and 2 after dinner, then week-4 is 2 tablets after brunch and dinner daily.    Also must practice Lifestyle change : lower carb diet and intermittent fasting --eat 2 main meals /day Brunch and dinner , NO snacking at all, drink water, coffee ,tea to fill you up, eat lots of non starchy veggies like broccoli, cauliflower,brussel sprouts daily, 1 piece of fruit /day .    Walk daily for 30 minutes for exercise at the fitness club or [employer] -swimming, also I am recommending you join [employer] again to help encourage you to lose weight and keep it off.     Eat 2 main meals/day -eat til your full, eat just enough carbohydrates/day to make you feel happy. Donot ADD sugar to any foods!  Read healthy living with diabetes to help with food choices.     1. Insulin is your body's fat storage hormone. When you eat meals high in carbohydrates your pancreas releases insulin to store the excess food energy as fat. By intermittent fasting, or eating all of your daily food in an 8 hour period while fasting the remaining 16, you  "reduce the amount of insulin your body is releasing, resulting in less storage of food energy as fat. Focusing on a diet as low in carbohydrate as you can tolerate will also help reduce the amount of insulin your pancreas releases. Low carbohydrate diet + intermittent fasting will result in weight loss and improved blood sugars with fewer medications!    No snacking between meals or after dinner .      2. When you feel hungry in your fasting window, try drinking a zero calorie liquid, like water,coffee, tea  first.        3. Check out the website The Fasting Method.[employer](join free portion of website) or \"The Obesity Code\" book by Dr. Isma De La Cruz for more details. Dr. De La Cruz also has a cookbook out with meals/recipes --the book is called The Obesity Code Cookbook.    4. Remember -these 7 triggers (Anxiety, stress, depression, boredom, comfort, habit, pain) make us want to eat carbohydrates -control those triggers and you will lose weight .    5. Exercise : 30 minutes /day -every day of the week. Examples: brisk walk indoors or out , treadmill walk at normal pace with raised incline, elliptical, stationary bike, swimming.     6. Remember: Two things make this lifestyle diet change work in the longterm - what you eat and when you eat. Eat just enough carbohydrates every day of the year to keep you mentally happy!      7. Take thyroid pill first thing in AM on empty stomach , take iron pill after dinner.         Follow-up: Return in about 4 weeks (around 11/7/2023), or @ 2 PM, for Weight loss, Medication Therapy Management Visit.          SUBJECTIVE/OBJECTIVE:                          Grace Chinchilla is a 68 year old female coming in for an initial visit. She was referred to me from Laila Sanches      Reason for visit:   Reason for Referral:  Weight managment, pre-diabetes  Reason service is needed/diagnosis: Desiring something to try to keep pre diabetes \"pre\"     Very tired today --despite wearing c=-pap nightly.  " Needs naps....    Allergies/ADRs: Reviewed in chart; bupropion caused sickness?  Past Medical History: Reviewed in chart  Tobacco: She reports that she has never smoked. She has never used smokeless tobacco.  Alcohol: not currently using  Other Substance Use: none  E-cigarette Use: none  Caffeine: not much   Social: retired   Personal Healthcare Goals:  lose weight in a healthy way and keep it off/ reverse pre-diabetes.   Activity: sedentary  Medication Adherence/Access: no issues reported    Hypertension     Losartan 25mg./day   Patient reports no current medication side effects.  Patient does not self-monitor blood pressure.         BP Readings from Last 3 Encounters:   10/10/23 130/66   08/03/23 123/73   05/25/23 134/55     Pulse Readings from Last 3 Encounters:   10/10/23 73   08/03/23 71   05/25/23 65     Hyperlipidemia     Atorvastatin 80mg daily  Patient reports no significant myalgias or other side effects.  The 10-year ASCVD risk score (Isiah HOLLOWAY, et al., 2019) is: 11.7%    Values used to calculate the score:      Age: 68 years      Sex: Female      Is Non- : No      Diabetic: No      Tobacco smoker: No      Systolic Blood Pressure: 130 mmHg      Is BP treated: Yes      HDL Cholesterol: 50 mg/dL      Total Cholesterol: 187 mg/dL       Recent Labs   Lab Test 08/03/23  1106 03/14/22  1049   CHOL 187 183   HDL 50 44*    114   TRIG 187* 125       Hypothyroidism     Levothyroxine 125 mcg daily. (Admits she takes her iron pill right with her thyroid pill)  Patient is having the following symptoms: hypothyroidism -  weight gain.        TSH   Date Value Ref Range Status   08/03/2023 0.49 0.30 - 4.20 uIU/mL Final   03/14/2022 3.25 0.30 - 5.00 uIU/mL Final   04/02/2007 5.88 (H) 0.4 - 5.0 mU/L Final         Class II Obesity (BMI 35 to 39.9)/pre-diabetes:   Ozempic stopped last fall.   Metformin - tried it -- then went to ozempic.   Patient reports no current medication side  "effects.  Nutrition/Eating Habits: saw Dr. Steve special diet + weekly ozempic --got down to 180lbs.   --then cost $>200/month too much stopped it and weight came back.  Has 3 sisters --2 of hem very active in her life.   Has been on [employer] 4 different in her life , sisters - are in charge of her money POA over her .  Her  works full time - cannot control finances , she gets 15$ and [facility] 25 $ /month for fun stuff , sisters take her shopping weekly. They use HSA card to pay  for her rx drugs.     Current eating habits :  Bfast - cereal most days --quick easy /she admits lazy, or will have oatmeal or pbutter /jelly sandwich , or yogurt with jam or sugar , water   Mid am snack --no  Lunch : skips or bowel of cottage cheese  Mid afternoon snack : apple and a banana -psychological thought she would get hungry   Dinner; minnestrone soup 1-2 bowls, or a salad.   Late night snacks : 10-11 pm hub snacks--she might have a sandwich pbandj or a yogurt , eats out of habit or other reasons.     Exercise/Activity:   has membership thru [employer] - fitness club (not going)- or go to [employer] -swimming --but no swimsuit.   Does see psychiatrist.   Medication History:  Ozempic: too $$ to continue.  Wt Readings from Last 4 Encounters:   10/10/23 198 lb 14.4 oz (90.2 kg)   08/03/23 198 lb (89.8 kg)   05/25/23 195 lb (88.5 kg)   05/02/23 195 lb (88.5 kg)     Estimated body mass index is 37.58 kg/m  as calculated from the following:    Height as of 8/3/23: 5' 1\" (1.549 m).    Weight as of this encounter: 198 lb 14.4 oz (90.2 kg).    Lab Results   Component Value Date    A1C 6.2 08/03/2023    A1C 5.3 09/16/2022    A1C 6.1 03/14/2022    A1C 6.0 09/17/2019    A1C 5.6 03/13/2019           Today's Vitals: /66   Pulse 73   Wt 198 lb 14.4 oz (90.2 kg)   LMP 09/13/2006   SpO2 96%   BMI 37.58 kg/m    ----------------      I spent 60 minutes with this patient today. All changes were made via collaborative practice agreement with Laila " ZACH Sun CNP. A copy of the visit note was provided to the patient's provider(s).    A summary of these recommendations was given to the patient.    Jarett Sen Rph.  Medication Therapy Management Provider  [phone]     Medication Therapy Recommendations  Hypothyroidism    Current Medication: levothyroxine (SYNTHROID/LEVOTHROID) 125 MCG tablet   Rationale: Incorrect administration - Adverse medication event - Safety   Recommendation: Provide Education - Ferrous Sulfate 324 (65 Fe) MG Tbec - mtm educated patient take levo-t first thing in am on empty stomach , take iron pill with dinner.   Status: Accepted per CPA         Pre-diabetes    Current Medication: metFORMIN (GLUCOPHAGE XR) 500 MG 24 hr tablet   Rationale: Untreated condition - Needs additional medication therapy - Indication   Recommendation: Start Medication   Status: Accepted per CPA

## 2023-10-10 ENCOUNTER — OFFICE VISIT (OUTPATIENT)
Dept: PHARMACY | Facility: CLINIC | Age: 68
End: 2023-10-10
Payer: COMMERCIAL

## 2023-10-10 VITALS
WEIGHT: 198.9 LBS | OXYGEN SATURATION: 96 % | SYSTOLIC BLOOD PRESSURE: 130 MMHG | BODY MASS INDEX: 37.58 KG/M2 | DIASTOLIC BLOOD PRESSURE: 66 MMHG | HEART RATE: 73 BPM

## 2023-10-10 DIAGNOSIS — E78.2 MIXED HYPERLIPIDEMIA: ICD-10-CM

## 2023-10-10 DIAGNOSIS — E66.812 CLASS 2 SEVERE OBESITY DUE TO EXCESS CALORIES WITH SERIOUS COMORBIDITY AND BODY MASS INDEX (BMI) OF 35.0 TO 35.9 IN ADULT (H): ICD-10-CM

## 2023-10-10 DIAGNOSIS — E66.01 CLASS 2 SEVERE OBESITY DUE TO EXCESS CALORIES WITH SERIOUS COMORBIDITY AND BODY MASS INDEX (BMI) OF 35.0 TO 35.9 IN ADULT (H): ICD-10-CM

## 2023-10-10 DIAGNOSIS — R73.03 PRE-DIABETES: Primary | ICD-10-CM

## 2023-10-10 DIAGNOSIS — E03.9 HYPOTHYROIDISM, UNSPECIFIED TYPE: ICD-10-CM

## 2023-10-10 DIAGNOSIS — R03.0 ELEVATED BP WITHOUT DIAGNOSIS OF HYPERTENSION: ICD-10-CM

## 2023-10-10 PROCEDURE — 99605 MTMS BY PHARM NP 15 MIN: CPT | Performed by: PHARMACIST

## 2023-10-10 PROCEDURE — 99607 MTMS BY PHARM ADDL 15 MIN: CPT | Performed by: PHARMACIST

## 2023-10-10 RX ORDER — METFORMIN HCL 500 MG
TABLET, EXTENDED RELEASE 24 HR ORAL
Qty: 120 TABLET | Refills: 5 | Status: SHIPPED | OUTPATIENT
Start: 2023-10-10 | End: 2023-11-07

## 2023-10-10 NOTE — PATIENT INSTRUCTIONS
Recommendations from today's MTM visit:                                                    MTM (medication therapy management) is a service provided by a clinical pharmacist designed to help you get the most of out of your medicines.   Today we reviewed what your medicines are for, how to know if they are working, that your medicines are safe and how to make your medicine regimen as easy as possible.      1.  Todays weight 198.9lbs , A1c is 6.2% --pre-diabetic -- lets try to reverse that --lets start Metformin 500mg ER tablet --1 tab daily after dinner daily x 7 days , then if well tolerated --week-2 is 1 tab after brunch and dinner , then week-3 is 1 tab after brunch and 2 after dinner, then week-4 is 2 tablets after brunch and dinner daily.    Also must practice Lifestyle change : lower carb diet and intermittent fasting --eat 2 main meals /day Brunch and dinner , NO snacking at all, drink water, coffee ,tea to fill you up, eat lots of non starchy veggies like broccoli, cauliflower,brussel sprouts daily, 1 piece of fruit /day .    Walk daily for 30 minutes for exercise at the fitness club or [employer] -swimming, also I am recommending you join [employer] again to help encourage you to lose weight and keep it off.     Eat 2 main meals/day -eat til your full, eat just enough carbohydrates/day to make you feel happy. Donot ADD sugar to any foods!  Read healthy living with diabetes to help with food choices.     1. Insulin is your body's fat storage hormone. When you eat meals high in carbohydrates your pancreas releases insulin to store the excess food energy as fat. By intermittent fasting, or eating all of your daily food in an 8 hour period while fasting the remaining 16, you reduce the amount of insulin your body is releasing, resulting in less storage of food energy as fat. Focusing on a diet as low in carbohydrate as you can tolerate will also help reduce the amount of insulin your pancreas releases. Low carbohydrate diet  "+ intermittent fasting will result in weight loss and improved blood sugars with fewer medications!    No snacking between meals or after dinner .      2. When you feel hungry in your fasting window, try drinking a zero calorie liquid, like water,coffee, tea  first.        3. Check out the website The Fasting Method.com(join free portion of website) or \"The Obesity Code\" book by Dr. Isma De La Cruz for more details. Dr. De La Cruz also has a cookbook out with meals/recipes --the book is called The Obesity Code Cookbook.    4. Remember -these 7 triggers (Anxiety, stress, depression, boredom, comfort, habit, pain) make us want to eat carbohydrates -control those triggers and you will lose weight .    5. Exercise : 30 minutes /day -every day of the week. Examples: brisk walk indoors or out , treadmill walk at normal pace with raised incline, elliptical, stationary bike, swimming.     6. Remember: Two things make this lifestyle diet change work in the longterm - what you eat and when you eat. Eat just enough carbohydrates every day of the year to keep you mentally happy!      7. Take thyroid pill first thing in AM on empty stomach , take iron pill after dinner.         Follow-up: Return in about 4 weeks (around 11/7/2023), or @ 2 PM, for Weight loss, Medication Therapy Management Visit.    It was great speaking with you today.  I value your experience and would be very thankful for your time in providing feedback in our clinic survey. In the next few days, you may receive an email or text message from [employer] with a link to a survey related to your  clinical pharmacist.\"     To schedule another MTM appointment, please call the clinic directly or you may call the MTM scheduling line at [phone] or toll-free at [phone].     My Clinical Pharmacist's contact information:                                                      Please feel free to contact me with any questions or concerns you have.      Jarett Sen, " Ashley.  Medication Therapy Management Provider  [phone]

## 2023-10-10 NOTE — LETTER
October 10, 2023  Grace Chinchilla  [address] [place] [address]    Dear Ms. Chinchilla, [facility]     Thank you for talking with me on Oct 10, 2023 about your health and medications. As a follow-up to our conversation, I have included two documents:      Your Recommended To-Do List has steps you should take to get the best results from your medications.  Your Medication List will help you keep track of your medications and how to take them.    If you want to talk about these documents, please call Jarett Sen RPH at phone: [phone], Monday-Friday 8-4:30pm.    I look forward to working with you and your doctors to make sure your medications work well for you.    Sincerely,  Jarett Sen RPH  [facility] Pharmacist, [facility]

## 2023-10-10 NOTE — Clinical Note
Laila--thx for mtm referral--chapito and I had a very nice visit with jessi --started her on Metformin + my non drug lifestyle plan.  Bree-Jarett

## 2023-10-10 NOTE — LETTER
_  Medication List        Prepared on: 10/10/2023     Bring your Medication List when you go to the doctor, hospital, or   emergency room. And, share it with your family or caregivers.     Note any changes to how you take your medications.  Cross out medications when you no longer use them.    Medication How I take it Why I use it Prescriber   amphetamine-dextroamphetamine (ADDERALL XR) 30 MG 24 hr capsule    Bi-polar/Depression  Patient Reported   atorvastatin (LIPITOR) 80 MG tablet Take 1 tablet (80 mg) by mouth daily Mixed Hyperlipidemia ZACH Haas CNP   Calcium-Magnesium-Zinc 333-133-5 MG TABS per tablet Take 1 tablet by mouth  Supplement  Patient Reported   cholecalciferol 25 MCG (1000 UT) TABS Take 1,000 Units by mouth   Supplement  Patient Reported   clonazePAM (KLONOPIN) 0.5 MG tablet    Anxiety Patient Reported   Cyanocobalamin (VITAMIN B-12) 500 MCG LOZG Take 1,500 mcg by mouth  Supplement  Patient Reported   diclofenac (VOLTAREN) 1 % topical gel Apply 4 g topically 4 times daily Primary osteoarthritis of right knee Cuca Fong CNP   divalproex sodium extended-release (DEPAKOTE ER) 250 MG 24 hr tablet Take 250 mg by mouth daily Taken with Depakote 500 x 2 for a total of 1250mg daily  Bi-polar /Depression Patient Reported   divalproex sodium extended-release (DEPAKOTE ER) 500 MG 24 hr tablet Take 1,000 mg by mouth daily In addition to Depakote 250mg  Bi-polar /Depression Patient Reported   DULoxetine (CYMBALTA) 30 MG capsule    Depression Patient Reported   ferrous sulfate (FEROSUL) 325 (65 Fe) MG tablet Take 325 mg by mouth daily (with breakfast)  Anemia Patient Reported   Flaxseed, Linseed, (FLAX SEEDS PO)    Supplement  Patient Reported   levothyroxine (SYNTHROID/LEVOTHROID) 125 MCG tablet Take 1 tablet (125 mcg) by mouth daily Hypothyroidism, unspecified type ZACH Haas CNP   losartan (COZAAR) 25 MG tablet Take 1 tablet (25 mg) by mouth daily Benign Essential Hypertension Laila SIMS  ZACH Sanches CNP   metFORMIN (GLUCOPHAGE XR) 500 MG 24 hr tablet 1 tab daily after dinner week-1 , then if well tolerated week-2 take 1 after brunch and dinner, week-3 is 1 after brunch and 2 tablets after dinner, then week-4 is 2 tablets after brunch and dinner. Pre-Diabetes ZACH Haas CNP   multivitamin w/minerals (THERA-VIT-M) tablet Take 1 tablet by mouth daily  Supplement  Patient Reported   Nutritional Supplements (GLUCOSAMINE COMPLEX PO)    Supplement  Patient Reported         Add new medications, over-the-counter drugs, herbals, vitamins, or  minerals in the blank rows below.    Medication How I take it Why I use it Prescriber                                      Allergies:      bupropion        Side effects I have had:               Other Information:              My notes and questions:

## 2023-10-10 NOTE — LETTER
"Recommended To-Do List      Prepared on: 10/10/2023       You can get the best results from your medications by completing the items on this \"To-Do List.\"      Bring your To-Do List when you go to your doctor. And, share it with your family or caregivers.    My To-Do List:  What we talked about: What I should do:   An issue with your medication    Education: : donot take levothyroxine and ferrous sulfate together , move iron pill to dinner time.           What we talked about: What I should do:   A new medication that may be of benefit to you    Start taking : Metformin pill--see plan for dose titration.           What we talked about: What I should do:                     " Minocycline Counseling: Patient advised regarding possible photosensitivity and discoloration of the teeth, skin, lips, tongue and gums.  Patient instructed to avoid sunlight, if possible.  When exposed to sunlight, patients should wear protective clothing, sunglasses, and sunscreen.  The patient was instructed to call the office immediately if the following severe adverse effects occur:  hearing changes, easy bruising/bleeding, severe headache, or vision changes.  The patient verbalized understanding of the proper use and possible adverse effects of minocycline.  All of the patient's questions and concerns were addressed.

## 2023-10-11 ENCOUNTER — TELEPHONE (OUTPATIENT)
Dept: OPHTHALMOLOGY | Facility: CLINIC | Age: 68
End: 2023-10-11
Payer: COMMERCIAL

## 2023-10-11 NOTE — TELEPHONE ENCOUNTER
Spoke to patient and per patient's request spoke to patient's sister Sarai. Questions about cataract surgery were answered. No new eye concerns. They have contact information to reach out to us with any new questions or concerns.

## 2023-10-17 ENCOUNTER — OFFICE VISIT (OUTPATIENT)
Dept: FAMILY MEDICINE | Facility: CLINIC | Age: 68
End: 2023-10-17
Payer: COMMERCIAL

## 2023-10-17 VITALS
TEMPERATURE: 97.5 F | BODY MASS INDEX: 37 KG/M2 | HEART RATE: 71 BPM | OXYGEN SATURATION: 96 % | RESPIRATION RATE: 15 BRPM | WEIGHT: 196 LBS | HEIGHT: 61 IN | SYSTOLIC BLOOD PRESSURE: 137 MMHG | DIASTOLIC BLOOD PRESSURE: 71 MMHG

## 2023-10-17 DIAGNOSIS — H26.9 CATARACT OF LEFT EYE, UNSPECIFIED CATARACT TYPE: ICD-10-CM

## 2023-10-17 DIAGNOSIS — F31.89 OTHER BIPOLAR DISORDER (H): ICD-10-CM

## 2023-10-17 DIAGNOSIS — E66.812 CLASS 2 SEVERE OBESITY DUE TO EXCESS CALORIES WITH SERIOUS COMORBIDITY AND BODY MASS INDEX (BMI) OF 37.0 TO 37.9 IN ADULT (H): ICD-10-CM

## 2023-10-17 DIAGNOSIS — R73.03 PRE-DIABETES: ICD-10-CM

## 2023-10-17 DIAGNOSIS — Z01.818 PREOP GENERAL PHYSICAL EXAM: Primary | ICD-10-CM

## 2023-10-17 DIAGNOSIS — E66.01 CLASS 2 SEVERE OBESITY DUE TO EXCESS CALORIES WITH SERIOUS COMORBIDITY AND BODY MASS INDEX (BMI) OF 37.0 TO 37.9 IN ADULT (H): ICD-10-CM

## 2023-10-17 PROCEDURE — 90480 ADMN SARSCOV2 VAC 1/ONLY CMP: CPT | Performed by: FAMILY MEDICINE

## 2023-10-17 PROCEDURE — 90662 IIV NO PRSV INCREASED AG IM: CPT | Performed by: FAMILY MEDICINE

## 2023-10-17 PROCEDURE — G0008 ADMIN INFLUENZA VIRUS VAC: HCPCS | Mod: 59 | Performed by: FAMILY MEDICINE

## 2023-10-17 PROCEDURE — 91320 SARSCV2 VAC 30MCG TRS-SUC IM: CPT | Performed by: FAMILY MEDICINE

## 2023-10-17 PROCEDURE — 99214 OFFICE O/P EST MOD 30 MIN: CPT | Mod: 25 | Performed by: FAMILY MEDICINE

## 2023-10-17 RX ORDER — RESPIRATORY SYNCYTIAL VIRUS VACCINE 120MCG/0.5
0.5 KIT INTRAMUSCULAR ONCE
Qty: 1 EACH | Refills: 0 | Status: CANCELLED | OUTPATIENT
Start: 2023-10-17 | End: 2023-10-17

## 2023-10-17 ASSESSMENT — PATIENT HEALTH QUESTIONNAIRE - PHQ9
SUM OF ALL RESPONSES TO PHQ QUESTIONS 1-9: 11
SUM OF ALL RESPONSES TO PHQ QUESTIONS 1-9: 11
10. IF YOU CHECKED OFF ANY PROBLEMS, HOW DIFFICULT HAVE THESE PROBLEMS MADE IT FOR YOU TO DO YOUR WORK, TAKE CARE OF THINGS AT HOME, OR GET ALONG WITH OTHER PEOPLE: SOMEWHAT DIFFICULT

## 2023-10-17 NOTE — PATIENT INSTRUCTIONS
Preparing for Your Surgery  Getting started  A nurse will call you to review your health history and instructions. They will give you an arrival time based on your scheduled surgery time. Please be ready to share:  Your doctor's clinic name and phone number  Your medical, surgical, and anesthesia history  A list of allergies and sensitivities  A list of medicines, including herbal treatments and over-the-counter drugs  Whether the patient has a legal guardian (ask how to send us the papers in advance)  Please tell us if you're pregnant--or if there's any chance you might be pregnant. Some surgeries may injure a fetus (unborn baby), so they require a pregnancy test. Surgeries that are safe for a fetus don't always need a test, and you can choose whether to have one.   If you have a child who's having surgery, please ask for a copy of Preparing for Your Child's Surgery.    Preparing for surgery  Within 10 to 30 days of surgery: Have a pre-op exam (sometimes called an H&P, or History and Physical). This can be done at a clinic or pre-operative center.  If you're having a , you may not need this exam. Talk to your care team.  At your pre-op exam, talk to your care team about all medicines you take. If you need to stop any medicines before surgery, ask when to start taking them again.  We do this for your safety. Many medicines can make you bleed too much during surgery. Some change how well surgery (anesthesia) drugs work.  Call your insurance company to let them know you're having surgery. (If you don't have insurance, call [phone].)  Call your clinic if there's any change in your health. This includes signs of a cold or flu (sore throat, runny nose, cough, rash, fever). It also includes a scrape or scratch near the surgery site.  If you have questions on the day of surgery, call your hospital or surgery center.  Eating and drinking guidelines  For your safety: Unless your surgeon tells you otherwise,  follow the guidelines below.  Eat and drink as usual until 8 hours before you arrive for surgery. After that, no food or milk.  Drink clear liquids until 2 hours before you arrive. These are liquids you can see through, like water, Gatorade, and Propel Water. They also include plain black coffee and tea (no cream or milk), candy, and breath mints. You can spit out gum when you arrive.  If you drink alcohol: Stop drinking it the night before surgery.  If your care team tells you to take medicine on the morning of surgery, it's okay to take it with a sip of water.  Preventing infection  Shower or bathe the night before and morning of your surgery. Follow the instructions your clinic gave you. (If no instructions, use regular soap.)  Don't shave or clip hair near your surgery site. We'll remove the hair if needed.  Don't smoke or vape the morning of surgery. You may chew nicotine gum up to 2 hours before surgery. A nicotine patch is okay.  Note: Some surgeries require you to completely quit smoking and nicotine. Check with your surgeon.  Your care team will make every effort to keep you safe from infection. We will:  Clean our hands often with soap and water (or an alcohol-based hand rub).  Clean the skin at your surgery site with a special soap that kills germs.  Give you a special gown to keep you warm. (Cold raises the risk of infection.)  Wear special hair covers, masks, gowns and gloves during surgery.  Give antibiotic medicine, if prescribed. Not all surgeries need antibiotics.  What to bring on the day of surgery  Photo ID and insurance card  Copy of your health care directive, if you have one  Glasses and hearing aids (bring cases)  You can't wear contacts during surgery  Inhaler and eye drops, if you use them (tell us about these when you arrive)  CPAP machine or breathing device, if you use them  A few personal items, if spending the night  If you have . . .  A pacemaker, ICD (cardiac defibrillator) or other  implant: Bring the ID card.  An implanted stimulator: Bring the remote control.  A legal guardian: Bring a copy of the certified (court-stamped) guardianship papers.  Please remove any jewelry, including body piercings. Leave jewelry and other valuables at home.  If you're going home the day of surgery  You must have a responsible adult drive you home. They should stay with you overnight as well.  If you don't have someone to stay with you, and you aren't safe to go home alone, we may keep you overnight. Insurance often won't pay for this.  After surgery  If it's hard to control your pain or you need more pain medicine, please call your surgeon's office.  Questions?   If you have any questions for your care team, list them here: _________________________________________________________________________________________________________________________________________________________________________ ____________________________________ ____________________________________ ____________________________________  For informational purposes only. Not to replace the advice of your health care provider. Copyright   2003, 2019 [employer] [facility]. All rights reserved. Clinically reviewed by Jackelyn Fields MD. [employer] 598301 - REV 12/22.  How to Take Your Medication Before Surgery  - Take all of your medications before surgery except as noted below  - HOLD (do not take) your METFORMIN on the morning of surgery.  - STOP taking all vitamins and herbal supplements 10 days before surgery.

## 2023-10-17 NOTE — COMMUNITY RESOURCES LIST (ENGLISH)
10/17/2023   [facility]  N/A  For questions about this resource list or additional care needs, please contact your primary care clinic or care manager.  Phone: [phone]   Email: N/A   Address: [address] [place] [address]   Hours: N/A        Food and Nutrition       Food pantry  1  [facility] Food Market Distance: 0.85 miles      Pickup   [address] [place] [address]  Language: Uzbek, English, Hmong, Kelly, Japanese  Hours: Mon 1:00 PM - 4:00 PM , Mon 5:00 PM - 6:30 PM , Tue - Fri 9:00 AM - 11:30 AM , Tue - Fri 1:00 PM - 3:30 PM  Fees: Free   Phone: (530) 120-4706 Website: https://[employer].twago - teamwork across global offices/     2  [facility] Service [place] Distance: 2.03 miles      Pickup   [address] [place] [address]  Language: English, Hmong, Guinean, Japanese  Hours: Mon 11:00 AM - 3:00 PM , Wed 11:00 AM - 3:00 PM , Fri 11:00 AM - 3:00 PM  Fees: Free, Self Pay   Phone: (550) 328-8689 Email: Marshall@[facility].org Website: http://[facility].org/[facility]/[address]/     SNAP application assistance  3  [facility] Outreach Distance: 0.85 miles      Phone/Virtual   [address] [place] [address]  Language: Uzbek, English, Hmong, Kelly, Japanese  Hours: Mon - Fri 10:00 AM - 12:00 PM , Mon - Fri 2:00 PM - 4:00 PM  Fees: Free   Phone: (920) 847-7928 Website: https://[employer].twago - teamwork across global offices/     4  [facility] of Human Services - [place] (SNAP) Distance: 2.45 miles      Phone/Virtual   [address] [address] [place] [address]  Language: English, Hmong, Equatorial Guinean, Beninese, Japanese, Monegasque  Hours: Mon - Fri 9:00 AM - 5:00 PM  Fees: Free   Phone: (463) 395-2014 Website: https://[place].gov/dhs/people-we-serve/adults/economic-assistance/food-nutrition/programs-and-services/supplemental-nutrition-assistance-program.jsp     Soup kitchen or free meals  5 St.  [facility] - [employer] Distance: 0.86 miles      Pickup   [address] [place] [address]  Language: English  Hours: Mon - Fri 5:00 PM - 6:00 PM  Fees: Free   Phone: (874) 390-6258 Email: britt@[facility] Website: http://www.[facility].org/     6  [facility] [place] [facility] Distance: 2.66 miles      Pickup   [address] [place] [address]  Language: English  Hours: Tue 2:00 PM - 4:00 PM , Thu 2:00 PM - 4:00 PM  Fees: Free   Phone: (884) 149-6766 Email: clyde@.[facility]. Website: https://www.[facility]/facilities/uxqlhj-byjkswaho-slvmyx          Important Numbers & Websites       Emergency Services   911  [facility]   311  Poison Control   (789) 537-6270  Suicide Prevention Lifeline   (342) 836-2850 (TALK)  Child Abuse Hotline   (542) 754-3698 (4-A-Child)  Sexual Assault Hotline   (599) 400-7903 (HOPE)  National Runaway Safeline   (476) 397-5180 (RUNAWAY)  All-Options Talkline   (565) 695-4123  Substance Abuse Referral   (310) 101-8423 (HELP)

## 2023-10-17 NOTE — COMMUNITY RESOURCES LIST (ENGLISH)
10/17/2023   [facility]  N[facility]  For questions about this resource list or additional care needs, please contact your primary care clinic or care manager.  Phone: [phone]   Email: N/A   Address: [address] [place] [address]   Hours: N/A        Food and Nutrition       Food pantry  1  [facility] Food Market Distance: 0.85 miles      Pickup   [address] [place] [address]  Language: German, English, Hmong, Kelly, Cay[place] Islander  Hours: Mon 1:00 PM - 4:00 PM , Mon 5:00 PM - 6:30 PM , Tue - Fri 9:00 AM - 11:30 AM , Tue - Fri 1:00 PM - 3:30 PM  Fees: Free   Phone: (903) 906-1374 Website: https://[facility].[employer]/     2  [facility] Service [place] Distance: 2.03 miles      Pickup   [address] [place] [address]  Language: English, Hmong, Omani, Cay[place] Islander  Hours: Mon 11:00 AM - 3:00 PM , Wed 11:00 AM - 3:00 PM , Fri 11:00 AM - 3:00 PM  Fees: Free, Self Pay   Phone: (547) 591-3768 Email: Marshall@[facility].org Website: http://[facility].org/[facility]/[address]/     SNAP application assistance  3  [facility] Outreach Distance: 0.85 miles      Phone/Virtual   [address] [place] [address]  Language: German, English, Hmong, Kelly, Cay[place] Islander  Hours: Mon - Fri 10:00 AM - 12:00 PM , Mon - Fri 2:00 PM - 4:00 PM  Fees: Free   Phone: (575) 550-1523 Website: https://[facility].[employer]/     4  [facility] of Human Services - [place] (SNAP) Distance: 2.45 miles      Phone/Virtual   [address] [address] [place] [address]  Language: English, Hmong, Armenian, Angolan, Cay[place] Islander, Macedonian  Hours: Mon - Fri 9:00 AM - 5:00 PM  Fees: Free   Phone: (481) 656-4432 Website: https://[place].gov/dhs/people-we-serve/adults/economic-assistance/food-nutrition/programs-and-services/supplemental-nutrition-assistance-program.jsp     Soup kitchen or free meals  5 St.  [facility] - [employer] Distance: 0.86 miles      Pickup   [address] [place] [address]  Language: English  Hours: Mon - Fri 5:00 PM - 6:00 PM  Fees: Free   Phone: (532) 449-4697 Email: britt@[facility] Website: http://www.[facility].org/     6  [facility] Distance: 2.66 miles      Pickup   [address] [place] [address]  Language: English  Hours: Tue 2:00 PM - 4:00 PM , Thu 2:00 PM - 4:00 PM  Fees: Free   Phone: (305) 369-9613 Email: clyde@.[facility]. Website: https://www.\[facility] [place] [facility]\"".[facility]/facilities/jbirau-wbpkohcbf-qonnza          Important Numbers & Websites       Emergency Services   911  [facility]   311  Poison Control   (140) 672-7342  Suicide Prevention Lifeline   (504) 801-8481 (TALK)  Child Abuse Hotline   (265) 731-7340 (4-A-Child)  Sexual Assault Hotline   (144) 506-1730 (HOPE)  National Runaway Safeline   (441) 671-8196 (RUNAWAY)  All-Options Talkline   (180) 707-6451  Substance Abuse Referral   (506) 432-3593 (HELP)

## 2023-10-17 NOTE — PROGRESS NOTES
[address]  [place] [address]  Phone: [phone]  Fax: [phone]  Primary Provider: Laila Sanches  Pre-op Performing Provider: ROBERTO ESQUIVEL      PREOPERATIVE EVALUATION:  Today's date: 10/17/2023    Grace is a 68 year old female who presents for a preoperative evaluation.      10/17/2023    11:14 AM   Additional Questions   Accompanied by Self     Surgical Information:  Surgery/Procedure: CATARACT   [place] [facility]   Surgeon: Rose Amin,   Surgery Date: 10/26/2023   Time of Surgery: 2:05 PM   Where patient plans to recover: At home with family  Fax number for surgical facility: Note does not need to be faxed, will be available electronically in Epic.    Assessment & Plan     The proposed surgical procedure is considered LOW risk.    Preop general physical exam       Cataract of left eye, unspecified cataract type       Class 2 severe obesity due to excess calories with serious comorbidity and body mass index (BMI) of 37.0 to 37.9 in adult (H)       Other bipolar disorder (H)       Pre-diabetes               - No identified additional risk factors other than previously addressed    Antiplatelet or Anticoagulation Medication Instructions:   - Patient is on no antiplatelet or anticoagulation medications.    Additional Medication Instructions:  See pt instructions. Hold metformin on the day of procedure.    RECOMMENDATION:  APPROVAL GIVEN to proceed with proposed procedure, without further diagnostic evaluation.            Subjective     HPI related to upcoming procedure: left eye cataract surgery.     Diabetes - just started metformin.     Mood is OK with current medications.     Losartan for HTN.,     Taking supplements and vitamins.         10/17/2023    11:05 AM   Preop Questions   1. Have you ever had a heart attack or stroke? No   2. Have you ever had surgery on your heart or blood vessels, such as a stent placement, a  coronary artery bypass, or surgery on an artery in your head, neck, heart, or legs? No   3. Do you have chest pain with activity? No   4. Do you have a history of  heart failure? No   5. Do you currently have a cold, bronchitis or symptoms of other infection? No   6. Do you have a cough, shortness of breath, or wheezing? No   7. Do you or anyone in your family have previous history of blood clots? No   8. Do you or does anyone in your family have a serious bleeding problem such as prolonged bleeding following surgeries or cuts? No   9. Have you ever had problems with anemia or been told to take iron pills? YES -    10. Have you had any abnormal blood loss such as black, tarry or bloody stools, or abnormal vaginal bleeding? No   11. Have you ever had a blood transfusion? No   12. Are you willing to have a blood transfusion if it is medically needed before, during, or after your surgery? Yes   13. Have you or any of your relatives ever had problems with anesthesia? No   14. Do you have sleep apnea, excessive snoring or daytime drowsiness? YES -    14a. Do you have a CPAP machine? Yes   15. Do you have any artifical heart valves or other implanted medical devices like a pacemaker, defibrillator, or continuous glucose monitor? No   16. Do you have artificial joints? No   17. Are you allergic to latex? No     Health Care Directive:  Patient does not have a Health Care Directive or Living Will: Discussed advance care planning with patient; information given to patient to review.    Preoperative Review of :   reviewed - controlled substances reflected in medication list.          Review of Systems  Constitutional, neuro, ENT, endocrine, pulmonary, cardiac, gastrointestinal, genitourinary, musculoskeletal, integument and psychiatric systems are negative, except as otherwise noted.    Patient Active Problem List    Diagnosis Date Noted    Nuclear sclerotic cataract of both eyes 08/18/2023     Priority: Medium     Epiretinal membrane (ERM) of left eye 04/18/2023     Priority: Medium     Added automatically from request for surgery [id number]      Physical deconditioning 03/31/2023     Priority: Medium    Pain in both knees 03/31/2023     Priority: Medium    Elevated BP without diagnosis of hypertension 07/05/2022     Priority: Medium    Vitamin D deficiency 03/14/2022     Priority: Medium    Anemia 10/04/2021     Priority: Medium    Carpal tunnel syndrome 10/04/2021     Priority: Medium    Conversion disorder 10/04/2021     Priority: Medium    Mild intellectual disabilities 10/04/2021     Priority: Medium    Other bipolar disorder (H) 10/04/2021     Priority: Medium    Primary localized osteoarthrosis, lower leg 10/04/2021     Priority: Medium    Urge incontinence of urine 10/04/2021     Priority: Medium    Major depression, recurrent (H24) 09/15/2021     Priority: Medium    Class 2 severe obesity due to excess calories with serious comorbidity and body mass index (BMI) of 35.0 to 35.9 in adult (H) 08/30/2021     Priority: Medium    Pre-diabetes 03/13/2019     Priority: Medium    Nonrheumatic aortic (valve) stenosis with insufficiency 03/22/2017     Priority: Medium    Plantar fascial fibromatosis      Priority: Medium    Obstructive sleep apnea      Priority: Medium     uses CPAP      Hypothyroidism 08/17/2004     Priority: Medium    Mixed hyperlipidemia 08/17/2004     Priority: Medium     Hyperlipidemia        Past Medical History:   Diagnosis Date    Adrenal mass (H24) 3/14/2022    Anxiety     Depression     Depressive disorder, not elsewhere classified     Diaphragmatic hernia without mention of obstruction or gangrene     Disease of thyroid gland     Dysthymic disorder     bipolar - sees  [facility]    Esophageal reflux     Hyperlipidemia     Mixed hyperlipidemia     Hyperlipidemia    Obstructive sleep apnea (adult) (pediatric)     uses CPAP    Plantar fascial fibromatosis     Temporomandibular joint  disorders, unspecified     Temporomandibular joint disorders, unspecified     Tietze's disease     costochondritis    Unspecified hypothyroidism      Past Surgical History:   Procedure Laterality Date    BIOPSY BREAST      benign    BIOPSY OF BREAST, INCISIONAL      Description: Biopsy Breast Open;  Recorded: 07/14/2009;  Comments: right, benign    CERVICAL BIOPSY      NO HISTORY OF SURGERY      RELEASE CARPAL TUNNEL Right     US BIOPSY THYROID FINE NEEDLE ASPIRATION  10/1/2019    VITRECTOMY PARSPLANA WITH 25 GAUGE SYSTEM Left 5/25/2023    Procedure: LEFT EYE VITRECTOMY, PARS PLANA APPROACH, USING 25-GAUGE INSTRUMENTS, Membrane peel, endo laser;  Surgeon: Jessica Motley MD;  Location: [facility] OR     Current Outpatient Medications   Medication Sig Dispense Refill    amphetamine-dextroamphetamine (ADDERALL XR) 30 MG 24 hr capsule       atorvastatin (LIPITOR) 80 MG tablet Take 1 tablet (80 mg) by mouth daily 90 tablet 3    Calcium-Magnesium-Zinc 333-133-5 MG TABS per tablet Take 1 tablet by mouth      cholecalciferol 25 MCG (1000 UT) TABS Take 1,000 Units by mouth       clonazePAM (KLONOPIN) 0.5 MG tablet       Cyanocobalamin (VITAMIN B-12) 500 MCG LOZG Take 1,500 mcg by mouth      diclofenac (VOLTAREN) 1 % topical gel Apply 4 g topically 4 times daily 350 g 3    divalproex sodium extended-release (DEPAKOTE ER) 250 MG 24 hr tablet Take 250 mg by mouth daily Taken with Depakote 500 x 2 for a total of 1250mg daily      divalproex sodium extended-release (DEPAKOTE ER) 500 MG 24 hr tablet Take 1,000 mg by mouth daily In addition to Depakote 250mg      DULoxetine (CYMBALTA) 30 MG capsule       ferrous sulfate (FEROSUL) 325 (65 Fe) MG tablet Take 325 mg by mouth daily (with breakfast)      Flaxseed, Linseed, (FLAX SEEDS PO)       levothyroxine (SYNTHROID/LEVOTHROID) 125 MCG tablet Take 1 tablet (125 mcg) by mouth daily 90 tablet 1    losartan (COZAAR) 25 MG tablet Take 1 tablet (25 mg) by mouth daily 90 tablet 3     "metFORMIN (GLUCOPHAGE XR) 500 MG 24 hr tablet 1 tab daily after dinner week-1 , then if well tolerated week-2 take 1 after brunch and dinner, week-3 is 1 after brunch and 2 tablets after dinner, then week-4 is 2 tablets after brunch and dinner. 120 tablet 5    multivitamin w/minerals (THERA-VIT-M) tablet Take 1 tablet by mouth daily      Nutritional Supplements (GLUCOSAMINE COMPLEX PO)          Allergies   Allergen Reactions    Bupropion         Social History     Tobacco Use    Smoking status: Never    Smokeless tobacco: Never   Substance Use Topics    Alcohol use: No     Family History   Problem Relation Age of Onset    C.A.D. Mother     Heart Disease Mother         d @42    Cancer Father         Leukemia    Leukemia Father         d @49    Breast Cancer Maternal Grandmother 85        d @94    Colon Cancer Maternal Grandfather     Cancer Paternal Grandmother         lung    Emphysema Brother     Alcoholism Brother     Mental retardation Brother     Heart Murmur Brother     Diabetes Sister     Breast Cancer Maternal Aunt 65    Liver Cancer Paternal Aunt     Cancer Niece         breast, liver d@ 44     Breast Cancer Cousin         diagnosed in her 30's    Glaucoma No family hx of     Macular Degeneration No family hx of      History   Drug Use No         Objective     /71 (BP Location: Right arm, Patient Position: Sitting, Cuff Size: Adult Large)   Pulse 71   Temp 97.5  F (36.4  C) (Temporal)   Resp 15   Ht 1.55 m (5' 1.02\")   Wt 88.9 kg (196 lb)   LMP 09/13/2006   SpO2 96%   BMI 37.00 kg/m      Physical Exam    GENERAL APPEARANCE: healthy, alert and no distress     EYES: EOMI, PERRL     HENT: ear canals and TM's normal and nose and mouth without ulcers or lesions     NECK: no adenopathy, no asymmetry, masses, or scars and thyroid normal to palpation     RESP: lungs clear to auscultation - no rales, rhonchi or wheezes     CV: regular rates and rhythm, normal S1 S2, no S3 or S4 and no murmur, click or " rub     SKIN: no suspicious lesions or rashes     NEURO: Normal strength and tone, sensory exam grossly normal, mentation intact and speech normal     LYMPHATICS: No cervical adenopathy    Recent Labs   Lab Test 08/03/23  1106 09/16/22  1345   HGB 12.1 11.4*    308    140   POTASSIUM 4.6 4.2   CR 0.79 0.78   A1C 6.2* 5.3        Diagnostics:  No labs were ordered during this visit.   No EKG required for low risk surgery (cataract, skin procedure, breast biopsy, etc).    Revised Cardiac Risk Index (RCRI):  The patient has the following serious cardiovascular risks for perioperative complications:   - No serious cardiac risks = 0 points     RCRI Interpretation: 0 points: Class I (very low risk - 0.4% complication rate)         Signed Electronically by: Jesus Nichols MD, MD  Copy of this evaluation report is provided to requesting physician.     clean roomT

## 2023-10-23 ENCOUNTER — ANESTHESIA EVENT (OUTPATIENT)
Dept: SURGERY | Facility: AMBULATORY SURGERY CENTER | Age: 68
End: 2023-10-23
Payer: COMMERCIAL

## 2023-10-26 ENCOUNTER — HOSPITAL ENCOUNTER (OUTPATIENT)
Facility: AMBULATORY SURGERY CENTER | Age: 68
Discharge: HOME | End: 2023-10-26
Attending: STUDENT IN AN ORGANIZED HEALTH CARE EDUCATION/TRAINING PROGRAM
Payer: COMMERCIAL

## 2023-10-26 ENCOUNTER — TELEPHONE (OUTPATIENT)
Dept: OPHTHALMOLOGY | Facility: CLINIC | Age: 68
End: 2023-10-26
Payer: COMMERCIAL

## 2023-10-26 ENCOUNTER — ANESTHESIA (OUTPATIENT)
Dept: SURGERY | Facility: AMBULATORY SURGERY CENTER | Age: 68
End: 2023-10-26
Payer: COMMERCIAL

## 2023-10-26 VITALS
SYSTOLIC BLOOD PRESSURE: 130 MMHG | TEMPERATURE: 96.9 F | RESPIRATION RATE: 16 BRPM | HEART RATE: 55 BPM | WEIGHT: 193 LBS | BODY MASS INDEX: 36.44 KG/M2 | HEIGHT: 61 IN | OXYGEN SATURATION: 93 % | DIASTOLIC BLOOD PRESSURE: 55 MMHG

## 2023-10-26 DIAGNOSIS — H25.13 NUCLEAR SCLEROTIC CATARACT OF BOTH EYES: Primary | ICD-10-CM

## 2023-10-26 LAB — GLUCOSE BLDC GLUCOMTR-MCNC: 89 MG/DL (ref 70–99)

## 2023-10-26 PROCEDURE — 82962 GLUCOSE BLOOD TEST: CPT | Performed by: PATHOLOGY

## 2023-10-26 PROCEDURE — 66984 XCAPSL CTRC RMVL W/O ECP: CPT | Mod: LT | Performed by: STUDENT IN AN ORGANIZED HEALTH CARE EDUCATION/TRAINING PROGRAM

## 2023-10-26 PROCEDURE — 66984 XCAPSL CTRC RMVL W/O ECP: CPT | Mod: LT

## 2023-10-26 DEVICE — LENS CC60WF 21.0 CLAREON UV ASPHERIC BICONVEX IOL: Type: IMPLANTABLE DEVICE | Site: EYE | Status: FUNCTIONAL

## 2023-10-26 RX ORDER — CYCLOPENTOLAT/TROPIC/PHENYLEPH 1%-1%-2.5%
1 DROPS (EA) OPHTHALMIC (EYE)
Status: COMPLETED | OUTPATIENT
Start: 2023-10-26 | End: 2023-10-26

## 2023-10-26 RX ORDER — MOXIFLOXACIN 5 MG/ML
1 SOLUTION/ DROPS OPHTHALMIC 4 TIMES DAILY
Qty: 3 ML | Refills: 0 | Status: SHIPPED | OUTPATIENT
Start: 2023-10-26 | End: 2023-11-20

## 2023-10-26 RX ORDER — LIDOCAINE 40 MG/G
CREAM TOPICAL
Status: DISCONTINUED | OUTPATIENT
Start: 2023-10-26 | End: 2023-10-26 | Stop reason: HOSPADM

## 2023-10-26 RX ORDER — SODIUM CHLORIDE, SODIUM LACTATE, POTASSIUM CHLORIDE, CALCIUM CHLORIDE 600; 310; 30; 20 MG/100ML; MG/100ML; MG/100ML; MG/100ML
INJECTION, SOLUTION INTRAVENOUS CONTINUOUS
Status: DISCONTINUED | OUTPATIENT
Start: 2023-10-26 | End: 2023-10-26 | Stop reason: HOSPADM

## 2023-10-26 RX ORDER — TIMOLOL MALEATE 5 MG/ML
SOLUTION/ DROPS OPHTHALMIC PRN
Status: DISCONTINUED | OUTPATIENT
Start: 2023-10-26 | End: 2023-10-26 | Stop reason: HOSPADM

## 2023-10-26 RX ORDER — DEXAMETHASONE SODIUM PHOSPHATE 4 MG/ML
INJECTION, SOLUTION INTRA-ARTICULAR; INTRALESIONAL; INTRAMUSCULAR; INTRAVENOUS; SOFT TISSUE PRN
Status: DISCONTINUED | OUTPATIENT
Start: 2023-10-26 | End: 2023-10-26 | Stop reason: HOSPADM

## 2023-10-26 RX ORDER — ONDANSETRON 4 MG/1
4 TABLET, ORALLY DISINTEGRATING ORAL EVERY 30 MIN PRN
Status: DISCONTINUED | OUTPATIENT
Start: 2023-10-26 | End: 2023-10-27 | Stop reason: HOSPADM

## 2023-10-26 RX ORDER — ACETAMINOPHEN 325 MG/1
975 TABLET ORAL ONCE
Status: DISCONTINUED | OUTPATIENT
Start: 2023-10-26 | End: 2023-10-26 | Stop reason: HOSPADM

## 2023-10-26 RX ORDER — ACETAMINOPHEN 325 MG/1
975 TABLET ORAL ONCE
Status: COMPLETED | OUTPATIENT
Start: 2023-10-26 | End: 2023-10-26

## 2023-10-26 RX ORDER — SODIUM CHLORIDE, SODIUM LACTATE, POTASSIUM CHLORIDE, CALCIUM CHLORIDE 600; 310; 30; 20 MG/100ML; MG/100ML; MG/100ML; MG/100ML
INJECTION, SOLUTION INTRAVENOUS CONTINUOUS
Status: DISCONTINUED | OUTPATIENT
Start: 2023-10-26 | End: 2023-10-27 | Stop reason: HOSPADM

## 2023-10-26 RX ORDER — TETRACAINE HYDROCHLORIDE 5 MG/ML
SOLUTION OPHTHALMIC PRN
Status: DISCONTINUED | OUTPATIENT
Start: 2023-10-26 | End: 2023-10-26 | Stop reason: HOSPADM

## 2023-10-26 RX ORDER — ONDANSETRON 2 MG/ML
4 INJECTION INTRAMUSCULAR; INTRAVENOUS EVERY 30 MIN PRN
Status: DISCONTINUED | OUTPATIENT
Start: 2023-10-26 | End: 2023-10-27 | Stop reason: HOSPADM

## 2023-10-26 RX ORDER — DICLOFENAC SODIUM 1 MG/ML
1 SOLUTION/ DROPS OPHTHALMIC
Status: COMPLETED | OUTPATIENT
Start: 2023-10-26 | End: 2023-10-26

## 2023-10-26 RX ORDER — OXYCODONE HYDROCHLORIDE 5 MG/1
10 TABLET ORAL
Status: DISCONTINUED | OUTPATIENT
Start: 2023-10-26 | End: 2023-10-27 | Stop reason: HOSPADM

## 2023-10-26 RX ORDER — PROPARACAINE HYDROCHLORIDE 5 MG/ML
1 SOLUTION/ DROPS OPHTHALMIC ONCE
Status: COMPLETED | OUTPATIENT
Start: 2023-10-26 | End: 2023-10-26

## 2023-10-26 RX ORDER — BALANCED SALT SOLUTION 6.4; .75; .48; .3; 3.9; 1.7 MG/ML; MG/ML; MG/ML; MG/ML; MG/ML; MG/ML
SOLUTION OPHTHALMIC PRN
Status: DISCONTINUED | OUTPATIENT
Start: 2023-10-26 | End: 2023-10-26 | Stop reason: HOSPADM

## 2023-10-26 RX ORDER — MOXIFLOXACIN IN NACL,ISO-OS/PF 0.3MG/0.3
SYRINGE (ML) INTRAOCULAR PRN
Status: DISCONTINUED | OUTPATIENT
Start: 2023-10-26 | End: 2023-10-26 | Stop reason: HOSPADM

## 2023-10-26 RX ORDER — LIDOCAINE HYDROCHLORIDE 10 MG/ML
INJECTION, SOLUTION EPIDURAL; INFILTRATION; INTRACAUDAL; PERINEURAL PRN
Status: DISCONTINUED | OUTPATIENT
Start: 2023-10-26 | End: 2023-10-26 | Stop reason: HOSPADM

## 2023-10-26 RX ORDER — KETOROLAC TROMETHAMINE 5 MG/ML
1 SOLUTION OPHTHALMIC 4 TIMES DAILY
Qty: 5 ML | Refills: 0 | Status: SHIPPED | OUTPATIENT
Start: 2023-10-26 | End: 2023-11-20

## 2023-10-26 RX ORDER — ACETAMINOPHEN 325 MG/1
325-650 TABLET ORAL EVERY 6 HOURS PRN
COMMUNITY
End: 2023-11-20

## 2023-10-26 RX ORDER — PREDNISOLONE ACETATE 10 MG/ML
1 SUSPENSION/ DROPS OPHTHALMIC 4 TIMES DAILY
Qty: 5 ML | Refills: 0 | Status: SHIPPED | OUTPATIENT
Start: 2023-10-26 | End: 2023-11-20

## 2023-10-26 RX ORDER — MOXIFLOXACIN 5 MG/ML
1 SOLUTION/ DROPS OPHTHALMIC
Status: COMPLETED | OUTPATIENT
Start: 2023-10-26 | End: 2023-10-26

## 2023-10-26 RX ORDER — OXYCODONE HYDROCHLORIDE 5 MG/1
5 TABLET ORAL
Status: DISCONTINUED | OUTPATIENT
Start: 2023-10-26 | End: 2023-10-27 | Stop reason: HOSPADM

## 2023-10-26 RX ADMIN — SODIUM CHLORIDE, SODIUM LACTATE, POTASSIUM CHLORIDE, CALCIUM CHLORIDE: 600; 310; 30; 20 INJECTION, SOLUTION INTRAVENOUS at 13:32

## 2023-10-26 RX ADMIN — MOXIFLOXACIN 1 DROP: 5 SOLUTION/ DROPS OPHTHALMIC at 12:55

## 2023-10-26 RX ADMIN — ACETAMINOPHEN 975 MG: 325 TABLET ORAL at 13:04

## 2023-10-26 RX ADMIN — DICLOFENAC SODIUM 1 DROP: 1 SOLUTION/ DROPS OPHTHALMIC at 13:11

## 2023-10-26 RX ADMIN — PROPARACAINE HYDROCHLORIDE 1 DROP: 5 SOLUTION/ DROPS OPHTHALMIC at 12:50

## 2023-10-26 RX ADMIN — Medication 1 DROP: at 13:12

## 2023-10-26 RX ADMIN — DICLOFENAC SODIUM 1 DROP: 1 SOLUTION/ DROPS OPHTHALMIC at 13:17

## 2023-10-26 RX ADMIN — Medication 1 DROP: at 13:16

## 2023-10-26 RX ADMIN — MOXIFLOXACIN 1 DROP: 5 SOLUTION/ DROPS OPHTHALMIC at 13:17

## 2023-10-26 RX ADMIN — Medication 1 DROP: at 12:56

## 2023-10-26 RX ADMIN — MOXIFLOXACIN 1 DROP: 5 SOLUTION/ DROPS OPHTHALMIC at 13:11

## 2023-10-26 RX ADMIN — DICLOFENAC SODIUM 1 DROP: 1 SOLUTION/ DROPS OPHTHALMIC at 12:58

## 2023-10-26 NOTE — TELEPHONE ENCOUNTER
[facility] Call Center    Phone Message    May a detailed message be left on voicemail: yes     Reason for Call: Other: Pt has a procedure today and has been dealing with a stomach bug for the past 2 weeks. States she's been experiencing diarrhea  and would like to know if that is okay for her to still have her surgery today. Please reach out to pt and advise.      Action Taken: Message routed to:  [facility] ([facility] eye    Travel Screening: Not Applicable

## 2023-10-26 NOTE — TELEPHONE ENCOUNTER
Spoke to pt at 0815    Last solid bowel movement around 10-.    Pt with diarrhea for about 2 weeks now and typically has to use restroom at night--2-3 times at night.    Pt has cataract surgery scheduled this afternoon with Dr. Amin.    No fever or other symptoms noted.    Reviewed likelihood of going forth with surgery if pt would like to proceed.    Asked pt to call [facility]op nursing at [phone] to review/confirm plan for surgery today.    Note to Dr. Brennan Wells RN 8:22 AM 10/26/23

## 2023-10-26 NOTE — ANESTHESIA POSTPROCEDURE EVALUATION
Patient: Grace Chinchilla    Procedure: Procedure(s):  LEFT EYE PHACOEMULSIFICATION, CATARACT, WITH INTRAOCULAR LENS IMPLANT       Anesthesia Type:  MAC    Note:  Disposition: Outpatient   Postop Pain Control: Uneventful            Sign Out: Well controlled pain   PONV: No   Neuro/Psych: Uneventful            Sign Out: Acceptable/Baseline neuro status   Airway/Respiratory: Uneventful            Sign Out: Acceptable/Baseline resp. status   CV/Hemodynamics: Uneventful            Sign Out: Acceptable CV status; No obvious hypovolemia; No obvious fluid overload   Other NRE: NONE   DID A NON-ROUTINE EVENT OCCUR? No       Last vitals:  Vitals Value Taken Time   /57 10/26/23 1410   Temp 36.1  C (97  F) 10/26/23 1410   Pulse 48 10/26/23 1410   Resp 16 10/26/23 1410   SpO2 93 % 10/26/23 1410       Electronically Signed By: Umer Garcia MD  October 26, 2023  2:46 PM

## 2023-10-26 NOTE — OP NOTE
PREOPERATIVE DIAGNOSIS:   Nuclear sclerotic cataract of Left eye   POSTOPERATIVE DIAGNOSIS:   Nuclear sclerotic cataract of Left eye  PROCEDURES: Cataract extraction with intraocular lens implant Left eye.  SURGEON: Rose Amin M.D.  ASSISTANT: none  ANESTHESIA: MAC with Topical   CDE: 8.00  INDICATIONS: The patient Grace Chinchilla presented to the eye clinic with decreased vision secondary to cataract in the Left eye. The risks, benefits and alternatives to cataract extraction were discussed. Patient elected to proceed with a monofocal IOL aimed at -1.00 for refractive target. The patient elected to proceed. All questions were answered to the patient's satisfaction.     DESCRIPTION OF PROCEDURE: Prior to the procedure, appropriate cardiac and respiratory monitors were applied to the patient.  In the pre-operative holding area, a drop of topical proparacaine 0.5% followed by three rounds of cyclopentolate 1%-tropicamide 1%-phenylephrine 2.5%,  by five minutes apart, were instilled into the procedure eye.  The patient was brought to the operating room where a surgical pause was carried out to identify with all members of the surgical team the correct surgical site.      With adequate anesthesia, the Left eye was prepped and draped in the usual sterile fashion for ophthalmic surgery. A lid speculum was placed, and the operating microscope was rotated into position. The surgeon was seated temporally. A paracentesis was created at the limbus with the surgeon's non dominant hand.  Through this limbal paracentesis, the anterior chamber was filled with preservative-free lidocaine, followed by preservative free epinephrine-BSS, followed by dispersive viscoelastic. Next, the main wound was created via a clear corneal incision with the surgeon's dominant hand using a 2.6 mm keratome blade. A capsulorrhexis was initiated using a 25-gauge bent needle, Cystotome, and a continuous curvilinear capsulorhexis was  completed in a circular fashion using the Utrata forceps. Following the capsulorhexis, some of the OVD was egressed from the main wound, and then the lens nucleus was carefully hydrodissected using balanced salt solution on an AC cannula.  The lens nucleus was rotated and removed using phacoemulsification in a stop and chop technique.  Residual cortical material was removed using irrigation-aspiration.  The capsular bag was then filled with cohesive viscoelastic.  A +21.0 diopter [id number] lens was inserted into the capsular bag.  The lens power selected was reviewed using the intraocular lens power measurements that were obtained preoperatively to confirm that the correct lens was selected for the desired post-operative refractive state. After lens insertion, the residual viscoelastic was removed in its entirety with the I/A handpiece. The wounds were hydrated with balanced salt solution and found to be self-sealing. Preservative free intracameral moxifloxacin was administered. With a weck-lisseth spear the wounds were checked and no leaks were noted. The intraocular pressure was noted to be within the normal range to digital palpation. Subconjunctival dexamethasone (2mg) was injected superiorly.    The lid speculum was removed, one drop of timolol 0.5% and a small ribbon of maxitrol ophthalmic ointment was instilled in the operative eye. An eye shield were carefully placed over the operative eye. The patient tolerated the procedure well without any complications.    PLAN: The patient will be discharged to home and will follow up for post operative visit as scheduled. Counseled return precautions  EBL:  None  COMPLICATIONS:  None  Implant Name Type Inv. Item Serial No.  Lot No. LRB No. Used Action   LENS CC60WF 21.0 [employer] UV [employer]HERIC BICONVEX IOL - [id number] Lens/Eye Implant LENS CC60WF 21.0 [employer] UV [employer]HER[employer] BICONVEX IOL [id number] [employer]  Left 1 Implanted

## 2023-10-26 NOTE — ANESTHESIA CARE TRANSFER NOTE
Patient: Grace Chinchilla    Procedure: Procedure(s):  LEFT EYE PHACOEMULSIFICATION, CATARACT, WITH INTRAOCULAR LENS IMPLANT       Diagnosis: Nuclear sclerotic cataract of both eyes [H25.13]  Diagnosis Additional Information: No value filed.    Anesthesia Type:   No value filed.     Note:    Oropharynx: oropharynx clear of all foreign objects and spontaneously breathing  Level of Consciousness: awake  Oxygen Supplementation: room air    Independent Airway: airway patency satisfactory and stable  Dentition: dentition unchanged  Vital Signs Stable: post-procedure vital signs reviewed and stable  Report to RN Given: handoff report given  Patient transferred to: Phase II  Comments: Vital signs per nursing documentation.       Handoff Report: Identifed the Patient, Identified the Reponsible Provider, Reviewed the pertinent medical history, Discussed the surgical course, Reviewed Intra-OP anesthesia mangement and issues during anesthesia, Set expectations for post-procedure period and Allowed opportunity for questions and acknowledgement of understanding      Vitals:  Vitals Value Taken Time   BP     Temp     Pulse 52    Resp 13    SpO2 94%        Electronically Signed By: ZACH Gilliland CRNA  October 26, 2023  2:10 PM

## 2023-10-26 NOTE — ANESTHESIA PREPROCEDURE EVALUATION
Anesthesia Pre-Procedure Evaluation    Patient: Grace Chinchilla   MRN: [id number] : 1955        Procedure : Procedure(s):  LEFT EYE VITRECTOMY, PARS PLANA APPROACH, USING 25-GAUGE INSTRUMENTS, Membrane peel, possible endo laser, possible gas vs oil          Past Medical History:   Diagnosis Date     Adrenal mass (H24) 3/14/2022     Anxiety      Depression      Depressive disorder, not elsewhere classified      Diaphragmatic hernia without mention of obstruction or gangrene      Disease of thyroid gland      Dysthymic disorder     bipolar - sees  Dr. Gomez [facility]     Esophageal reflux      Hyperlipidemia      Mixed hyperlipidemia     Hyperlipidemia     Obstructive sleep apnea (adult) (pediatric)     uses CPAP     Plantar fascial fibromatosis      Temporomandibular joint disorders, unspecified      Temporomandibular joint disorders, unspecified      Tietze's disease     costochondritis     Unspecified hypothyroidism       Past Surgical History:   Procedure Laterality Date     BIOPSY BREAST      benign     BIOPSY OF BREAST, INCISIONAL      Description: Biopsy Breast Open;  Recorded: 2009;  Comments: right, benign     CERVICAL BIOPSY       NO HISTORY OF SURGERY       RELEASE CARPAL TUNNEL Right      US BIOPSY THYROID FINE NEEDLE ASPIRATION  10/1/2019     VITRECTOMY PARSPLANA WITH 25 GAUGE SYSTEM Left 2023    Procedure: LEFT EYE VITRECTOMY, PARS PLANA APPROACH, USING 25-GAUGE INSTRUMENTS, Membrane peel, endo laser;  Surgeon: Jessica Motley MD;  Location: [facility] OR      Allergies   Allergen Reactions     Bupropion       Social History     Tobacco Use     Smoking status: Never     Smokeless tobacco: Never   Substance Use Topics     Alcohol use: No      Wt Readings from Last 1 Encounters:   10/26/23 87.5 kg (193 lb)        Anesthesia Evaluation            ROS/MED HX  ENT/Pulmonary:     (+) sleep apnea, uses CPAP,                                     Neurologic:       Cardiovascular:    "    METS/Exercise Tolerance:     Hematologic:       Musculoskeletal:       GI/Hepatic:     (+) GERD,                   Renal/Genitourinary:       Endo:     (+)          thyroid problem, hypothyroidism,    Obesity,       Psychiatric/Substance Use:     (+) psychiatric history anxiety, depression and bipolar       Infectious Disease:       Malignancy:       Other:            Physical Exam    Airway        Mallampati: III   TM distance: > 3 FB   Neck ROM: full   Mouth opening: > 3 cm    Respiratory Devices and Support         Dental       (+) Multiple crowns, permanant bridges      Cardiovascular          Rhythm and rate: regular     Pulmonary           breath sounds clear to auscultation         OUTSIDE LABS:  CBC:   Lab Results   Component Value Date    WBC 6.7 08/03/2023    WBC 8.0 09/16/2022    HGB 12.1 08/03/2023    HGB 11.4 (L) 09/16/2022    HCT 38.1 08/03/2023    HCT 34.6 (L) 09/16/2022     08/03/2023     09/16/2022     BMP:   Lab Results   Component Value Date     08/03/2023     09/16/2022    POTASSIUM 4.6 08/03/2023    POTASSIUM 4.2 09/16/2022    CHLORIDE 103 08/03/2023    CHLORIDE 103 09/16/2022    CO2 28 08/03/2023    CO2 28 09/16/2022    BUN 21.7 08/03/2023    BUN 27.6 (H) 09/16/2022    CR 0.79 08/03/2023    CR 0.78 09/16/2022    GLC 89 10/26/2023    GLC 84 08/03/2023     COAGS: No results found for: \"PTT\", \"INR\", \"FIBR\"  POC:   Lab Results   Component Value Date    HCG Negative 01/07/2003     HEPATIC:   Lab Results   Component Value Date    ALBUMIN 4.6 08/03/2023    PROTTOTAL 7.3 08/03/2023    ALT 13 08/03/2023    AST 22 08/03/2023    ALKPHOS 86 08/03/2023    BILITOTAL 0.3 08/03/2023     OTHER:   Lab Results   Component Value Date    A1C 6.2 (H) 08/03/2023    DAYAN 9.8 08/03/2023    LIPASE 31 08/30/2021    TSH 0.49 08/03/2023    T4 0.91 04/02/2007       Anesthesia Plan    ASA Status:  3    NPO Status:  NPO Appropriate    Anesthesia Type: MAC.     - Reason for MAC: immobility needed "              Consents    Anesthesia Plan(s) and associated risks, benefits, and realistic alternatives discussed. Questions answered and patient/representative(s) expressed understanding.     - Discussed:     - Discussed with:  Patient            Postoperative Care    Pain management: IV analgesics.   PONV prophylaxis: Ondansetron (or other 5HT-3), Dexamethasone or Solumedrol     Comments:                Umer Garcia MD

## 2023-10-26 NOTE — DISCHARGE INSTRUCTIONS
"Cataract Surgery Post-Operative Instructions      You have undergone cataract surgery today. Take it easy as the mild anesthesia wears off.     After cataract surgery you will have an eye shield over your eye and things might be a little blurry. This is normal. It will clear up over the coming days. Your eye may feel a little scratchy and this should get better over the next few days    It is okay to resume your previous diet    Use Tylenol (if there is no contraindication from a medical standpoint) if you experience any eye pain    Avoid sleeping on or putting pressure on the operated eye    Sleep with the eye shield at bedtime for the first week    You may resume light activity tomorrow, but no heavy lifting, no straining, no bending over especially the first week    Do not rub the eyes, no water in the eyes (no pools or hot tubs or tap water); Please wait until tomorrow to shower, and when you do shower take care not to get water in the surgical eye    You can continue the following eye drops starting tonight:    Vigamox (tan cap) 1 drop four times a day to surgical eye for 1 week, then stop  Ketorolac (grey top) 1 drop four times a day to surgical eye for 1 week, then 1 drop three times a day to surgical eye for 1 week, then 1 drop two times a day to surgical eye for 1 week , then 1 drop once a day to surgical eye for 1 week, then stop  Predforte (pink or white top) 1 drop four times a day to surgical eye for 1 week, then 1 drop three times a day to surgical eye for 1 week, then 1 drop two times a day to surgical eye for 1 week , then 1 drop once a day to surgical eye for 1 week, then stop  OPTIONAL: preservative free artificial tears (refresh, thera tears, systane, etc) 1 drop 4-6 times per day as needed (DO NOT use Visine or Clear Eyes or any eye drop product that states \"red out\")  If on glaucoma medications, then continue regular eye pressure drops  **wait 5-10 minutes between each drop**  **can refrigerate " eye drops to reduce burning or stinging sensation**    Please contact Dr Amin at [phone] if any issues arise    Your follow up appointment is as scheduled on 10/27/2023 2:45 PM     [facility] - Department of Ophthalmology  [address] [place] [address], [phone]    [facility]  Home Care Following Anesthesia  For 24 hours after surgery:  Get plenty of rest.  A responsible adult must stay with you for at least 24 hours after you leave the surgery center.  Do not drive or use heavy equipment.  If you have weakness or tingling, don't drive or use heavy equipment until this feeling goes away.   Do not drink alcohol.   Avoid strenuous or risky activities.  Ask for help when climbing stairs.  You may feel lightheaded.  IF so, sit for a few minutes before standing.  Have someone help you get up.   If you have nausea (feel sick to your stomach): Drink only clear liquids such as apple juice, ginger ale, broth or 7-Up.  Rest may also help.  Be sure to drink enough fluids.  Move to a regular diet as you feel able.   You may have a slight fever.  Call the doctor if your fever is over 100 F (37.7 C) (taken under the tongue) or lasts longer than 24 hours.  You may have a dry mouth, a sore throat, muscle aches or trouble sleeping. These should go away after 24 hours.  Do not make important or legal decisions.   It is recommended to avoid smoking.               Tips for taking pain medications  To get the best pain relief possible, remember these points:  Take pain medications as directed, before pain becomes severe.  Pain medication can upset your stomach: taking it with food may help.  Constipation is a common side effect of pain medication. Drink plenty of  fluids.  Eat foods high in fiber. Take a stool softener if recommended by your doctor or pharmacist.  Do not drink alcohol, drive or operate machinery while taking pain medications.  Ask about other ways to control  pain, such as with heat, ice or relaxation.    Tylenol/Acetaminophen Consumption    If you feel your pain relief is insufficient, you may take Tylenol/Acetaminophen in addition to your narcotic pain medication.   Be careful not to exceed 4,000 mg of Tylenol/Acetaminophen in a 24 hour period from all sources.  If you are taking extra strength Tylenol/acetaminophen (500 mg), the maximum dose is 8 tablets in 24 hours.  If you are taking regular strength acetaminophen (325 mg), the maximum dose is 12 tablets in 24 hours.    Call a doctor for any of the following:  Signs of infection (fever, growing tenderness at the surgery site, a large amount of drainage or bleeding, severe pain, foul-smelling drainage, redness, swelling).  It has been over 8 to 10 hours since surgery and you are still not able to urinate (pass water).  Headache for over 24 hours.  Numbness, tingling or weakness the day after surgery (if you had spinal anesthesia).  Signs of Covid-19 infection (temperature over 100 degrees, shortness of breath, cough, loss of taste/smell, generalized body aches, persistent headache, chills, sore throat, nausea/vomiting/diarrhea)  Your doctor is:       Dr. Rose Amin, Ophthalmology: [phone]               Or dial [phone] and ask for the resident on call for:  Ophthalmology  For emergency care, call the:  [facility]:  [phone] (TTY for hearing impaired: [phone])

## 2023-10-26 NOTE — BRIEF OP NOTE
[facility] [place]    Brief Operative Note    Pre-operative diagnosis: Nuclear sclerotic cataract of left eye  Post-operative diagnosis Same as pre-operative diagnosis    Procedure: LEFT EYE PHACOEMULSIFICATION, CATARACT, WITH INTRAOCULAR LENS IMPLANT, Left - Eye    Surgeon: Surgeon(s) and Role:     * Rose Amin MD - Primary  Anesthesia: MAC with Topical   Estimated Blood Loss: None    Drains: None  Specimens: * No specimens in log *  Findings:   None.  Complications: None.  Implants:   Implant Name Type Inv. Item Serial No.  Lot No. LRB No. Used Action   LENS [id number] 21.0 [employer] UV ASPHERIC BICONVEX IOL - [id number] Lens/Eye Implant LENS [id number] 21.0 [employer] UV ASPHERIC BICONVEX IOL [id number] [employer]  Left 1 Implanted

## 2023-10-27 ENCOUNTER — OFFICE VISIT (OUTPATIENT)
Dept: OPHTHALMOLOGY | Facility: CLINIC | Age: 68
End: 2023-10-27
Attending: STUDENT IN AN ORGANIZED HEALTH CARE EDUCATION/TRAINING PROGRAM
Payer: COMMERCIAL

## 2023-10-27 DIAGNOSIS — Z96.1 PSEUDOPHAKIA, LEFT EYE: ICD-10-CM

## 2023-10-27 DIAGNOSIS — Z98.890 POSTOPERATIVE EYE STATE: Primary | ICD-10-CM

## 2023-10-27 PROCEDURE — 99024 POSTOP FOLLOW-UP VISIT: CPT | Performed by: STUDENT IN AN ORGANIZED HEALTH CARE EDUCATION/TRAINING PROGRAM

## 2023-10-27 PROCEDURE — G0463 HOSPITAL OUTPT CLINIC VISIT: HCPCS | Performed by: STUDENT IN AN ORGANIZED HEALTH CARE EDUCATION/TRAINING PROGRAM

## 2023-10-27 ASSESSMENT — VISUAL ACUITY
OS_SC: 20/300
OD_CC: 20/25
METHOD: SNELLEN - LINEAR

## 2023-10-27 ASSESSMENT — TONOMETRY
OS_IOP_MMHG: 12
OD_IOP_MMHG: 12
IOP_METHOD: TONOPEN

## 2023-10-27 ASSESSMENT — SLIT LAMP EXAM - LIDS
COMMENTS: NORMAL
COMMENTS: NORMAL

## 2023-10-27 ASSESSMENT — EXTERNAL EXAM - LEFT EYE: OS_EXAM: NORMAL

## 2023-10-27 ASSESSMENT — EXTERNAL EXAM - RIGHT EYE: OD_EXAM: NORMAL

## 2023-10-27 NOTE — NURSING NOTE
Chief Complaints and History of Present Illnesses   Patient presents with    Post Op (Ophthalmology) Left Eye     LEFT EYE PHACOEMULSIFICATION, CATARACT, WITH INTRAOCULAR LENS IMPLANT 10/26/23     Chief Complaint(s) and History of Present Illness(es)       Post Op (Ophthalmology) Left Eye              Comments: LEFT EYE PHACOEMULSIFICATION, CATARACT, WITH INTRAOCULAR LENS IMPLANT 10/26/23              Comments    Day 1 po   States some discomfort . No eye pain    Ju Meneses COT 2:36 PM October 27, 2023

## 2023-10-27 NOTE — PROGRESS NOTES
HPI       Post Op (Ophthalmology) Left Eye     Additional comments: LEFT EYE PHACOEMULSIFICATION, CATARACT, WITH INTRAOCULAR LENS IMPLANT 10/26/23             Comments    Day 1 po   States some discomfort . No eye pain    Ju Meneses COT 2:36 PM October 27, 2023               Last edited by Ju Meneses on 10/27/2023  2:37 PM.        No new flashes, floaters, or curtain down visual field.    Review of systems for the eyes was negative other than the pertinent positives/negatives listed in the HPI.    Ocular Meds: post op drops as instructed    Ocular Hx: PPV/MS OS 5/25/23 with Dr Motley; herpes zoster V1 distribution without ocular involvement, left sided; CE/IOL left eye (10/26/2023)    FOHx: no family history of glaucoma or blindness    PMHx:   Past Medical History:   Diagnosis Date    Adrenal mass (H24) 3/14/2022    Anxiety     Depression     Depressive disorder, not elsewhere classified     Diaphragmatic hernia without mention of obstruction or gangrene     Disease of thyroid gland     Dysthymic disorder     bipolar - sees  Dr. Gomez [facility]    Esophageal reflux     Hyperlipidemia     Mixed hyperlipidemia     Hyperlipidemia    Obstructive sleep apnea (adult) (pediatric)     uses CPAP    Plantar fascial fibromatosis     Temporomandibular joint disorders, unspecified     Temporomandibular joint disorders, unspecified     Tietze's disease     costochondritis    Unspecified hypothyroidism        Assessment & Plan      Grace Chinchilla is a 68 year old female with the following diagnoses:    Postoperative eye state  Pseudophakia OS  - Doing well, IOP wnl, diana negative; vision may be limited due to retina pathology  - mild corneal edema as expected  - Keep eye shield in place at night for 7 days  - post-operative drops and taper according to instructions  -- vigamox QID x 1 week to surgical eye  -- predforte QID/TID/BID/daily/stop to procedure eye, taper weekly  -- ketorolac QID/TID/BID/daily/stop  to procedure eye, taper weekly  - Post-operative do's and don'ts reviewed, questions answered  - patient has emergency contact information  - Counseled endophthalmitis/RD/return precautions    Nuclear sclerotic cataract OD  - not visually significant  - observe    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    Attending Physician Attestation:  Complete documentation of historical and exam elements from today's encounter can be found in the full encounter summary report (not reduplicated in this progress note).  I personally obtained the chief complaint(s) and history of present illness.  I confirmed and edited as necessary the review of systems, past medical/surgical history, family history, social history, and examination findings as documented by others; and I examined the patient myself.  I personally reviewed the relevant tests, images, and reports as documented above.  I formulated and edited as necessary the assessment and plan and discussed the findings and management plan with the patient and family. . - Rose Amin MD

## 2023-10-28 NOTE — PROGRESS NOTES
10-28-23    Genia called and left message that she is having 2 weeks diarrhea -- mtm did start her on metformin 500mg er tabs 10-10-23. First week 1 tab after dinner diarrhea since 10-14-23.  Right now 1 after brunch and 2 after dinner .  Small bm yesterday and diarrhea last night.       Plan:    1.  [id number]. After brunch  and 1 after dinner max dose---she can tolerate + prn imodium ad recommended per mtm .    F/up mtm visit 11-7-23.    Jarett Sen [facility].  Medication Therapy Management Provider  [phone]

## 2023-11-01 DIAGNOSIS — I10 BENIGN ESSENTIAL HYPERTENSION: ICD-10-CM

## 2023-11-01 NOTE — TELEPHONE ENCOUNTER
Patient called to ask if she should stop taking her losartan because her last few blood pressure readings have been good. Explained that this would be the expected result of taking a medication like losartan, and to continue same medication. Patient verbalized understanding but will still discuss with MTM at upcoming appointment on 11/7/23.    VAN MondragonN, RN  [facility]

## 2023-11-03 ENCOUNTER — OFFICE VISIT (OUTPATIENT)
Dept: OPHTHALMOLOGY | Facility: CLINIC | Age: 68
End: 2023-11-03
Attending: STUDENT IN AN ORGANIZED HEALTH CARE EDUCATION/TRAINING PROGRAM
Payer: COMMERCIAL

## 2023-11-03 DIAGNOSIS — Z98.890 POSTOPERATIVE EYE STATE: Primary | ICD-10-CM

## 2023-11-03 DIAGNOSIS — Z96.1 PSEUDOPHAKIA, LEFT EYE: ICD-10-CM

## 2023-11-03 PROCEDURE — G0463 HOSPITAL OUTPT CLINIC VISIT: HCPCS | Performed by: STUDENT IN AN ORGANIZED HEALTH CARE EDUCATION/TRAINING PROGRAM

## 2023-11-03 PROCEDURE — 99024 POSTOP FOLLOW-UP VISIT: CPT | Performed by: STUDENT IN AN ORGANIZED HEALTH CARE EDUCATION/TRAINING PROGRAM

## 2023-11-03 ASSESSMENT — REFRACTION_WEARINGRX
OS_CYLINDER: +2.75
OD_CYLINDER: +2.50
OS_ADD: +2.50
SPECS_TYPE: PAL
OS_AXIS: 010
OD_AXIS: 147
OS_SPHERE: -1.75
OD_SPHERE: -2.50
OD_ADD: +2.50

## 2023-11-03 ASSESSMENT — VISUAL ACUITY
CORRECTION_TYPE: GLASSES
OD_CC+: -2
OD_CC: 20/20
OS_CC: 20/125
METHOD: SNELLEN - LINEAR

## 2023-11-03 ASSESSMENT — EXTERNAL EXAM - LEFT EYE: OS_EXAM: NORMAL

## 2023-11-03 ASSESSMENT — TONOMETRY
OD_IOP_MMHG: 14
OS_IOP_MMHG: 22
IOP_METHOD: TONOPEN

## 2023-11-03 ASSESSMENT — SLIT LAMP EXAM - LIDS
COMMENTS: NORMAL
COMMENTS: NORMAL

## 2023-11-03 ASSESSMENT — EXTERNAL EXAM - RIGHT EYE: OD_EXAM: NORMAL

## 2023-11-03 NOTE — NURSING NOTE
Chief Complaints and History of Present Illnesses   Patient presents with    Post Op (Ophthalmology) Left Eye     Cataract extraction with IOL in the left eye on 10/26/2023        Chief Complaint(s) and History of Present Illness(es)       Post Op (Ophthalmology) Left Eye              Laterality: left eye    Course: stable    Associated symptoms: floaters.  Negative for dryness, eye pain and flashes    Pain scale: 0/10    Comments: Cataract extraction with IOL in the left eye on 10/26/2023                 Comments    Patient returns to clinic for a one week post operative left eye exam.  She states that her left eye is constantly fogged.      Janelle Graves, GLORIA 12:50 PM  November 3, 2023

## 2023-11-03 NOTE — PROGRESS NOTES
HPI       Post Op (Ophthalmology) Left Eye    In left eye.  Since onset it is stable.  Associated symptoms include floaters.  Negative for dryness, eye pain and flashes.  Pain was noted as 0/10. Additional comments: Cataract extraction with IOL in the left eye on 10/26/2023                Comments    Patient returns to clinic for a one week post operative left eye exam.  She states that her left eye is constantly fogged.      GLORIA Barfield 12:50 PM  November 3, 2023               Last edited by Janelle Graves COT on 11/3/2023 12:51 PM.          No new flashes, floaters, or curtain down visual field.    Review of systems for the eyes was negative other than the pertinent positives/negatives listed in the HPI.    Ocular Meds: post op drops as instructed    Ocular Hx: PPV/MS OS 5/25/23 with Dr Motley; herpes zoster V1 distribution without ocular involvement, left sided; CE/IOL left eye (10/26/2023)    FOHx: no family history of glaucoma or blindness    PMHx:   Past Medical History:   Diagnosis Date    Adrenal mass (H24) 3/14/2022    Anxiety     Depression     Depressive disorder, not elsewhere classified     Diaphragmatic hernia without mention of obstruction or gangrene     Disease of thyroid gland     Dysthymic disorder     bipolar - sees  Dr. Jason Langley [facility]    Esophageal reflux     Hyperlipidemia     Mixed hyperlipidemia     Hyperlipidemia    Obstructive sleep apnea (adult) (pediatric)     uses CPAP    Plantar fascial fibromatosis     Temporomandibular joint disorders, unspecified     Temporomandibular joint disorders, unspecified     Tietze's disease     costochondritis    Unspecified hypothyroidism        Assessment & Plan      Grace Chinchilla is a 68 year old female with the following diagnoses:    Postoperative eye state  Pseudophakia OS  - Doing well, IOP wnl, diana negative; vision may be limited due to retina pathology; there may also be an optic neuropathy as patient as noted to have  thinning on OCT RNFL during visit with Dr Motley and ?trace RAPD; discussed with patient will work up extensively next visit, patient amenable to plan  - discontinue eye shield  - post-operative drops and taper according to instructions  -- discontinue vigamox  -- predforte TID/BID/daily/stop to procedure eye, taper weekly  -- ketorolac TID/BID/daily/stop to procedure eye, taper weekly  - Post-operative do's and don'ts reviewed, questions answered  - patient has emergency contact information  - Counseled endophthalmitis/RD/return precautions    Nuclear sclerotic cataract OD  - not visually significant  - observe    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    Attending Physician Attestation:  Complete documentation of historical and exam elements from today's encounter can be found in the full encounter summary report (not reduplicated in this progress note).  I personally obtained the chief complaint(s) and history of present illness.  I confirmed and edited as necessary the review of systems, past medical/surgical history, family history, social history, and examination findings as documented by others; and I examined the patient myself.  I personally reviewed the relevant tests, images, and reports as documented above.  I formulated and edited as necessary the assessment and plan and discussed the findings and management plan with the patient and family. . - oRse Amin MD

## 2023-11-06 NOTE — PROGRESS NOTES
Medication Therapy Management (MTM) Encounter    ASSESSMENT:                            Medication Adherence/Access: No issues identified    Obesity/Pre-Diabetes:  Stay on max. Tolerated dose of Metformin -500mg. 2 x day + lifestyle eula --see plan for details --she is off to a very good start lets see if this can continue.     Hypertension:   Patient is not meeting blood pressure goal of < 140/90mmHg. Patient would benefit from the following - continued blood pressure monitoring, watching diet, increasing exercise and weight loss, and limiting/reducing sodium.      Hyperlipidemia:   Stable.  Patient is on high intensity statin which is indicated based on 2019 ACC/AHA guidelines for lipid management.      Hypothyroidism:   Stable :Pt would benefit from rechecking thyroid labs in 2024.        PLAN:                            1. [facility] weight today is 192.8lbs -- you lost 6 lbs . This past month -- continue eating more vegetables , eat 2 meals/day and walk daily for exercise --all is going well so far and your tolerating Metformin 1 tablet 2 x day as is.  Wt.loss of 1-2 lbs every month --is very outstanding especially then if you can keep it off.   Eat just enough carbs/day to stay mentally happy but don't go past that feeling.     2. Blood Pressure 150/56mmhg today --stay on Losartan 25mg./day .  Try not to miss any doses of your medications.   Recheck Blood Pressure in 4 weeks.       Follow-up: Return in about 1 month (around 12/7/2023), or @ 11;30 AM, for Medication Therapy Management Visit, BP Recheck, Weight loss.      SUBJECTIVE/OBJECTIVE:                          Grace Chinchilla is a 68 year old female coming in for a follow-up (10-10-23) visit. She was referred to me from Laila Sanches    Patient accompanied by her sister Apple. Apple and another sister take care of Grace and her  finances etc.       Reason for visit:   F/up pre-diabetes/metformin start   Sister apple has questions about  mtm plan going forward.       Allergies/ADRs: Reviewed in chart; bupropion caused sickness?  Past Medical History: Reviewed in chart  Tobacco: She reports that she has never smoked. She has never used smokeless tobacco.  Alcohol: not currently using  Other Substance Use: none  E-cigarette Use: none  Caffeine: not much   Social: retired   Personal Healthcare Goals:  lose weight in a healthy way and keep it off/ reverse pre-diabetes.   Activity: sedentary  Medication Adherence/Access: reports missed 2 days of her losartan 1 week ago.     Hypertension     Losartan 25mg./day   Patient reports no current medication side effects.  Patient does not self-monitor blood pressure.         BP Readings from Last 3 Encounters:   11/07/23 (!) 150/56   10/26/23 130/55   10/17/23 137/71     Pulse Readings from Last 3 Encounters:   11/07/23 84   10/26/23 55   10/17/23 71     Hyperlipidemia     Atorvastatin 80mg daily  Patient reports no significant myalgias or other side effects.  The 10-year ASCVD risk score (Isiah HOLLOWAY, et al., 2019) is: 15.3%    Values used to calculate the score:      Age: 68 years      Sex: Female      Is Non- : No      Diabetic: No      Tobacco smoker: No      Systolic Blood Pressure: 150 mmHg      Is BP treated: Yes      HDL Cholesterol: 50 mg/dL      Total Cholesterol: 187 mg/dL       Recent Labs   Lab Test 08/03/23  1106 03/14/22  1049   CHOL 187 183   HDL 50 44*    114   TRIG 187* 125       Hypothyroidism     Levothyroxine 125 mcg daily. (Admits she takes her iron pill right with her thyroid pill)  Patient is having the following symptoms: hypothyroidism -  weight gain.        TSH   Date Value Ref Range Status   08/03/2023 0.49 0.30 - 4.20 uIU/mL Final   03/14/2022 3.25 0.30 - 5.00 uIU/mL Final   04/02/2007 5.88 (H) 0.4 - 5.0 mU/L Final         Class II Obesity (BMI 35 to 39.9)/pre-diabetes:   Ozempic stopped last fall.   Metformin - tried it -- then went to ozempic. We  "restarted Met 500mg er tabs 10-10-23 --had diarrhea SE at 3/day --now taking - 1 tab 2 x day --tolerates well.     Patient reports no current medication side effects.    Eats 2 x day now on my LC+IF lifestyle plan -- eating more salads , walking more now.    Sister jo ann has concerns that Grace wont stick to the lifestyle plan .           Previously :   Nutrition/Eating Habits: saw Dr. Steve special diet + weekly ozempic --got down to 180lbs.   --then cost $>200/month too much stopped it and weight came back.  Has 3 sisters --2 of hem very active in her life.   Has been on TOPS 4 different in her life , sisters - are in charge of her money POA over her .  Her  works full time - cannot control finances , she gets 15$ and Fancy 25 $ /month for fun stuff , sisters take her shopping weekly. They use HSA card to pay  for her rx drugs.     Current eating habits :  Bfast - cereal most days --quick easy /she admits lazy, or will have oatmeal or pbutter /jelly sandwich , or yogurt with jam or sugar , water   Mid am snack --no  Lunch : skips or bowel of cottage cheese  Mid afternoon snack : apple and a banana -psychological thought she would get hungry   Dinner; minnestrone soup 1-2 bowls, or a salad.   Late night snacks : 10-11 pm hubby snacks--she might have a sandwich pbandj or a yogurt , eats out of habit or other reasons.     Exercise/Activity:   has membership thru [employer] - fitness club (not going)- or go to [employer] -swimming --but no swimsuit.   Does see psychiatrist.   Medication History:  Ozempic: too $$ to continue.  Wt Readings from Last 4 Encounters:   11/07/23 192 lb 11.2 oz (87.4 kg)   10/26/23 193 lb (87.5 kg)   10/17/23 196 lb (88.9 kg)   10/10/23 198 lb 14.4 oz (90.2 kg)     Estimated body mass index is 36.38 kg/m  as calculated from the following:    Height as of 10/26/23: 5' 1.02\" (1.55 m).    Weight as of this encounter: 192 lb 11.2 oz (87.4 kg).    Lab Results   Component Value Date    A1C 6.2 " 08/03/2023    A1C 5.3 09/16/2022    A1C 6.1 03/14/2022    A1C 6.0 09/17/2019    A1C 5.6 03/13/2019           Today's Vitals: BP (!) 150/56   Pulse 84   Wt 192 lb 11.2 oz (87.4 kg)   LMP 09/13/2006   SpO2 95%   BMI 36.38 kg/m    ----------------      I spent 30 minutes with this patient today. All changes were made via collaborative practice agreement with ZACH Melara CNP. A copy of the visit note was provided to the patient's provider(s).    A summary of these recommendations was given to the patient.    Jarett Sen Rph.  Medication Therapy Management Provider  [phone]     Medication Therapy Recommendations  No medication therapy recommendations to display

## 2023-11-07 ENCOUNTER — OFFICE VISIT (OUTPATIENT)
Dept: PHARMACY | Facility: CLINIC | Age: 68
End: 2023-11-07
Payer: COMMERCIAL

## 2023-11-07 VITALS
HEART RATE: 84 BPM | BODY MASS INDEX: 36.38 KG/M2 | DIASTOLIC BLOOD PRESSURE: 56 MMHG | OXYGEN SATURATION: 95 % | SYSTOLIC BLOOD PRESSURE: 150 MMHG | WEIGHT: 192.7 LBS

## 2023-11-07 DIAGNOSIS — R73.03 PRE-DIABETES: Primary | ICD-10-CM

## 2023-11-07 DIAGNOSIS — E78.2 MIXED HYPERLIPIDEMIA: ICD-10-CM

## 2023-11-07 DIAGNOSIS — I10 ESSENTIAL HYPERTENSION: ICD-10-CM

## 2023-11-07 DIAGNOSIS — E66.01 CLASS 2 SEVERE OBESITY DUE TO EXCESS CALORIES WITH SERIOUS COMORBIDITY AND BODY MASS INDEX (BMI) OF 35.0 TO 35.9 IN ADULT (H): ICD-10-CM

## 2023-11-07 DIAGNOSIS — E66.812 CLASS 2 SEVERE OBESITY DUE TO EXCESS CALORIES WITH SERIOUS COMORBIDITY AND BODY MASS INDEX (BMI) OF 35.0 TO 35.9 IN ADULT (H): ICD-10-CM

## 2023-11-07 DIAGNOSIS — E03.9 HYPOTHYROIDISM, UNSPECIFIED TYPE: ICD-10-CM

## 2023-11-07 PROCEDURE — 99606 MTMS BY PHARM EST 15 MIN: CPT | Performed by: PHARMACIST

## 2023-11-07 RX ORDER — METFORMIN HCL 500 MG
TABLET, EXTENDED RELEASE 24 HR ORAL
COMMUNITY
Start: 2023-11-07 | End: 2024-01-31

## 2023-11-07 NOTE — Clinical Note
Laila-- xenia--jessi off to very good start down 6 lbs on metfomrin + my lifestyle program , her sister jo ann came to her visit today and wanted to understand what I was doing --I explained in detail --she doesn't think nikko can do the plan long term --I reiterated will plan on seeing nikko monthly fopr awhile to keep her motivated --lets see how next 3-6 months goes.  Gus

## 2023-11-07 NOTE — PATIENT INSTRUCTIONS
"Recommendations from today's MTM visit:                                                         1. [facility] weight today is 192.8lbs -- you lost 6 lbs . This past month -- continue eating more vegetables , eat 2 meals/day and walk daily for exercise --all is going well so far and your tolerating Metformin 1 tablet 2 x day as is.  Wt.loss of 1-2 lbs every month --is very outstanding especially then if you can keep it off.   Eat just enough carbs/day to stay mentally happy but don't go past that feeling.     2. Blood Pressure 150/56mmhg today --stay on Losartan 25mg./day .  Try not to miss any doses of your medications.   Recheck Blood Pressure in 4 weeks.      Follow-up: Return in about 1 month (around 12/7/2023), or @ 11;30 AM, for Medication Therapy Management Visit, BP Recheck, Weight loss.    It was great speaking with you today.  I value your experience and would be very thankful for your time in providing feedback in our clinic survey. In the next few days, you may receive an email or text message from [employer] with a link to a survey related to your  clinical pharmacist.\"     To schedule another MTM appointment, please call the clinic directly or you may call the MTM scheduling line at [phone] or toll-free at [phone].     My Clinical Pharmacist's contact information:                                                      Please feel free to contact me with any questions or concerns you have.      Jarett Sen Rph.  Medication Therapy Management Provider  [phone]    "

## 2023-11-17 ENCOUNTER — NURSE TRIAGE (OUTPATIENT)
Dept: FAMILY MEDICINE | Facility: CLINIC | Age: 68
End: 2023-11-17
Payer: COMMERCIAL

## 2023-11-17 NOTE — TELEPHONE ENCOUNTER
"Patient called again with same symptoms. Hoping for \"home care\" advice.     Relayed to patient message from earlier today:   SEE IN OFFICE TODAY:   * You need to be examined today. Let me give you an appointment. No appointments available in clinic today  * IF NO AVAILABLE APPOINTMENTS:  You need to be seen in the Urgent Care Center. Go to the one at [facility]. Go there today. A nearby Urgent Care Center is often a good source of care. Another choice is to go to the Emergency Department.       Patient stated she is going to go to the urgent care but not until tomorrow.     VAN DurhamN RN  [facility]    "

## 2023-11-17 NOTE — TELEPHONE ENCOUNTER
SEE IN OFFICE TODAY:   * You need to be examined today. Let me give you an appointment. No appointments available in clinic today  * IF NO AVAILABLE APPOINTMENTS:  You need to be seen in the Urgent Care Center. Go to the one at [facility]. Go there today. A nearby Urgent Care Center is often a good source of care. Another choice is to go to the Emergency Department.    Pain between chest  No radiating pain  Lasts seconds to a couple minutes.   Comes and goes  Patient has been told it was an anxiety in the past, hesitant to be seen as doesn't want to pat the copay    Message routed to PCP, Laila Pacheco RN  [facility]      Reason for Disposition   Chest pain(s) lasting a few seconds persists > 3 days    Additional Information   Negative: Followed an injury to chest   Negative: SEVERE chest pain   Negative: Pain also in shoulder(s) or arm(s) or jaw   Negative: Difficulty breathing   Negative: Cocaine use within last 3 days   Negative: Major surgery in the past month   Negative: Hip or leg fracture (broken bone) in past month (or had cast on leg or ankle in past month)   Negative: Illness requiring prolonged bedrest in past month (e.g., immobilization, long hospital stay)   Negative: Long-distance travel in past month (e.g., car, bus, train, plane; with trip lasting 6 or more hours)   Negative: History of prior 'blood clot' in leg or lungs (i.e., deep vein thrombosis, pulmonary embolism)   Negative: History of inherited increased risk of blood clots (e.g., Factor 5 Leiden, Anti-thrombin 3, Protein C or Protein S deficiency, Prothrombin mutation)   Negative: Cancer treatment in the past two months (or has cancer now)   Negative: Heart beating irregularly or very rapidly   Negative: Chest pain lasting longer than 5 minutes and occurred in last 3 days (72 hours) (Exception: Feels exactly the same as previously diagnosed heartburn and has accompanying sour taste in  "mouth.)   Negative: Chest pain or 'angina' comes and goes and is happening more often (increasing in frequency) or getting worse (increasing in severity) (Exception: Chest pains that last only a few seconds.)   Negative: Dizziness or lightheadedness   Negative: Coughing up blood   Negative: Patient sounds very sick or weak to the triager   Negative: Patient says chest pain feels exactly the same as previously diagnosed 'heartburn' and describes burning in chest and accompanying sour taste in mouth   Negative: Fever > 100.4 F (38.0 C)   Negative: SEVERE difficulty breathing (e.g., struggling for each breath, speaks in single words)   Negative: Shock suspected (e.g., cold/pale/clammy skin, too weak to stand, low BP, rapid pulse)   Negative: Difficult to awaken or acting confused (e.g., disoriented, slurred speech)   Negative: Passed out (i.e., lost consciousness, collapsed and was not responding)   Negative: Chest pain lasting longer than 5 minutes and ANY of the following:         Pain is crushing, pressure-like, or heavy         Over 44 years old          Over 30 years old and one cardiac risk factor (e.g diabetes, high blood pressure, high cholesterol, smoker, or family history of heart disease)         History of heart disease (e.g. angina, heart attack, heart failure, bypass surgery, takes nitroglycerin)   Negative: Heart beating < 50 beats per minute OR > 140 beats per minute   Negative: Visible sweat on face or sweat dripping down face   Negative: Sounds like a life-threatening emergency to the triager    Answer Assessment - Initial Assessment Questions  1. LOCATION: \"Where does it hurt?\"       Between chest pain     No radiating pain    Lasts seconds to a couple minutes.     Comes and goes  Patient has been told it was an anxiety in the past, hesitant to be seen    2. RADIATION: \"Does the pain go anywhere else?\" (e.g., into neck, jaw, arms, back)      No radiating pain    3. ONSET: \"When did the chest pain " "begin?\" (Minutes, hours or days)       11/12/23    4. PATTERN: \"Does the pain come and go, or has it been constant since it started?\"  \"Does it get worse with exertion?\"       Comes and goes    5. DURATION: \"How long does it last\" (e.g., seconds, minutes, hours)      Lasts 3 minutes    6. SEVERITY: \"How bad is the pain?\"  (e.g., Scale 1-10; mild, moderate, or severe)     - MILD (1-3): doesn't interfere with normal activities      - MODERATE (4-7): interferes with normal activities or awakens from sleep     - SEVERE (8-10): excruciating pain, unable to do any normal activities        6/10 moderate dull / sharp pain     7. CARDIAC RISK FACTORS: \"Do you have any history of heart problems or risk factors for heart disease?\" (e.g., angina, prior heart attack; diabetes, high blood pressure, high cholesterol, smoker, or strong family history of heart disease)        HTN    8. PULMONARY RISK FACTORS: \"Do you have any history of lung disease?\"  (e.g., blood clots in lung, asthma, emphysema, birth control pills)      None    9. CAUSE: \"What do you think is causing the chest pain?\"      Unknown    10. OTHER SYMPTOMS: \"Do you have any other symptoms?\" (e.g., dizziness, nausea, vomiting, sweating, fever, difficulty breathing, cough)     Deep breath has a dry breath     11. PREGNANCY: \"Is there any chance you are pregnant?\" \"When was your last menstrual period?\"        N/A    Protocols used: Chest Pain-A-[place]    "

## 2023-11-18 ENCOUNTER — OFFICE VISIT (OUTPATIENT)
Dept: URGENT CARE | Facility: URGENT CARE | Age: 68
End: 2023-11-18
Payer: COMMERCIAL

## 2023-11-18 ENCOUNTER — NURSE TRIAGE (OUTPATIENT)
Dept: NURSING | Facility: CLINIC | Age: 68
End: 2023-11-18

## 2023-11-18 ENCOUNTER — ANCILLARY PROCEDURE (OUTPATIENT)
Dept: GENERAL RADIOLOGY | Facility: CLINIC | Age: 68
End: 2023-11-18
Attending: PHYSICIAN ASSISTANT
Payer: COMMERCIAL

## 2023-11-18 VITALS
OXYGEN SATURATION: 95 % | DIASTOLIC BLOOD PRESSURE: 60 MMHG | SYSTOLIC BLOOD PRESSURE: 160 MMHG | HEART RATE: 81 BPM | TEMPERATURE: 96.4 F

## 2023-11-18 DIAGNOSIS — R07.1 CHEST PAIN ON BREATHING: Primary | ICD-10-CM

## 2023-11-18 DIAGNOSIS — R79.89 TROPONIN LEVEL ELEVATED: ICD-10-CM

## 2023-11-18 DIAGNOSIS — R07.1 CHEST PAIN ON BREATHING: ICD-10-CM

## 2023-11-18 LAB
ALBUMIN SERPL BCG-MCNC: 4.3 G/DL (ref 3.5–5.2)
ALP SERPL-CCNC: 89 U/L (ref 40–150)
ALT SERPL W P-5'-P-CCNC: 10 U/L (ref 0–50)
ANION GAP SERPL CALCULATED.3IONS-SCNC: 6 MMOL/L (ref 7–15)
AST SERPL W P-5'-P-CCNC: 19 U/L (ref 0–45)
BASOPHILS # BLD AUTO: 0 10E3/UL (ref 0–0.2)
BASOPHILS NFR BLD AUTO: 0 %
BILIRUB SERPL-MCNC: 0.3 MG/DL
BUN SERPL-MCNC: 22.2 MG/DL (ref 8–23)
CALCIUM SERPL-MCNC: 9.5 MG/DL (ref 8.8–10.2)
CHLORIDE SERPL-SCNC: 101 MMOL/L (ref 98–107)
CREAT SERPL-MCNC: 0.79 MG/DL (ref 0.51–0.95)
DEPRECATED HCO3 PLAS-SCNC: 31 MMOL/L (ref 22–29)
EGFRCR SERPLBLD CKD-EPI 2021: 81 ML/MIN/1.73M2
EOSINOPHIL # BLD AUTO: 0.2 10E3/UL (ref 0–0.7)
EOSINOPHIL NFR BLD AUTO: 3 %
ERYTHROCYTE [DISTWIDTH] IN BLOOD BY AUTOMATED COUNT: 13.8 % (ref 10–15)
GLUCOSE SERPL-MCNC: 99 MG/DL (ref 70–99)
HCT VFR BLD AUTO: 38.5 % (ref 35–47)
HGB BLD-MCNC: 12.4 G/DL (ref 11.7–15.7)
IMM GRANULOCYTES # BLD: 0 10E3/UL
IMM GRANULOCYTES NFR BLD: 0 %
LYMPHOCYTES # BLD AUTO: 1.6 10E3/UL (ref 0.8–5.3)
LYMPHOCYTES NFR BLD AUTO: 25 %
MCH RBC QN AUTO: 26.6 PG (ref 26.5–33)
MCHC RBC AUTO-ENTMCNC: 32.2 G/DL (ref 31.5–36.5)
MCV RBC AUTO: 83 FL (ref 78–100)
MONOCYTES # BLD AUTO: 1 10E3/UL (ref 0–1.3)
MONOCYTES NFR BLD AUTO: 16 %
NEUTROPHILS # BLD AUTO: 3.5 10E3/UL (ref 1.6–8.3)
NEUTROPHILS NFR BLD AUTO: 56 %
PLATELET # BLD AUTO: 269 10E3/UL (ref 150–450)
POTASSIUM SERPL-SCNC: 4.3 MMOL/L (ref 3.4–5.3)
PROT SERPL-MCNC: 7.3 G/DL (ref 6.4–8.3)
RBC # BLD AUTO: 4.66 10E6/UL (ref 3.8–5.2)
SODIUM SERPL-SCNC: 138 MMOL/L (ref 135–145)
TROPONIN T SERPL HS-MCNC: 18 NG/L
WBC # BLD AUTO: 6.3 10E3/UL (ref 4–11)

## 2023-11-18 PROCEDURE — 80053 COMPREHEN METABOLIC PANEL: CPT | Performed by: PHYSICIAN ASSISTANT

## 2023-11-18 PROCEDURE — 84484 ASSAY OF TROPONIN QUANT: CPT | Performed by: PHYSICIAN ASSISTANT

## 2023-11-18 PROCEDURE — 71046 X-RAY EXAM CHEST 2 VIEWS: CPT | Mod: TC | Performed by: RADIOLOGY

## 2023-11-18 PROCEDURE — 93005 ELECTROCARDIOGRAM TRACING: CPT | Performed by: PHYSICIAN ASSISTANT

## 2023-11-18 PROCEDURE — 99215 OFFICE O/P EST HI 40 MIN: CPT | Mod: 25 | Performed by: PHYSICIAN ASSISTANT

## 2023-11-18 PROCEDURE — 85025 COMPLETE CBC W/AUTO DIFF WBC: CPT | Performed by: PHYSICIAN ASSISTANT

## 2023-11-18 PROCEDURE — 36415 COLL VENOUS BLD VENIPUNCTURE: CPT | Performed by: PHYSICIAN ASSISTANT

## 2023-11-18 RX ORDER — ALBUTEROL SULFATE 90 UG/1
1-2 AEROSOL, METERED RESPIRATORY (INHALATION) EVERY 6 HOURS
Qty: 8.5 G | Refills: 0 | Status: SHIPPED | OUTPATIENT
Start: 2023-11-18 | End: 2024-08-06

## 2023-11-18 NOTE — PROGRESS NOTES
URGENT CARE VISIT:    SUBJECTIVE:   Grace Chinchilla is a 68 year old female who presents for intermittent chest pain since 6 days ago. It is sharp and variable in duration. She also feels sob and has a dry cough. Denies nasal congestion or dizziness. No treatments tried. No recent hospitalizations or long travel.     PMH:   Past Medical History:   Diagnosis Date    Adrenal mass (H24) 3/14/2022    Anxiety     Depression     Depressive disorder, not elsewhere classified     Diaphragmatic hernia without mention of obstruction or gangrene     Disease of thyroid gland     Dysthymic disorder     bipolar - sees  Dr. Jason Langle[facility]    Esophageal reflux     Hyperlipidemia     Mixed hyperlipidemia     Hyperlipidemia    Obstructive sleep apnea (adult) (pediatric)     uses CPAP    Plantar fascial fibromatosis     Temporomandibular joint disorders, unspecified     Temporomandibular joint disorders, unspecified     Tietze's disease     costochondritis    Unspecified hypothyroidism      Allergies: Bupropion  Medications:   Current Outpatient Medications   Medication Sig Dispense Refill    albuterol (PROAIR HFA/PROVENTIL HFA/VENTOLIN HFA) 108 (90 Base) MCG/ACT inhaler Inhale 1-2 puffs into the lungs every 6 hours for 5 days 8.5 g 0    acetaminophen (TYLENOL) 325 MG tablet Take 325-650 mg by mouth every 6 hours as needed for mild pain      amphetamine-dextroamphetamine (ADDERALL XR) 30 MG 24 hr capsule       atorvastatin (LIPITOR) 80 MG tablet Take 1 tablet (80 mg) by mouth daily 90 tablet 3    Calcium-Magnesium-Zinc 333-133-5 MG TABS per tablet Take 1 tablet by mouth      cholecalciferol 25 MCG (1000 UT) TABS Take 1,000 Units by mouth       clonazePAM (KLONOPIN) 0.5 MG tablet       Cyanocobalamin (VITAMIN B-12) 500 MCG LOZG Take 1,500 mcg by mouth      diclofenac (VOLTAREN) 1 % topical gel Apply 4 g topically 4 times daily 350 g 3    divalproex sodium extended-release (DEPAKOTE ER) 250 MG 24 hr tablet Take 250 mg by  mouth daily Taken with Depakote 500 x 2 for a total of 1250mg daily      divalproex sodium extended-release (DEPAKOTE ER) 500 MG 24 hr tablet Take 1,000 mg by mouth daily In addition to Depakote 250mg      DULoxetine (CYMBALTA) 30 MG capsule       ferrous sulfate (FEROSUL) 325 (65 Fe) MG tablet Take 325 mg by mouth daily (with breakfast)      Flaxseed, Linseed, (FLAX SEEDS PO)       ketorolac (ACULAR) 0.5 % ophthalmic solution Place 1 drop Into the left eye 4 times daily 5 mL 0    levothyroxine (SYNTHROID/LEVOTHROID) 125 MCG tablet Take 1 tablet (125 mcg) by mouth daily 90 tablet 1    losartan (COZAAR) 25 MG tablet Take 1 tablet (25 mg) by mouth daily 90 tablet 3    metFORMIN (GLUCOPHAGE XR) 500 MG 24 hr tablet 1 tab after bfast and dinner daily .      moxifloxacin (VIGAMOX) 0.5 % ophthalmic solution Place 1 drop Into the left eye 4 times daily 3 mL 0    multivitamin w/minerals (THERA-VIT-M) tablet Take 1 tablet by mouth daily      Nutritional Supplements (GLUCOSAMINE COMPLEX PO)       prednisoLONE acetate (PRED FORTE) 1 % ophthalmic suspension Place 1 drop Into the left eye 4 times daily 5 mL 0     Social History:   Social History     Tobacco Use    Smoking status: Never    Smokeless tobacco: Never   Substance Use Topics    Alcohol use: No       ROS: ROS otherwise found to be negative except as noted above.     OBJECTIVE:  BP (!) 160/60   Pulse 81   Temp (!) 96.4  F (35.8  C) (Temporal)   LMP 09/13/2006   SpO2 95%   General: WDWN in NAD  Eyes: EOMI,  PERRL, conjunctiva clear  HENT: Ear canals and TM's normal.  Nose and mouth without ulcers, erythema or lesions  Neck: Supple, non-tender  Cardiac: RRR without murmurs, rubs, or gallops.  Respiratory: LCTAB without adventitious sounds. Non-labored breathing.  Abdominal: Active bowel sounds in all four quadrants. Soft, non-tender without guarding or rebound.   Extremities: No peripheral edema or tenderness, peripheral pulses normal  Musculoskeletal: No gross  deformities noted, no erythema, FROM noted in all extremities. No chest wall TTP  Neuro: Normal strength and tone, sensory exam grossly normal,  normal speech and mentation  Integumentary: No suspicious rashes or lesions.     Labs:  Results for orders placed or performed in visit on 11/18/23   XR Chest 2 Views     Status: None    Narrative    EXAM: XR CHEST 2 VIEWS  LOCATION: [facility]  DATE: 11/18/2023    INDICATION:  Chest pain on breathing  COMPARISON: None.      Impression    IMPRESSION: Lungs are clear. No pleural effusion. Heart size and pulmonary vascularity within normal limits.   Results for orders placed or performed in visit on 11/18/23   Troponin T, High Sensitivity     Status: Abnormal   Result Value Ref Range    Troponin T, High Sensitivity 18 (H) <=14 ng/L   Comprehensive metabolic panel     Status: Abnormal   Result Value Ref Range    Sodium 138 135 - 145 mmol/L    Potassium 4.3 3.4 - 5.3 mmol/L    Carbon Dioxide (CO2) 31 (H) 22 - 29 mmol/L    Anion Gap 6 (L) 7 - 15 mmol/L    Urea Nitrogen 22.2 8.0 - 23.0 mg/dL    Creatinine 0.79 0.51 - 0.95 mg/dL    GFR Estimate 81 >60 mL/min/1.73m2    Calcium 9.5 8.8 - 10.2 mg/dL    Chloride 101 98 - 107 mmol/L    Glucose 99 70 - 99 mg/dL    Alkaline Phosphatase 89 40 - 150 U/L    AST 19 0 - 45 U/L    ALT 10 0 - 50 U/L    Protein Total 7.3 6.4 - 8.3 g/dL    Albumin 4.3 3.5 - 5.2 g/dL    Bilirubin Total 0.3 <=1.2 mg/dL   CBC with platelets and differential     Status: None   Result Value Ref Range    WBC Count 6.3 4.0 - 11.0 10e3/uL    RBC Count 4.66 3.80 - 5.20 10e6/uL    Hemoglobin 12.4 11.7 - 15.7 g/dL    Hematocrit 38.5 35.0 - 47.0 %    MCV 83 78 - 100 fL    MCH 26.6 26.5 - 33.0 pg    MCHC 32.2 31.5 - 36.5 g/dL    RDW 13.8 10.0 - 15.0 %    Platelet Count 269 150 - 450 10e3/uL    % Neutrophils 56 %    % Lymphocytes 25 %    % Monocytes 16 %    % Eosinophils 3 %    % Basophils 0 %    % Immature Granulocytes 0 %    Absolute Neutrophils  3.5 1.6 - 8.3 10e3/uL    Absolute Lymphocytes 1.6 0.8 - 5.3 10e3/uL    Absolute Monocytes 1.0 0.0 - 1.3 10e3/uL    Absolute Eosinophils 0.2 0.0 - 0.7 10e3/uL    Absolute Basophils 0.0 0.0 - 0.2 10e3/uL    Absolute Immature Granulocytes 0.0 <=0.4 10e3/uL   CBC with platelets and differential     Status: None    Narrative    The following orders were created for panel order CBC with platelets and differential.  Procedure                               Abnormality         Status                     ---------                               -----------         ------                     CBC with platelets and d...[[id number]]                      Final result                 Please view results for these tests on the individual orders.         ASSESSMENT:     ICD-10-CM    1. Chest pain on breathing  R07.1 XR Chest 2 Views     EKG 12-lead, tracing only     Troponin T, High Sensitivity     CBC with platelets and differential     Comprehensive metabolic panel     Troponin T, High Sensitivity     CBC with platelets and differential     Comprehensive metabolic panel     albuterol (PROAIR HFA/PROVENTIL HFA/VENTOLIN HFA) 108 (90 Base) MCG/ACT inhaler      2. Troponin level elevated  R79.89            PLAN:  45 minutes spent by me on the date of the encounter doing chart review, review of outside records, review of test results, interpretation of tests, patient visit, and documentation.   Patient Instructions   Patient was educated on the natural course of condition. Chest x-ray and CBC is normal. EKG shows normal sinus rhythm. CMP is unremarkable. Troponin was elevated therefore advised to go to the ER for further evaluation. Patient agreed and will have family member drive her.     Patient verbalized understanding and is agreeable to plan. The patient was discharged ambulatory and in stable condition.    Smita Gamboa PA-C ....................  11/18/2023   5:13 PM

## 2023-11-18 NOTE — PATIENT INSTRUCTIONS
Patient was educated on the natural course of condition. Chest x-ray and CBC is normal. EKG shows normal sinus rhythm. Troponin and CMP are pending. Take medications as directed. Side effects discussed. Conservative measures discussed including rest, increased fluids, humidifier, and over-the-counter cough suppressants. See your primary care provider if symptoms do not improve in 7 days. Seek emergency care if you develop shortness of breath or fever over 103.

## 2023-11-19 ENCOUNTER — TELEPHONE (OUTPATIENT)
Dept: URGENT CARE | Facility: URGENT CARE | Age: 68
End: 2023-11-19
Payer: COMMERCIAL

## 2023-11-19 NOTE — TELEPHONE ENCOUNTER
Patient Returning Call    Reason for call:  Speak with UC staff    Information relayed to patient:  n/a    Patient has additional questions:  Yes    What are your questions/concerns:  Want to speak with care team in UC to clarify what follow-up instructions are needed.    Who does the patient want to speak with:   MA    Is an  needed?:  No      Okay to leave a detailed message?: Yes at Cell number on file:    Telephone Information:   Mobile [phone]   Mobile [phone]

## 2023-11-19 NOTE — TELEPHONE ENCOUNTER
This patient was called yesterday by Smita Gamboa PA-C and was advised to go to ED.  corina weiner  Left message for pt that she was supposed to go to ED  Jodie Woods CMA

## 2023-11-19 NOTE — TELEPHONE ENCOUNTER
HP  she was seen today.  She was told to call for results of her tests from today. Blood work, EKG and CXR.  No message was found upon chart review. This note will be forwarded to --please have  provider call patient back. Thank you.     Myriam Martinez RN Triage Nurse Advisor 6:45 PM 11/18/2023

## 2023-11-20 ENCOUNTER — HOSPITAL ENCOUNTER (OUTPATIENT)
Facility: CLINIC | Age: 68
Setting detail: OBSERVATION
Discharge: HOME | End: 2023-11-21
Attending: EMERGENCY MEDICINE | Admitting: EMERGENCY MEDICINE
Payer: COMMERCIAL

## 2023-11-20 DIAGNOSIS — R07.9 CHEST PAIN, UNSPECIFIED TYPE: ICD-10-CM

## 2023-11-20 LAB
ALBUMIN SERPL BCG-MCNC: 4.4 G/DL (ref 3.5–5.2)
ALP SERPL-CCNC: 92 U/L (ref 40–150)
ALT SERPL W P-5'-P-CCNC: 19 U/L (ref 0–50)
ANION GAP SERPL CALCULATED.3IONS-SCNC: 11 MMOL/L (ref 7–15)
AST SERPL W P-5'-P-CCNC: 19 U/L (ref 0–45)
ATRIAL RATE - MUSE: 75 BPM
BASOPHILS # BLD AUTO: 0 10E3/UL (ref 0–0.2)
BASOPHILS NFR BLD AUTO: 0 %
BILIRUB SERPL-MCNC: 0.3 MG/DL
BUN SERPL-MCNC: 14.3 MG/DL (ref 8–23)
CALCIUM SERPL-MCNC: 9.8 MG/DL (ref 8.8–10.2)
CHLORIDE SERPL-SCNC: 103 MMOL/L (ref 98–107)
CREAT SERPL-MCNC: 0.81 MG/DL (ref 0.51–0.95)
CRP SERPL-MCNC: <3 MG/L
D DIMER PPP FEU-MCNC: 0.35 UG/ML FEU (ref 0–0.5)
DEPRECATED HCO3 PLAS-SCNC: 25 MMOL/L (ref 22–29)
DIASTOLIC BLOOD PRESSURE - MUSE: NORMAL MMHG
EGFRCR SERPLBLD CKD-EPI 2021: 78 ML/MIN/1.73M2
EOSINOPHIL # BLD AUTO: 0.2 10E3/UL (ref 0–0.7)
EOSINOPHIL NFR BLD AUTO: 3 %
ERYTHROCYTE [DISTWIDTH] IN BLOOD BY AUTOMATED COUNT: 14 % (ref 10–15)
FLUAV RNA SPEC QL NAA+PROBE: NEGATIVE
FLUBV RNA RESP QL NAA+PROBE: NEGATIVE
GLUCOSE SERPL-MCNC: 95 MG/DL (ref 70–99)
HCT VFR BLD AUTO: 38 % (ref 35–47)
HGB BLD-MCNC: 12.4 G/DL (ref 11.7–15.7)
HOLD SPECIMEN: NORMAL
IMM GRANULOCYTES # BLD: 0 10E3/UL
IMM GRANULOCYTES NFR BLD: 0 %
INTERPRETATION ECG - MUSE: NORMAL
LIPASE SERPL-CCNC: 37 U/L (ref 13–60)
LYMPHOCYTES # BLD AUTO: 2.2 10E3/UL (ref 0.8–5.3)
LYMPHOCYTES NFR BLD AUTO: 33 %
MCH RBC QN AUTO: 27.1 PG (ref 26.5–33)
MCHC RBC AUTO-ENTMCNC: 32.6 G/DL (ref 31.5–36.5)
MCV RBC AUTO: 83 FL (ref 78–100)
MONOCYTES # BLD AUTO: 0.7 10E3/UL (ref 0–1.3)
MONOCYTES NFR BLD AUTO: 10 %
NEUTROPHILS # BLD AUTO: 3.5 10E3/UL (ref 1.6–8.3)
NEUTROPHILS NFR BLD AUTO: 54 %
NRBC # BLD AUTO: 0 10E3/UL
NRBC BLD AUTO-RTO: 0 /100
NT-PROBNP SERPL-MCNC: 131 PG/ML (ref 0–900)
P AXIS - MUSE: 48 DEGREES
PLATELET # BLD AUTO: 293 10E3/UL (ref 150–450)
POTASSIUM SERPL-SCNC: 4.2 MMOL/L (ref 3.4–5.3)
PR INTERVAL - MUSE: 164 MS
PROT SERPL-MCNC: 7.5 G/DL (ref 6.4–8.3)
QRS DURATION - MUSE: 78 MS
QT - MUSE: 340 MS
QTC - MUSE: 379 MS
R AXIS - MUSE: 78 DEGREES
RBC # BLD AUTO: 4.58 10E6/UL (ref 3.8–5.2)
RSV RNA SPEC NAA+PROBE: NEGATIVE
SARS-COV-2 RNA RESP QL NAA+PROBE: NEGATIVE
SODIUM SERPL-SCNC: 139 MMOL/L (ref 135–145)
SYSTOLIC BLOOD PRESSURE - MUSE: NORMAL MMHG
T AXIS - MUSE: 51 DEGREES
TROPONIN T SERPL HS-MCNC: 10 NG/L
TROPONIN T SERPL HS-MCNC: 10 NG/L
VENTRICULAR RATE- MUSE: 75 BPM
WBC # BLD AUTO: 6.7 10E3/UL (ref 4–11)

## 2023-11-20 PROCEDURE — 99285 EMERGENCY DEPT VISIT HI MDM: CPT | Mod: 25 | Performed by: EMERGENCY MEDICINE

## 2023-11-20 PROCEDURE — G0378 HOSPITAL OBSERVATION PER HR: HCPCS

## 2023-11-20 PROCEDURE — 99223 1ST HOSP IP/OBS HIGH 75: CPT | Mod: FS | Performed by: STUDENT IN AN ORGANIZED HEALTH CARE EDUCATION/TRAINING PROGRAM

## 2023-11-20 PROCEDURE — 99207 PR APP CREDIT; MD BILLING SHARED VISIT: CPT | Mod: FS | Performed by: PHYSICIAN ASSISTANT

## 2023-11-20 PROCEDURE — 86140 C-REACTIVE PROTEIN: CPT | Performed by: PHYSICIAN ASSISTANT

## 2023-11-20 PROCEDURE — 83880 ASSAY OF NATRIURETIC PEPTIDE: CPT | Performed by: PHYSICIAN ASSISTANT

## 2023-11-20 PROCEDURE — 250N000013 HC RX MED GY IP 250 OP 250 PS 637: Performed by: PHYSICIAN ASSISTANT

## 2023-11-20 PROCEDURE — 93005 ELECTROCARDIOGRAM TRACING: CPT | Performed by: EMERGENCY MEDICINE

## 2023-11-20 PROCEDURE — 93010 ELECTROCARDIOGRAM REPORT: CPT | Performed by: EMERGENCY MEDICINE

## 2023-11-20 PROCEDURE — 85025 COMPLETE CBC W/AUTO DIFF WBC: CPT | Performed by: EMERGENCY MEDICINE

## 2023-11-20 PROCEDURE — 250N000013 HC RX MED GY IP 250 OP 250 PS 637: Performed by: EMERGENCY MEDICINE

## 2023-11-20 PROCEDURE — 84484 ASSAY OF TROPONIN QUANT: CPT | Performed by: EMERGENCY MEDICINE

## 2023-11-20 PROCEDURE — 87637 SARSCOV2&INF A&B&RSV AMP PRB: CPT | Performed by: EMERGENCY MEDICINE

## 2023-11-20 PROCEDURE — 36415 COLL VENOUS BLD VENIPUNCTURE: CPT | Performed by: EMERGENCY MEDICINE

## 2023-11-20 PROCEDURE — 80053 COMPREHEN METABOLIC PANEL: CPT | Performed by: EMERGENCY MEDICINE

## 2023-11-20 PROCEDURE — 83690 ASSAY OF LIPASE: CPT | Performed by: EMERGENCY MEDICINE

## 2023-11-20 PROCEDURE — 85379 FIBRIN DEGRADATION QUANT: CPT | Performed by: EMERGENCY MEDICINE

## 2023-11-20 RX ORDER — NITROGLYCERIN 0.4 MG/1
0.4 TABLET SUBLINGUAL EVERY 5 MIN PRN
Status: DISCONTINUED | OUTPATIENT
Start: 2023-11-20 | End: 2023-11-21 | Stop reason: HOSPADM

## 2023-11-20 RX ORDER — AMOXICILLIN 250 MG
1 CAPSULE ORAL 2 TIMES DAILY PRN
Status: DISCONTINUED | OUTPATIENT
Start: 2023-11-20 | End: 2023-11-21 | Stop reason: HOSPADM

## 2023-11-20 RX ORDER — DIVALPROEX SODIUM 500 MG/1
1000 TABLET, EXTENDED RELEASE ORAL DAILY
Status: DISCONTINUED | OUTPATIENT
Start: 2023-11-21 | End: 2023-11-20

## 2023-11-20 RX ORDER — ONDANSETRON 4 MG/1
4 TABLET, ORALLY DISINTEGRATING ORAL EVERY 6 HOURS PRN
Status: DISCONTINUED | OUTPATIENT
Start: 2023-11-20 | End: 2023-11-21 | Stop reason: HOSPADM

## 2023-11-20 RX ORDER — KETOROLAC TROMETHAMINE 5 MG/ML
1 SOLUTION OPHTHALMIC DAILY
COMMUNITY
End: 2024-08-27

## 2023-11-20 RX ORDER — IBUPROFEN 200 MG
400 TABLET ORAL EVERY 6 HOURS PRN
Status: DISCONTINUED | OUTPATIENT
Start: 2023-11-20 | End: 2023-11-21 | Stop reason: HOSPADM

## 2023-11-20 RX ORDER — LOSARTAN POTASSIUM 25 MG/1
25 TABLET ORAL DAILY
Status: DISCONTINUED | OUTPATIENT
Start: 2023-11-21 | End: 2023-11-21 | Stop reason: HOSPADM

## 2023-11-20 RX ORDER — UREA 10 %
1500 LOTION (ML) TOPICAL DAILY
Status: DISCONTINUED | OUTPATIENT
Start: 2023-11-21 | End: 2023-11-21 | Stop reason: HOSPADM

## 2023-11-20 RX ORDER — ATORVASTATIN CALCIUM 40 MG/1
80 TABLET, FILM COATED ORAL DAILY
Status: DISCONTINUED | OUTPATIENT
Start: 2023-11-21 | End: 2023-11-21 | Stop reason: HOSPADM

## 2023-11-20 RX ORDER — ASPIRIN 81 MG/1
324 TABLET, CHEWABLE ORAL DAILY
Status: DISCONTINUED | OUTPATIENT
Start: 2023-11-20 | End: 2023-11-21 | Stop reason: HOSPADM

## 2023-11-20 RX ORDER — MULTIPLE VITAMINS W/ MINERALS TAB 9MG-400MCG
1 TAB ORAL DAILY
Status: DISCONTINUED | OUTPATIENT
Start: 2023-11-21 | End: 2023-11-21 | Stop reason: HOSPADM

## 2023-11-20 RX ORDER — ALBUTEROL SULFATE 90 UG/1
1-2 AEROSOL, METERED RESPIRATORY (INHALATION) EVERY 6 HOURS
Status: DISCONTINUED | OUTPATIENT
Start: 2023-11-20 | End: 2023-11-20

## 2023-11-20 RX ORDER — ACETAMINOPHEN 325 MG/1
325-650 TABLET ORAL EVERY 6 HOURS PRN
Status: DISCONTINUED | OUTPATIENT
Start: 2023-11-20 | End: 2023-11-21 | Stop reason: HOSPADM

## 2023-11-20 RX ORDER — DIVALPROEX SODIUM 250 MG/1
250 TABLET, EXTENDED RELEASE ORAL DAILY
Status: DISCONTINUED | OUTPATIENT
Start: 2023-11-21 | End: 2023-11-20

## 2023-11-20 RX ORDER — DULOXETIN HYDROCHLORIDE 30 MG/1
90 CAPSULE, DELAYED RELEASE ORAL DAILY
Status: DISCONTINUED | OUTPATIENT
Start: 2023-11-21 | End: 2023-11-21 | Stop reason: HOSPADM

## 2023-11-20 RX ORDER — VITAMIN B COMPLEX
25 TABLET ORAL DAILY
Status: DISCONTINUED | OUTPATIENT
Start: 2023-11-21 | End: 2023-11-21 | Stop reason: HOSPADM

## 2023-11-20 RX ORDER — METFORMIN HCL 500 MG
500 TABLET, EXTENDED RELEASE 24 HR ORAL 2 TIMES DAILY WITH MEALS
Status: DISCONTINUED | OUTPATIENT
Start: 2023-11-20 | End: 2023-11-21 | Stop reason: HOSPADM

## 2023-11-20 RX ORDER — PREDNISOLONE ACETATE 10 MG/ML
1 SUSPENSION/ DROPS OPHTHALMIC DAILY
COMMUNITY
End: 2024-08-27

## 2023-11-20 RX ORDER — AMOXICILLIN 250 MG
2 CAPSULE ORAL 2 TIMES DAILY PRN
Status: DISCONTINUED | OUTPATIENT
Start: 2023-11-20 | End: 2023-11-21 | Stop reason: HOSPADM

## 2023-11-20 RX ORDER — ONDANSETRON 2 MG/ML
4 INJECTION INTRAMUSCULAR; INTRAVENOUS EVERY 6 HOURS PRN
Status: DISCONTINUED | OUTPATIENT
Start: 2023-11-20 | End: 2023-11-21 | Stop reason: HOSPADM

## 2023-11-20 RX ORDER — FERROUS SULFATE 325(65) MG
325 TABLET ORAL
Status: DISCONTINUED | OUTPATIENT
Start: 2023-11-21 | End: 2023-11-21 | Stop reason: HOSPADM

## 2023-11-20 RX ADMIN — ACETAMINOPHEN 325 MG: 325 TABLET, FILM COATED ORAL at 23:50

## 2023-11-20 RX ADMIN — METFORMIN ER 500 MG 500 MG: 500 TABLET ORAL at 22:03

## 2023-11-20 RX ADMIN — ASPIRIN 81 MG CHEWABLE TABLET 324 MG: 81 TABLET CHEWABLE at 18:21

## 2023-11-20 RX ADMIN — NITROGLYCERIN 0.4 MG: 0.4 TABLET SUBLINGUAL at 18:21

## 2023-11-20 ASSESSMENT — ACTIVITIES OF DAILY LIVING (ADL)
ADLS_ACUITY_SCORE: 35

## 2023-11-20 NOTE — ED TRIAGE NOTES
"Triage Assessment & Note:    BP (!) 199/111   Pulse 76   Temp 98  F (36.7  C) (Oral)   Resp 16   Ht 1.575 m (5' 2\")   Wt 83 kg (183 lb)   LMP 09/13/2006   SpO2 96%   BMI 33.47 kg/m      Patient presents with: C/o CP x 3 days that increases with deep breathing. PT was at  and found to have an elevated troponin.     Home Treatments/Remedies: None    Febrile / Afebrile? Afebrile     Duration of C/o:  3 days    Toño Saucedo RN  November 20, 2023       Triage Assessment (Adult)       Row Name 11/20/23 1657          Triage Assessment    Airway WDL WDL        Respiratory WDL    Respiratory WDL WDL        Cardiac WDL    Cardiac WDL chest pain        Chest Pain Assessment    Chest Pain Location substernal     Character pressure                     "

## 2023-11-20 NOTE — ED PROVIDER NOTES
ED Provider Note  [facility]      History     Chief Complaint   Patient presents with    Abnormal Labs     The history is provided by medical records and the patient.     Grace Chinchilla is a 68 year old female with a history of HTN on Losartan, HLD on Atorvastatin, hypothyroidism on Levothyroxine, and prediabetes who presents to the ED with c/o intermittent chest pain for the past 8 days. Pain worsens with deep breathing. Pt also reports a nonproductive cough that began around the same time. Pt describes the pain as a dull, achey pain that does not radiate. Pt states her chest feels tender and weak. No SOB. No history of blood clots. No LE edema. Pt presented to  on  and was found to have a troponin of 18, and had an EKG done, which was unremarkable. At that time she was given mucinex and albuterol, she is not sure if those helped. Pt reports having mitral stenosis. Pt reports a family history of cardiac problems including mom  of MI at 42, brother and sister have defibrillator.     Past Medical History  Past Medical History:   Diagnosis Date    Adrenal mass (H24) 3/14/2022    Anxiety     Depression     Depressive disorder, not elsewhere classified     Diaphragmatic hernia without mention of obstruction or gangrene     Disease of thyroid gland     Dysthymic disorder     bipolar - sees  Dr. Jason Langley [facility]    Esophageal reflux     Hyperlipidemia     Mixed hyperlipidemia     Hyperlipidemia    Obstructive sleep apnea (adult) (pediatric)     uses CPAP    Plantar fascial fibromatosis     Temporomandibular joint disorders, unspecified     Temporomandibular joint disorders, unspecified     Tietze's disease     costochondritis    Unspecified hypothyroidism      Past Surgical History:   Procedure Laterality Date    BIOPSY BREAST      benign    BIOPSY OF BREAST, INCISIONAL      Description: Biopsy Breast Open;  Recorded: 2009;  Comments: right, benign    CERVICAL BIOPSY       NO HISTORY OF SURGERY      PHACOEMULSIFICATION CLEAR CORNEA WITH STANDARD INTRAOCULAR LENS IMPLANT Left 10/26/2023    Procedure: LEFT EYE PHACOEMULSIFICATION, CATARACT, WITH INTRAOCULAR LENS IMPLANT;  Surgeon: Rose Amin MD;  Location: [facility] OR    RELEASE CARPAL TUNNEL Right     US BIOPSY THYROID FINE NEEDLE ASPIRATION  10/1/2019    VITRECTOMY PARSPLANA WITH 25 GAUGE SYSTEM Left 5/25/2023    Procedure: LEFT EYE VITRECTOMY, PARS PLANA APPROACH, USING 25-GAUGE INSTRUMENTS, Membrane peel, endo laser;  Surgeon: Jessica Motley MD;  Location: [facility] OR     albuterol (PROAIR HFA/PROVENTIL HFA/VENTOLIN HFA) 108 (90 Base) MCG/ACT inhaler  amphetamine-dextroamphetamine (ADDERALL XR) 30 MG 24 hr capsule  atorvastatin (LIPITOR) 80 MG tablet  Calcium-Magnesium-Zinc 333-133-5 MG TABS per tablet  cholecalciferol 25 MCG (1000 UT) TABS  clonazePAM (KLONOPIN) 0.5 MG tablet  Cyanocobalamin (VITAMIN B-12) 500 MCG LOZG  diclofenac (VOLTAREN) 1 % topical gel  divalproex sodium extended-release (DEPAKOTE ER) 250 MG 24 hr tablet  divalproex sodium extended-release (DEPAKOTE ER) 500 MG 24 hr tablet  DULoxetine (CYMBALTA) 30 MG capsule  ferrous sulfate (FEROSUL) 325 (65 Fe) MG tablet  Flaxseed, Linseed, (FLAX SEEDS PO)  ketorolac (ACULAR) 0.5 % ophthalmic solution  levothyroxine (SYNTHROID/LEVOTHROID) 125 MCG tablet  losartan (COZAAR) 25 MG tablet  metFORMIN (GLUCOPHAGE XR) 500 MG 24 hr tablet  multivitamin w/minerals (THERA-VIT-M) tablet  Nutritional Supplements (GLUCOSAMINE COMPLEX PO)  prednisoLONE acetate (PRED FORTE) 1 % ophthalmic suspension      Allergies   Allergen Reactions    Bupropion      Family History  Family History   Problem Relation Age of Onset    C.A.D. Mother     Heart Disease Mother         d @42    Cancer Father         Leukemia    Leukemia Father         d @49    Breast Cancer Maternal Grandmother 85        d @94    Colon Cancer Maternal Grandfather     Cancer Paternal Grandmother         lung     "Emphysema Brother     Alcoholism Brother     Mental retardation Brother     Heart Murmur Brother     Diabetes Sister     Breast Cancer Maternal Aunt 65    Liver Cancer Paternal Aunt     Cancer Niece         breast, liver d@ 44     Breast Cancer Cousin         diagnosed in her 30's    Glaucoma No family hx of     Macular Degeneration No family hx of      Social History   Social History     Tobacco Use    Smoking status: Never    Smokeless tobacco: Never   Vaping Use    Vaping Use: Never used   Substance Use Topics    Alcohol use: No    Drug use: No      Past medical history, past surgical history, medications, allergies, family history, and social history were reviewed with the patient. No additional pertinent items.      A complete review of systems was performed with pertinent positives and negatives noted in the HPI, and all other systems negative.    Physical Exam   BP: (!) 199/111  Pulse: 76  Temp: 98  F (36.7  C)  Resp: 16  Height: 157.5 cm (5' 2\")  Weight: 83 kg (183 lb)  SpO2: 96 %  Physical Exam  Vitals and nursing note reviewed.   Constitutional:       General: She is not in acute distress.     Appearance: She is well-developed. She is obese. She is not ill-appearing or diaphoretic.   HENT:      Head: Normocephalic and atraumatic.      Nose: Nose normal.   Eyes:      General: No scleral icterus.     Conjunctiva/sclera: Conjunctivae normal.   Cardiovascular:      Rate and Rhythm: Normal rate.      Heart sounds: Murmur heard.      Systolic murmur is present with a grade of 2/6.   Pulmonary:      Effort: Pulmonary effort is normal. No respiratory distress.      Breath sounds: Normal breath sounds. No stridor. No wheezing, rhonchi or rales.   Abdominal:      General: There is no distension.      Palpations: Abdomen is soft.      Tenderness: There is no abdominal tenderness.   Musculoskeletal:         General: No deformity or signs of injury. Normal range of motion.      Cervical back: Normal range of motion and " neck supple. No rigidity.   Skin:     General: Skin is warm and dry.      Coloration: Skin is not jaundiced or pale.   Neurological:      General: No focal deficit present.      Mental Status: She is alert and oriented to person, place, and time.   Psychiatric:         Mood and Affect: Mood normal.         Behavior: Behavior normal.           ED Course, Procedures, & Data      Procedures            EKG Interpretation:      Interpreted by Cheryl Vázquez MD  Time reviewed: 1710  Symptoms at time of EKG: chest pain   Rhythm: normal sinus   Rate: 75 bpm  Axis: normal  Ectopy: none  Conduction: normal  ST Segments/ T Waves: No ST-T wave changes  Q Waves: none  Comparison to prior: Unchanged from 11/18    Clinical Impression: normal EKG                 Results for orders placed or performed during the hospital encounter of 11/20/23   US Lower Extremity Venous Duplex Left     Status: None    Narrative    EXAMINATION: DOPPLER VENOUS ULTRASOUND OF THE LEFT LOWER EXTREMITY,  11/21/2023 11:12 AM     COMPARISON: None.    HISTORY: Leg pain, rule out DVT    TECHNIQUE:  Gray-scale evaluation with compression, spectral flow, and  color Doppler assessment of the deep venous system of the left leg  from groin to knee, and then at the ankle.    FINDINGS:  In the left lower extremity, the common femoral, femoral, popliteal  and posterior tibial veins demonstrate normal compressibility and  blood flow.      Impression    IMPRESSION:  No evidence left lower extremity deep venous thrombosis.    I have personally reviewed the examination and initial interpretation  and I agree with the findings.    RAVINDER BLANCHARD MD         SYSTEM ID:  [id number]   Troponin T, High Sensitivity     Status: Normal   Result Value Ref Range    Troponin T, High Sensitivity 10 <=14 ng/L   Comprehensive metabolic panel     Status: Normal   Result Value Ref Range    Sodium 139 135 - 145 mmol/L    Potassium 4.2 3.4 - 5.3 mmol/L    Carbon Dioxide (CO2) 25 22 - 29  mmol/L    Anion Gap 11 7 - 15 mmol/L    Urea Nitrogen 14.3 8.0 - 23.0 mg/dL    Creatinine 0.81 0.51 - 0.95 mg/dL    GFR Estimate 78 >60 mL/min/1.73m2    Calcium 9.8 8.8 - 10.2 mg/dL    Chloride 103 98 - 107 mmol/L    Glucose 95 70 - 99 mg/dL    Alkaline Phosphatase 92 40 - 150 U/L    AST 19 0 - 45 U/L    ALT 19 0 - 50 U/L    Protein Total 7.5 6.4 - 8.3 g/dL    Albumin 4.4 3.5 - 5.2 g/dL    Bilirubin Total 0.3 <=1.2 mg/dL   CBC with platelets and differential     Status: None   Result Value Ref Range    WBC Count 6.7 4.0 - 11.0 10e3/uL    RBC Count 4.58 3.80 - 5.20 10e6/uL    Hemoglobin 12.4 11.7 - 15.7 g/dL    Hematocrit 38.0 35.0 - 47.0 %    MCV 83 78 - 100 fL    MCH 27.1 26.5 - 33.0 pg    MCHC 32.6 31.5 - 36.5 g/dL    RDW 14.0 10.0 - 15.0 %    Platelet Count 293 150 - 450 10e3/uL    % Neutrophils 54 %    % Lymphocytes 33 %    % Monocytes 10 %    % Eosinophils 3 %    % Basophils 0 %    % Immature Granulocytes 0 %    NRBCs per 100 WBC 0 <1 /100    Absolute Neutrophils 3.5 1.6 - 8.3 10e3/uL    Absolute Lymphocytes 2.2 0.8 - 5.3 10e3/uL    Absolute Monocytes 0.7 0.0 - 1.3 10e3/uL    Absolute Eosinophils 0.2 0.0 - 0.7 10e3/uL    Absolute Basophils 0.0 0.0 - 0.2 10e3/uL    Absolute Immature Granulocytes 0.0 <=0.4 10e3/uL    Absolute NRBCs 0.0 10e3/uL   Lipase     Status: Normal   Result Value Ref Range    Lipase 37 13 - 60 U/L   [place] Draw     Status: None (In process)    Narrative    The following orders were created for panel order [place] Draw.  Procedure                               Abnormality         Status                     ---------                               -----------         ------                     Extra Blue Top Tube[[id number]]                                                         Extra Red Top Tube[[id number]]                               Final result                 Please view results for these tests on the individual orders.   D dimer quantitative     Status: Normal   Result Value Ref Range     D-Dimer Quantitative 0.35 0.00 - 0.50 ug/mL FEU    Narrative    This D-dimer assay is intended for use in conjunction with a clinical pretest probability assessment model to exclude pulmonary embolism (PE) and deep venous thrombosis (DVT) in outpatients suspected of PE or DVT. The cut-off value is 0.50 ug/mL FEU.    For patients 50 years of age or older, the application of age-adjusted cut-off values for D-Dimer may increase the specificity without significant effect on sensitivity. The literature suggested calculation age adjusted cut-off in ug/L = age in years x 10 ug/L. The results in this laboratory are reported as ug/mL rather than ug/L. The calculation for age adjusted cut off in ug/mL= age in years x 0.01 ug/mL. For example, the cut off for a 76 year old male is 76 x 0.01 ug/mL = 0.76 ug/mL (760 ug/L).    M Chelsi et al. Age adjusted D-dimer cut-off levels to rule out pulmonary embolism: The ADJUST-PE Study. CHRISTINE 2014;311:9836-7406.; HJ Louise et al. Diagnostic accuracy of conventional or age adjusted D-dimer cutoff values in older patients with suspected venous thromboembolism. Systemic review and meta-analysis. BMJ 2013:346:f2492.   Extra Red Top Tube     Status: None   Result Value Ref Range    Hold Specimen JIC    Symptomatic Influenza A/B, RSV, & SARS-CoV2 PCR (COVID-19) Nasopharyngeal     Status: Normal    Specimen: Nasopharyngeal; Swab   Result Value Ref Range    Influenza A PCR Negative Negative    Influenza B PCR Negative Negative    RSV PCR Negative Negative    SARS CoV2 PCR Negative Negative    Narrative    Testing was performed using the Xpert Xpress CoV2/Flu/RSV Assay on the [employer] GeneXpert Instrument. This test should be ordered for the detection of SARS-CoV-2, influenza, and RSV viruses in individuals who meet clinical and/or epidemiological criteria. Test performance is unknown in asymptomatic patients. This test is for in vitro diagnostic use under the FDA EUA for laboratories  certified under CLIA to perform high or moderate complexity testing. This test has not been FDA cleared or approved. A negative result does not rule out the presence of PCR inhibitors in the specimen or target RNA in concentration below the limit of detection for the assay. If only one viral target is positive but coinfection with multiple targets is suspected, the sample should be re-tested with another FDA cleared, approved, or authorized test, if coinfection would change clinical management. This test was validated by the [facility]. These laboratories are certified under the Clinical Laboratory Improvement Amendments of 1988 (CLIA-88) as qualified to perform high complexity laboratory testing.   Troponin T, High Sensitivity     Status: Normal   Result Value Ref Range    Troponin T, High Sensitivity 10 <=14 ng/L   CRP inflammation     Status: Normal   Result Value Ref Range    CRP Inflammation <3.00 <5.00 mg/L   Nt probnp inpatient     Status: Normal   Result Value Ref Range    N terminal Pro BNP Inpatient 131 0 - 900 pg/mL   Basic metabolic panel     Status: Normal   Result Value Ref Range    Sodium 140 135 - 145 mmol/L    Potassium 4.8 3.4 - 5.3 mmol/L    Chloride 105 98 - 107 mmol/L    Carbon Dioxide (CO2) 27 22 - 29 mmol/L    Anion Gap 8 7 - 15 mmol/L    Urea Nitrogen 19.9 8.0 - 23.0 mg/dL    Creatinine 0.85 0.51 - 0.95 mg/dL    GFR Estimate 74 >60 mL/min/1.73m2    Calcium 9.5 8.8 - 10.2 mg/dL    Glucose 93 70 - 99 mg/dL   CBC with platelets     Status: Abnormal   Result Value Ref Range    WBC Count 7.1 4.0 - 11.0 10e3/uL    RBC Count 4.15 3.80 - 5.20 10e6/uL    Hemoglobin 11.0 (L) 11.7 - 15.7 g/dL    Hematocrit 34.6 (L) 35.0 - 47.0 %    MCV 83 78 - 100 fL    MCH 26.5 26.5 - 33.0 pg    MCHC 31.8 31.5 - 36.5 g/dL    RDW 14.2 10.0 - 15.0 %    Platelet Count 272 150 - 450 10e3/uL   D dimer quantitative     Status: Normal   Result Value Ref Range    D-Dimer Quantitative 0.31 0.00 - 0.50 ug/mL  FEU    Narrative    This D-dimer assay is intended for use in conjunction with a clinical pretest probability assessment model to exclude pulmonary embolism (PE) and deep venous thrombosis (DVT) in outpatients suspected of PE or DVT. The cut-off value is 0.50 ug/mL FEU.    For patients 50 years of age or older, the application of age-adjusted cut-off values for D-Dimer may increase the specificity without significant effect on sensitivity. The literature suggested calculation age adjusted cut-off in ug/L = age in years x 10 ug/L. The results in this laboratory are reported as ug/mL rather than ug/L. The calculation for age adjusted cut off in ug/mL= age in years x 0.01 ug/mL. For example, the cut off for a 76 year old male is 76 x 0.01 ug/mL = 0.76 ug/mL (760 ug/L).    M Chelsi et al. Age adjusted D-dimer cut-off levels to rule out pulmonary embolism: The ADJUST-PE Study. CHRISTINE 2014;311:8337-8021.; HJ Louise et al. Diagnostic accuracy of conventional or age adjusted D-dimer cutoff values in older patients with suspected venous thromboembolism. Systemic review and meta-analysis. BMJ 2013:346:f2492.   EKG 12-lead, tracing only     Status: None   Result Value Ref Range    Systolic Blood Pressure  mmHg    Diastolic Blood Pressure  mmHg    Ventricular Rate 75 BPM    Atrial Rate 75 BPM    WI Interval 164 ms    QRS Duration 78 ms     ms    QTc 379 ms    P Axis 48 degrees    R AXIS 78 degrees    T Axis 51 degrees    Interpretation ECG       Sinus rhythm  Normal ECG  Unconfirmed report - interpretation of this ECG is computer generated - see medical record for final interpretation  Reconfirmed by - EMERGENCY ROOM, PHYSICIAN ([id number]),  JULIAN ALONSO ([id number]) on 11/20/2023 6:33:31 PM     Echo Complete     Status: None   Result Value Ref Range    LVEF  60-65%     Narrative    [id number]  [id number]  [id number]  [id number]^CIRO^LONI^LEVI     [facility]  Echocardiography Laboratory  [address]  [place] [address]     Name: SHY PRAJAPATI  MRN: [id number]  : 1955  Study Date: 2023 08:14 AM  Age: 68 yrs  Gender: Female  Patient Location: [facility]  Reason For Study: Chest Pain  Ordering Physician: LONI TANNER  Performed By: Kika Gordon RDCS     BSA: 1.8 m2  Height: 62 in  Weight: 183 lb  HR: 56  BP: 151/57 mmHg  ______________________________________________________________________________  Procedure  Complete Portable Echo Adult.  ______________________________________________________________________________  Interpretation Summary  Global and regional left ventricular function is normal with an EF of 60-  65%.No regional wall motion abnormalities are seen.  The right ventricle is normal size.Global right ventricular function is  normal.  Mild aortic valve calcification is present.Mild aortic insufficiency is  present.  IVC diameter <2.1 cm collapsing >50% with sniff suggests a normal RA pressure  of 3 mmHg.     This study was compared with the study from 2023. No significant change.     ______________________________________________________________________________  Left Ventricle  Global and regional left ventricular function is normal with an EF of 60-65%.  Left ventricular size is normal. Relative wall thickness is increased  consistent with concentric remodeling. Left ventricular diastolic function is  indeterminate. No regional wall motion abnormalities are seen.     Right Ventricle  The right ventricle is normal size. Global right ventricular function is  normal.     Atria  Mild biatrial enlargement is present.     Mitral Valve  On Doppler interrogation, there is no significant stenosis or regurgitation.  Moderate mitral annular calcification is present.     Aortic Valve  Mild aortic valve calcification is present. Mild aortic insufficiency is  present.     Tricuspid Valve  The tricuspid valve is normal. Trace tricuspid insufficiency is present. The  peak  velocity of the tricuspid regurgitant jet is not obtainable. Pulmonary  artery systolic pressure cannot be assessed.     Pulmonic Valve  The pulmonic valve is normal. On Doppler interrogation, there is no  significant stenosis or regurgitation.     Vessels  The inferior vena cava is normal. Ascending aorta 3.6 cm. When indexed to BSA,  the ascending aorta is mildly enlarged. IVC diameter <2.1 cm collapsing >50%  with sniff suggests a normal RA pressure of 3 mmHg.     Pericardium  No pericardial effusion is present.     Compared to Previous Study  This study was compared with the study from 2023 . There has been no  change.     Attestation  I have personally viewed the imaging and agree with the interpretation and  report as documented by the fellow, Toño Cristina MD, and/or edited by me.  ______________________________________________________________________________  MMode/2D Measurements & Calculations  RVDd: 4.1 cm  IVSd: 1.00 cm  LVIDd: 4.1 cm  LVIDs: 2.5 cm  LVPWd: 1.1 cm  FS: 39.4 %  LV mass(C)d: 135.7 grams  LV mass(C)dI: 73.7 grams/m2  Ao root diam: 3.2 cm  asc Aorta Diam: 3.6 cm  LVOT diam: 2.1 cm  LVOT area: 3.5 cm2  Ao root diam index Ht(cm/m): 2.0  Ao root diam index BSA (cm/m2): 1.7  Asc Ao diam index BSA (cm/m2): 2.0  Asc Ao diam index Ht(cm/m): 2.3  LA Volume (BP): 67.5 ml     LA Volume Index (BP): 36.7 ml/m2  RWT: 0.52  TAPSE: 2.2 cm     Doppler Measurements & Calculations  MV E max kuldeep: 64.5 cm/sec  MV A max kuldeep: 113.2 cm/sec  MV E/A: 0.57  MV max P.3 mmHg  MV mean PG: 3.2 mmHg  MV V2 VTI: 45.3 cm  MVA(VTI): 2.2 cm2  MV dec slope: 199.0 cm/sec2  MV dec time: 0.32 sec  LV V1 max P.4 mmHg  LV V1 max: 136.3 cm/sec  LV V1 VTI: 28.7 cm  SV(LVOT): 101.8 ml  SI(LVOT): 55.3 ml/m2  E/E' av.2     Lateral E/e': 11.9  Medial E/e': 10.6     ______________________________________________________________________________  Report approved by: Leah Crain 2023 09:58 AM          CBC with platelets differential     Status: None    Narrative    The following orders were created for panel order CBC with platelets differential.  Procedure                               Abnormality         Status                     ---------                               -----------         ------                     CBC with platelets and d...[[id number]]                      Final result                 Please view results for these tests on the individual orders.       XR CHEST 2 VIEWS 11/18/23  IMPRESSION: Lungs are clear. No pleural effusion. Heart size and pulmonary vascularity within normal limits.     Troponin T, High Sensitivity 11/18/23  18 ng/L 18 High        Medications - No data to display  Labs Ordered and Resulted from Time of ED Arrival to Time of ED Departure   TROPONIN T, HIGH SENSITIVITY - Normal       Result Value    Troponin T, High Sensitivity 10     COMPREHENSIVE METABOLIC PANEL - Normal    Sodium 139      Potassium 4.2      Carbon Dioxide (CO2) 25      Anion Gap 11      Urea Nitrogen 14.3      Creatinine 0.81      GFR Estimate 78      Calcium 9.8      Chloride 103      Glucose 95      Alkaline Phosphatase 92      AST 19      ALT 19      Protein Total 7.5      Albumin 4.4      Bilirubin Total 0.3     LIPASE - Normal    Lipase 37     D DIMER QUANTITATIVE - Normal    D-Dimer Quantitative 0.35     INFLUENZA A/B, RSV, & SARS-COV2 PCR - Normal    Influenza A PCR Negative      Influenza B PCR Negative      RSV PCR Negative      SARS CoV2 PCR Negative     TROPONIN T, HIGH SENSITIVITY - Normal    Troponin T, High Sensitivity 10     CRP INFLAMMATION - Normal    CRP Inflammation <3.00     NT PROBNP INPATIENT - Normal    N terminal Pro BNP Inpatient 131     CBC WITH PLATELETS AND DIFFERENTIAL    WBC Count 6.7      RBC Count 4.58      Hemoglobin 12.4      Hematocrit 38.0      MCV 83      MCH 27.1      MCHC 32.6      RDW 14.0      Platelet Count 293      % Neutrophils 54      % Lymphocytes 33      %  Monocytes 10      % Eosinophils 3      % Basophils 0      % Immature Granulocytes 0      NRBCs per 100 WBC 0      Absolute Neutrophils 3.5      Absolute Lymphocytes 2.2      Absolute Monocytes 0.7      Absolute Eosinophils 0.2      Absolute Basophils 0.0      Absolute Immature Granulocytes 0.0      Absolute NRBCs 0.0       US Lower Extremity Venous Duplex Left   Final Result   IMPRESSION:   No evidence left lower extremity deep venous thrombosis.      I have personally reviewed the examination and initial interpretation   and I agree with the findings.      RAVINDER BLANCHARD MD            SYSTEM ID:  [id number]      Echo Complete   Final Result             Critical care was not performed.     Medical Decision Making  The patient's presentation was of high complexity (an acute health issue posing potential threat to life or bodily function).    The patient's evaluation involved:  an assessment requiring an independent historian (sister)  review of 3+ test result(s) ordered prior to this encounter (see separate area of note for details)  ordering and/or review of 3+ test(s) in this encounter (see separate area of note for details)  discussion of management or test interpretation with another health professional (see separate area of note for details)    The patient's management necessitated moderate risk (prescription drug management including medications given in the ED), moderate risk (limitations due to social determinants of health (see separate area of note for details)), high risk (drug therapy requiring intensive monitoring (see separate area of note for details)), and high risk (a decision regarding hospitalization).    Assessment & Plan    Grace Chinchilla is a 68 year old female with a history of HTN on Losartan, HLD on Atorvastatin, hypothyroidism on Levothyroxine, and prediabetes who presents to the ED with c/o intermittent chest pain for the past 8 days.     Ddx: ACUTE CORONARY SYNDROME, GERD, CHF, viral  URI, PNA, pulmonary embolism    Patient afebrile and hypertensive on arrival to 199/111.  Pulse 76.  Respiratory rate 16.  Satting 96% on room air.  Per review of the chart she presented to an urgent care 2 days ago and was found to have an elevated troponin of 18.  Was advised to present to the emergency department but unfortunately did not come in until today.  Patient reporting active chest pain at the time of arrival.  She was given aspirin and sublingual nitroglycerin.   On recheck today the troponin is 10.  EKG shows normal sinus rhythm without acute ischemic changes.  Lipase normal.  CBC normal.  CMP normal. D-dimer negative.  Influenza and COVID-negative.  BNP normal.  CRP negative.  Given patient's risk factors for coronary artery disease I discussed management with the patient and her sister who is present at bedside.  I recommended the patient obtain a cardiac stress test for further evaluation within the next 72 hours if possible.  However, I also offered an observation admission for rule out if the patient's family did not think this could be achieved as an outpatient.  The patient and her sister conferred over the telephone with another one of the patient's sisters who is her primary medical decision maker.  After the family discussed the options they felt it would be unlikely for the patient to be able to have an outpatient stress test arranged within the next 3 days.  They elected to be admitted to observation for rule out in the hospital.  Patient admitted on observation for ongoing management.    I have reviewed the nursing notes. I have reviewed the findings, diagnosis, plan and need for follow up with the patient.    Discharge Medication List as of 11/21/2023  3:59 PM          Final diagnoses:   Chest pain, unspecified type   I, Najma Last, bharti serving as a trained medical scribe to document services personally performed by Cheryl Vázquez MD based on the provider's statements to me on November  20, 2023.  This document has been checked and approved by the attending provider.    I, Cheryl Vázquez MD, was physically present and have reviewed and verified the accuracy of this note documented by Najma Last medical scribe.      Cheryl Vázquez MD  [facility] EMERGENCY DEPARTMENT  11/20/2023     Cheryl Vázquez MD  11/24/23 0769

## 2023-11-21 ENCOUNTER — APPOINTMENT (OUTPATIENT)
Dept: CARDIOLOGY | Facility: CLINIC | Age: 68
End: 2023-11-21
Attending: PHYSICIAN ASSISTANT
Payer: COMMERCIAL

## 2023-11-21 ENCOUNTER — APPOINTMENT (OUTPATIENT)
Dept: ULTRASOUND IMAGING | Facility: CLINIC | Age: 68
End: 2023-11-21
Attending: PHYSICIAN ASSISTANT
Payer: COMMERCIAL

## 2023-11-21 VITALS
HEART RATE: 82 BPM | RESPIRATION RATE: 16 BRPM | TEMPERATURE: 98.1 F | SYSTOLIC BLOOD PRESSURE: 147 MMHG | DIASTOLIC BLOOD PRESSURE: 61 MMHG | BODY MASS INDEX: 33.68 KG/M2 | HEIGHT: 62 IN | OXYGEN SATURATION: 98 % | WEIGHT: 183 LBS

## 2023-11-21 LAB
ANION GAP SERPL CALCULATED.3IONS-SCNC: 8 MMOL/L (ref 7–15)
BUN SERPL-MCNC: 19.9 MG/DL (ref 8–23)
CALCIUM SERPL-MCNC: 9.5 MG/DL (ref 8.8–10.2)
CHLORIDE SERPL-SCNC: 105 MMOL/L (ref 98–107)
CREAT SERPL-MCNC: 0.85 MG/DL (ref 0.51–0.95)
D DIMER PPP FEU-MCNC: 0.31 UG/ML FEU (ref 0–0.5)
DEPRECATED HCO3 PLAS-SCNC: 27 MMOL/L (ref 22–29)
EGFRCR SERPLBLD CKD-EPI 2021: 74 ML/MIN/1.73M2
ERYTHROCYTE [DISTWIDTH] IN BLOOD BY AUTOMATED COUNT: 14.2 % (ref 10–15)
GLUCOSE SERPL-MCNC: 93 MG/DL (ref 70–99)
HCT VFR BLD AUTO: 34.6 % (ref 35–47)
HGB BLD-MCNC: 11 G/DL (ref 11.7–15.7)
LVEF ECHO: NORMAL
MCH RBC QN AUTO: 26.5 PG (ref 26.5–33)
MCHC RBC AUTO-ENTMCNC: 31.8 G/DL (ref 31.5–36.5)
MCV RBC AUTO: 83 FL (ref 78–100)
PLATELET # BLD AUTO: 272 10E3/UL (ref 150–450)
POTASSIUM SERPL-SCNC: 4.8 MMOL/L (ref 3.4–5.3)
RBC # BLD AUTO: 4.15 10E6/UL (ref 3.8–5.2)
SODIUM SERPL-SCNC: 140 MMOL/L (ref 135–145)
WBC # BLD AUTO: 7.1 10E3/UL (ref 4–11)

## 2023-11-21 PROCEDURE — 93971 EXTREMITY STUDY: CPT | Mod: LT

## 2023-11-21 PROCEDURE — 250N000013 HC RX MED GY IP 250 OP 250 PS 637: Performed by: PHYSICIAN ASSISTANT

## 2023-11-21 PROCEDURE — G0378 HOSPITAL OBSERVATION PER HR: HCPCS

## 2023-11-21 PROCEDURE — 93306 TTE W/DOPPLER COMPLETE: CPT

## 2023-11-21 PROCEDURE — 99207 PR APP CREDIT; MD BILLING SHARED VISIT: CPT | Mod: FS | Performed by: PHYSICIAN ASSISTANT

## 2023-11-21 PROCEDURE — 99239 HOSP IP/OBS DSCHRG MGMT >30: CPT | Mod: FS | Performed by: INTERNAL MEDICINE

## 2023-11-21 PROCEDURE — 36415 COLL VENOUS BLD VENIPUNCTURE: CPT | Performed by: PHYSICIAN ASSISTANT

## 2023-11-21 PROCEDURE — 93306 TTE W/DOPPLER COMPLETE: CPT | Mod: 26 | Performed by: STUDENT IN AN ORGANIZED HEALTH CARE EDUCATION/TRAINING PROGRAM

## 2023-11-21 PROCEDURE — 93971 EXTREMITY STUDY: CPT | Mod: 26 | Performed by: RADIOLOGY

## 2023-11-21 PROCEDURE — 85027 COMPLETE CBC AUTOMATED: CPT | Performed by: PHYSICIAN ASSISTANT

## 2023-11-21 PROCEDURE — 85379 FIBRIN DEGRADATION QUANT: CPT | Performed by: PHYSICIAN ASSISTANT

## 2023-11-21 PROCEDURE — 80048 BASIC METABOLIC PNL TOTAL CA: CPT | Performed by: PHYSICIAN ASSISTANT

## 2023-11-21 RX ORDER — ALBUTEROL SULFATE 90 UG/1
1-2 AEROSOL, METERED RESPIRATORY (INHALATION) EVERY 6 HOURS
Status: DISCONTINUED | OUTPATIENT
Start: 2023-11-21 | End: 2023-11-21 | Stop reason: HOSPADM

## 2023-11-21 RX ADMIN — DULOXETINE HYDROCHLORIDE 90 MG: 30 CAPSULE, DELAYED RELEASE ORAL at 08:51

## 2023-11-21 RX ADMIN — LOSARTAN POTASSIUM 25 MG: 25 TABLET, FILM COATED ORAL at 08:51

## 2023-11-21 RX ADMIN — Medication 25 MCG: at 08:51

## 2023-11-21 RX ADMIN — ATORVASTATIN CALCIUM 80 MG: 40 TABLET, FILM COATED ORAL at 08:50

## 2023-11-21 RX ADMIN — Medication 1 TABLET: at 08:51

## 2023-11-21 RX ADMIN — METFORMIN ER 500 MG 500 MG: 500 TABLET ORAL at 08:51

## 2023-11-21 RX ADMIN — ALBUTEROL SULFATE 1 PUFF: 90 AEROSOL, METERED RESPIRATORY (INHALATION) at 12:25

## 2023-11-21 RX ADMIN — ASPIRIN 81 MG CHEWABLE TABLET 324 MG: 81 TABLET CHEWABLE at 08:51

## 2023-11-21 RX ADMIN — LEVOTHYROXINE SODIUM 125 MCG: 0.03 TABLET ORAL at 09:03

## 2023-11-21 RX ADMIN — CYANOCOBALAMIN TAB 500 MCG 1500 MCG: 500 TAB at 08:48

## 2023-11-21 ASSESSMENT — ACTIVITIES OF DAILY LIVING (ADL)
ADLS_ACUITY_SCORE: 31

## 2023-11-21 NOTE — PLAN OF CARE
"Outpatient/Observation goals to be met before discharge home:    /61 (BP Location: Left arm)   Pulse 72   Temp 97.8  F (36.6  C) (Oral)   Resp 18   Ht 1.575 m (5' 2\")   Wt 83 kg (183 lb)   LMP 09/13/2006   SpO2 98%   BMI 33.47 kg/m      -diagnostic tests and consults completed and resulted: in process  -vital signs normal or at patient baseline: met   -adequate pain control on oral analgesics: met  -returns to baseline functional status: in process, complains of chest pain in the center of her chest, complains of SOB with activity and when coughing   "

## 2023-11-21 NOTE — PROVIDER NOTIFICATION
"Provider text paged    \"Pt is wondering about her home night meds specifically the divalproex. She also complains of itching on her vulva and is wondering about a lotion or cream for it.\"  "

## 2023-11-21 NOTE — H&P
[facility]    History and Physical - Hospitalist Service, GOLD TEAM        Date of Admission:  11/20/2023    Assessment & Plan      Grace Chinchilla is a 68 year old female with a past medical history of HTN, hypothyroidism, HLD and DM2 who presents to the ED with intermittent chest pain and elevated troponin.  She is admitted under observation for further treatment and monitoring.     Subacute Chest Pain   HTN  HLD - Patient presents with complaints of a one to two week history of anterior chest pain. Pain is worse with deep inspiration and is associated with a cough. Denies lower extremity swelling.  She was seen in the urgent care with work up largely unremarkable except a troponin of 18.  Repeat troponin x 2 here negative.  EKG negative for ischemic changes.  UC diagnosed patient with bronchitis and prescribed an albuterol inhaler.  Etiology of chest pain uncertain, differentials includes pericarditis vs CHF vs anxiety vs GERD.   -Obtain Echo   -add CRP and BNP   -give ibuprofen  -consider GI cocktail     DM2 - Continue PTA metformin     Hypothyroidism - Continue PTA levothyroxine     Bipolar Disorder - Continue PTA Cymbalta and Depakote         Observation Goals: -diagnostic tests and consults completed and resulted, -vital signs normal or at patient baseline, -adequate pain control on oral analgesics, -returns to baseline functional status, Nurse to notify provider when observation goals have been met and patient is ready for discharge.  Diet: Regular Diet Adult    DVT Prophylaxis: Ambulate every shift  [place] Catheter: Not present  Lines: None     Cardiac Monitoring: None  Code Status: Full Code      Clinically Significant Risk Factors Present on Admission                  # Hypertension: Home medication list includes antihypertensive(s)      # Obesity: Estimated body mass index is 33.47 kg/m  as calculated from the following:    Height as of this encounter:  "1.575 m (5' 2\").    Weight as of this encounter: 83 kg (183 lb).              Disposition Plan      Expected Discharge Date: 11/21/2023                The patient's care was discussed with the Attending Physician, Dr. Mello .    Paige Le PA-C  Hospitalist Service, [facility]  Securely message with [employer] (more info)  Text page via [facility] Paging/Directory   See signed in provider for up to date coverage information    ______________________________________________________________________    Chief Complaint   Chest pain    History is obtained from the patient and patient's chart     History of Present Illness   Grace Chinchilla is a 68 year old female with a PMH as listed above who presents with an intermittent history of chest pain.  Patient notes chest pain for the past 1-2 weeks.  She states she was resting when it first occurred. She notes pain is worse with deep inspiration.  She notes an associated cough. She admits she initially thought it was related to her anxiety. She notes a past history of heartburn but states her pain does not feel similar.        Past Medical History    Past Medical History:   Diagnosis Date    Adrenal mass (H24) 3/14/2022    Anxiety     Depression     Depressive disorder, not elsewhere classified     Diaphragmatic hernia without mention of obstruction or gangrene     Disease of thyroid gland     Dysthymic disorder     bipolar - sees  Dr. Jason Langley [facility]    Esophageal reflux     Hyperlipidemia     Mixed hyperlipidemia     Hyperlipidemia    Obstructive sleep apnea (adult) (pediatric)     uses CPAP    Plantar fascial fibromatosis     Temporomandibular joint disorders, unspecified     Temporomandibular joint disorders, unspecified     Tietze's disease     costochondritis    Unspecified hypothyroidism        Past Surgical History   Past Surgical History:   Procedure Laterality Date    BIOPSY BREAST      benign    " BIOPSY OF BREAST, INCISIONAL      Description: Biopsy Breast Open;  Recorded: 07/14/2009;  Comments: right, benign    CERVICAL BIOPSY      NO HISTORY OF SURGERY      PHACOEMULSIFICATION CLEAR CORNEA WITH STANDARD INTRAOCULAR LENS IMPLANT Left 10/26/2023    Procedure: LEFT EYE PHACOEMULSIFICATION, CATARACT, WITH INTRAOCULAR LENS IMPLANT;  Surgeon: Rose Amin MD;  Location: [facility] OR    RELEASE CARPAL TUNNEL Right     US BIOPSY THYROID FINE NEEDLE ASPIRATION  10/1/2019    VITRECTOMY PARSPLANA WITH 25 GAUGE SYSTEM Left 5/25/2023    Procedure: LEFT EYE VITRECTOMY, PARS PLANA APPROACH, USING 25-GAUGE INSTRUMENTS, Membrane peel, endo laser;  Surgeon: Jessica Motley MD;  Location: [facility] [place]       Prior to Admission Medications   Prior to Admission Medications   Prescriptions Last Dose Informant Patient Reported? Taking?   Calcium-Magnesium-Zinc 333-133-5 MG TABS per tablet   Yes No   Sig: Take 1 tablet by mouth   Cyanocobalamin (VITAMIN B-12) 500 MCG LOZG   Yes No   Sig: Take 1,500 mcg by mouth   DULoxetine (CYMBALTA) 30 MG capsule   Yes No   Flaxseed, Linseed, (FLAX SEEDS PO)   Yes No   Nutritional Supplements (GLUCOSAMINE COMPLEX PO)   Yes No   acetaminophen (TYLENOL) 325 MG tablet   Yes No   Sig: Take 325-650 mg by mouth every 6 hours as needed for mild pain   albuterol (PROAIR HFA/PROVENTIL HFA/VENTOLIN HFA) 108 (90 Base) MCG/ACT inhaler   No No   Sig: Inhale 1-2 puffs into the lungs every 6 hours for 5 days   amphetamine-dextroamphetamine (ADDERALL XR) 30 MG 24 hr capsule   Yes No   atorvastatin (LIPITOR) 80 MG tablet   No No   Sig: Take 1 tablet (80 mg) by mouth daily   cholecalciferol 25 MCG (1000 UT) TABS   Yes No   Sig: Take 1,000 Units by mouth    clonazePAM (KLONOPIN) 0.5 MG tablet   Yes No   diclofenac (VOLTAREN) 1 % topical gel   No No   Sig: Apply 4 g topically 4 times daily   divalproex sodium extended-release (DEPAKOTE ER) 250 MG 24 hr tablet   Yes No   Sig: Take 250 mg by mouth daily Taken with  Depakote 500 x 2 for a total of 1250mg daily   divalproex sodium extended-release (DEPAKOTE ER) 500 MG 24 hr tablet   Yes No   Sig: Take 1,000 mg by mouth daily In addition to Depakote 250mg   ferrous sulfate (FEROSUL) 325 (65 Fe) MG tablet   Yes No   Sig: Take 325 mg by mouth daily (with breakfast)   ketorolac (ACULAR) 0.5 % ophthalmic solution   No No   Sig: Place 1 drop Into the left eye 4 times daily   levothyroxine (SYNTHROID/LEVOTHROID) 125 MCG tablet   No No   Sig: Take 1 tablet (125 mcg) by mouth daily   losartan (COZAAR) 25 MG tablet   No No   Sig: Take 1 tablet (25 mg) by mouth daily   metFORMIN (GLUCOPHAGE XR) 500 MG 24 hr tablet   Yes No   Si tab after bfast and dinner daily .   moxifloxacin (VIGAMOX) 0.5 % ophthalmic solution   No No   Sig: Place 1 drop Into the left eye 4 times daily   multivitamin w/minerals (THERA-VIT-M) tablet   Yes No   Sig: Take 1 tablet by mouth daily   prednisoLONE acetate (PRED FORTE) 1 % ophthalmic suspension   No No   Sig: Place 1 drop Into the left eye 4 times daily      Facility-Administered Medications: None      Physical Exam   Vital Signs: Temp: 97.2  F (36.2  C) Temp src: Oral BP: (!) 151/57 Pulse: 63   Resp: 14 SpO2: 96 %      Weight: 183 lbs 0 oz  GENERAL: Alert and oriented x 3. NAD. Ambulatory. Cooperative.   HEENT: Anicteric sclera. Mucous membranes moist. NC. AT.  CV: RRR. S1, S2. No murmurs appreciated.   RESPIRATORY: Effort normal on RA.  Patient with left sided rub.   GI: Abdomen soft and non distended with normoactive bowel sounds present in all quadrants. No tenderness, rebound, guarding. No lesions.   NEUROLOGICAL: No focal deficits. Moves all extremities.  CN 2-12 grossly intact.  EXTREMITIES: No peripheral edema.    SKIN: No jaundice. No rashes.      Medical Decision Making       75 MINUTES SPENT BY ME on the date of service doing chart review, history, exam, documentation & further activities per the note.      Data   Recent Labs   Lab 23  4856  11/18/23  1632   WBC 6.7 6.3   HGB 12.4 12.4   MCV 83 83    269    138   POTASSIUM 4.2 4.3   CHLORIDE 103 101   CO2 25 31*   BUN 14.3 22.2   CR 0.81 0.79   ANIONGAP 11 6*   DAYAN 9.8 9.5   GLC 95 99   ALBUMIN 4.4 4.3   PROTTOTAL 7.5 7.3   BILITOTAL 0.3 0.3   ALKPHOS 92 89   ALT 19 10   AST 19 19   LIPASE 37  --

## 2023-11-21 NOTE — PROGRESS NOTES
Care Management    11/21: CHW met with pt at bedside, introduced self and explained the role of a Community Health Worker. CHW reviewed the Medicare Outpatient Observation Notice (MOON) and answered any questions and concerns the pt had. CHW encouraged patient to call to follow up with their insurance to receive the most accurate information. Patient signed COHN form and received a copy. CHW faxed to [phone] and placed the original form in pt's chart.      Rashad PISANO  ICU & ED/OBS  Phone: [phone]

## 2023-11-21 NOTE — DISCHARGE SUMMARY
"[facility]  Hospitalist Discharge Summary      Date of Admission:  11/20/2023  Date of Discharge:  11/21/2023  Discharging Provider: Erin Johnson PA-C  Discharge Service: Hospitalist Service    Discharge Diagnoses   Subacute chest pain, suspect due to acute bronchitis versus MSK pain    Clinically Significant Risk Factors     # Obesity: Estimated body mass index is 33.47 kg/m  as calculated from the following:    Height as of this encounter: 1.575 m (5' 2\").    Weight as of this encounter: 83 kg (183 lb).       Follow-ups Needed After Discharge   Follow-up Appointments     Adult [facility] Follow-up and recommended labs and tests      Follow up with primary care provider, SHERI Melara for any symptom   worsening.     Appointments on [place] and/or [facility] (with [facility] or [facility]   provider or service). Call [phone] if you haven't heard regarding   these appointments within 7 days of discharge.          Unresulted Labs Ordered in the Past 30 Days of this Admission       No orders found for last 31 day(s).        These results will be followed up by N/A    Discharge Disposition   Discharged to home  Condition at discharge: Stable    Hospital Course   Grace Chinchilla is a 68 year old female with a past medical history of HTN, hypothyroidism, HLD and DM2 who presents to the ED with intermittent chest pain and elevated troponin.  She is admitted under observation for further treatment and monitoring.  Patient's symptoms improved somewhat gradually.  Workup including for ACS and PE were negative.  Due to mild cough, chest x-ray was obtained but was unremarkable.  ECHO unremarkable. Suspect patient has acute bronchitis causing her symptoms.  She was discharged home.     Subacute Chest Pain   HTN  HLD - Patient presents with complaints of a one to two week history of anterior chest pain. Pain is worse with deep inspiration and is associated with a cough. " Denies lower extremity swelling.  She was seen in the urgent care with work up largely unremarkable except a troponin of 18.  Repeat troponin x 2 here negative.  EKG negative for ischemic changes.  UC diagnosed patient with bronchitis and prescribed an albuterol inhaler.  Etiology of chest pain uncertain, differentials includes pericarditis vs CHF vs anxiety vs GERD. ECHO unremarkable. D-dimer negative, LE US of left calf which was painful negative.   - patient would like to go home and continue treating with OTC robitussin    DM2 - Continue PTA metformin     Hypothyroidism - Continue PTA levothyroxine     Bipolar Disorder - Continue PTA Cymbalta and Depakote      Consultations This Hospital Stay   None    Code Status   Full Code    Time Spent on this Encounter   I, Erin Johnson PA-C, personally saw the patient today and spent greater than 30 minutes discharging this patient.       Erin Johnson PA-C  [facility] [address] [address]  Phone: [phone]  Fax: [phone]  ______________________________________________________________________    Physical Exam   Vital Signs: Temp: 97.7  F (36.5  C) Temp src: Oral BP: 107/54 Pulse: 75   Resp: 18 SpO2: 98 % O2 Device: None (Room air)    Weight: 183 lbs 0 oz    GENERAL: adult female seen sitting comfortably in bed. NAD.   NEURO / PSYCH: Alert, converses appropriately. No focal deficits. Moves all extremities.   HEENT: Anicteric sclera. PERRL. Mucous membranes moist.   CV: RRR. S1, S2. No murmurs appreciated.  2+ right radial pulse.  RESPIRATORY: Effort normal. Lungs CTAB with no wheezing, rales, rhonchi.   GI: Abdomen soft and non distended with bowel sounds rare. No tenderness, rebound, guarding.   MSK: no gross deformities  EXTREMITIES: nonedematous  SKIN: No jaundice. No rashes or lesions to exposed areas.     Primary Care Physician   Laila Sanches    Discharge Orders      Primary Care - Care  Coordination Referral      Reason for your hospital stay    You were admitted to the hospital for chest pain of uncertain etiology.  You are worked up for acute coronary syndrome, heart attack, lung clot or pulmonary embolism.  All of the findings for urgent or life-threatening causes of chest pain were negative.  It is possible that your pain is from bronchitis.  You are recommended to continue with over-the-counter treatments as is your preference.  You are being discharged home.     Activity    Your activity upon discharge: activity as tolerated     Adult [facility] Follow-up and recommended labs and tests    Follow up with primary care provider, SHERI Melara for any symptom worsening.     Appointments on [place] and/or [facility] (with [facility] or [facility] provider or service). Call [phone] if you haven't heard regarding these appointments within 7 days of discharge.     Diet    Follow this diet upon discharge: Orders Placed This Encounter      Regular Diet Adult       Significant Results and Procedures   Most Recent 3 CBC's:  Recent Labs   Lab Test 11/21/23  0627 11/20/23  1716 11/18/23  1632   WBC 7.1 6.7 6.3   HGB 11.0* 12.4 12.4   MCV 83 83 83    293 269     Most Recent 3 BMP's:  Recent Labs   Lab Test 11/21/23  0627 11/20/23  1716 11/18/23  1632    139 138   POTASSIUM 4.8 4.2 4.3   CHLORIDE 105 103 101   CO2 27 25 31*   BUN 19.9 14.3 22.2   CR 0.85 0.81 0.79   ANIONGAP 8 11 6*   DAYAN 9.5 9.8 9.5   GLC 93 95 99       Discharge Medications   Current Discharge Medication List        CONTINUE these medications which have NOT CHANGED    Details   albuterol (PROAIR HFA/PROVENTIL HFA/VENTOLIN HFA) 108 (90 Base) MCG/ACT inhaler Inhale 1-2 puffs into the lungs every 6 hours for 5 days  Qty: 8.5 g, Refills: 0    Comments: Pharmacy may dispense brand covered by insurance (Proair, or proventil or ventolin or generic albuterol inhaler)  Associated Diagnoses: Chest pain on breathing       amphetamine-dextroamphetamine (ADDERALL XR) 30 MG 24 hr capsule Take 30 mg by mouth every morning      atorvastatin (LIPITOR) 80 MG tablet Take 1 tablet (80 mg) by mouth daily  Qty: 90 tablet, Refills: 3    Associated Diagnoses: Mixed hyperlipidemia      Calcium-Magnesium-Zinc 333-133-5 MG TABS per tablet Take 1 tablet by mouth daily      cholecalciferol 25 MCG (1000 UT) TABS Take 1,000 Units by mouth daily      clonazePAM (KLONOPIN) 0.5 MG tablet Take 0.5 mg by mouth 2 times daily as needed for anxiety      Cyanocobalamin (VITAMIN B-12) 500 MCG LOZG Take 1,500 mcg by mouth daily      diclofenac (VOLTAREN) 1 % topical gel Apply 4 g topically as needed for moderate pain      !! divalproex sodium extended-release (DEPAKOTE ER) 250 MG 24 hr tablet Take 250 mg by mouth daily Taken with Depakote 500 x 2 for a total of 1250mg daily      !! divalproex sodium extended-release (DEPAKOTE ER) 500 MG 24 hr tablet Take 1,000 mg by mouth daily In addition to Depakote 250mg      DULoxetine (CYMBALTA) 30 MG capsule Take 3 capsules by mouth daily      ferrous sulfate (FEROSUL) 325 (65 Fe) MG tablet Take 325 mg by mouth daily (with dinner)      Flaxseed, Linseed, (FLAX SEEDS PO) Take 1 capsule by mouth daily (with dinner)      ketorolac (ACULAR) 0.5 % ophthalmic solution Place 1 drop Into the left eye daily      levothyroxine (SYNTHROID/LEVOTHROID) 125 MCG tablet Take 1 tablet (125 mcg) by mouth daily  Qty: 90 tablet, Refills: 1    Associated Diagnoses: Hypothyroidism, unspecified type      losartan (COZAAR) 25 MG tablet Take 1 tablet (25 mg) by mouth daily  Qty: 90 tablet, Refills: 3    Associated Diagnoses: Benign essential hypertension      metFORMIN (GLUCOPHAGE XR) 500 MG 24 hr tablet 1 tab after bfast and dinner daily .    Associated Diagnoses: Pre-diabetes      multivitamin w/minerals (THERA-VIT-M) tablet Take 2 tablets by mouth daily      Nutritional Supplements (GLUCOSAMINE COMPLEX PO) Take 2 packets by mouth Every  afternoon      prednisoLONE acetate (PRED FORTE) 1 % ophthalmic suspension Place 1 drop Into the left eye daily       !! - Potential duplicate medications found. Please discuss with provider.        STOP taking these medications       acetaminophen (TYLENOL) 325 MG tablet Comments:   Reason for Stopping:             Allergies   Allergies   Allergen Reactions    Bupropion

## 2023-11-21 NOTE — MEDICATION SCRIBE - ADMISSION MEDICATION HISTORY
Medication Scribe Admission Medication History    Admission medication history is complete. The information provided in this note is only as accurate as the sources available at the time of the update.    Information Source(s): Patient via in-person    Pertinent Information:   -Pt forgot to take her mediations this morning so she took them in the early afternoon  -Pt forgot her albuterol inhaler and she has started her new regiment of Q6H yesterday    Changes made to PTA medication list:  Added: None  Deleted: Moxifloxacin 0.5%, Acetaminophen 325 mg  Changed: Adderall 30 mg (no frequency) --> Adderall 30 mg (every day), Calcium-magnesium zinc (no frequency) --> Calcium-magnesium zinc (every day), Vitamin D3 (No frequency) -->  Vitamin D3 (every day), Clonazepam 0.5 mg (No frequency) --> Clonazepam 0.5 mg (PRN), Vitamin B-12 (no frequency) -->  Vitamin B-12 (every day), Diclofenac 1% (4 times daily) --> Diclofenac 1% (prn), Duloxetine 30 mg (no frequency) --> Duloxetine 30 mg (3 tab every day), Ferrous sulfate 325 mg (QAM) --> Ferrous sulfate 325 mg (QPM) , Flaxseed po (no frequency) --> Flaxseed po (every day), Ketorolac 0.5% (4 times daily) --> Ketorolac 0.5% (every day), Glucosamine (no frequency) -->   Glucosamine (2 packet every afternoon), Prednisolone acetate 1% (4 times daily) --> Prednisolone acetate 1% (every day)    Medication Affordability:  Not including over the counter (OTC) medications, was there a time in the past 3 months when you did not take your medications as prescribed because of cost?: No    Allergies reviewed with patient and updates made in EHR: yes    Medication History Completed By: Ilana Burton 11/20/2023 9:18 PM    PTA Med List   Medication Sig Last Dose    albuterol (PROAIR HFA/PROVENTIL HFA/VENTOLIN HFA) 108 (90 Base) MCG/ACT inhaler Inhale 1-2 puffs into the lungs every 6 hours for 5 days 11/20/2023 at 1430    amphetamine-dextroamphetamine (ADDERALL XR) 30 MG 24 hr capsule Take 30 mg  by mouth every morning 11/20/2023 at Noon    atorvastatin (LIPITOR) 80 MG tablet Take 1 tablet (80 mg) by mouth daily 11/20/2023 at Noon    Calcium-Magnesium-Zinc 333-133-5 MG TABS per tablet Take 1 tablet by mouth daily 11/20/2023 at Noon    cholecalciferol 25 MCG (1000 UT) TABS Take 1,000 Units by mouth daily 11/20/2023 at Noon    clonazePAM (KLONOPIN) 0.5 MG tablet Take 0.5 mg by mouth 2 times daily as needed for anxiety 11/20/2023 at unknown    Cyanocobalamin (VITAMIN B-12) 500 MCG LOZG Take 1,500 mcg by mouth daily 11/20/2023 at Noon    diclofenac (VOLTAREN) 1 % topical gel Apply 4 g topically as needed for moderate pain Past Month at unknown    divalproex sodium extended-release (DEPAKOTE ER) 250 MG 24 hr tablet Take 250 mg by mouth daily Taken with Depakote 500 x 2 for a total of 1250mg daily 11/19/2023 at pm    divalproex sodium extended-release (DEPAKOTE ER) 500 MG 24 hr tablet Take 1,000 mg by mouth daily In addition to Depakote 250mg 11/19/2023 at pm    DULoxetine (CYMBALTA) 30 MG capsule Take 3 capsules by mouth daily 11/20/2023 at Noon    ferrous sulfate (FEROSUL) 325 (65 Fe) MG tablet Take 325 mg by mouth daily (with dinner) 11/19/2023 at pm    Flaxseed, Linseed, (FLAX SEEDS PO) Take 1 capsule by mouth daily (with dinner) 11/19/2023 at pm    ketorolac (ACULAR) 0.5 % ophthalmic solution Place 1 drop Into the left eye daily 11/20/2023 at noon    levothyroxine (SYNTHROID/LEVOTHROID) 125 MCG tablet Take 1 tablet (125 mcg) by mouth daily 11/20/2023 at Noon    losartan (COZAAR) 25 MG tablet Take 1 tablet (25 mg) by mouth daily 11/20/2023 at Noon    metFORMIN (GLUCOPHAGE XR) 500 MG 24 hr tablet 1 tab after bfast and dinner daily . 11/20/2023 at Noon    multivitamin w/minerals (THERA-VIT-M) tablet Take 2 tablets by mouth daily 11/20/2023 at Noon    Nutritional Supplements (GLUCOSAMINE COMPLEX PO) Take 2 packets by mouth Every afternoon 11/19/2023 at unknown    prednisoLONE acetate (PRED FORTE) 1 % ophthalmic  suspension Place 1 drop Into the left eye daily 11/20/2023 at Noon

## 2023-11-21 NOTE — PLAN OF CARE
"Observation goals     -diagnostic tests and consults completed and resulted: in process  -vital signs normal or at patient baseline: met   -adequate pain control on oral analgesics: Will continue to monitor.   -returns to baseline functional status: in process, complains of chest pain in the center of her chest, complains of SOB with activity and when coughing     /54 (BP Location: Left arm)   Pulse 75   Temp 97.7  F (36.5  C) (Oral)   Resp 18   Ht 1.575 m (5' 2\")   Wt 83 kg (183 lb)   LMP 09/13/2006   SpO2 98%   BMI 33.47 kg/m      "

## 2023-11-21 NOTE — PLAN OF CARE
Observation goals      -diagnostic tests and consults completed and resulted: Echo and US completed.   -vital signs normal or at patient baseline: met   -adequate pain control on oral analgesics: Will continue to monitor.   -returns to baseline functional status: in process, complains of chest pain in the center of her chest, complains of SOB with activity and when coughing

## 2023-11-21 NOTE — DISCHARGE SUMMARY
Discharge instructions reviewed. Pt and sister verbalizes understanding. PIV discontinued. Pt discharge home accompanied by sister. Pt ambulated to the main lobby with the sister.

## 2023-11-22 ENCOUNTER — TELEPHONE (OUTPATIENT)
Dept: FAMILY MEDICINE | Facility: CLINIC | Age: 68
End: 2023-11-22
Payer: COMMERCIAL

## 2023-11-22 NOTE — TELEPHONE ENCOUNTER
ED / Discharge Outreach Protocol    Patient Contact    Attempt # 1    Was call answered?  No.  Left message on voicemail with information to call triage back.    VAN GarciaN, RN-BC  [facility]

## 2023-11-22 NOTE — TELEPHONE ENCOUNTER
Triage RN team,    Please call pt for Hospital discharge  follow up. Chest pain    Marley Hernandez, RN, BSN  [facility]

## 2023-11-24 NOTE — TELEPHONE ENCOUNTER
ED / Discharge Outreach Protocol    Patient Contact    Attempt # 2    Was call answered?  No.  Left message on voicemail with information to call me back.    Janelle Angelo, VANN RN  [facility]

## 2023-11-27 ENCOUNTER — TELEPHONE (OUTPATIENT)
Dept: OPHTHALMOLOGY | Facility: CLINIC | Age: 68
End: 2023-11-27
Payer: COMMERCIAL

## 2023-11-27 NOTE — TELEPHONE ENCOUNTER
Patient calling. She is being seen tomorrow for hospital follow-up appointment. Wondering if she should keep it. Advised that she should keep appointment.    Renee Sainz RN, BSN, PHN  [facility]  [phone]

## 2023-11-27 NOTE — TELEPHONE ENCOUNTER
[facility]    Phone Message    May a detailed message be left on voicemail: yes     Reason for Call: Other: Patient called requesting a call back from care team. Her 1 month post op got pushed back to 12/15. She is wondering if she should continue her eye drops until then. Please call to advise.      Action Taken: Other: eye    Travel Screening: Not Applicable

## 2023-11-28 ENCOUNTER — OFFICE VISIT (OUTPATIENT)
Dept: FAMILY MEDICINE | Facility: CLINIC | Age: 68
End: 2023-11-28
Payer: COMMERCIAL

## 2023-11-28 VITALS
RESPIRATION RATE: 18 BRPM | BODY MASS INDEX: 35.15 KG/M2 | HEIGHT: 62 IN | OXYGEN SATURATION: 97 % | DIASTOLIC BLOOD PRESSURE: 76 MMHG | WEIGHT: 191 LBS | TEMPERATURE: 97.2 F | SYSTOLIC BLOOD PRESSURE: 130 MMHG | HEART RATE: 86 BPM

## 2023-11-28 DIAGNOSIS — R07.9 CHEST PAIN, UNSPECIFIED TYPE: Primary | ICD-10-CM

## 2023-11-28 DIAGNOSIS — Z09 HOSPITAL DISCHARGE FOLLOW-UP: ICD-10-CM

## 2023-11-28 PROCEDURE — 99213 OFFICE O/P EST LOW 20 MIN: CPT | Mod: 24 | Performed by: FAMILY MEDICINE

## 2023-11-28 ASSESSMENT — PATIENT HEALTH QUESTIONNAIRE - PHQ9
SUM OF ALL RESPONSES TO PHQ QUESTIONS 1-9: 13
SUM OF ALL RESPONSES TO PHQ QUESTIONS 1-9: 13
10. IF YOU CHECKED OFF ANY PROBLEMS, HOW DIFFICULT HAVE THESE PROBLEMS MADE IT FOR YOU TO DO YOUR WORK, TAKE CARE OF THINGS AT HOME, OR GET ALONG WITH OTHER PEOPLE: SOMEWHAT DIFFICULT

## 2023-11-28 NOTE — PROGRESS NOTES
"  1. Chest pain, unspecified type  2. Hospital discharge follow-up  This is a 67 yo female here for follow up after hospitalization - she was admitted after a bit of a protracted course of mid chest pain.  Exams remained fairly normal but patient insists her pain is generally better.  Exam would suggest costochondritis as an element of pain.  Discussed.  Will continue to follow - discussed signs/sx that should prompt more close follow up.          Melita Edwards is a 68 year old, presenting for the following health issues:  Hospital F/U        11/28/2023     4:02 PM   Additional Questions   Roomed by Jane       \"I had bronchitis\"  thought it was stress/anxiety - was in her chest  Later, called nurse line - suggested UC - someone brought her there  Thought it was best to go to ER  Troponin level was high   Fam hx cardiac disease - sisters thought she'd do okay without hospital  So went home   By Monday - was still feeling hard to breathe   That's when she thought she should go to ER -   (2 sisters decided - \"they manage my affairs\")  Did a lot of tests -   Went in Monday night - discharged Tuesday afternoon     Using Albuterol     Went home on Monday; by  Tues/Wed was resting  Went downstairs for Thanksgiving on Thursday - for a while    In hospital, wanted to know what \"exasperates my pain\"  Things that increase her pain :  walking up the stairs, rushing around getting things done;  Hard to avoid getting up fast because has recliner that is not user friendly    2 weeks ago - started Mucinex -   Seemed to help   But sisters thought it was too expensive  Then had Robitussin - for severe cold/flu symptoms -   Now out of that -     Able to take a deep breath - not like 2 weeks ago -   Sister said she had bronchitis x 5 weeks - \"she's in such good health\"                 Hospital Follow-up Visit:    Hospital/Nursing Home/IP Rehab Facility: [facility]  Date of " "Admission: 11/20/23  Date of Discharge: 11/21/23  Reason(s) for Admission: Chest pain    Was your hospitalization related to COVID-19? No   Problems taking medications regularly:  None  Medication changes since discharge: Stopped Tylenol.   Problems adhering to non-medication therapy:  None    Summary of hospitalization:  [facility] discharge summary reviewed  Diagnostic Tests/Treatments reviewed.  Follow up needed: none  Other Healthcare Providers Involved in Patient s Care:         None  Update since discharge: improved.         Plan of care communicated with patient                   Review of Systems   Constitutional:  Negative for chills and fever.   HENT:  Negative for congestion, ear pain and sore throat.    Eyes:  Negative for pain and visual disturbance.   Respiratory:  Negative for cough and shortness of breath.    Cardiovascular:  Positive for chest pain (mid sternal). Negative for palpitations and peripheral edema.   Gastrointestinal:  Negative for abdominal pain, constipation and diarrhea.   Endocrine: Negative for polyuria.   Genitourinary:  Negative for dysuria, frequency and vaginal discharge.   Musculoskeletal:  Negative for arthralgias and myalgias.   Skin:  Negative for rash.   Allergic/Immunologic: Negative.    Neurological:  Negative for dizziness, weakness, headaches and paresthesias.   Hematological:  Does not bruise/bleed easily.   Psychiatric/Behavioral: Negative.     All other systems reviewed and are negative.           Objective    /76 (BP Location: Right arm, Patient Position: Sitting, Cuff Size: Adult Regular)   Pulse 86   Temp 97.2  F (36.2  C) (Temporal)   Resp 18   Ht 1.57 m (5' 1.81\")   Wt 86.6 kg (191 lb)   LMP 09/13/2006   SpO2 97%   BMI 35.15 kg/m    Body mass index is 35.15 kg/m .  Physical Exam  Vitals reviewed.   Constitutional:       General: She is not in acute distress.     Appearance: Normal appearance.   HENT:      Head: Normocephalic.      " Right Ear: Tympanic membrane, ear canal and external ear normal.      Left Ear: Tympanic membrane, ear canal and external ear normal.      Nose: Nose normal.      Mouth/Throat:      Mouth: Mucous membranes are moist.      Pharynx: No posterior oropharyngeal erythema.   Eyes:      Extraocular Movements: Extraocular movements intact.      Conjunctiva/sclera: Conjunctivae normal.      Pupils: Pupils are equal, round, and reactive to light.   Cardiovascular:      Rate and Rhythm: Normal rate and regular rhythm.      Pulses: Normal pulses.      Heart sounds: Murmur heard.      Comments: Tender to palpation along sternal borders - costochondral junctions      Pulmonary:      Effort: Pulmonary effort is normal.      Breath sounds: Normal breath sounds.   Abdominal:      Palpations: Abdomen is soft. There is no mass.      Tenderness: There is no abdominal tenderness. There is no guarding or rebound.   Musculoskeletal:         General: No deformity. Normal range of motion.      Cervical back: Normal range of motion and neck supple.   Lymphadenopathy:      Cervical: No cervical adenopathy.   Skin:     General: Skin is warm and dry.   Neurological:      General: No focal deficit present.      Mental Status: She is alert.   Psychiatric:         Mood and Affect: Mood normal.         Behavior: Behavior normal.            No results found for any visits on 11/28/23.          Prior to immunization administration, verified patients identity using patient s name and date of birth. Please see Immunization Activity for additional information.     Screening Questionnaire for Adult Immunization    Are you sick today?   Don't Know   Do you have allergies to medications, food, a vaccine component or latex?   No   Have you ever had a serious reaction after receiving a vaccination?   Yes   Do you have a long-term health problem with heart, lung, kidney, or metabolic disease (e.g., diabetes), asthma, a blood disorder, no spleen, complement  component deficiency, a cochlear implant, or a spinal fluid leak?  Are you on long-term aspirin therapy?   Yes   Do you have cancer, leukemia, HIV/AIDS, or any other immune system problem?   No   Do you have a parent, brother, or sister with an immune system problem?   No   In the past 3 months, have you taken medications that affect  your immune system, such as prednisone, other steroids, or anticancer drugs; drugs for the treatment of rheumatoid arthritis, Crohn s disease, or psoriasis; or have you had radiation treatments?   Yes   Have you had a seizure, or a brain or other nervous system problem?   No   During the past year, have you received a transfusion of blood or blood    products, or been given immune (gamma) globulin or antiviral drug?   No   For women: Are you pregnant or is there a chance you could become       pregnant during the next month?   No   Have you received any vaccinations in the past 4 weeks?   No     Immunization questionnaire was positive for at least one answer.  Notified Dr. Walters submitted by the patient for this visit:  Patient Health Questionnaire (Submitted on 11/28/2023)  If you checked off any problems, how difficult have these problems made it for you to do your work, take care of things at home, or get along with other people?: Somewhat difficult  PHQ9 TOTAL SCORE: 13  .      Patient instructed to remain in clinic for 15 minutes afterwards, and to report any adverse reactions.     Screening performed by Jane Cheung CMA on 11/28/2023 at 4:05 PM.

## 2023-11-28 NOTE — COMMUNITY RESOURCES LIST (ENGLISH)
11/28/2023   [facility]  N/A  For questions about this resource list or additional care needs, please contact your primary care clinic or care manager.  Phone: [phone]   Email: N/A   Address: [address] [place] [id number]   Hours: N/A        Food and Nutrition       Food pantry  1  [facility] Food Market Distance: 0.85 miles      Pickup   [address] [place] [address]  Language: English, English, Hmong, Kelly, Libyan  Hours: Mon 1:00 PM - 4:00 PM , Mon 5:00 PM - 6:30 PM , Tue - Fri 9:00 AM - 11:30 AM , Tue - Fri 1:00 PM - 3:30 PM  Fees: Free   Phone: (182) 107-7473 Website: [email or web address]://[email or web address]/     2  [facility] [place] Distance: 2.03 miles      Pickup   [address] [place] [id number]  Language: English, Hmong, Belarusian, Libyan  Hours: Mon 11:00 AM - 3:00 PM , Wed 11:00 AM - 3:00 PM , Fri 11:00 AM - 3:00 PM  Fees: Free, Self Pay   Phone: (330) 769-2901 Email: [email or web address] Website: [email or web address]/[address]/     SNAP application assistance  3  [facility] Outreach Distance: 0.85 miles      Phone/Virtual   [address] [place] [address]  Language: English, English, Hmong, Kelly, Libyan  Hours: Mon - Fri 10:00 AM - 12:00 PM , Mon - Fri 2:00 PM - 4:00 PM  Fees: Free   Phone: (323) 347-5339 Website: [email or web address]     4  [facility] [place] (SNAP) Distance: 2.45 miles      Phone/Virtual   [address] [address] [place] [address]  Language: English, Hmong, Gabonese, Lithuanian, Libyan, Citizen of [place]  Hours: Mon - Fri 9:00 AM - 5:00 PM  Fees: Free   Phone: (829) 830-4867 Website: https://[place].gov/dhs/people-we-serve/adults/economic-assistance/food-nutrition/programs-and-services/supplemental-nutrition-assistance-program.jsp     Soup kitchen or free meals  [address]  [facility] [employer] Distance: 0.86 miles      Pickup   [address] [place] [address]  Language: English  Hours: Mon - Fri 5:00 PM - 6:00 PM  Fees: Free   Phone: (934) 623-3384 Email: britt@[email or web address] Hospital[facility] [employer] Website: http://[email or web address]/     6  [facility] Distance: 2.66 miles      Pickup   [address] [place] [address]  Language: English  Hours: Tue 2:00 PM - 4:00 PM , Thu 2:00 PM - 4:00 PM  Fees: Free   Phone: (486) 614-7106 Email: [email or web address]. Website: https://www.\[facility]\"".[facility]/facilities/jvxqgt-tnsakufhv-bdywuh          Important Numbers & Websites       Emergency Services   911  [facility]   311  Poison Control   (205) 785-3149  Suicide Prevention Lifeline   (403) 602-7493 (TALK)  Child Abuse Hotline   (600) 843-5416 (4-A-Child)  Sexual Assault Hotline   (358) 668-3019 (HOPE)  National Runaway Safeline   (938) 655-5432 (RUNAWAY)  All-Options Talkline   (920) 706-3350  Substance Abuse Referral   (389) 569-6653 (HELP)

## 2023-11-28 NOTE — PATIENT INSTRUCTIONS
OK to use the Albuterol inhaler as needed.  Keep it around if you get short of breath.  I think some of your pain is musculoskeletal - where the ribs meet the breastbone is inflamed (costochondritis).  OK to use a heating pad - 10-15 minutes per hour as needed.      Continue all your other medications.    If symptoms are getting worse, you should be seen again.

## 2023-12-03 ASSESSMENT — ENCOUNTER SYMPTOMS
COUGH: 0
CHILLS: 0
ARTHRALGIAS: 0
CONSTIPATION: 0
PALPITATIONS: 0
DYSURIA: 0
PARESTHESIAS: 0
FEVER: 0
ALLERGIC/IMMUNOLOGIC NEGATIVE: 1
PSYCHIATRIC NEGATIVE: 1
MYALGIAS: 0
WEAKNESS: 0
DIZZINESS: 0
FREQUENCY: 0
HEADACHES: 0
SORE THROAT: 0
SHORTNESS OF BREATH: 0
ABDOMINAL PAIN: 0
DIARRHEA: 0
EYE PAIN: 0
BRUISES/BLEEDS EASILY: 0

## 2023-12-15 ENCOUNTER — OFFICE VISIT (OUTPATIENT)
Dept: OPHTHALMOLOGY | Facility: CLINIC | Age: 68
End: 2023-12-15
Attending: STUDENT IN AN ORGANIZED HEALTH CARE EDUCATION/TRAINING PROGRAM
Payer: COMMERCIAL

## 2023-12-15 DIAGNOSIS — H40.003 GLAUCOMA SUSPECT OF BOTH EYES: ICD-10-CM

## 2023-12-15 DIAGNOSIS — Z98.890 POSTOPERATIVE EYE STATE: Primary | ICD-10-CM

## 2023-12-15 DIAGNOSIS — H35.372 EPIRETINAL MEMBRANE (ERM) OF LEFT EYE: ICD-10-CM

## 2023-12-15 DIAGNOSIS — Z96.1 PSEUDOPHAKIA, LEFT EYE: ICD-10-CM

## 2023-12-15 PROCEDURE — 99207 OCT RETINA SPECTRALIS OU (BOTH EYE): CPT | Mod: 26 | Performed by: STUDENT IN AN ORGANIZED HEALTH CARE EDUCATION/TRAINING PROGRAM

## 2023-12-15 PROCEDURE — 92133 CPTRZD OPH DX IMG PST SGM ON: CPT | Performed by: STUDENT IN AN ORGANIZED HEALTH CARE EDUCATION/TRAINING PROGRAM

## 2023-12-15 PROCEDURE — G0463 HOSPITAL OUTPT CLINIC VISIT: HCPCS | Performed by: STUDENT IN AN ORGANIZED HEALTH CARE EDUCATION/TRAINING PROGRAM

## 2023-12-15 PROCEDURE — 99024 POSTOP FOLLOW-UP VISIT: CPT | Performed by: STUDENT IN AN ORGANIZED HEALTH CARE EDUCATION/TRAINING PROGRAM

## 2023-12-15 PROCEDURE — 92134 CPTRZ OPH DX IMG PST SGM RTA: CPT | Performed by: STUDENT IN AN ORGANIZED HEALTH CARE EDUCATION/TRAINING PROGRAM

## 2023-12-15 PROCEDURE — 92083 EXTENDED VISUAL FIELD XM: CPT | Performed by: STUDENT IN AN ORGANIZED HEALTH CARE EDUCATION/TRAINING PROGRAM

## 2023-12-15 ASSESSMENT — REFRACTION_WEARINGRX
OD_AXIS: 147
OS_SPHERE: -1.75
OS_CYLINDER: +2.75
OD_ADD: +2.50
OD_CYLINDER: +2.50
OS_ADD: +2.50
OD_SPHERE: -2.50
SPECS_TYPE: PAL
OS_AXIS: 010

## 2023-12-15 ASSESSMENT — CONF VISUAL FIELD
OS_SUPERIOR_NASAL_RESTRICTION: 0
OS_NORMAL: 1
OD_SUPERIOR_NASAL_RESTRICTION: 0
OD_NORMAL: 1
OD_INFERIOR_TEMPORAL_RESTRICTION: 0
OD_INFERIOR_NASAL_RESTRICTION: 0
OS_INFERIOR_NASAL_RESTRICTION: 0
OS_INFERIOR_TEMPORAL_RESTRICTION: 0
OS_SUPERIOR_TEMPORAL_RESTRICTION: 0
METHOD: COUNTING FINGERS
OD_SUPERIOR_TEMPORAL_RESTRICTION: 0

## 2023-12-15 ASSESSMENT — SLIT LAMP EXAM - LIDS
COMMENTS: NORMAL
COMMENTS: NORMAL

## 2023-12-15 ASSESSMENT — REFRACTION_MANIFEST
OS_CYLINDER: +2.00
OS_ADD: +2.50
OD_CYLINDER: +2.25
OS_AXIS: 022
OD_ADD: +2.50
OD_SPHERE: -2.00
OD_AXIS: 155
OS_SPHERE: -1.00

## 2023-12-15 ASSESSMENT — EXTERNAL EXAM - RIGHT EYE: OD_EXAM: NORMAL

## 2023-12-15 ASSESSMENT — VISUAL ACUITY
OD_CC: 20/25
CORRECTION_TYPE: GLASSES
OS_CC: 20/150
OD_CC+: -2
METHOD: SNELLEN - LINEAR

## 2023-12-15 ASSESSMENT — TONOMETRY
OS_IOP_MMHG: 19
IOP_METHOD: TONOPEN
OD_IOP_MMHG: 20

## 2023-12-15 ASSESSMENT — CUP TO DISC RATIO: OS_RATIO: ~0.45

## 2023-12-15 ASSESSMENT — EXTERNAL EXAM - LEFT EYE: OS_EXAM: NORMAL

## 2023-12-15 NOTE — PROGRESS NOTES
HPI       Post Op (Ophthalmology) Left Eye    In left eye.  This started 1 month ago.  Associated symptoms include glare (only in bright lit settings).  Negative for double vision and eye pain.  Treatments tried include eye drops.  Pain was noted as 0/10.             Comments    Pt reports very slight improvement in left eye vision, still appears consistently blurry. Denies pain/new floaters/flashes.   Is still occasionally taking the eye drops cause wasn't sure if was supposed ot continue or not.. averages, once or twice weekly. Discontinued doing daily after 1 mo per pt.     Nadine Jensen OA, December 15, 2023            Last edited by Nadine Stokes on 12/15/2023  1:10 PM.          No new flashes, floaters, or curtain down visual field.    Review of systems for the eyes was negative other than the pertinent positives/negatives listed in the HPI.    Ocular Meds: ATs prn OU    Ocular Hx: PPV/MS OS 5/25/23 with Dr Motley; herpes zoster V1 distribution without ocular involvement, left sided; CE/IOL left eye (10/26/2023)    FOHx: no family history of glaucoma or blindness    PMHx:   Past Medical History:   Diagnosis Date    Adrenal mass (H24) 3/14/2022    Anxiety     Depression     Depressive disorder, not elsewhere classified     Diaphragmatic hernia without mention of obstruction or gangrene     Disease of thyroid gland     Dysthymic disorder     [facility]    Esophageal reflux     Hyperlipidemia     Mixed hyperlipidemia     Hyperlipidemia    Obstructive sleep apnea (adult) (pediatric)     uses CPAP    Plantar fascial fibromatosis     Temporomandibular joint disorders, unspecified     Temporomandibular joint disorders, unspecified     Tietze's disease     costochondritis    Unspecified hypothyroidism        Assessment & Plan      Grace Chinchilla is a 68 year old female with the following diagnoses:    Postoperative eye state  Pseudophakia OS  - Doing well, IOP wnl, diana  negative; vision may be limited due to retina pathology  - discontinue all drops  - Post-operative do's and don'ts reviewed, questions answered  - patient has emergency contact information  - Counseled endophthalmitis/RD/return precautions    ?Optic neuropathy left eye  - noted to have thinning on OCT RNFL during visit with Dr Motley and today, and ?trace RAPD prior visit; dilated today prior to MD assessment so unable to assess today; color plates decreased left eye  - visual field with central/paracentral defect along with nasal defect; may be glaucomatous and also due to patient's prior PPV/membrane stripping, however will refer to neuro-ophthalmology; appreciate their expertise    Nuclear sclerotic cataract OD  - not visually significant  - observe    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 Retina as scheduled; or sooner changes    Attending Physician Attestation:  Complete documentation of historical and exam elements from today's encounter can be found in the full encounter summary report (not reduplicated in this progress note).  I personally obtained the chief complaint(s) and history of present illness.  I confirmed and edited as necessary the review of systems, past medical/surgical history, family history, social history, and examination findings as documented by others; and I examined the patient myself.  I personally reviewed the relevant tests, images, and reports as documented above.  I formulated and edited as necessary the assessment and plan and discussed the findings and  management plan with the patient and family. . - Rose Amin MD

## 2023-12-15 NOTE — NURSING NOTE
Chief Complaints and History of Present Illnesses   Patient presents with    Post Op (Ophthalmology) Left Eye     Chief Complaint(s) and History of Present Illness(es)       Post Op (Ophthalmology) Left Eye              Laterality: left eye    Onset: 1 month ago    Associated symptoms: glare (only in bright lit settings).  Negative for double vision and eye pain    Treatments tried: eye drops    Pain scale: 0/10              Comments    Pt reports very slight improvement in left eye vision, still appears consistently blurry. Denies pain/new floaters/flashes.   Is still occasionally taking the eye drops cause wasn't sure if was supposed ot continue or not.. averages, once or twice weekly. Discontinued doing daily after 1 mo per pt.     Nakul Jensen OA, December 15, 2023

## 2024-01-14 NOTE — PROGRESS NOTES
Medication Therapy Management (MTM) Encounter    ASSESSMENT:                            Medication Adherence/Access: forgets to fill med pill boxes --needs calendar reminder weekly.     Obesity/Pre-Diabetes:  Stay on max. Tolerated dose of Metformin -500mg. 2 x day + lifestyle plan --see plan for details --she is off to a very good start but has plateaued and mtm feels she should join [employer] support program + [employer] for daily exercise. .     Hypertension:   Patient is  meeting blood pressure goal of < 140/90mmHg. Patient would benefit from the following - continued blood pressure monitoring, watching diet, increasing exercise and weight loss, and limiting/reducing sodium.      Hyperlipidemia:   Stable.  Patient is on high intensity statin which is indicated based on 2019 ACC/AHA guidelines for lipid management.      Hypothyroidism:   Stable :Pt would benefit from rechecking thyroid labs in 2024.        PLAN:                              1. Continue all same medications as is-but please write down on your calendar a weekly reminder to fill your pill boxes and take all your medications; please come and see me for in clinic lab rechecks and discussion on wt.loss options --I think you should walk daily + re-join- [employer]  for motivation to lose pounds sensibly with lifestyle changes.     Please join [employer] on [place] for exercise plan.           Follow-up: Return in about 10 weeks (around 3/25/2024), or @ 1 PM in clinic visit ., for Medication Therapy Management Visit, Lab Work, Weight loss.    SUBJECTIVE/OBJECTIVE:                          Grace Chinchilla is a 68 year old female contacted via  Telephone for a follow-up (11-7-23) visit. She was referred to me from Laila Sanches  .    Patient accompanied by her sister Apple. Apple and another sister take care of Grace and her  finances etc.       Reason for visit:   Meds/labs /wt.loss discussion.       Allergies/ADRs: Reviewed in chart; bupropion caused  sickness?  Past Medical History: Reviewed in chart  Tobacco: She reports that she has never smoked. She has never used smokeless tobacco.  Alcohol: not currently using  Other Substance Use: none  E-cigarette Use: none  Caffeine: not much   Social: retired   Personal Healthcare Goals:  lose weight in a healthy way and keep it off/ reverse pre-diabetes.   Activity: sedentary  Medication Adherence/Access: reports missed 2 days of her losartan 1 week ago. Has pill boxes-- but still forgets to fill meds weekly --has calendar as a reminder.     Hypertension     Losartan 25mg./day   Patient reports no current medication side effects.  Patient does not self-monitor blood pressure.         BP Readings from Last 3 Encounters:   11/28/23 130/76   11/21/23 (!) 147/61   11/18/23 (!) 160/60     Pulse Readings from Last 3 Encounters:   11/28/23 86   11/21/23 82   11/18/23 81     Hyperlipidemia     Atorvastatin 80mg daily  Patient reports no significant myalgias or other side effects.  The 10-year ASCVD risk score (Isiah HOLLOWAY, et al., 2019) is: 11.7%    Values used to calculate the score:      Age: 68 years      Sex: Female      Is Non- : No      Diabetic: No      Tobacco smoker: No      Systolic Blood Pressure: 130 mmHg      Is BP treated: Yes      HDL Cholesterol: 50 mg/dL      Total Cholesterol: 187 mg/dL       Recent Labs   Lab Test 08/03/23  1106 03/14/22  1049   CHOL 187 183   HDL 50 44*    114   TRIG 187* 125       Hypothyroidism     Levothyroxine 125 mcg daily. (Admits she takes her iron pill right with her thyroid pill)  Patient is having the following symptoms: hypothyroidism -  weight gain.        TSH   Date Value Ref Range Status   08/03/2023 0.49 0.30 - 4.20 uIU/mL Final   03/14/2022 3.25 0.30 - 5.00 uIU/mL Final   04/02/2007 5.88 (H) 0.4 - 5.0 mU/L Final         Class II Obesity (BMI 35 to 39.9)/pre-diabetes:   Ozempic stopped last fall.   Metformin - tried it -- then went to ozempic. We  restarted Met 500mg er tabs 10-10-23 --had diarrhea SE at 3/day --now taking - 1 tab 2 x day --tolerates well.     Patient reports no current medication side effects.    Eats 2 x day now on my LC+IF lifestyle plan -- eating more salads , walking more now.    She has used [facility] in the past to lose wt. -her sisters feel she only goes there for social aspect of the program .   Cost is 4$/month .     Sister jo ann has concerns that Grace wont stick to the lifestyle plan .           Previously :   Nutrition/Eating Habits: saw Dr. Steve special diet + weekly ozempic --got down to 180lbs.   --then cost $>200/month too much stopped it and weight came back.  Has 3 sisters --2 of hem very active in her life.   Has been on [facility] 4 different in her life , sisters - are in charge of her money POA over her .  Her  works full time - cannot control finances , she gets 15$ and [employer] 25 $ /month for fun stuff , sisters take her shopping weekly. They use HSA card to pay  for her rx drugs.     Current eating habits :  Bfast - cereal most days --quick easy /she admits lazy, or will have oatmeal or pbutter /jelly sandwich , or yogurt with jam or sugar , water   Mid am snack --no  Lunch : skips or bowel of cottage cheese  Mid afternoon snack : apple and a banana -psychological thought she would get hungry   Dinner; minnestrone soup 1-2 bowls, or a salad.   Late night snacks : 10-11 pm hubby snacks--she might have a sandwich pbandj or a yogurt , eats out of habit or other reasons.     Exercise/Activity:   has membership thru [employer] - fitness club (not going)- or go to [employer] -swimming --but no swimsuit.   Does see psychiatrist.   Medication History:  Ozempic: too $$ to continue.  Wt Readings from Last 4 Encounters:   11/28/23 191 lb (86.6 kg)   11/20/23 183 lb (83 kg)   11/07/23 192 lb 11.2 oz (87.4 kg)   10/26/23 193 lb (87.5 kg)     Estimated body mass index is 35.15 kg/m  as calculated from the following:    Height as of 11/28/23:  "5' 1.81\" (1.57 m).    Weight as of 11/28/23: 191 lb (86.6 kg).    Lab Results   Component Value Date    A1C 6.2 08/03/2023    A1C 5.3 09/16/2022    A1C 6.1 03/14/2022    A1C 6.0 09/17/2019    A1C 5.6 03/13/2019           ----------------------------------------------------------------------------------------------------------    I spent 30 minutes with this patient today. All changes were made via collaborative practice agreement with ZACH Melara CNP. A copy of the visit note was provided to the patient's provider(s).    A summary of these recommendations was mailed to the patient.    Jarett Sen Rph.  Medication Therapy Management Provider  [phone]     Medication Therapy Recommendations  No medication therapy recommendations to display       "

## 2024-01-15 ENCOUNTER — VIRTUAL VISIT (OUTPATIENT)
Dept: PHARMACY | Facility: CLINIC | Age: 69
End: 2024-01-15
Payer: COMMERCIAL

## 2024-01-15 DIAGNOSIS — E66.812 CLASS 2 SEVERE OBESITY DUE TO EXCESS CALORIES WITH SERIOUS COMORBIDITY AND BODY MASS INDEX (BMI) OF 35.0 TO 35.9 IN ADULT (H): ICD-10-CM

## 2024-01-15 DIAGNOSIS — E66.01 CLASS 2 SEVERE OBESITY DUE TO EXCESS CALORIES WITH SERIOUS COMORBIDITY AND BODY MASS INDEX (BMI) OF 35.0 TO 35.9 IN ADULT (H): ICD-10-CM

## 2024-01-15 DIAGNOSIS — R73.03 PRE-DIABETES: Primary | ICD-10-CM

## 2024-01-15 DIAGNOSIS — E03.9 HYPOTHYROIDISM, UNSPECIFIED TYPE: ICD-10-CM

## 2024-01-15 DIAGNOSIS — I10 ESSENTIAL HYPERTENSION: ICD-10-CM

## 2024-01-15 PROCEDURE — 99605 MTMS BY PHARM NP 15 MIN: CPT | Mod: 93 | Performed by: PHARMACIST

## 2024-01-15 NOTE — Clinical Note
Laila-- spoke with jessi today -- recommended she join [facility] for wt.loss and [employer] for exercise , then see me end of march for f/up labs etc.  Gus

## 2024-01-15 NOTE — PATIENT INSTRUCTIONS
"Recommendations from today's MTM visit:                                                         1. Continue all same medications as is-but please write down on your calendar a weekly reminder to fill your pill boxes and take all your medications; please come and see me for in clinic lab rechecks and discussion on wt.loss options --I think you should walk daily + re-join- [facility]  for motivation to lose pounds sensibly with lifestyle changes.     Please join "[employer] on [employer] for exercise plan.           Follow-up: Return in about 10 weeks (around 3/25/2024), or @ 1 PM in clinic visit ., for Medication Therapy Management Visit, Lab Work, Weight loss.    It was great speaking with you today.  I value your experience and would be very thankful for your time in providing feedback in our clinic survey. In the next few days, you may receive an email or text message from [employer] with a link to a survey related to your  clinical pharmacist.\"     To schedule another MTM appointment, please call the clinic directly or you may call the MTM scheduling line at [phone] or toll-free at [phone].     My Clinical Pharmacist's contact information:                                                      Please feel free to contact me with any questions or concerns you have.      Jarett Sen Rph.  Medication Therapy Management Provider  [phone]    "

## 2024-01-15 NOTE — LETTER
"Recommended To-Do List      Prepared on: 01/15/2024       You can get the best results from your medications by completing the items on this \"To-Do List.\"      Bring your To-Do List when you go to your doctor. And, share it with your family or caregivers.    My To-Do List:  What we talked about: What I should do:    What my medicines are for, how to know if my medicines are working, made sure my medicines are safe for me and reviewed how to take my medicines.      Take my medicines every day                "

## 2024-01-15 NOTE — LETTER
_  Medication List        Prepared on: 01/15/2024     Bring your Medication List when you go to the doctor, hospital, or   emergency room. And, share it with your family or caregivers.     Note any changes to how you take your medications.  Cross out medications when you no longer use them.    Medication How I take it Why I use it Prescriber   albuterol (PROAIR HFA/PROVENTIL HFA/VENTOLIN HFA) 108 (90 Base) MCG/ACT inhaler Inhale 1-2 puffs into the lungs every 6 hours for 5 days Chest Pain on Breathing Smita Gamboa PA-C   amphetamine-dextroamphetamine (ADDERALL XR) 30 MG 24 hr capsule Take 30 mg by mouth every morning  ADD Patient Reported   atorvastatin (LIPITOR) 80 MG tablet Take 1 tablet (80 mg) by mouth daily Mixed Hyperlipidemia ZACH Haas CNP   Calcium-Magnesium-Zinc 333-133-5 MG TABS per tablet Take 1 tablet by mouth daily  General health  Patient Reported   cholecalciferol 25 MCG (1000 UT) TABS Take 1,000 Units by mouth daily  General health  Patient Reported   clonazePAM (KLONOPIN) 0.5 MG tablet Take 0.5 mg by mouth 2 times daily as needed for anxiety  anxiety Patient Reported   Cyanocobalamin (VITAMIN B-12) 500 MCG LOZG Take 1,500 mcg by mouth daily  General health  Patient Reported   diclofenac (VOLTAREN) 1 % topical gel Apply 4 g topically as needed for moderate pain  Pain relief  Patient Reported   divalproex sodium extended-release (DEPAKOTE ER) 250 MG 24 hr tablet Take 250 mg by mouth daily Taken with Depakote 500 x 2 for a total of 1250mg daily  Bipolar  Patient Reported   divalproex sodium extended-release (DEPAKOTE ER) 500 MG 24 hr tablet Take 1,000 mg by mouth daily In addition to Depakote 250mg  Bipolar Patient Reported   DULoxetine (CYMBALTA) 30 MG capsule Take 3 capsules by mouth daily  Depression Patient Reported   ferrous sulfate (FEROSUL) 325 (65 Fe) MG tablet Take 325 mg by mouth daily (with dinner)  anemia Patient Reported   Flaxseed, Linseed, (FLAX SEEDS PO) Take 1 capsule by  mouth daily (with dinner)  General Health  Patient Reported   ketorolac (ACULAR) 0.5 % ophthalmic solution Place 1 drop Into the left eye daily  Allergies Patient Reported   levothyroxine (SYNTHROID/LEVOTHROID) 125 MCG tablet Take 1 tablet (125 mcg) by mouth daily Hypothyroidism, unspecified type ZACH Haas CNP   losartan (COZAAR) 25 MG tablet Take 1 tablet (25 mg) by mouth daily Benign Essential Hypertension ZACH Haas CNP   metFORMIN (GLUCOPHAGE XR) 500 MG 24 hr tablet 1 tab after bfast and dinner daily . Pre-Diabetes ZACH Haas CNP   multivitamin w/minerals (THERA-VIT-M) tablet Take 2 tablets by mouth daily  General health  Patient Reported   Nutritional Supplements (GLUCOSAMINE COMPLEX PO) Take 2 packets by mouth Every afternoon  Pain relief  Patient Reported   prednisoLONE acetate (PRED FORTE) 1 % ophthalmic suspension Place 1 drop Into the left eye daily  Inflammation Patient Reported         Add new medications, over-the-counter drugs, herbals, vitamins, or  minerals in the blank rows below.    Medication How I take it Why I use it Prescriber                                      Allergies:      bupropion        Side effects I have had:               Other Information:              My notes and questions:

## 2024-01-15 NOTE — LETTER
January 15, 2024  Grace Chinchilla  [address] [place] [address]    Dear Ms. Chinchilla, [facility]     Thank you for talking with me on José Miguel 15, 2024 about your health and medications. As a follow-up to our conversation, I have included two documents:      Your Recommended To-Do List has steps you should take to get the best results from your medications.  Your Medication List will help you keep track of your medications and how to take them.    If you want to talk about these documents, please call Jarett Sen RPH at phone: [phone], Monday-Friday 8-4:30pm.    I look forward to working with you and your doctors to make sure your medications work well for you.    Sincerely,  Jarett Sen RPH  [facility] Pharmacist, [facility]

## 2024-01-16 ENCOUNTER — NURSE TRIAGE (OUTPATIENT)
Dept: FAMILY MEDICINE | Facility: CLINIC | Age: 69
End: 2024-01-16
Payer: COMMERCIAL

## 2024-01-16 NOTE — TELEPHONE ENCOUNTER
Nurse Triage SBAR    Is this a 2nd Level Triage? YES, LICENSED PRACTITIONER REVIEW IS REQUIRED    Situation: New onset jaw pain    Background/Assessment:   Jaw pain, started yesterday afternoon  Right side only, rates pain 8/10  Took 2 tylenol and sx subsided, but pain has returned  Patient feels this may be TMJ related  Has a nightguard but admits she does not wear it regularly  Denies any visible swelling, mild tenderness to touch    Protocol Recommended Disposition:   Go To ED/UCC Now (Or To Office With PCP Approval), Home Care    Recommendation: Gave patient recommended disposition of ED/UCC but patient would prefer a clinic visit this week. RN encouraged pt to present to UC given possibility of referred/atypical pain. Routing for advisement on disposition.    Routed to provider    MARGOTH Mondragon, RN  [facility]      Reason for Disposition   Mouth pain and present < 3 days   New-onset jaw pain of unknown cause AND at least one cardiac risk factor (e.g., 45 years or older, diabetes, high cholesterol, hypertension, obesity, smoker or strong family history of heart disease)    Additional Information   Negative: SEVERE difficulty breathing (e.g., struggling for each breath, speaks in single words, stridor)   Negative: Sounds like a life-threatening emergency to the triager   Negative: Followed a tooth (or teeth) injury   Negative: Followed a mouth injury   Negative: Throat is painful   Negative: Cold sore suspected (i.e., fever blister sore on the outer lip)   Negative: Tooth is painful or swelling around a tooth   Negative: Drooling or spitting out saliva (because can't swallow) and new-onset   Negative: Chemical or electrical burn of mouth   Negative: Tongue (or mouth floor under the tongue) is very swollen and tender   Negative: Gums are red, painful and have many ulcers   Negative: Mouth ulcer and fever   Negative: Large lymph node (> 1 inch or 2.5 cm) under the jaw   Negative:  "Receiving chemotherapy or radiation therapy   Negative: SEVERE mouth pain (e.g., excruciating) and not improved after 2 hours of pain medicine   Negative: Bloody crusts on lips or sores in mouth and rash anywhere else on body (back, chest, face, palms, soles)   Negative: Face is very swollen   Negative: Large blisters in mouth (i.e., fluid filled bubbles or sacs)   Negative: Face is swollen and no fever   Negative: Dental device causes mouth pain (e.g., braces, dentures, retainer)   Negative: Difficulty breathing or unusual sweating (e.g., sweating without exertion)   Negative: Can't close the mouth fully (i.e., \"mouth is locked open\", patient will have difficulty talking)   Negative: Fever and localized red rash   Negative: Fever and area of face is swollen   Negative: Shock suspected (e.g., cold/pale/clammy skin, too weak to stand, low BP, rapid pulse)   Negative: Similar pain previously and it was from 'heart attack'   Negative: Similar pain previously and it was from 'angina' and not relieved by nitroglycerin   Negative: Sounds like a life-threatening emergency to the triager    Protocols used: Mouth Pain-A-OH, Face Pain-A-OH    "

## 2024-01-16 NOTE — TELEPHONE ENCOUNTER
Left message for patient to call back and ask to speak with an available triage nurse.    VAN MondragonN, RN  [facility]

## 2024-01-16 NOTE — TELEPHONE ENCOUNTER
Patient called back and as given recommendation for Urgent Care today.  States she will see if her daughter can give her a ride to urgent care.  Patient says pain now is like a 4/10, down from what it was when she first called.  She did take 2 more tylenol since first call.  Kalina Velasco RN  [facility]

## 2024-01-29 NOTE — PROGRESS NOTES
"     Grace Chinchilla is a 68 year old female with the following diagnoses:   1. Optic atrophy    2. Visual field defect         Patient was sent for consultation by Dr. Amin for left optic neuropathy.    HPI: 68-year-old female with hx of pre-diabetes, pseudophakia left eye (Brennan, 10/26/23), hx of ERM peel (5/25/23, PPV/MS, Tiesha). Patient underwent cataract surgery with Dr. Amin in the left eye on 10/26/23. Pre-op, patient was 20/300, and at 2 month follow-up BCVA was 20/70. On OCT mac, she was noted to have distorted foveal contour, and paracentral defect on visual field testing. On 10/6/23 visit with Dr. Motley, pupil exam was notably \"trace?\" APD in the left eye. Patient was dilated prior to MD visit on 12/15/23, but there was suspicion for left optic neuropathy based on thinning of nerve noted on prior OCT RNFL testing, 2/11 color plate testing, and poor visual acuity that may or may not be entirely explained by ERM peel.     Per notes, patient had thin inner retina noted pre-op 2/2023 (prior to ERM peel).     Today, patient reports that vision has been largely stable. She reports having diplopia after a past eye surgery, but has not had diplopia for about 6 months. No eye pain. Intermittent posterior headaches a couple times a year that improve spontaneously. She reports that her diet has been poor for many years until recently, and ate a lot of processed or canned foods (not much fresh fruit or vegetables). She includes meat in her diet. No known toxic exposures in the past. Never smoked cigarettes. She almost never drinks alcohol.         History of sleep apnea, hypothyroidism on synthroid, prediabetes, iron deficiency, HTN on medication, hypercholesterolemia. No history of strokes, blood clots, or cancer.      Independent historians:  Patient, sister.     Review of outside testing:  No recent head/brain imaging to review    Vitamin B12 (8/3/23): 1200 (WNL)      Review of outside " clinical notes:    12/15/23 -- Visit with Dr. Brennan Edwards YIMI Chinchilla is a 68 year old female with the following diagnoses:     Postoperative eye state  Pseudophakia OS  - Doing well, IOP wnl, diana negative; vision may be limited due to retina pathology  - discontinue all drops  - Post-operative do's and don'ts reviewed, questions answered  - patient has emergency contact information  - Counseled endophthalmitis/RD/return precautions     ?Optic neuropathy left eye  - noted to have thinning on OCT RNFL during visit with Dr Motley and today, and ?trace RAPD prior visit; dilated today prior to MD assessment so unable to assess today; color plates decreased left eye  - visual field with central/paracentral defect along with nasal defect; may be glaucomatous and also due to patient's prior PPV/membrane stripping, however will refer to neuro-ophthalmology; appreciate their expertise     Nuclear sclerotic cataract OD  - not visually significant  - observe     Hx of ERM left eye s/p PPV/MS 5/25/23  - tr ir cysts on oct macula today  - follows with Dr Motley  - toni precautions     PVD OD  - previously noted  - no new holes, tears, or detachment  - follows with retina  - counseled RD precautions     Myopia of both eyes with astigmatism and presbyopia  - refraction reviewed and dispensed today     Past medical history:    Patient Active Problem List   Diagnosis    Hypothyroidism    Mixed hyperlipidemia    Obstructive sleep apnea    Plantar fascial fibromatosis    Class 2 severe obesity due to excess calories with serious comorbidity and body mass index (BMI) of 35.0 to 35.9 in adult (H)    Major depression, recurrent (H24)    Anemia    Carpal tunnel syndrome    Conversion disorder    Mild intellectual disabilities    Nonrheumatic aortic (valve) stenosis with insufficiency    Other bipolar disorder (H)    Primary localized osteoarthrosis, lower leg    Urge incontinence of urine    Pre-diabetes    Vitamin D  deficiency    Elevated BP without diagnosis of hypertension    Physical deconditioning    Pain in both knees    Epiretinal membrane (ERM) of left eye    Nuclear sclerotic cataract of both eyes    Chest pain, unspecified type         Medications:   amphetamine-dextroamphetamine  atorvastatin  Calcium-Magnesium-Zinc Tabs  cholecalciferol Tabs  clonazePAM  diclofenac  divalproex sodium extended-release  DULoxetine  ferrous sulfate  FLAX SEEDS PO  GLUCOSAMINE COMPLEX PO  ketorolac  levothyroxine  losartan  metFORMIN  multivitamin w/minerals  prednisoLONE acetate  Vitamin B-12 Lozg      Family history / social history:  Patient's family history includes Alcoholism in her brother; Breast Cancer in her cousin; Breast Cancer (age of onset: 65) in her maternal aunt; Breast Cancer (age of onset: 85) in her maternal grandmother; C.A.D. in her mother; Cancer in her father, niece, and paternal grandmother; Colon Cancer in her maternal grandfather; Diabetes in her sister; Emphysema in her brother; Heart Disease in her mother; Heart Murmur in her brother; Leukemia in her father; Liver Cancer in her paternal aunt; Mental retardation in her brother.     Patient  reports that she has never smoked. She has never used smokeless tobacco. She reports that she does not drink alcohol and does not use drugs.       Exam:  Visual acuity 20/20 right eye 20/70 left eye.  Color vision 11/11 right eye and 1/11 left eye.  Pupil exam shows a left rAPD.  Intraocular pressure 19 right eye and 19 left eye.  Anterior segment exam unremarkable.  Fundus exam shows possible pseudophakic pallor left eye.  Strabismus exam  unremarkable.    Tests ordered and interpreted today:      OCT RNFL today 1/30/2024:  - Right eye: mean thickness of 85. Findings: Borderline area superiorly, otherwise WNL.   - Left eye: mean thickness of 69. Findings: Superior and inferior thinning.     Visual Field G-top today 1/30/2024:  - Right eye: reliable, MD of -1.0 dB, findings:  couple nonspecific points of depression, otherwise normal.    - Left eye: reliable, MD of -8.3 dB, findings: Cecocentral nasal scotoma.         Discussion of management / interpretation with another provider:   None    Assessment/Plan:   It is my impression that patient has a left optic neuropathy.  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 This is complicated by the fact that she is status post ERM peel of the left eye 5/25/2023. RNFL testing shows atrophy on the left, and has a visual field cecocentral deficit. B12 level was normal in August 2023. Her optic discs do have cupping but are tilted, and visual fields are less consistent with glaucomatous optic neuropathy, IOP within normal limits. Overall, her right optic nerve function appears normal.  She is not quite sure if her vision was worse the day after her Epiretinal membrane peel, but she is pretty sure that it was within a week.  The retinal nerve fiber layer has worsened since then but has been stable recently.  I will obtain an MRI orbit to evaluate for compressive lesions.  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  Presumably the inner retina is injured which leads to vision loss and optic atrophy.              Attending Physician Attestation:  Complete documentation of historical and exam elements from today's encounter can be found in the full encounter summary report (not reduplicated in this progress note).  I personally obtained the chief complaint(s) and history of present illness.  I confirmed and edited as necessary the review of systems, past medical/surgical history, family history, social history, and examination findings as documented by others; and I examined the patient myself.  I personally reviewed the relevant tests, images, and reports as documented above.   I formulated and edited as necessary the assessment and plan and discussed the findings and management plan with the patient and family. I personally reviewed the ophthalmic test(s) associated with this encounter, agree with the interpretation(s) as documented by the resident/fellow, and have edited the corresponding report(s) as necessary.  - Freddy Cheung MD  Ophthalmology, PGY-4      Precharting:  Toño Madrid MD, PGY3  Ophthalmology Resident  [facility]

## 2024-01-30 ENCOUNTER — OFFICE VISIT (OUTPATIENT)
Dept: OPHTHALMOLOGY | Facility: CLINIC | Age: 69
End: 2024-01-30
Attending: OPTOMETRIST
Payer: COMMERCIAL

## 2024-01-30 DIAGNOSIS — H47.20 OPTIC ATROPHY: Primary | ICD-10-CM

## 2024-01-30 DIAGNOSIS — H53.40 VISUAL FIELD DEFECT: ICD-10-CM

## 2024-01-30 DIAGNOSIS — H53.40 VISUAL FIELD DEFECT: Primary | ICD-10-CM

## 2024-01-30 PROCEDURE — 99214 OFFICE O/P EST MOD 30 MIN: CPT | Mod: GC | Performed by: OPHTHALMOLOGY

## 2024-01-30 PROCEDURE — 92083 EXTENDED VISUAL FIELD XM: CPT | Performed by: OPHTHALMOLOGY

## 2024-01-30 PROCEDURE — G0463 HOSPITAL OUTPT CLINIC VISIT: HCPCS | Performed by: OPHTHALMOLOGY

## 2024-01-30 PROCEDURE — 92133 CPTRZD OPH DX IMG PST SGM ON: CPT | Performed by: OPHTHALMOLOGY

## 2024-01-30 RX ORDER — DIAZEPAM 10 MG
10 TABLET ORAL ONCE
Qty: 1 TABLET | Refills: 0 | Status: SHIPPED | OUTPATIENT
Start: 2024-01-30 | End: 2024-01-30

## 2024-01-30 ASSESSMENT — SLIT LAMP EXAM - LIDS
COMMENTS: NORMAL
COMMENTS: NORMAL

## 2024-01-30 ASSESSMENT — REFRACTION_WEARINGRX
OD_CYLINDER: +2.50
OD_AXIS: 147
OS_SPHERE: -1.75
OD_ADD: +2.50
OS_ADD: +2.50
OS_CYLINDER: +2.75
SPECS_TYPE: PAL
OD_SPHERE: -2.50
OS_AXIS: 010

## 2024-01-30 ASSESSMENT — CONF VISUAL FIELD
OS_SUPERIOR_TEMPORAL_RESTRICTION: 0
OS_INFERIOR_NASAL_RESTRICTION: 0
OD_SUPERIOR_TEMPORAL_RESTRICTION: 0
OD_SUPERIOR_NASAL_RESTRICTION: 0
OS_INFERIOR_TEMPORAL_RESTRICTION: 0
OD_INFERIOR_TEMPORAL_RESTRICTION: 0
OS_NORMAL: 1
OD_NORMAL: 1
OD_INFERIOR_NASAL_RESTRICTION: 0
METHOD: COUNTING FINGERS
OS_SUPERIOR_NASAL_RESTRICTION: 0

## 2024-01-30 ASSESSMENT — EXTERNAL EXAM - LEFT EYE: OS_EXAM: NORMAL

## 2024-01-30 ASSESSMENT — CUP TO DISC RATIO
OD_RATIO: 0.5
OS_RATIO: 0.5

## 2024-01-30 ASSESSMENT — VISUAL ACUITY
OD_CC: 20/20
OS_CC: 20/70
OD_CC+: -1
CORRECTION_TYPE: GLASSES
METHOD: SNELLEN - LINEAR

## 2024-01-30 ASSESSMENT — TONOMETRY
IOP_METHOD: ICARE
OS_IOP_MMHG: 19
OD_IOP_MMHG: 19

## 2024-01-30 ASSESSMENT — EXTERNAL EXAM - RIGHT EYE: OD_EXAM: NORMAL

## 2024-01-30 NOTE — NURSING NOTE
Chief Complaint(s) and History of Present Illness(es)       New Patient    In left eye.  Since onset it is stable.  Associated symptoms include eye pain and floaters.  Negative for double vision, headache and flashes.  Pain was noted as 0/10. Additional comments: Grace Chinchilla is a 68 year old female sent for consultation by Dr. Amin for optic neuropathy, left eye.  Grace reports she has noticed more dots/floaters in her vision.  Eyes get tired easily and sore.  She denies blurry and distorted vision.  No headaches or eye pain.  No double vision.   SHELLI Crum 1/30/2024 1:40 PM

## 2024-01-30 NOTE — LETTER
"2024         RE:  :  MRN: Grace Chinchilla  1955  [id number]     Dear Dr. Amin:     Thank you for asking me to see your very pleasant patient, Grace Chinchilla, in neuro-ophthalmic consultation.  I would like to thank you for sending your records and I have summarized them in the history of present illness.  My assessment and plan are below.  For further details, please see my attached clinic note.      Grace Chinchilla is a 68 year old female with the following diagnoses:   1. Optic atrophy    2. Visual field defect         Patient was sent for consultation by Dr. Amin for left optic neuropathy.    HPI: 68-year-old female with hx of pre-diabetes, pseudophakia left eye (Brennan, 10/26/23), hx of ERM peel (23, PPV/MS, Tiesha). Patient underwent cataract surgery with Dr. Amin in the left eye on 10/26/23. Pre-op, patient was 20/300, and at 2 month follow-up BCVA was 20/70. On OCT mac, she was noted to have distorted foveal contour, and paracentral defect on visual field testing. On 10/6/23 visit with Dr. Motley, pupil exam was notably \"trace?\" APD in the left eye. Patient was dilated prior to MD visit on 12/15/23, but there was suspicion for left optic neuropathy based on thinning of nerve noted on prior OCT RNFL testing,  color plate testing, and poor visual acuity that may or may not be entirely explained by ERM peel.     Per notes, patient had thin inner retina noted pre-op 2023 (prior to ERM peel).     Today, patient reports that vision has been largely stable. She reports having diplopia after a past eye surgery, but has not had diplopia for about 6 months. No eye pain. Intermittent posterior headaches a couple times a year that improve spontaneously. She reports that her diet has been poor for many years until recently, and ate a lot of processed or canned foods (not much fresh fruit or vegetables). She includes meat in her diet. No known toxic exposures in the " past. Never smoked cigarettes. She almost never drinks alcohol.         History of sleep apnea, hypothyroidism on synthroid, prediabetes, iron deficiency, HTN on medication, hypercholesterolemia. No history of strokes, blood clots, or cancer.      Independent historians:  Patient, sister.     Review of outside testing:  No recent head/brain imaging to review    Vitamin B12 (8/3/23): 1200 (WNL)      Review of outside clinical notes:    12/15/23 -- Visit with Dr. Amin  Grace YIMI Chinchilla is a 68 year old female with the following diagnoses:     Postoperative eye state  Pseudophakia OS  - Doing well, IOP wnl, diana negative; vision may be limited due to retina pathology  - discontinue all drops  - Post-operative do's and don'ts reviewed, questions answered  - patient has emergency contact information  - Counseled endophthalmitis/RD/return precautions     ?Optic neuropathy left eye  - noted to have thinning on OCT RNFL during visit with Dr Motley and today, and ?trace RAPD prior visit; dilated today prior to MD assessment so unable to assess today; color plates decreased left eye  - visual field with central/paracentral defect along with nasal defect; may be glaucomatous and also due to patient's prior PPV/membrane stripping, however will refer to neuro-ophthalmology; appreciate their expertise     Nuclear sclerotic cataract OD  - not visually significant  - observe     Hx of ERM left eye s/p PPV/MS 5/25/23  - tr ir cysts on oct macula today  - follows with Dr Motley  - toni precautions     PVD OD  - previously noted  - no new holes, tears, or detachment  - follows with retina  - counseled RD precautions     Myopia of both eyes with astigmatism and presbyopia  - refraction reviewed and dispensed today     Past medical history:    Patient Active Problem List   Diagnosis    Hypothyroidism    Mixed hyperlipidemia    Obstructive sleep apnea    Plantar fascial fibromatosis    Class 2 severe obesity due to  excess calories with serious comorbidity and body mass index (BMI) of 35.0 to 35.9 in adult (H)    Major depression, recurrent (H24)    Anemia    Carpal tunnel syndrome    Conversion disorder    Mild intellectual disabilities    Nonrheumatic aortic (valve) stenosis with insufficiency    Other bipolar disorder (H)    Primary localized osteoarthrosis, lower leg    Urge incontinence of urine    Pre-diabetes    Vitamin D deficiency    Elevated BP without diagnosis of hypertension    Physical deconditioning    Pain in both knees    Epiretinal membrane (ERM) of left eye    Nuclear sclerotic cataract of both eyes    Chest pain, unspecified type         Medications:   amphetamine-dextroamphetamine  atorvastatin  Calcium-Magnesium-Zinc Tabs  cholecalciferol Tabs  clonazePAM  diclofenac  divalproex sodium extended-release  DULoxetine  ferrous sulfate  FLAX SEEDS PO  GLUCOSAMINE COMPLEX PO  ketorolac  levothyroxine  losartan  metFORMIN  multivitamin w/minerals  prednisoLONE acetate  Vitamin B-12 Lozg      Family history / social history:  Patient's family history includes Alcoholism in her brother; Breast Cancer in her cousin; Breast Cancer (age of onset: 65) in her maternal aunt; Breast Cancer (age of onset: 85) in her maternal grandmother; C.A.D. in her mother; Cancer in her father, niece, and paternal grandmother; Colon Cancer in her maternal grandfather; Diabetes in her sister; Emphysema in her brother; Heart Disease in her mother; Heart Murmur in her brother; Leukemia in her father; Liver Cancer in her paternal aunt; Mental retardation in her brother.     Patient  reports that she has never smoked. She has never used smokeless tobacco. She reports that she does not drink alcohol and does not use drugs.       Exam:  Visual acuity 20/20 right eye 20/70 left eye.  Color vision 11/11 right eye and 1/11 left eye.  Pupil exam shows a left rAPD.  Intraocular pressure 19 right eye and 19 left eye.  Anterior segment exam  unremarkable.  Fundus exam shows possible pseudophakic pallor left eye.  Strabismus exam  unremarkable.    Tests ordered and interpreted today:      OCT RNFL today 1/30/2024:  - Right eye: mean thickness of 85. Findings: Borderline area superiorly, otherwise WNL.   - Left eye: mean thickness of 69. Findings: Superior and inferior thinning.     Visual Field G-top today 1/30/2024:  - Right eye: reliable, MD of -1.0 dB, findings: couple nonspecific points of depression, otherwise normal.    - Left eye: reliable, MD of -8.3 dB, findings: Cecocentral nasal scotoma.         Discussion of management / interpretation with another provider:   None    Assessment/Plan:   It is my impression that patient has a left optic neuropathy.  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 This is complicated by the fact that she is status post ERM peel of the left eye 5/25/2023. RNFL testing shows atrophy on the left, and has a visual field cecocentral deficit. B12 level was normal in August 2023. Her optic discs do have cupping but are tilted, and visual fields are less consistent with glaucomatous optic neuropathy, IOP within normal limits. Overall, her right optic nerve function appears normal.  She is not quite sure if her vision was worse the day after her Epiretinal membrane peel, but she is pretty sure that it was within a week.  The retinal nerve fiber layer has worsened since then but has been stable recently.  I will obtain an MRI orbit to evaluate for compressive lesions.  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  Presumably the inner retina is injured which leads to vision loss and optic atrophy.          Again, thank you for allowing me to participate in the care of your patient.      Sincerely,    Freddy Hair MD  Professor  Ophthalmology  Residency   Director of Neuro-Ophthalmology  [place] [facility] [employer] Endowed Chair  Departments of Ophthalmology, Neurology, and Neurosurgery  [facility] [address] [place]  [address]  T - [phone]  F - [phone]  [email or web address]      CC: Rose Amin MD  [address] [facility] [address]  Via In Basket    DX = unilateral optic atrophy, ?inner retinal optic neuropathy

## 2024-01-31 ENCOUNTER — TELEPHONE (OUTPATIENT)
Dept: OPHTHALMOLOGY | Facility: CLINIC | Age: 69
End: 2024-01-31
Payer: COMMERCIAL

## 2024-01-31 DIAGNOSIS — R73.03 PRE-DIABETES: ICD-10-CM

## 2024-01-31 NOTE — TELEPHONE ENCOUNTER
IRON - inner retinal optic neuropathy    Called and provided the information above     Maryam Marquez Communication Facilitator on 1/31/2024 at 2:11 PM

## 2024-01-31 NOTE — TELEPHONE ENCOUNTER
[facility] Call Center    Phone Message    May a detailed message be left on voicemail: yes     Reason for Call: Other: Pt states  have her an acronym of a condition she may have. Pt is wondering what the acronym was as well as what the 4 letter acronym meant.     Please reach out to pt and Advise.    Thank You!    Action Taken: Message routed to:  [facility]    Travel Screening: Not Applicable

## 2024-01-31 NOTE — TELEPHONE ENCOUNTER
IRON - inner retinal optic neuropathy       Above acronym per Dr. Hair.    Note to patient communicator to assist in updating pt-- pt not on mychart.    Heraclio Wells RN 2:07 PM 01/31/24

## 2024-02-01 RX ORDER — METFORMIN HCL 500 MG
TABLET, EXTENDED RELEASE 24 HR ORAL
Qty: 180 TABLET | Refills: 0 | Status: SHIPPED | OUTPATIENT
Start: 2024-02-01 | End: 2024-08-06

## 2024-02-09 ENCOUNTER — HOSPITAL ENCOUNTER (OUTPATIENT)
Dept: MRI IMAGING | Facility: CLINIC | Age: 69
Discharge: HOME | End: 2024-02-09
Attending: OPHTHALMOLOGY | Admitting: OPHTHALMOLOGY
Payer: COMMERCIAL

## 2024-02-09 DIAGNOSIS — H47.20 OPTIC ATROPHY: ICD-10-CM

## 2024-02-09 PROCEDURE — 70543 MRI ORBT/FAC/NCK W/O &W/DYE: CPT

## 2024-02-09 PROCEDURE — A9585 GADOBUTROL INJECTION: HCPCS | Performed by: OPHTHALMOLOGY

## 2024-02-09 PROCEDURE — 255N000002 HC RX 255 OP 636: Performed by: OPHTHALMOLOGY

## 2024-02-09 RX ORDER — GADOBUTROL 604.72 MG/ML
9 INJECTION INTRAVENOUS ONCE
Status: COMPLETED | OUTPATIENT
Start: 2024-02-09 | End: 2024-02-09

## 2024-02-09 RX ADMIN — GADOBUTROL 9 ML: 604.72 INJECTION INTRAVENOUS at 13:50

## 2024-02-11 NOTE — RESULT ENCOUNTER NOTE
Dear Grace,     Your results were in the acceptable range. At this point, I would recommend observation.  Please continue to follow with Dr. Amin and Dr. Hilario.     Please call my office if your symptoms worsen.    Thank you for allowing me to share in your care.     Sincerely,      Fredyd Hair MD

## 2024-02-23 ENCOUNTER — PATIENT OUTREACH (OUTPATIENT)
Dept: CARE COORDINATION | Facility: CLINIC | Age: 69
End: 2024-02-23
Payer: COMMERCIAL

## 2024-03-05 ENCOUNTER — NURSE TRIAGE (OUTPATIENT)
Dept: NURSING | Facility: CLINIC | Age: 69
End: 2024-03-05
Payer: COMMERCIAL

## 2024-03-05 ENCOUNTER — TELEPHONE (OUTPATIENT)
Dept: OPHTHALMOLOGY | Facility: CLINIC | Age: 69
End: 2024-03-05
Payer: COMMERCIAL

## 2024-03-05 NOTE — TELEPHONE ENCOUNTER
"  Nurse Triage SBAR    Is this a 2nd Level Triage? YES, LICENSED PRACTITIONER REVIEW IS REQUIRED    Situation: Patient calling to report having double vision x 1 week and eye pain that occurs only when she closes her eyes to nap or go to sleep.    Background: Last seen in Eye clinic 1/30/24 with Dr Hair.    Ocular Hx: PPV/MS OS 5/25/23 with Dr Motley; herpes zoster V1 distribution without ocular involvement, left sided; CE/IOL left eye (10/26/2023)     Assessment:  -Endorses double vision for 1 week. Looking out a window, light, or lit candle  -Came on suddenly.  -Floaters at baseline. No flashes of light.  -No blurriness noted but thinks she \"sees something sometimes but eyes playing tricks\" on her.  - Pain 5-6/10 with eyes closed - described as \"tender, sore like being extra tired from being awake too long\"  -Can read but endorses difficulty focusing, has to concentrate.  -Denies headache but reports that she has had a \"Sharp pain at top of head when standing up lasting 5-10 sec about 5x over last few days\"  -Wears glasses    Protocol Recommended Disposition:   See in Office Today    Recommendation: Please call patient for further assessment and recommendations. She doesn't feel she needs to be seen urgently, but also note by triager that she is a little bit of difficult historian. Should be evaluated based on eye history and current complaints.    Routed to provider/clinic    Lorena SALGUERO RN  P Central Nursing/Red Flag Triage & Med Refill Team         Reason for Disposition   Patient wants to be seen    Additional Information   Negative: Weakness of the face, arm or leg on one side of the body   Negative: Followed getting substance in the eye   Negative: Foreign body stuck in the eye   Negative: Followed an eye injury   Negative: Followed sun lamp or sun exposure (UV keratitis)   Negative: Yellow or green discharge (pus) in the eye   Negative: Pregnant   Negative: Complete loss of vision in one or both eyes   " "Negative: SEVERE eye pain    Answer Assessment - Initial Assessment Questions  1. DESCRIPTION: \"How has your vision changed?\" (e.g., complete vision loss, blurred vision, double vision, floaters, etc.)      Double vision x 1  week    2. LOCATION: \"One or both eyes?\" If one, ask: \"Which eye?\"      Both eyes    3. SEVERITY: \"Can you see anything?\" If Yes, ask: \"What can you see?\" (e.g., fine print)      Difficult to focus other will see double    4. ONSET: \"When did this begin?\" \"Did it start suddenly or has this been gradual?\"      Last Tuesday    5. PATTERN: \"Does this come and go, or has it been constant since it started?\"      Not constant - has more dbl vision when looking out window, at a light, or lit candle    6. PAIN: \"Is there any pain in your eye(s)?\"  (Scale 1-10; or mild, moderate, severe)    - NONE (0): No pain.    - MILD (1-3): Doesn't interfere with normal activities.    - MODERATE (4-7): Interferes with normal activities or awakens from sleep.     - SEVERE (8-10): Excruciating pain, unable to do any normal activities.      5-6    7. CONTACTS-GLASSES: \"Do you wear contacts or glasses?\"      glasses    8. CAUSE: \"What do you think is causing this visual problem?\"      Doesn't know    9. OTHER SYMPTOMS: \"Do you have any other symptoms?\" (e.g., confusion, headache, arm or leg weakness, speech problems)      Complains of  eye pain when closes eyes   \"Sharp pain in top of head when standing up lasting 5-10 sec about 5x over last few days\"      10. PREGNANCY: \"Is there any chance you are pregnant?\" \"When was your last menstrual period?\"        no    Protocols used: Vision Loss or Change-A-OH    "

## 2024-03-05 NOTE — TELEPHONE ENCOUNTER
Red flag triage encounter also sent and will document in other encounter.    Heraclio Wells RN 4:46 PM 03/05/24

## 2024-03-05 NOTE — TELEPHONE ENCOUNTER
Spoke to pt at 1700    Left eye monocular double vision intermittently times one week. Noticed daily.    Closes right eye and doubling still present in left eye.    Light effects doubling    Pt with eye pain/discomfort when closes eye at night.    Pt not using artificial tears (pt states has on hand)    Encouraged artificial tears and warm compresses 2/day for 10 minutes    If symptoms not improving by next week to reach out to review earlier scheduling options-- sooner for any worsening symptoms/vision changes    Scheduled follow up with Dr. Amin April 5th for reevaluation.    Pt verbally demonstrated understanding and seemed comfortable with information/plan.    Heraclio Wells RN 5:08 PM 03/05/24

## 2024-03-05 NOTE — TELEPHONE ENCOUNTER
[facility] Call Center    Phone Message    May a detailed message be left on voicemail: yes     Reason for Call: Symptoms or Concerns     If patient has red-flag symptoms, warm transfer to triage line    Current symptom or concern: per pt noticed in the last few days double vision DOS:10/26/23 Dr Amin  Pt last saw Dr Hair 1/30/24  3/1/24 when pt noticed the double vision is back     Symptoms have been present for:  4 days    Has patient previously been seen for this? Yes    By Dr Amin:     Date: 10/26/23    Are there any new or worsening symptoms? Yes:     Action Taken: Message routed to:  [facility]    Travel Screening: Not Applicable

## 2024-03-08 ENCOUNTER — TRANSFERRED RECORDS (OUTPATIENT)
Dept: HEALTH INFORMATION MANAGEMENT | Facility: CLINIC | Age: 69
End: 2024-03-08
Payer: COMMERCIAL

## 2024-03-18 NOTE — TELEPHONE ENCOUNTER
Spoke to Puja.  Information given to her from Cuca Fong CNP as noted below.   Detail Level: Zone Otc Regimen: Urea 40% cream Otc Regimen: Wart stick, apply as directed

## 2024-03-22 ENCOUNTER — PATIENT OUTREACH (OUTPATIENT)
Dept: CARE COORDINATION | Facility: CLINIC | Age: 69
End: 2024-03-22
Payer: COMMERCIAL

## 2024-04-02 DIAGNOSIS — E03.9 HYPOTHYROIDISM, UNSPECIFIED TYPE: ICD-10-CM

## 2024-04-03 ENCOUNTER — TELEPHONE (OUTPATIENT)
Dept: FAMILY MEDICINE | Facility: CLINIC | Age: 69
End: 2024-04-03
Payer: COMMERCIAL

## 2024-04-03 RX ORDER — LEVOTHYROXINE SODIUM 125 UG/1
TABLET ORAL
Qty: 90 TABLET | Refills: 0 | Status: SHIPPED | OUTPATIENT
Start: 2024-04-03 | End: 2024-07-10

## 2024-04-03 NOTE — TELEPHONE ENCOUNTER
Laila Sanches --    Please review and advise: labs  Pended below.     Patient states she has labs every 6 months due to psychiatric medication she is on.   Are you ok with ordering the labs without an appointment.   Patient is scheduled for annual visit in August.   April 8: appointment with Jarett Angelo, VANN RN  [facility]

## 2024-04-03 NOTE — TELEPHONE ENCOUNTER
Pt calling back to check status of her lab inquiry.    Advised provider is out of office today and we will call her back no sooner than tomorrow.    Radha SALAS RN  [facility]

## 2024-04-05 NOTE — TELEPHONE ENCOUNTER
Spoke with patient who states she has written orders from her psychiatrist, and only needed a lab appt at .  She is scheduled to see MTM on 4/8.  Pt was already scheduled for lab appt in the middle of her MTM appt but was unaware; rescheduled for lab draw just prior to MTM appt.    Confirmed to pt that levothyroxine refill was sent on 4/3.    Radha SALAS RN  [facility]

## 2024-04-07 NOTE — PROGRESS NOTES
Medication Therapy Management (MTM) Encounter    ASSESSMENT:                            Medication Adherence/Access: only issue is her steroid eye drop-forgets to take.      Obesity/Pre-Diabetes:  Stay on max. Tolerated dose of Metformin -500mg. 2 x day + lifestyle plan --A1c today 5.7% --excellent improvement , continue metformin and daily exercise.       Hypertension:   Patient is not meeting blood pressure goal of < 140/90mmHg. Patient would benefit from : starting low dose 2.5mg amlodipine at bedtime .         Hyperlipidemia:   Stable.  Patient is on high intensity statin which is indicated based on 2019 ACC/AHA guidelines for lipid management.      Hypothyroidism:   Stable         PLAN:                            1. A1c today = 5.7%--excellent --continue 2 x day Metformin + your lifestyle changes --they are helping /working well for you.     2. I added Dr. Montana's labs , [employer] will fax the results to him when available.     3. Blood Pressure today 154/63mmhg --should be <140/90mmhg -- keep Losartan in AM as is --lets ADD in a low dose bedtime pill called Amlodipine 2.5mg. now.           Follow-up: Return in about 3 weeks (around 4/29/2024), or @ 3;30pm, for Medication Therapy Management Visit, BP Recheck.      SUBJECTIVE/OBJECTIVE:                          Grace Chinchilla is a 68 year old female coming in for a follow-up (1-15-24) visit. She was referred to me from Laila Sanches          Reason for visit:   A1c review.   Psych md --needs 3 lab from dr. Montana her psychiatrist.       Allergies/ADRs: Reviewed in chart; bupropion caused sickness?  Past Medical History: Reviewed in chart  Tobacco: She reports that she has never smoked. She has never used smokeless tobacco.  Alcohol: not currently using  Other Substance Use: none  E-cigarette Use: none  Caffeine: not much   Social: retired   Personal Healthcare Goals:  lose weight in a healthy way and keep it off/ reverse pre-diabetes.   Activity:  sedentary  Medication Adherence/Access: Has pill boxes-- takes meds daily , only one she is missing is her prednisolone eye drop.     Hypertension     Losartan 25mg./day   Patient reports no current medication side effects.  Patient does not self-monitor blood pressure.         BP Readings from Last 3 Encounters:   04/08/24 (!) 154/63   11/28/23 130/76   11/21/23 (!) 147/61     Pulse Readings from Last 3 Encounters:   04/08/24 84   11/28/23 86   11/21/23 82     Hyperlipidemia     Atorvastatin 80mg daily  Patient reports no significant myalgias or other side effects.  The 10-year ASCVD risk score (Isiah HOLLOWAY, et al., 2019) is: 16%    Values used to calculate the score:      Age: 68 years      Sex: Female      Is Non- : No      Diabetic: No      Tobacco smoker: No      Systolic Blood Pressure: 154 mmHg      Is BP treated: Yes      HDL Cholesterol: 50 mg/dL      Total Cholesterol: 187 mg/dL       Recent Labs   Lab Test 08/03/23  1106 03/14/22  1049   CHOL 187 183   HDL 50 44*    114   TRIG 187* 125           Hypothyroidism     Levothyroxine 125 mcg daily. (Admits she takes her iron pill right with her thyroid pill)  Patient is having the following symptoms: hypothyroidism -  weight gain.        TSH   Date Value Ref Range Status   08/03/2023 0.49 0.30 - 4.20 uIU/mL Final   03/14/2022 3.25 0.30 - 5.00 uIU/mL Final   04/02/2007 5.88 (H) 0.4 - 5.0 mU/L Final         Class II Obesity (BMI 35 to 39.9)/pre-diabetes:   Ozempic stopped last fall.   Metformin - tried it -- then went to ozempic. We restarted Met 500mg er tabs 10-10-23 --had diarrhea SE at 3/day --now taking - 1 tab 2 x day --tolerates well.     Patient reports  current medication side effects: loose stools but not all the time --feels salads trigger loose stools.     Eats 2 x day now on my LC+IF lifestyle plan -- eating more salads , walking more now-trying to walk 30 minutes /day now.     She has used [facility] in the past to lose wt.  "-her sisters feel she only goes there for social aspect of the program .   Cost is 4$/month . She did not join yet.   Every Thursday sister jf and her go to the [facility] and workout for 45 minutes.     Sister jo ann has concerns that Grace wont stick to the lifestyle plan .           Previously :   Nutrition/Eating Habits: saw Dr. Steve special diet + weekly ozempic --got down to 180lbs.   --then cost $>200/month too much stopped it and weight came back.  Has 3 sisters --2 of hem very active in her life.   Has been on [facility] 4 different in her life , sisters - are in charge of her money POA over her .  Her  works full time - cannot control finances , she gets 15$ and [employer] 25 $ /month for fun stuff , sisters take her shopping weekly. They use HSA card to pay  for her rx drugs.     Current eating habits :  Bfast - cereal most days --quick easy /she admits lazy, or will have oatmeal or pbutter /jelly sandwich , or yogurt with jam or sugar , water   Mid am snack --no  Lunch : skips or bowel of cottage cheese  Mid afternoon snack : apple and a banana -psychological thought she would get hungry   Dinner; minnestrone soup 1-2 bowls, or a salad.   Late night snacks : 10-11 pm hubby snacks--she might have a sandwich pbandj or a yogurt , eats out of habit or other reasons.     Exercise/Activity:   has membership thru [employer] - fitness club (not going)- or go to [facility] -swimming --but no swimsuit.   Does see psychiatrist.   Medication History:  Ozempic: too $$ to continue.  Wt Readings from Last 4 Encounters:   04/08/24 191 lb 14.4 oz (87 kg)   11/28/23 191 lb (86.6 kg)   11/20/23 183 lb (83 kg)   11/07/23 192 lb 11.2 oz (87.4 kg)     Estimated body mass index is 35.31 kg/m  as calculated from the following:    Height as of 11/28/23: 5' 1.81\" (1.57 m).    Weight as of this encounter: 191 lb 14.4 oz (87 kg).    Lab Results   Component Value Date    A1C 5.7 04/08/2024    A1C 6.2 08/03/2023    A1C 5.3 09/16/2022    A1C 6.1 " 03/14/2022    A1C 6.0 09/17/2019         BP (!) 154/63   Pulse 84   Wt 191 lb 14.4 oz (87 kg)   LMP 09/13/2006   SpO2 96%   BMI 35.31 kg/m      ----------------------------------------------------------------------------------------------------------    I spent 30 minutes with this patient today. All changes were made via collaborative practice agreement with ZACH Melara CNP. A copy of the visit note was provided to the patient's provider(s).    A summary of these recommendations was given after todays office visit.     Jarett Sen Rph.  Medication Therapy Management Provider  [phone]     Medication Therapy Recommendations  Essential hypertension    Current Medication: amLODIPine (NORVASC) 2.5 MG tablet   Rationale: Synergistic therapy - Needs additional medication therapy - Indication   Recommendation: Start Medication   Status: Accepted per CPA

## 2024-04-08 ENCOUNTER — LAB (OUTPATIENT)
Dept: LAB | Facility: CLINIC | Age: 69
End: 2024-04-08
Payer: COMMERCIAL

## 2024-04-08 ENCOUNTER — OFFICE VISIT (OUTPATIENT)
Dept: PHARMACY | Facility: CLINIC | Age: 69
End: 2024-04-08
Payer: COMMERCIAL

## 2024-04-08 VITALS
OXYGEN SATURATION: 96 % | WEIGHT: 191.9 LBS | BODY MASS INDEX: 35.31 KG/M2 | SYSTOLIC BLOOD PRESSURE: 154 MMHG | DIASTOLIC BLOOD PRESSURE: 63 MMHG | HEART RATE: 84 BPM

## 2024-04-08 DIAGNOSIS — F31.89 OTHER BIPOLAR DISORDER (H): ICD-10-CM

## 2024-04-08 DIAGNOSIS — R73.03 PRE-DIABETES: ICD-10-CM

## 2024-04-08 DIAGNOSIS — E66.812 CLASS 2 SEVERE OBESITY DUE TO EXCESS CALORIES WITH SERIOUS COMORBIDITY AND BODY MASS INDEX (BMI) OF 35.0 TO 35.9 IN ADULT (H): ICD-10-CM

## 2024-04-08 DIAGNOSIS — E78.2 MIXED HYPERLIPIDEMIA: ICD-10-CM

## 2024-04-08 DIAGNOSIS — I10 ESSENTIAL HYPERTENSION: Primary | ICD-10-CM

## 2024-04-08 DIAGNOSIS — E66.01 CLASS 2 SEVERE OBESITY DUE TO EXCESS CALORIES WITH SERIOUS COMORBIDITY AND BODY MASS INDEX (BMI) OF 35.0 TO 35.9 IN ADULT (H): ICD-10-CM

## 2024-04-08 LAB
BASOPHILS # BLD AUTO: 0 10E3/UL (ref 0–0.2)
BASOPHILS NFR BLD AUTO: 0 %
EOSINOPHIL # BLD AUTO: 0.2 10E3/UL (ref 0–0.7)
EOSINOPHIL NFR BLD AUTO: 3 %
ERYTHROCYTE [DISTWIDTH] IN BLOOD BY AUTOMATED COUNT: 14.5 % (ref 10–15)
HBA1C MFR BLD: 5.7 % (ref 0–5.6)
HCT VFR BLD AUTO: 37.5 % (ref 35–47)
HGB BLD-MCNC: 11.8 G/DL (ref 11.7–15.7)
IMM GRANULOCYTES # BLD: 0 10E3/UL
IMM GRANULOCYTES NFR BLD: 0 %
LYMPHOCYTES # BLD AUTO: 1.6 10E3/UL (ref 0.8–5.3)
LYMPHOCYTES NFR BLD AUTO: 24 %
MCH RBC QN AUTO: 26.2 PG (ref 26.5–33)
MCHC RBC AUTO-ENTMCNC: 31.5 G/DL (ref 31.5–36.5)
MCV RBC AUTO: 83 FL (ref 78–100)
MONOCYTES # BLD AUTO: 0.8 10E3/UL (ref 0–1.3)
MONOCYTES NFR BLD AUTO: 12 %
NEUTROPHILS # BLD AUTO: 4 10E3/UL (ref 1.6–8.3)
NEUTROPHILS NFR BLD AUTO: 60 %
PLATELET # BLD AUTO: 317 10E3/UL (ref 150–450)
RBC # BLD AUTO: 4.51 10E6/UL (ref 3.8–5.2)
WBC # BLD AUTO: 6.6 10E3/UL (ref 4–11)

## 2024-04-08 PROCEDURE — 36415 COLL VENOUS BLD VENIPUNCTURE: CPT

## 2024-04-08 PROCEDURE — 80053 COMPREHEN METABOLIC PANEL: CPT

## 2024-04-08 PROCEDURE — 99000 SPECIMEN HANDLING OFFICE-LAB: CPT

## 2024-04-08 PROCEDURE — 80164 ASSAY DIPROPYLACETIC ACD TOT: CPT | Mod: 90

## 2024-04-08 PROCEDURE — 80165 DIPROPYLACETIC ACID FREE: CPT | Mod: 90

## 2024-04-08 PROCEDURE — 99606 MTMS BY PHARM EST 15 MIN: CPT | Performed by: PHARMACIST

## 2024-04-08 PROCEDURE — 83036 HEMOGLOBIN GLYCOSYLATED A1C: CPT

## 2024-04-08 PROCEDURE — 99607 MTMS BY PHARM ADDL 15 MIN: CPT | Performed by: PHARMACIST

## 2024-04-08 PROCEDURE — 85025 COMPLETE CBC W/AUTO DIFF WBC: CPT

## 2024-04-08 RX ORDER — AMLODIPINE BESYLATE 2.5 MG/1
2.5 TABLET ORAL AT BEDTIME
Qty: 30 TABLET | Refills: 1 | Status: SHIPPED | OUTPATIENT
Start: 2024-04-08 | End: 2024-04-29 | Stop reason: DRUGHIGH

## 2024-04-08 NOTE — Clinical Note
Laila--xenia-- Blood Pressure elevated again --I added low dose amlodipine at bedtime for jessi --see me for bp recheck at end of the month --Jarett

## 2024-04-08 NOTE — PATIENT INSTRUCTIONS
"Recommendations from today's MTM visit:                                                         1. A1c today = 5.7%--excellent --continue 2 x day Metformin + your lifestyle changes --they are helping /working well for you.     2. I added Dr. Montana's labs , [employer] will fax the results to him when available.     3. Blood Pressure today 154/63mmhg --should be <140/90mmhg -- keep Losartan in AM as is --lets ADD in a low dose bedtime pill called Amlodipine 2.5mg. now.           Follow-up: Return in about 3 weeks (around 4/29/2024), or @ 3;30pm, for Medication Therapy Management Visit, BP Recheck.    It was great speaking with you today.  I value your experience and would be very thankful for your time in providing feedback in our clinic survey. In the next few days, you may receive an email or text message from [employer] with a link to a survey related to your  clinical pharmacist.\"     To schedule another MTM appointment, please call the clinic directly or you may call the MTM scheduling line at [phone] or toll-free at [phone].     My Clinical Pharmacist's contact information:                                                      Please feel free to contact me with any questions or concerns you have.      Jarett Sen Rph.  Medication Therapy Management Provider  [phone]    "

## 2024-04-09 LAB
ALBUMIN SERPL BCG-MCNC: 4.4 G/DL (ref 3.5–5.2)
ALP SERPL-CCNC: 94 U/L (ref 40–150)
ALT SERPL W P-5'-P-CCNC: 23 U/L (ref 0–50)
ANION GAP SERPL CALCULATED.3IONS-SCNC: 10 MMOL/L (ref 7–15)
AST SERPL W P-5'-P-CCNC: 19 U/L (ref 0–45)
BILIRUB SERPL-MCNC: 0.2 MG/DL
BUN SERPL-MCNC: 21.9 MG/DL (ref 8–23)
CALCIUM SERPL-MCNC: 9.7 MG/DL (ref 8.8–10.2)
CHLORIDE SERPL-SCNC: 103 MMOL/L (ref 98–107)
CREAT SERPL-MCNC: 0.8 MG/DL (ref 0.51–0.95)
DEPRECATED HCO3 PLAS-SCNC: 28 MMOL/L (ref 22–29)
EGFRCR SERPLBLD CKD-EPI 2021: 79 ML/MIN/1.73M2
GLUCOSE SERPL-MCNC: 100 MG/DL (ref 70–99)
POTASSIUM SERPL-SCNC: 4.3 MMOL/L (ref 3.4–5.3)
PROT SERPL-MCNC: 7.2 G/DL (ref 6.4–8.3)
SODIUM SERPL-SCNC: 141 MMOL/L (ref 135–145)

## 2024-04-10 LAB
VALPROATE FREE MFR SERPL: 15 %
VALPROATE FREE SERPL-MCNC: 12 UG/ML
VALPROATE SERPL-MCNC: 79 UG/ML

## 2024-04-21 NOTE — PROGRESS NOTES
Medication Therapy Management (MTM) Encounter    ASSESSMENT:                            Medication Adherence/Access: none     Obesity/Pre-Diabetes:  Stay on max. Tolerated dose of Metformin -500mg. 2 x day + lifestyle plan --A1c today 5.7% --excellent improvement , continue metformin and daily exercise. See plan for details.     Goal is to be <190lbs. by next appt. 6-3-24.     Hypertension:   Improving. Patient is not meeting blood pressure goal of < 140/90mmHg. Patient would benefit from : increasing bedtime daily dose of Amlodipine to 5mg. Now.       Hyperlipidemia:   Stable.  Patient is on high intensity statin which is indicated based on 2019 ACC/AHA guidelines for lipid management.      Hypothyroidism:   Stable         PLAN:                              1. Clinic Blood Pressure today = 143/70mmhg-- close to goal of <140/90mmhg.    Lets increase your bedtime Amlodipine daily dose to 5mg. Now.    Walk at least 5,000 steps/day and go to [facility] weekly fitness silver sneakers class with [employer].     Try not to snack and not eat seconds --drink more water --see if this helps you lose a few lbs.     Continue all other medications as is .       Follow-up: Return in about 5 weeks (around 6/3/2024), or @ 3:30 PM, for Medication Therapy Management Visit, BP Recheck.        SUBJECTIVE/OBJECTIVE:                          Grace Chinchilla is a 69 year old female coming in for a follow-up (4-8-24) visit. She was referred to me from Laila Sanches  .          Reason for visit:   Bp recheck.       Allergies/ADRs: Reviewed in chart; bupropion caused sickness?  Past Medical History: Reviewed in chart  Tobacco: She reports that she has never smoked. She has never used smokeless tobacco.  Alcohol: not currently using  Other Substance Use: none  E-cigarette Use: none  Caffeine: not much   Social: retired   Personal Healthcare Goals:  lose weight in a healthy way and keep it off/ reverse pre-diabetes.   Activity: sedentary in the past  now walking more.      Medication Adherence/Access: Has pill boxes-- takes meds daily .      Hypertension   Added 2.5mg./daily amlodipine 4-8-24.  Losartan 25mg./day   Patient reports no current medication side effects.  Patient does not self-monitor blood pressure.         BP Readings from Last 3 Encounters:   04/29/24 (!) 143/70   04/08/24 (!) 154/63   11/28/23 130/76     Pulse Readings from Last 3 Encounters:   04/29/24 90   04/08/24 84   11/28/23 86     Hyperlipidemia     Atorvastatin 80mg daily  Patient reports no significant myalgias or other side effects.  The 10-year ASCVD risk score (Isiah HOLLOWAY, et al., 2019) is: 15.5%    Values used to calculate the score:      Age: 69 years      Sex: Female      Is Non- : No      Diabetic: No      Tobacco smoker: No      Systolic Blood Pressure: 143 mmHg      Is BP treated: Yes      HDL Cholesterol: 50 mg/dL      Total Cholesterol: 187 mg/dL       Recent Labs   Lab Test 08/03/23  1106 03/14/22  1049   CHOL 187 183   HDL 50 44*    114   TRIG 187* 125           Hypothyroidism     Levothyroxine 125 mcg daily. (Admits she takes her iron pill right with her thyroid pill)  Patient is having the following symptoms: hypothyroidism -  weight gain.        TSH   Date Value Ref Range Status   08/03/2023 0.49 0.30 - 4.20 uIU/mL Final   03/14/2022 3.25 0.30 - 5.00 uIU/mL Final   04/02/2007 5.88 (H) 0.4 - 5.0 mU/L Final         Class II Obesity (BMI 35 to 39.9)/pre-diabetes:   Ozempic stopped last fall.   Metformin - tried it -- then went to ozempic. We restarted Met 500mg er tabs 10-10-23 --had diarrhea SE at 3/day --now taking - 1 tab 2 x day --tolerates well.     Patient reports  current medication side effects: loose stools but not all the time --feels salads trigger loose stools.     Eats 2 x day now on my LC+IF lifestyle plan -- eating more salads , walking more now-trying to walk 30 minutes /day now. (She averages 3 K+ steps/day-4-29-24).  He highest  "was 9k steps/day .     She has used [facility] in the past to lose wt. -her sisters feel she only goes there for social aspect of the program .   Cost is 4$/month . She did not join yet.   Every Thursday sister jf and her go to the [facility] and workout for 45 minutes.     Sister jo ann has concerns that Grace wont stick to the lifestyle plan .           Previously :   Nutrition/Eating Habits: saw Dr. Steve special diet + weekly ozempic --got down to 180lbs.   --then cost $>200/month too much stopped it and weight came back.  Has 3 sisters --2 of hem very active in her life.   Has been on [facility] 4 different in her life , sisters - are in charge of her money POA over her .  Her  works full time - cannot control finances , she gets 15$ and hubby 25 $ /month for fun stuff , sisters take her shopping weekly. They use HSA card to pay  for her rx drugs.     Current eating habits :  Bfast - cereal most days --quick easy /she admits lazy, or will have oatmeal or pbutter /jelly sandwich , or yogurt with jam or sugar , water   Mid am snack --no  Lunch : skips or bowel of cottage cheese  Mid afternoon snack : apple and a banana -psychological thought she would get hungry   Dinner; minnestrone soup 1-2 bowls, or a salad.   Late night snacks : 10-11 pm hubby snacks--she might have a sandwich pbandj or a yogurt , eats out of habit or other reasons.     Exercise/Activity:   has membership thru [employer] - fitness club (not going)- or go to [facility] -swimming --but no swimsuit.   Does see psychiatrist.   Medication History:  Ozempic: too $$ to continue.  Wt Readings from Last 4 Encounters:   04/29/24 193 lb (87.5 kg)   04/08/24 191 lb 14.4 oz (87 kg)   11/28/23 191 lb (86.6 kg)   11/20/23 183 lb (83 kg)     Estimated body mass index is 35.52 kg/m  as calculated from the following:    Height as of 11/28/23: 5' 1.81\" (1.57 m).    Weight as of this encounter: 193 lb (87.5 kg).    Lab Results   Component Value Date    A1C 5.7 04/08/2024    " A1C 6.2 08/03/2023    A1C 5.3 09/16/2022    A1C 6.1 03/14/2022    A1C 6.0 09/17/2019         BP (!) 143/70   Pulse 90   Wt 193 lb (87.5 kg)   LMP 09/13/2006   SpO2 94%   BMI 35.52 kg/m      ----------------------------------------------------------------------------------------------------------    I spent 30 minutes with this patient today. All changes were made via collaborative practice agreement with ZACH Melara CNP. A copy of the visit note was provided to the patient's provider(s).    A summary of these recommendations was given after todays office visit.     [facility], [facility].  Medication Therapy Management Provider  [phone]     Medication Therapy Recommendations  Essential hypertension    Current Medication: amLODIPine (NORVASC) 2.5 MG tablet (Discontinued)   Rationale: Dose too low - Dosage too low - Effectiveness   Recommendation: Increase Dose - amLODIPine 5 MG tablet   Status: Accepted per CPA

## 2024-04-29 ENCOUNTER — OFFICE VISIT (OUTPATIENT)
Dept: PHARMACY | Facility: CLINIC | Age: 69
End: 2024-04-29
Payer: COMMERCIAL

## 2024-04-29 VITALS
OXYGEN SATURATION: 94 % | SYSTOLIC BLOOD PRESSURE: 143 MMHG | BODY MASS INDEX: 35.52 KG/M2 | WEIGHT: 193 LBS | DIASTOLIC BLOOD PRESSURE: 70 MMHG | HEART RATE: 90 BPM

## 2024-04-29 DIAGNOSIS — R73.03 PRE-DIABETES: ICD-10-CM

## 2024-04-29 DIAGNOSIS — E66.01 CLASS 2 SEVERE OBESITY DUE TO EXCESS CALORIES WITH SERIOUS COMORBIDITY AND BODY MASS INDEX (BMI) OF 35.0 TO 35.9 IN ADULT (H): ICD-10-CM

## 2024-04-29 DIAGNOSIS — I10 ESSENTIAL HYPERTENSION: Primary | ICD-10-CM

## 2024-04-29 DIAGNOSIS — E78.2 MIXED HYPERLIPIDEMIA: ICD-10-CM

## 2024-04-29 DIAGNOSIS — E03.9 HYPOTHYROIDISM, UNSPECIFIED TYPE: ICD-10-CM

## 2024-04-29 DIAGNOSIS — E66.812 CLASS 2 SEVERE OBESITY DUE TO EXCESS CALORIES WITH SERIOUS COMORBIDITY AND BODY MASS INDEX (BMI) OF 35.0 TO 35.9 IN ADULT (H): ICD-10-CM

## 2024-04-29 PROCEDURE — 99606 MTMS BY PHARM EST 15 MIN: CPT | Performed by: PHARMACIST

## 2024-04-29 PROCEDURE — 99607 MTMS BY PHARM ADDL 15 MIN: CPT | Performed by: PHARMACIST

## 2024-04-29 RX ORDER — DEXTROAMPHETAMINE SACCHARATE, AMPHETAMINE ASPARTATE MONOHYDRATE, DEXTROAMPHETAMINE SULFATE AND AMPHETAMINE SULFATE 2.5; 2.5; 2.5; 2.5 MG/1; MG/1; MG/1; MG/1
10 CAPSULE, EXTENDED RELEASE ORAL DAILY
COMMUNITY

## 2024-04-29 RX ORDER — AMLODIPINE BESYLATE 5 MG/1
5 TABLET ORAL AT BEDTIME
Qty: 90 TABLET | Refills: 1 | Status: SHIPPED | OUTPATIENT
Start: 2024-04-29 | End: 2024-08-06

## 2024-04-29 RX ORDER — DEXTROAMPHETAMINE SACCHARATE, AMPHETAMINE ASPARTATE MONOHYDRATE, DEXTROAMPHETAMINE SULFATE AND AMPHETAMINE SULFATE 7.5; 7.5; 7.5; 7.5 MG/1; MG/1; MG/1; MG/1
30 CAPSULE, EXTENDED RELEASE ORAL DAILY
COMMUNITY

## 2024-04-29 NOTE — Clinical Note
Laila--amlodipine helping --I did increase her dose to 5mg now -- recheck bp with me in 5 weeks.  Bree oneal

## 2024-04-29 NOTE — PATIENT INSTRUCTIONS
"Recommendations from today's MTM visit:                                                         1. Clinic Blood Pressure today = 143/70mmhg-- close to goal of <140/90mmhg.    Lets increase your bedtime Amlodipine daily dose to 5mg. Now.    Walk at least 5,000 steps/day and go to [facility] weekly fitness silver sneakers class with [employer].     Try not to snack and not eat seconds --drink more water --see if this helps you lose a few lbs.     Continue all other medications as is .       Follow-up: Return in about 5 weeks (around 6/3/2024), or @ 3:30 PM, for Medication Therapy Management Visit, BP Recheck.    It was great speaking with you today.  I value your experience and would be very thankful for your time in providing feedback in our clinic survey. In the next few days, you may receive an email or text message from [employer] with a link to a survey related to your  clinical pharmacist.\"     To schedule another MTM appointment, please call the clinic directly or you may call the MTM scheduling line at [phone] or toll-free at [phone].     My Clinical Pharmacist's contact information:                                                      Please feel free to contact me with any questions or concerns you have.      Jarett Sen Rph.  Medication Therapy Management Provider  [phone]    "

## 2024-05-28 ENCOUNTER — NURSE TRIAGE (OUTPATIENT)
Dept: FAMILY MEDICINE | Facility: CLINIC | Age: 69
End: 2024-05-28
Payer: COMMERCIAL

## 2024-05-28 NOTE — TELEPHONE ENCOUNTER
Call received from patient:  Right hip pain  Started AM of 5/24/24  No injury that she is aware of  Fell on bathroom floor on 5/21/24; no head injury  Currently the hip pain is mild but yesterday it was moderate to severe  If rest on left side or sit with pillow underneath her helps  Ice not helpful  Tylenol helpful  PT exercise taught to her by friend but unsure helpful  Pain does not radiate  Afebrile  No rash  Pain causes her to take pauses while walking  Pain starts to subside after walking 15-20 minutes    See Care Advice section of encounter for home care measures reviewed with patient.    Writer recommended office visit for evaluation.  Appt scheduled on 5/29/24 with SANCHEZ Arenas CNP.  Visit date, time and location confirmed with patient.    Call back to speak with a nurse if develop any new, changing or worsening symptoms.    Patient verbalized understanding and in agreement with plan.    MARGOTH Garcia, RN-[facility]    Reason for Disposition   MODERATE pain (e.g., interferes with normal activities, limping) and present > 3 days    Additional Information   Negative: Looks like a broken bone or dislocated joint (e.g., crooked or deformed)   Negative: Sounds like a life-threatening emergency to the triager   Negative: Followed a hip injury   Negative: Leg pain is main symptom   Negative: Back pain radiating (shooting) into hip   Negative: SEVERE pain (e.g., excruciating, unable to do any normal activities) and fever   Negative: Can't stand (bear weight) or walk   Negative: Fever and red area (or area very tender to touch)   Negative: Patient sounds very sick or weak to the triager   Negative: SEVERE pain (e.g., excruciating, unable to do any normal activities)   Negative: Red area or streak > 2 inches (or 5 cm)   Negative: Painful rash with multiple small blisters grouped together (i.e., dermatomal distribution or 'band' or 'stripe')   Negative: Looks like a boil, infected  sore, deep ulcer, or other infected rash (spreading redness, pus)   Negative: Localized rash is very painful (no fever)   Negative: Numbness in a leg or foot (i.e., loss of sensation)   Negative: Patient wants to be seen    Protocols used: Hip Pain-A-[place]

## 2024-05-29 ENCOUNTER — OFFICE VISIT (OUTPATIENT)
Dept: FAMILY MEDICINE | Facility: CLINIC | Age: 69
End: 2024-05-29
Payer: COMMERCIAL

## 2024-05-29 ENCOUNTER — ANCILLARY PROCEDURE (OUTPATIENT)
Dept: GENERAL RADIOLOGY | Facility: CLINIC | Age: 69
End: 2024-05-29
Attending: NURSE PRACTITIONER
Payer: COMMERCIAL

## 2024-05-29 VITALS
OXYGEN SATURATION: 98 % | DIASTOLIC BLOOD PRESSURE: 66 MMHG | TEMPERATURE: 97.7 F | RESPIRATION RATE: 21 BRPM | BODY MASS INDEX: 34.87 KG/M2 | SYSTOLIC BLOOD PRESSURE: 138 MMHG | HEART RATE: 76 BPM | WEIGHT: 189.5 LBS

## 2024-05-29 DIAGNOSIS — M25.551 HIP PAIN, RIGHT: Primary | ICD-10-CM

## 2024-05-29 DIAGNOSIS — M25.551 HIP PAIN, RIGHT: ICD-10-CM

## 2024-05-29 PROCEDURE — 73502 X-RAY EXAM HIP UNI 2-3 VIEWS: CPT | Mod: TC | Performed by: RADIOLOGY

## 2024-05-29 PROCEDURE — 99214 OFFICE O/P EST MOD 30 MIN: CPT | Performed by: NURSE PRACTITIONER

## 2024-05-29 PROCEDURE — G2211 COMPLEX E/M VISIT ADD ON: HCPCS | Performed by: NURSE PRACTITIONER

## 2024-05-29 ASSESSMENT — PATIENT HEALTH QUESTIONNAIRE - PHQ9
SUM OF ALL RESPONSES TO PHQ QUESTIONS 1-9: 13
10. IF YOU CHECKED OFF ANY PROBLEMS, HOW DIFFICULT HAVE THESE PROBLEMS MADE IT FOR YOU TO DO YOUR WORK, TAKE CARE OF THINGS AT HOME, OR GET ALONG WITH OTHER PEOPLE: SOMEWHAT DIFFICULT
SUM OF ALL RESPONSES TO PHQ QUESTIONS 1-9: 13

## 2024-05-29 NOTE — PROGRESS NOTES
"  Assessment & Plan     (M25.551) Hip pain, right  (primary encounter diagnosis)  Comment: gluteal tendinopathy and trochanteric bursitis  Plan: XR Hip Right 2-3 Views        Xray is negative for fracture.  Discussed using Voltaren gel to area, rest, Tylenol as needed, ice.    Follow up if no improvement in 2 weeks, consider referral to PT.       I spent a total of 38 minutes on the day of the visit.   Time spent by me doing chart review, history and exam, documentation and further activities per the note          BMI  Estimated body mass index is 34.87 kg/m  as calculated from the following:    Height as of 11/28/23: 1.57 m (5' 1.81\").    Weight as of this encounter: 86 kg (189 lb 8 oz).             Melita Edwards is a 69 year old, presenting for the following health issues:  Musculoskeletal Problem (/RT hip pain, on-going for a while, fell Tuesday was able to get up wit no pain, woke up Friday morning, very difficult to move, no bruising, no swelling, comes randomly throughout the day, using Tylenol and icing. Pain makes it difficult to get up and down.)    History of Present Illness       Reason for visit:  Pain in my right hip, possibly caused by a fall I had a week ago yesterday.  Symptom onset:  3-7 days ago  Symptom intensity:  Moderate  Symptom progression:  Worsening  Had these symptoms before:  No  What makes it worse:  First getting up in morning~sitting in chairs that are uncomfortable~and mor recently getting up from the toilet.  What makes it better:  Taking pain pills~rest or sleep on left side and when I walk through my initial pain then can begin to feel a little better.    She eats 2-3 servings of fruits and vegetables daily.She consumes 0 sweetened beverage(s) daily.She exercises with enough effort to increase her heart rate 10 to 19 minutes per day.  She exercises with enough effort to increase her heart rate 3 or less days per week. She is missing 2 dose(s) of medications per week.  She is " not taking prescribed medications regularly due to remembering to take.     She fell in the bathroom 8 days ago and landed on her right hip.  She had no pain for 3 days, but then woke up with pain Friday morning, which was a day after she took a long walk.  She denies any back pain, radicular pain, paresthesias, weakness.    Pain is worse in the morning or when sitting in a more uncomfortable chair.  She tried using ice, which made it feel worse.  It hurts when she first starts walking, then improves.   She did take Tylenol, which helps somewhat.                     Objective    /66 (BP Location: Right arm, Patient Position: Sitting, Cuff Size: Adult Regular)   Pulse 76   Temp 97.7  F (36.5  C) (Temporal)   Resp 21   Wt 86 kg (189 lb 8 oz)   LMP 09/13/2006   SpO2 98%   BMI 34.87 kg/m    Body mass index is 34.87 kg/m .  Physical Exam   GENERAL: alert and no distress  MS: tenderness over the right gluteal tendon and right greater trochanter (also has some tenderness on the left as well)  Back inspection: symmetrical, no spinal curvature  Tenderness: no vertebral tenderness  Musculoskeletal: ROM: full  Neuro: Strength: 5/5 lower extremities bilaterally, able to heel walk and toe walk  Sensation: sensation to light touch grossly intact and equal bilaterally  Straight leg raise: negative  Cross leg raise: negative  FROM of the right hip  SKIN: no suspicious lesions or rashes  NEURO: Normal strength and tone, mentation intact and speech normal  PSYCH: mentation appears normal, affect normal/bright            Signed Electronically by: Michelle Arensa NP

## 2024-05-29 NOTE — PATIENT INSTRUCTIONS
Gluteal tendinopathy and trochanteric bursitis    Use the Voltaren gel 4 times a day.  Take Tylenol as needed (max dose of 3000 mg a day).     Let me know if symptoms are not any better in the next 1-2 weeks.

## 2024-05-30 ENCOUNTER — TELEPHONE (OUTPATIENT)
Dept: FAMILY MEDICINE | Facility: CLINIC | Age: 69
End: 2024-05-30
Payer: COMMERCIAL

## 2024-05-30 NOTE — TELEPHONE ENCOUNTER
Michelle - Please review and advise.    Patient saw you 5/29/24 for right hip pain following a fall, diagnosis: gluteal tendinopathy and trochanteric bursitis. Patient wonders if it's apparent whether her trochanteric bursitis was caused by the fall, or if bursitis was pre-existing.    Patient denies right hip pain, decreased/difficult movement, tenderness, or swelling of affected joint prior to injury. Notes she does have pre-existing bursitis of bilateral knees, but no problems with hip prior to falling.    OK to leave detailed message on return call.    Kaitlin Padgett RN  [facility]

## 2024-05-31 NOTE — TELEPHONE ENCOUNTER
Writer relayed message from Violet Arenas regarding right hip pain, etc.     Message from Violet:   Likely due to the fall.     VAN DurhamN RN  [facility]

## 2024-06-03 ENCOUNTER — TELEPHONE (OUTPATIENT)
Dept: FAMILY MEDICINE | Facility: CLINIC | Age: 69
End: 2024-06-03
Payer: COMMERCIAL

## 2024-06-03 ENCOUNTER — NURSE TRIAGE (OUTPATIENT)
Dept: FAMILY MEDICINE | Facility: CLINIC | Age: 69
End: 2024-06-03
Payer: COMMERCIAL

## 2024-06-03 NOTE — TELEPHONE ENCOUNTER
Provider Response to 2nd Level Triage Request    I have reviewed the RN documentation. My recommendation is:  Refer to ED if she truly cannot get up and walk and had a fall.  If this feels similar to previous migraine, could be seen in UC, but I suspect they will make her go to the ED as well.  I cannot run diagnostics in the clinic such as a CT scan and this can be done in the ED which is why we recommend that course of action.  Would use RN discretion if 911 needs to be called for well check

## 2024-06-03 NOTE — TELEPHONE ENCOUNTER
"Tyrese Alonso     SB    6/3/24  2:45 PM  Note     Patient Returning Call     Reason for call:  See nurse triage from today 6/3.     Patient called and was told by sister's that \"they tell people to go to the ER all the time to cover their butts. You call them back and get them to schedule you for the next day.\"     Patient told sister's that the care team doesn't just say it all the time and they told her to go to ER for a good reason but sisters are still instant patient call the care team to get scheduled same day.     Offered patient again if they would like us to call 911 but patient denied. Patient wishes that we reach out to sisters Sarai/Apple and Puja to discuss patient needing to go to the ER.     Information relayed to patient:  Will talk to nurse team and see what we can do and will reach out when we have an answer.     Patient has additional questions:  Yes     What are your questions/concerns:  Patient wants to know what they should do at this point     Who does the patient want to speak with:  Provider or RN     Is an  needed?:  No        Okay to leave a detailed message?: Yes at Home number on file [phone] (home)        "

## 2024-06-03 NOTE — TELEPHONE ENCOUNTER
"  Patient Returning Call    Reason for call:  See nurse triage from today 6/3.    Patient called and was told by sister's that \"they tell people to go to the ER all the time to cover their butts. You call them back and get them to schedule you for the next day.\"    Patient told sister's that the care team doesn't just say it all the time and they told her to go to ER for a good reason but sisters are still instant patient call the care team to get scheduled same day.    Offered patient again if they would like us to call 911 but patient denied. Patient wishes that we reach out to sisters Sarai/Apple and Puja to discuss patient needing to go to the ER.    Information relayed to patient:  Will talk to nurse team and see what we can do and will reach out when we have an answer.    Patient has additional questions:  Yes    What are your questions/concerns:  Patient wants to know what they should do at this point    Who does the patient want to speak with:  Provider or RN    Is an  needed?:  No      Okay to leave a detailed message?: Yes at Home number on file [phone] (home)  "

## 2024-06-03 NOTE — TELEPHONE ENCOUNTER
"Call received from patient:  Headache x 1 hour  Took 2 Tylenol  Hurts with movement  Location: top of head, right side  \"Strong ache\"  Stomach feels queasy  Pain is severe  Has had migraines for 15 years but not pain like this  No emesis  No diarrhea  Fell in bathroom 13 days ago but doing better  Landed on right hip but doing better  No new numbness/tingling  No fever  No eye pain nor sore throat  Vision in corner of left eye was slightly wavy before headache began  Unable to get up and walk    Writer recommended Emergency Room evaluation now and offered to call 911 to patient's residence as patient reported she cannot drive and would need to call her sister or a friend for transport.    Patient declined writer's offer to call 911 and stated she will call her sister and friend to figure out transport to ER.      VAN GarciaN, RN-[facility]      Reason for Disposition   Unable to walk without falling    Additional Information   Negative: Difficult to awaken or acting confused (e.g., disoriented, slurred speech)   Negative: Weakness of the face, arm or leg on one side of the body and new-onset   Negative: Numbness of the face, arm or leg on one side of the body and new-onset   Negative: Loss of speech or garbled speech and new-onset   Negative: Passed out (i.e., fainted, collapsed and was not responding)   Negative: Sounds like a life-threatening emergency to the triager   Negative: Followed a head injury within last 3 days   Negative: Traumatic Brain Injury (TBI) is suspected   Negative: Sinus pain of forehead and yellow or green nasal discharge   Negative: Pregnant    Protocols used: Headache-A-[place]    "

## 2024-06-03 NOTE — TELEPHONE ENCOUNTER
"I called pt.  Pt fell 2 weeks ago.  She can't walk. Gets dizzy when up.  H/a.  Spasm of pain when standing.    Pt says she can't call 911. She is not sure why. Sisters may get mad at her. She \"has cried sandra too many times.\"    Pt says her  is at work. Home by 430. They don't have a car.  Pt says her sisters are POAs.  I don't see a POA or consent to communicate on file.    PT said I could call either of her sisters.  I called Puja.  I reviewed the sx and direction from PCP.  I told Puja and Sarai to go over and see her sister. See the condition she is in.  Puja will call Sarai and follow up with PT.    (ADS is not an option at this time of day.)    Alyx Macias RN-[facility]         "

## 2024-07-09 DIAGNOSIS — E03.9 HYPOTHYROIDISM, UNSPECIFIED TYPE: ICD-10-CM

## 2024-07-10 RX ORDER — LEVOTHYROXINE SODIUM 125 UG/1
TABLET ORAL
Qty: 90 TABLET | Refills: 0 | Status: SHIPPED | OUTPATIENT
Start: 2024-07-10 | End: 2024-08-06

## 2024-07-25 ENCOUNTER — NURSE TRIAGE (OUTPATIENT)
Dept: FAMILY MEDICINE | Facility: CLINIC | Age: 69
End: 2024-07-25
Payer: COMMERCIAL

## 2024-07-25 NOTE — TELEPHONE ENCOUNTER
Nurse Triage SBAR    Is this a 2nd Level Triage? NO    Situation: Patient called stating been having some worsening body pain     Background: Patient has had this body ache/pain before but this time it has worsen. No recent travels.     Assessment: Moderate (7/10) LT side pain of the buttocks (majority of pain location) radiating down to back of left leg and assisted down to below the knee. Patient unsure of cause. Denies any other symptoms.     Protocol Recommended Disposition: See in Office Within 3 Days    Recommendation: Appt scheduled for patient to see [facility] provider tomorrow at  Clinic with Norbert Booth, patient is okay to see any available provider.        Does the patient meet one of the following criteria for ADS visit consideration? 16+ years old, with an [facility] PCP     TIP  Providers, please consider if this condition is appropriate for management at one of our Acute and Diagnostic Services sites.     If patient is a good candidate, please use dotphrase <dot>triageresponse and select Refer to ADS to document.      Reason for Disposition   MODERATE pain (e.g., interferes with normal activities) and present > 3 days    Additional Information   Negative: Shock suspected (e.g., cold/pale/clammy skin, too weak to stand, low BP, rapid pulse)   Negative: Difficult to awaken or acting confused (e.g., disoriented, slurred speech)   Negative: Sounds like a life-threatening emergency to the triager   Negative: Chest pain   Negative: Arm pains with exertion (e.g., walking)   Negative: Muscle aches from influenza (flu) suspected   Negative: Muscle aches from heat exposure suspected   Negative: Lyme disease suspected (e.g., bull's eye rash or tick bite / exposure in past month)   Negative: Pain only in back   Negative: Pain in one arm OR arm pains caused by recent vigorous activity (e.g., sports, lifting, overuse)   Negative: Pain in one leg OR leg pains caused by recent vigorous activity (e.g., sports, lifting,  "overuse)   Negative: Rash over large area or most of the body (widespread or generalized)   Negative: Dark (cola or tea-colored) or red-colored urine   Negative: Drinking very little and dehydration suspected (e.g., no urine > 12 hours, very dry mouth, very lightheaded)   Negative: Patient sounds very sick or weak to the triager   Negative: SEVERE pain (e.g., excruciating, unable to do any normal activities) and not improved 2 hours after pain medicine   Negative: SEVERE pain and taking a statin medicine (a lipid or cholesterol lowering drug)   Negative: Fever > 104 F (40 C)   Negative: Fever > 101 F (38.3 C) and over 60 years of age   Negative: Fever > 100.0 F  (37.8 C) and bedridden (e.g., CVA, chronic illness, recovering from surgery)   Negative: Fever > 100.0 F (37.8 C) and indwelling urinary catheter (e.g., [place], coude)   Negative: Fever > 100.0 F (37.8 C) and diabetes mellitus or weak immune system (e.g., HIV positive, cancer chemo, splenectomy, organ transplant, chronic steroids)   Negative: Fever present > 3 days (72 hours)   Negative: Muscle aches are unexplained and occur within 1 month of a tick bite   Negative: Diabetes mellitus or weak immune system (e.g., HIV positive, cancer chemo, splenectomy, organ transplant, chronic steroids)    Answer Assessment - Initial Assessment Questions  1. ONSET: \"When did the muscle aches or body pains start?\"       Last Thursday---has had it in the past but not this bad currently   2. LOCATION: \"What part of your body is hurting?\" (e.g., entire body, arms, legs)       Left side on buttocks, radiate down to back of leg to about intermediate below the knee   3. SEVERITY: \"How bad is the pain?\" (Scale 1-10; or mild, moderate, severe)    - MILD (1-3): doesn't interfere with normal activities     - MODERATE (4-7): interferes with normal activities or awakens from sleep     - SEVERE (8-10):  excruciating pain, unable to do any normal activities       7/10 moderate   4. CAUSE: " "\"What do you think is causing the pains?\"      Unsure, don't know   5. FEVER: \"Have you been having fever?\"      No   6. OTHER SYMPTOMS: \"Do you have any other symptoms?\" (e.g., chest pain, weakness, rash, cold or flu symptoms, weight loss)      No   7. PREGNANCY: \"Is there any chance you are pregnant?\" \"When was your last menstrual period?\"      N/A  8. TRAVEL: \"Have you traveled out of the country in the last month?\" (e.g., travel history, exposures)      No    Protocols used: Muscle Aches and Body Pain-A-[place]      Jeromy Marcial, MSN, RN   [facility]     "

## 2024-07-26 ENCOUNTER — OFFICE VISIT (OUTPATIENT)
Dept: FAMILY MEDICINE | Facility: CLINIC | Age: 69
End: 2024-07-26
Payer: COMMERCIAL

## 2024-07-26 VITALS
HEIGHT: 62 IN | HEART RATE: 75 BPM | RESPIRATION RATE: 16 BRPM | BODY MASS INDEX: 34.23 KG/M2 | TEMPERATURE: 97.9 F | WEIGHT: 186 LBS | DIASTOLIC BLOOD PRESSURE: 73 MMHG | OXYGEN SATURATION: 97 % | SYSTOLIC BLOOD PRESSURE: 146 MMHG

## 2024-07-26 DIAGNOSIS — M54.42 ACUTE LEFT-SIDED LOW BACK PAIN WITH LEFT-SIDED SCIATICA: Primary | ICD-10-CM

## 2024-07-26 PROCEDURE — G2211 COMPLEX E/M VISIT ADD ON: HCPCS | Performed by: PHYSICIAN ASSISTANT

## 2024-07-26 PROCEDURE — 99213 OFFICE O/P EST LOW 20 MIN: CPT | Performed by: PHYSICIAN ASSISTANT

## 2024-07-26 RX ORDER — METHYLPREDNISOLONE 4 MG
TABLET, DOSE PACK ORAL
Qty: 21 TABLET | Refills: 0 | Status: SHIPPED | OUTPATIENT
Start: 2024-07-26 | End: 2024-08-27

## 2024-07-26 RX ORDER — DIAZEPAM 10 MG
10 TABLET ORAL ONCE
COMMUNITY
Start: 2024-01-30 | End: 2024-08-06

## 2024-07-26 ASSESSMENT — PATIENT HEALTH QUESTIONNAIRE - PHQ9
SUM OF ALL RESPONSES TO PHQ QUESTIONS 1-9: 17
SUM OF ALL RESPONSES TO PHQ QUESTIONS 1-9: 17
10. IF YOU CHECKED OFF ANY PROBLEMS, HOW DIFFICULT HAVE THESE PROBLEMS MADE IT FOR YOU TO DO YOUR WORK, TAKE CARE OF THINGS AT HOME, OR GET ALONG WITH OTHER PEOPLE: SOMEWHAT DIFFICULT

## 2024-07-26 NOTE — PATIENT INSTRUCTIONS
Low Back Pain: Exercises  Introduction  Here are some examples of exercises for you to try. The exercises may be suggested for a condition or for rehabilitation. Start each exercise slowly. Ease off the exercises if you start to have pain.  You will be told when to start these exercises and which ones will work best for you.  How to do the exercises  Press-up    Lie on your stomach, supporting your body with your forearms.  Press your elbows down into the floor to raise your upper back. As you do this, relax your stomach muscles and allow your back to arch without using your back muscles. As your press up, do not let your hips or pelvis come off the floor.  Hold for 15 to 30 seconds, then relax.  Repeat 2 to 4 times.  Alternate arm and leg (bird dog)    Start on the floor, on your hands and knees.  Tighten your belly muscles by pulling your belly button in toward your spine. Be sure you continue to breathe normally and do not hold your breath.  Keeping your back and neck straight, raise one arm off the floor and hold it straight out in front of you. Be careful not to let your shoulder drop down, because that will twist your trunk.  Hold for about 6 seconds, then lower your arm and switch to your other arm. Over time, work up to holding for 10 to 30 seconds each time.  Repeat 8 to 12 times with each arm.  When you feel steady and strong doing this exercise with your arms, try doing the exercise with your legs instead. Raise one leg and hold it straight out behind you. Be careful not to let your hip drop down, because that will twist your trunk.  When holding your leg straight out becomes easier, try raising your opposite arm at the same time.  Knee-to-chest exercise    Lie on your back with your knees bent and your feet flat on the floor.  Bring one knee to your chest, keeping the other foot flat on the floor (or keeping the other leg straight, whichever feels better on your lower back).  Keep your lower back pressed  to the floor. Hold for at least 15 to 30 seconds.  Relax, and lower the knee to the starting position.  Repeat with the other leg. Repeat 2 to 4 times with each leg.  To get more stretch, put your other leg flat on the floor while pulling your knee to your chest.  Curl-ups    Lie on the floor on your back with your knees bent at a 90-degree angle. Your feet should be flat on the floor, about 12 inches from your buttocks.  Cross your arms over your chest. If this bothers your neck, try putting your hands behind your neck (not your head), with your elbows spread apart.  Slowly tighten your belly muscles and raise your shoulder blades off the floor.  Keep your head in line with your body, and do not press your chin to your chest.  Hold this position for 1 or 2 seconds, then slowly lower yourself back down to the floor.  Repeat 8 to 12 times.  Pelvic tilt    Lie on your back with your knees bent and your feet flat on the floor.  Tighten your belly muscles and buttocks, and press your lower back to the floor. You should feel your hips and pelvis rock back.  Hold for about 6 seconds while breathing smoothly, and then relax.  Repeat 8 to 12 times.  Heel dig bridging    Lie on your back with both knees bent and your ankles bent so that only your heels are digging into the floor. Your knees should be bent about 90 degrees.  Then push your heels into the floor, squeeze your buttocks, and lift your hips off the floor until your shoulders, hips, and knees are all in a straight line.  Hold for about 6 seconds as you continue to breathe normally, and then slowly lower your hips back down to the floor and rest for up to 10 seconds.  Do 8 to 12 repetitions.  Hamstring stretch in doorway    Lie on your back in a doorway, with one leg through the open door.  Slide your leg up the wall to straighten your knee. You should feel a gentle stretch down the back of your leg.  Hold the stretch for at least 15 to 30 seconds. Do not arch your  back, point your toes, or bend either knee. Keep one heel touching the floor and the other heel touching the wall.  Repeat with your other leg.  Do 2 to 4 times for each leg.  Hip flexor stretch    Kneel on the floor with one knee bent and one leg behind you. Place your forward knee over your foot. Keep your other knee touching the floor.  Slowly push your hips forward until you feel a stretch in the upper thigh of your rear leg.  Hold the stretch for at least 15 to 30 seconds. Repeat with your other leg.  Do 2 to 4 times on each side.  Back press    Stand with your back 10 to 12 inches away from a wall.  Lean into the wall until your back is against it. Press your lower back against the wall by pulling in your stomach muscles.  Slowly slide down until your knees are slightly bent, pressing your lower back into the wall.  Hold for at least 6 seconds, then slide back up the wall.  Repeat 8 to 12 times.  Over time, work up to holding this position for as much as 1 minute.  Follow-up care is a key part of your treatment and safety. Be sure to make and go to all appointments, and call your doctor if you are having problems. It's also a good idea to know your test results and keep a list of the medicines you take.  Current as of: November 9, 2022               Content Version: 13.7    [id number] [employer].   Care instructions adapted under license by your healthcare professional. If you have questions about a medical condition or this instruction, always ask your healthcare professional. [employer] disclaims any warranty or liability for your use of this information.

## 2024-07-26 NOTE — PROGRESS NOTES
"  Assessment & Plan     (M54.42) Acute left-sided low back pain with left-sided sciatica  (primary encounter diagnosis)  Comment:   Plan: methylPREDNISolone (MEDROL DOSEPAK) 4 MG tablet        therapy pack, Physical Therapy          Referral        Etiology of low back pain discussed today.  Treatment course including medications and home exercises discussed.  Advised of medication side effects.  Based on today's examination and history of current back pain, imaging not indicated at this time.  If not improving with the above treatment plan, consider formal physical therapy and/or imaging.  Patient advised to follow-up if not improving over the next 7 to 10 days      BMI  Estimated body mass index is 34.23 kg/m  as calculated from the following:    Height as of this encounter: 1.57 m (5' 1.81\").    Weight as of this encounter: 84.4 kg (186 lb).             Melita Edwards is a 69 year old, presenting for the following health issues:  Musculoskeletal Problem and Tailbone Pain      7/26/2024     2:19 PM   Additional Questions   Roomed by Alyssa     History of Present Illness       Reason for visit:  Pain in my left buttock, radiating down the back of myleft leg,almost to my knee.  Symptom onset:  1-2 weeks ago  Symptoms include:  Weakness and tenderness  Symptom intensity:  Moderate  Symptom progression:  Staying the same  Had these symptoms before:  Yes  Has tried/received treatment for these symptoms:  No  What makes it worse:  Standing, sitting, reaching up and walking  What makes it better:  Sitting or lying down on my right side and taking lukewarm soaking tub baths    She eats 2-3 servings of fruits and vegetables daily.She consumes 0 sweetened beverage(s) daily.She exercises with enough effort to increase her heart rate 10 to 19 minutes per day.  She exercises with enough effort to increase her heart rate 3 or less days per week. She is missing 2 dose(s) of medications per week.     No inciting " "injury, no falls.    Noting no pain in her L foot, no weakness in L leg.    No urinary or bowel incontinence or hesitancy        Objective    BP (!) 159/73 (BP Location: Right arm, Patient Position: Sitting, Cuff Size: Adult Regular)   Pulse 75   Temp 97.9  F (36.6  C) (Temporal)   Resp 16   Ht 1.57 m (5' 1.81\")   Wt 84.4 kg (186 lb)   LMP 09/13/2006   SpO2 97%   BMI 34.23 kg/m    Body mass index is 34.23 kg/m .  Physical Exam  Musculoskeletal:      Lumbar back: Positive left straight leg raise test.        Back Exam     Tenderness   The patient is experiencing tenderness in the lumbar.    Range of Motion   The patient has normal back ROM.    Muscle Strength   The patient has normal back strength.    Tests   Straight leg raise left: positive    Reflexes   Patellar:  normal    Other   Toe walk: normal  Heel walk: normal  Sensation: normal  Gait: normal   Erythema: no back redness    Comments:  Left gluteal group with tenderness to palpation, tight hamstrings bilaterally                      Signed Electronically by: Norbert Booth PA-C    "

## 2024-07-31 ENCOUNTER — TELEPHONE (OUTPATIENT)
Dept: FAMILY MEDICINE | Facility: CLINIC | Age: 69
End: 2024-07-31
Payer: COMMERCIAL

## 2024-07-31 NOTE — TELEPHONE ENCOUNTER
Patient calling to ask when her most recent tetanus shot was given.    Reviewed most recent TDAP as of 8/24/2023 - patient noted this for her records and has no other questions at this time.    VAN MondragonN, RN (she/her)  [facility] RN

## 2024-08-06 ENCOUNTER — OFFICE VISIT (OUTPATIENT)
Dept: FAMILY MEDICINE | Facility: CLINIC | Age: 69
End: 2024-08-06
Payer: COMMERCIAL

## 2024-08-06 VITALS
RESPIRATION RATE: 21 BRPM | OXYGEN SATURATION: 100 % | SYSTOLIC BLOOD PRESSURE: 138 MMHG | BODY MASS INDEX: 34.14 KG/M2 | TEMPERATURE: 97.6 F | HEART RATE: 74 BPM | DIASTOLIC BLOOD PRESSURE: 70 MMHG | WEIGHT: 185.5 LBS

## 2024-08-06 DIAGNOSIS — I10 BENIGN ESSENTIAL HYPERTENSION: ICD-10-CM

## 2024-08-06 DIAGNOSIS — Z00.00 ENCOUNTER FOR MEDICARE ANNUAL WELLNESS EXAM: ICD-10-CM

## 2024-08-06 DIAGNOSIS — R73.03 PRE-DIABETES: ICD-10-CM

## 2024-08-06 DIAGNOSIS — Z12.31 VISIT FOR SCREENING MAMMOGRAM: ICD-10-CM

## 2024-08-06 DIAGNOSIS — E78.2 MIXED HYPERLIPIDEMIA: ICD-10-CM

## 2024-08-06 DIAGNOSIS — I10 ESSENTIAL HYPERTENSION: ICD-10-CM

## 2024-08-06 DIAGNOSIS — B37.9 CANDIDA INFECTION: ICD-10-CM

## 2024-08-06 DIAGNOSIS — Z12.11 SCREEN FOR COLON CANCER: Primary | ICD-10-CM

## 2024-08-06 DIAGNOSIS — N95.2 VAGINAL ATROPHY: ICD-10-CM

## 2024-08-06 DIAGNOSIS — E03.9 HYPOTHYROIDISM, UNSPECIFIED TYPE: ICD-10-CM

## 2024-08-06 LAB
CHOLEST SERPL-MCNC: 173 MG/DL
FASTING STATUS PATIENT QL REPORTED: NO
HDLC SERPL-MCNC: 48 MG/DL
LDLC SERPL CALC-MCNC: 100 MG/DL
NONHDLC SERPL-MCNC: 125 MG/DL
T4 FREE SERPL-MCNC: 1.67 NG/DL (ref 0.9–1.7)
TRIGL SERPL-MCNC: 125 MG/DL
TSH SERPL DL<=0.005 MIU/L-ACNC: 0.14 UIU/ML (ref 0.3–4.2)

## 2024-08-06 PROCEDURE — 84443 ASSAY THYROID STIM HORMONE: CPT | Performed by: NURSE PRACTITIONER

## 2024-08-06 PROCEDURE — 84439 ASSAY OF FREE THYROXINE: CPT | Performed by: NURSE PRACTITIONER

## 2024-08-06 PROCEDURE — G0439 PPPS, SUBSEQ VISIT: HCPCS | Performed by: NURSE PRACTITIONER

## 2024-08-06 PROCEDURE — 80061 LIPID PANEL: CPT | Performed by: NURSE PRACTITIONER

## 2024-08-06 PROCEDURE — 36415 COLL VENOUS BLD VENIPUNCTURE: CPT | Performed by: NURSE PRACTITIONER

## 2024-08-06 PROCEDURE — 99214 OFFICE O/P EST MOD 30 MIN: CPT | Mod: 25 | Performed by: NURSE PRACTITIONER

## 2024-08-06 RX ORDER — ATORVASTATIN CALCIUM 80 MG/1
80 TABLET, FILM COATED ORAL DAILY
Qty: 90 TABLET | Refills: 3 | Status: SHIPPED | OUTPATIENT
Start: 2024-08-06

## 2024-08-06 RX ORDER — LEVOTHYROXINE SODIUM 125 UG/1
125 TABLET ORAL DAILY
Qty: 90 TABLET | Refills: 1 | Status: SHIPPED | OUTPATIENT
Start: 2024-08-06

## 2024-08-06 RX ORDER — METFORMIN HCL 500 MG
TABLET, EXTENDED RELEASE 24 HR ORAL
Qty: 180 TABLET | Refills: 0 | Status: SHIPPED | OUTPATIENT
Start: 2024-08-06

## 2024-08-06 RX ORDER — NYSTATIN 100000 U/G
OINTMENT TOPICAL 2 TIMES DAILY
Qty: 30 G | Refills: 4 | Status: SHIPPED | OUTPATIENT
Start: 2024-08-06

## 2024-08-06 RX ORDER — DEXTROAMPHETAMINE SACCHARATE, AMPHETAMINE ASPARTATE MONOHYDRATE, DEXTROAMPHETAMINE SULFATE AND AMPHETAMINE SULFATE 7.5; 7.5; 7.5; 7.5 MG/1; MG/1; MG/1; MG/1
30 CAPSULE, EXTENDED RELEASE ORAL DAILY
Status: CANCELLED | OUTPATIENT
Start: 2024-08-06

## 2024-08-06 RX ORDER — DEXTROAMPHETAMINE SACCHARATE, AMPHETAMINE ASPARTATE MONOHYDRATE, DEXTROAMPHETAMINE SULFATE AND AMPHETAMINE SULFATE 2.5; 2.5; 2.5; 2.5 MG/1; MG/1; MG/1; MG/1
10 CAPSULE, EXTENDED RELEASE ORAL DAILY
Status: CANCELLED | OUTPATIENT
Start: 2024-08-06

## 2024-08-06 RX ORDER — LOSARTAN POTASSIUM 25 MG/1
25 TABLET ORAL DAILY
Qty: 90 TABLET | Refills: 3 | Status: SHIPPED | OUTPATIENT
Start: 2024-08-06

## 2024-08-06 RX ORDER — AMLODIPINE BESYLATE 5 MG/1
5 TABLET ORAL AT BEDTIME
Qty: 90 TABLET | Refills: 1 | Status: SHIPPED | OUTPATIENT
Start: 2024-08-06

## 2024-08-06 RX ORDER — ESTRADIOL 0.1 MG/G
2 CREAM VAGINAL
Qty: 42.5 G | Refills: 4 | Status: SHIPPED | OUTPATIENT
Start: 2024-08-08

## 2024-08-06 SDOH — HEALTH STABILITY: PHYSICAL HEALTH: ON AVERAGE, HOW MANY DAYS PER WEEK DO YOU ENGAGE IN MODERATE TO STRENUOUS EXERCISE (LIKE A BRISK WALK)?: 1 DAY

## 2024-08-06 ASSESSMENT — PATIENT HEALTH QUESTIONNAIRE - PHQ9
SUM OF ALL RESPONSES TO PHQ QUESTIONS 1-9: 12
10. IF YOU CHECKED OFF ANY PROBLEMS, HOW DIFFICULT HAVE THESE PROBLEMS MADE IT FOR YOU TO DO YOUR WORK, TAKE CARE OF THINGS AT HOME, OR GET ALONG WITH OTHER PEOPLE: SOMEWHAT DIFFICULT
SUM OF ALL RESPONSES TO PHQ QUESTIONS 1-9: 12

## 2024-08-06 ASSESSMENT — PAIN SCALES - GENERAL: PAINLEVEL: NO PAIN (0)

## 2024-08-06 ASSESSMENT — SOCIAL DETERMINANTS OF HEALTH (SDOH): HOW OFTEN DO YOU GET TOGETHER WITH FRIENDS OR RELATIVES?: NEVER

## 2024-08-06 NOTE — LETTER
August 9, 2024      Grace Chinchilla  [address] [place] [address]        Dear ,    We are writing to inform you of your test results.    Labs look good overall.  TSH is a little low, but T4 is normal.  As long as you are not having symptoms, we can leave your current dose of levothyroxine as it is.  We should plan to recheck in a couple months to monitor.  I have placed a lab only order and you can schedule this either through [employer] or calling central scheduling.     Resulted Orders   Lipid panel reflex to direct LDL Non-fasting   Result Value Ref Range    Cholesterol 173 <200 mg/dL    Triglycerides 125 <150 mg/dL    Direct Measure HDL 48 (L) >=50 mg/dL    LDL Cholesterol Calculated 100 <=100 mg/dL    Non HDL Cholesterol 125 <130 mg/dL    Patient Fasting > 8hrs? No     Narrative    Cholesterol  Desirable:  <200 mg/dL    Triglycerides  Normal:  Less than 150 mg/dL  Borderline High:  150-199 mg/dL  High:  200-499 mg/dL  Very High:  Greater than or equal to 500 mg/dL    Direct Measure HDL  Female:  Greater than or equal to 50 mg/dL   Male:  Greater than or equal to 40 mg/dL    LDL Cholesterol  Desirable:  <100mg/dL  Above Desirable:  100-129 mg/dL   Borderline High:  130-159 mg/dL   High:  160-189 mg/dL   Very High:  >= 190 mg/dL    Non HDL Cholesterol  Desirable:  130 mg/dL  Above Desirable:  130-159 mg/dL  Borderline High:  160-189 mg/dL  High:  190-219 mg/dL  Very High:  Greater than or equal to 220 mg/dL   TSH WITH FREE T4 REFLEX   Result Value Ref Range    TSH 0.14 (L) 0.30 - 4.20 uIU/mL   T4 free   Result Value Ref Range    Free T4 1.67 0.90 - 1.70 ng/dL       If you have any questions or concerns, please call the clinic at the number listed above.       Sincerely,      Laila Sanches, [facility]

## 2024-08-06 NOTE — PROGRESS NOTES
"Preventive Care Visit  [facility]  Laila Sanches DNP, Nurse Practitioner Primary Care  Aug 6, 2024      Assessment & Plan     Encounter for Medicare annual wellness exam  Reviewed medical/social/family history and health maintenance     Vaginal atrophy  Complaints of vaginal dryness, will start estrace today  - estradiol (ESTRACE) 0.1 MG/GM vaginal cream; Place 2 g vaginally twice a week    Candida infection  Vaginal infection, has been using cortisone.  Likely exacerbated by stress incontinence and pad usage.   - nystatin (MYCOSTATIN) 050541 UNIT/GM external ointment; Apply topically 2 times daily To vagina    Benign essential hypertension   Stable, tolerating med, no changes at this time  - losartan (COZAAR) 25 MG tablet; Take 1 tablet (25 mg) by mouth daily  - amLODIPine (NORVASC) 5 MG tablet; Take 1 tablet (5 mg) by mouth at bedtime    Hypothyroidism, unspecified type   Stable, tolerating med, no changes at this time  - TSH WITH FREE T4 REFLEX; Future  - levothyroxine (SYNTHROID/LEVOTHROID) 125 MCG tablet; Take 1 tablet (125 mcg) by mouth daily  - TSH WITH FREE T4 REFLEX    Pre-diabetes  Well controlled on metformin.   - metFORMIN (GLUCOPHAGE XR) 500 MG 24 hr tablet; 1 tab after bfast and dinner daily .      Mixed hyperlipidemia  - Lipid panel reflex to direct LDL Non-fasting; Future  - atorvastatin (LIPITOR) 80 MG tablet; Take 1 tablet (80 mg) by mouth daily  - Lipid panel reflex to direct LDL Non-fasting    Screen for colon cancer  - Colonoscopy Screening  Referral; Future    Visit for screening mammogram  - MA Screening Bilateral w/ [facility]; Future    Patient has been advised of split billing requirements and indicates understanding: Yes        BMI  Estimated body mass index is 34.14 kg/m  as calculated from the following:    Height as of 7/26/24: 1.57 m (5' 1.81\").    Weight as of this encounter: 84.1 kg (185 lb 8 oz).   Weight management plan: Patient referred to " endocrine and/or weight management specialty    Counseling  Appropriate preventive services were addressed with this patient via screening, questionnaire, or discussion as appropriate for fall prevention, nutrition, physical activity, Tobacco-use cessation, weight loss and cognition.  Checklist reviewing preventive services available has been given to the patient.  Reviewed patient's diet, addressing concerns and/or questions.   She is at risk for lack of exercise and has been provided with information to increase physical activity for the benefit of her well-being.   Patient is at risk for social isolation and has been provided with information about the benefit of social connection.   She is at risk for psychosocial distress and has been provided with information to reduce risk.   Discussed possible causes of fatigue. Updated plan of care.  Patient reported difficulty with activities of daily living were addressed today.The patient was provided with written information regarding signs of hearing loss.   Information on urinary incontinence and treatment options given to patient.   The patient's PHQ-9 score is consistent with moderate depression. She was provided with information regarding depression.           Melita Edwards is a 69 year old, presenting for the following:  Medicare Visit          Health Care Directive  Patient does not have a Health Care Directive or Living Will: Discussed advance care planning with patient; information given to patient to review.    HPI        8/6/2024   General Health   How would you rate your overall physical health? (!) FAIR   Feel stress (tense, anxious, or unable to sleep) Only a little      (!) STRESS CONCERN      8/6/2024   Nutrition   Diet: Regular (no restrictions)    Low salt       Multiple values from one day are sorted in reverse-chronological order         8/6/2024   Exercise   Days per week of moderate/strenous exercise 1 day      (!) EXERCISE CONCERN       8/6/2024   Social Factors   Frequency of gathering with friends or relatives Never   Worry food won't last until get money to buy more Yes   Food not last or not have enough money for food? Yes   Do you have housing? (Housing is defined as stable permanent housing and does not include staying ouside in a car, in a tent, in an abandoned building, in an overnight shelter, or couch-surfing.) Yes   Are you worried about losing your housing? No   Lack of transportation? No   Unable to get utilities (heat,electricity)? No      (!) FOOD SECURITY CONCERN PRESENT(!) SOCIAL CONNECTIONS CONCERN      8/6/2024   Fall Risk   Fallen 2 or more times in the past year? Yes   Trouble with walking or balance? No              8/6/2024   Activities of Daily Living- Home Safety   Needs help with the following daily activites Shopping    Money management   Safety concerns in the home None of the above       Multiple values from one day are sorted in reverse-chronological order         8/6/2024   Dental   Dentist two times every year? Yes            8/6/2024   Hearing Screening   Hearing concerns? (!) I NEED TO ASK PEOPLE TO SPEAK UP OR REPEAT THEMSELVES.    (!) TROUBLE UNDESTANDING A SPEAKER IN A PUBLIC MEETING OR [employer] SERVICE.    (!) TROUBLE UNDERSTANDING SOFT OR WHISPERED SPEECH.       Multiple values from one day are sorted in reverse-chronological order         8/6/2024   Driving Risk Screening   Patient/family members have concerns about driving No            8/6/2024   General Alertness/Fatigue Screening   Have you been more tired than usual lately? (!) YES            8/6/2024   Urinary Incontinence Screening   Bothered by leaking urine in past 6 months Yes            8/6/2024   TB Screening   Were you born outside of the [place]? No          Today's PHQ-9 Score:       8/6/2024     2:14 PM   PHQ-9 SCORE   PHQ-9 Total Score MyChart 12 (Moderate depression)   PHQ-9 Total Score 12         8/6/2024   Substance Use   Alcohol more than  3/day or more than 7/wk Not Applicable   Do you have a current opioid prescription? No   How severe/bad is pain from 1 to 10? 5/10   Do you use any other substances recreationally? (!) OTHER        Social History     Tobacco Use    Smoking status: Never    Smokeless tobacco: Never   Vaping Use    Vaping status: Never Used   Substance Use Topics    Alcohol use: No    Drug use: No           3/24/2022   LAST FHS-7 RESULTS   1st degree relative breast or ovarian cancer No   Any relative bilateral breast cancer No   Any male have breast cancer No   Any ONE woman have BOTH breast AND ovarian cancer No   Any woman with breast cancer before 50yrs Yes   2 or more relatives with breast AND/OR ovarian cancer No   2 or more relatives with breast AND/OR bowel cancer Yes           Mammogram Screening - Mammogram every 1-2 years updated in Health Maintenance based on mutual decision making      History of abnormal Pap smear: No - age 65 or older with adequate negative prior screening test results (3 consecutive negative cytology results, 2 consecutive negative cotesting results, or 2 consecutive negative HrHPV test results within 10 years, with the most recent test occurring within the recommended screening interval for the test used)        12/5/2016     2:58 PM   PAP / HPV   PAP Negative for squamous intraepithelial lesion or malignancy  Electronically signed by Cayden Gray CT (ASCP) on 12/9/2016 at  3:07 PM        ASCVD Risk   The 10-year ASCVD risk score (Isiah DK, et al., 2019) is: 14.5%    Values used to calculate the score:      Age: 69 years      Sex: Female      Is Non- : No      Diabetic: No      Tobacco smoker: No      Systolic Blood Pressure: 138 mmHg      Is BP treated: Yes      HDL Cholesterol: 50 mg/dL      Total Cholesterol: 187 mg/dL            Reviewed and updated as needed this visit by Provider                      Current providers sharing in care for this patient  "include:  Patient Care Team:  Laila Sanches DNP as PCP - General (Nurse Practitioner Primary Care)  Payam Martins Chi, OD as MD (Optometry)  Arley Abraham MD as Hospitalist (Internal Medicine)  Jessica Motley MD as Assigned Surgical Provider  Rose Amin MD as MD (Ophthalmology)  Jarett Sen RPH as Pharmacist (Pharmacist)  Jarett Sen RPH as Assigned MTM Pharmacist  Laila Sanches DNP as Assigned PCP    The following health maintenance items are reviewed in Epic and correct as of today:  Health Maintenance   Topic Date Due    COLORECTAL CANCER SCREENING  12/04/2023    COVID-19 Vaccine (7 - 2023-24 season) 02/17/2024    MAMMO SCREENING  03/24/2024    LIPID  08/03/2024    TSH W/FREE T4 REFLEX  08/03/2024    INFLUENZA VACCINE (1) 09/01/2024    MEDICARE ANNUAL WELLNESS VISIT  08/06/2025    ANNUAL REVIEW OF HM ORDERS  08/06/2025    FALL RISK ASSESSMENT  08/06/2025    GLUCOSE  04/08/2027    ADVANCE CARE PLANNING  08/06/2029    DTAP/TDAP/TD IMMUNIZATION (5 - Td or Tdap) 08/24/2033    DEXA  09/27/2034    HEPATITIS C SCREENING  Completed    Pneumococcal Vaccine: 65+ Years  Completed    ZOSTER IMMUNIZATION  Completed    RSV VACCINE (Pregnancy & 60+)  Completed    IPV IMMUNIZATION  Aged Out    HPV IMMUNIZATION  Aged Out    MENINGITIS IMMUNIZATION  Aged Out    RSV MONOCLONAL ANTIBODY  Aged Out            Objective    Exam  /70 (BP Location: Right arm, Patient Position: Sitting, Cuff Size: Adult Regular)   Pulse 74   Temp 97.6  F (36.4  C) (Temporal)   Resp 21   Wt 84.1 kg (185 lb 8 oz)   LMP 09/13/2006   SpO2 100%   BMI 34.14 kg/m     Estimated body mass index is 34.14 kg/m  as calculated from the following:    Height as of 7/26/24: 1.57 m (5' 1.81\").    Weight as of this encounter: 84.1 kg (185 lb 8 oz).    Physical Exam  GENERAL: alert and no distress  EYES: Eyes grossly normal to inspection, PERRL and conjunctivae and sclerae normal  HENT: ear canals and TM's normal, nose and " mouth without ulcers or lesions  NECK: no adenopathy, no asymmetry, masses, or scars  RESP: lungs clear to auscultation - no rales, rhonchi or wheezes  CV: regular rate and rhythm, normal S1 S2, no S3 or S4, no murmur, click or rub, no peripheral edema  ABDOMEN: soft, nontender, no hepatosplenomegaly, no masses and bowel sounds normal  MS: no gross musculoskeletal defects noted, no edema         8/6/2024   Mini Cog   Clock Draw Score 2 Normal   3 Item Recall 2 objects recalled   Mini Cog Total Score 4                 Signed Electronically by: Laila Sacnhes DNP    Answers submitted by the patient for this visit:  Patient Health Questionnaire (Submitted on 8/6/2024)  If you checked off any problems, how difficult have these problems made it for you to do your work, take care of things at home, or get along with other people?: Somewhat difficult  PHQ9 TOTAL SCORE: 12

## 2024-08-06 NOTE — PATIENT INSTRUCTIONS
Patient Education   Preventive Care Advice   This is general advice given by our system to help you stay healthy. However, your care team may have specific advice just for you. Please talk to your care team about your preventive care needs.  Nutrition  Eat 5 or more servings of fruits and vegetables each day.  Try wheat bread, brown rice and whole grain pasta (instead of white bread, rice, and pasta).  Get enough calcium and vitamin D. Check the label on foods and aim for 100% of the RDA (recommended daily allowance).  Lifestyle  Exercise at least 150 minutes each week  (30 minutes a day, 5 days a week).  Do muscle strengthening activities 2 days a week. These help control your weight and prevent disease.  No smoking.  Wear sunscreen to prevent skin cancer.  Have a dental exam and cleaning every 6 months.  Yearly exams  See your health care team every year to talk about:  Any changes in your health.  Any medicines your care team has prescribed.  Preventive care, family planning, and ways to prevent chronic diseases.  Shots (vaccines)   HPV shots (up to age 26), if you've never had them before.  Hepatitis B shots (up to age 59), if you've never had them before.  COVID-19 shot: Get this shot when it's due.  Flu shot: Get a flu shot every year.  Tetanus shot: Get a tetanus shot every 10 years.  Pneumococcal, hepatitis A, and RSV shots: Ask your care team if you need these based on your risk.  Shingles shot (for age 50 and up)  General health tests  Diabetes screening:  Starting at age 35, Get screened for diabetes at least every 3 years.  If you are younger than age 35, ask your care team if you should be screened for diabetes.  Cholesterol test: At age 39, start having a cholesterol test every 5 years, or more often if advised.  Bone density scan (DEXA): At age 50, ask your care team if you should have this scan for osteoporosis (brittle bones).  Hepatitis C: Get tested at least once in your life.  STIs (sexually  transmitted infections)  Before age 24: Ask your care team if you should be screened for STIs.  After age 24: Get screened for STIs if you're at risk. You are at risk for STIs (including HIV) if:  You are sexually active with more than one person.  You don't use condoms every time.  You or a partner was diagnosed with a sexually transmitted infection.  If you are at risk for HIV, ask about PrEP medicine to prevent HIV.  Get tested for HIV at least once in your life, whether you are at risk for HIV or not.  Cancer screening tests  Cervical cancer screening: If you have a cervix, begin getting regular cervical cancer screening tests starting at age 21.  Breast cancer scan (mammogram): If you've ever had breasts, begin having regular mammograms starting at age 40. This is a scan to check for breast cancer.  Colon cancer screening: It is important to start screening for colon cancer at age 45.  Have a colonoscopy test every 10 years (or more often if you're at risk) Or, ask your provider about stool tests like a FIT test every year or Cologuard test every 3 years.  To learn more about your testing options, visit:   .  For help making a decision, visit:   https://bit.ly/on57890.  Prostate cancer screening test: If you have a prostate, ask your care team if a prostate cancer screening test (PSA) at age 55 is right for you.  Lung cancer screening: If you are a current or former smoker ages 50 to 80, ask your care team if ongoing lung cancer screenings are right for you.  For informational purposes only. Not to replace the advice of your health care provider. Copyright   2023 [facility]. All rights reserved. Clinically reviewed by the [facility]. [place] [id number] - REV 01/24.  Learning About Activities of Daily Living  What are activities of daily living?     Activities of daily living (ADLs) are the basic self-care tasks you do every day. These include eating, bathing, dressing,  and moving around.  As you age, and if you have health problems, you may find that it's harder to do some of these tasks. If so, your doctor can suggest ideas that may help.  To measure what kind of help you may need, your doctor will ask how well you are able to do ADLs. Let your doctor know if there are any tasks that you are having trouble doing. This is an important first step to getting help. And when you have the help you need, you can stay as independent as possible.  How will a doctor assess your ADLs?  Asking about ADLs is part of a routine health checkup your doctor will likely do as you age. Your health check might be done in a doctor's office, in your home, or at a hospital. The goal is to find out if you are having any problems that could make it hard to care for yourself or that make it unsafe for you to be on your own.  To measure your ADLs, your doctor will ask how hard it is for you to do routine tasks. Your doctor may also want to know if you have changed the way you do a task because of a health problem. Your doctor may watch how you:  Walk back and forth.  Keep your balance while you stand or walk.  Move from sitting to standing or from a bed to a chair.  Button or unbutton a shirt or sweater.  Remove and put on your shoes.  It's common to feel a little worried or anxious if you find you can't do all the things you used to be able to do. Talking with your doctor about ADLs is a way to make sure you're as safe as possible and able to care for yourself as well as you can. You may want to bring a caregiver, friend, or family member to your checkup. They can help you talk to your doctor.  Follow-up care is a key part of your treatment and safety. Be sure to make and go to all appointments, and call your doctor if you are having problems. It's also a good idea to know your test results and keep a list of the medicines you take.  Current as of: October 24, 2023  Content Version: 14.1    6387-8321  Healthwise, [employer].   Care instructions adapted under license by your healthcare professional. If you have questions about a medical condition or this instruction, always ask your healthcare professional. [email or web address] disclaims any warranty or liability for your use of this information.    Preventing Falls: Care Instructions  Injuries and health problems such as trouble walking or poor eyesight can increase your risk of falling. So can some medicines. But there are things you can do to help prevent falls. You can exercise to get stronger. You can also arrange your home to make it safer.    Talk to your doctor about the medicines you take. Ask if any of them increase the risk of falls and whether they can be changed or stopped.   Try to exercise regularly. It can help improve your strength and balance. This can help lower your risk of falling.     Practice fall safety and prevention.    Wear low-heeled shoes that fit well and give your feet good support. Talk to your doctor if you have foot problems that make this hard.  Carry a cellphone or wear a medical alert device that you can use to call for help.  Use stepladders instead of chairs to reach high objects. Don't climb if you're at risk for falls. Ask for help, if needed.  Wear the correct eyeglasses, if you need them.    Make your home safer.    Remove rugs, cords, clutter, and furniture from walkways.  Keep your house well lit. Use night-lights in hallways and bathrooms.  Install and use sturdy handrails on stairways.  Wear nonskid footwear, even inside. Don't walk barefoot or in socks without shoes.    Be safe outside.    Use handrails, curb cuts, and ramps whenever possible.  Keep your hands free by using a shoulder bag or backpack.  Try to walk in well-lit areas. Watch out for uneven ground, changes in pavement, and debris.  Be careful in the winter. Walk on the grass or gravel when sidewalks are slippery. Use de-icer on steps and walkways.  "Add non-slip devices to shoes.    Put grab bars and nonskid mats in your shower or tub and near the toilet. Try to use a shower chair or bath bench when bathing.   Get into a tub or shower by putting in your weaker leg first. Get out with your strong side first. Have a phone or medical alert device in the bathroom with you.   Where can you learn more?  Go to https://www.[employer]/patiented  Enter G117 in the search box to learn more about \"Preventing Falls: Care Instructions.\"  Current as of: July 17, 2023               Content Version: 14.0    0853-3496 [employer].   Care instructions adapted under license by your healthcare professional. If you have questions about a medical condition or this instruction, always ask your healthcare professional. [employer] disclaims any warranty or liability for your use of this information.      Hearing Loss: Care Instructions  Overview     Hearing loss is a sudden or slow decrease in how well you hear. It can range from slight to profound. Permanent hearing loss can occur with aging. It also can happen when you are exposed long-term to loud noise. Examples include listening to loud music, riding motorcycles, or being around other loud machines.  Hearing loss can affect your work and home life. It can make you feel lonely or depressed. You may feel that you have lost your independence. But hearing aids and other devices can help you hear better and feel connected to others.  Follow-up care is a key part of your treatment and safety. Be sure to make and go to all appointments, and call your doctor if you are having problems. It's also a good idea to know your test results and keep a list of the medicines you take.  How can you care for yourself at home?  Avoid loud noises whenever possible. This helps keep your hearing from getting worse.  Always wear hearing protection around loud noises.  Wear a hearing aid as directed.  A professional can help " "you pick a hearing aid that will work best for you.  You can also get hearing aids over the counter for mild to moderate hearing loss.  Have hearing tests as your doctor suggests. They can show whether your hearing has changed. Your hearing aid may need to be adjusted.  Use other devices as needed. These may include:  Telephone amplifiers and hearing aids that can connect to a television, stereo, radio, or microphone.  Devices that use lights or vibrations. These alert you to the doorbell, a ringing telephone, or a baby monitor.  Television closed-captioning. This shows the words at the bottom of the screen. Most new TVs can do this.  TTY (text telephone). This lets you type messages back and forth on the telephone instead of talking or listening. These devices are also called TDD. When messages are typed on the keyboard, they are sent over the phone line to a receiving TTY. The message is shown on a monitor.  Use text messaging, social media, and email if it is hard for you to communicate by telephone.  Try to learn a listening technique called speechreading. It is not lipreading. You pay attention to people's gestures, expressions, posture, and tone of voice. These clues can help you understand what a person is saying. Face the person you are talking to, and have them face you. Make sure the lighting is good. You need to see the other person's face clearly.  Think about counseling if you need help to adjust to your hearing loss.  When should you call for help?  Watch closely for changes in your health, and be sure to contact your doctor if:    You think your hearing is getting worse.     You have new symptoms, such as dizziness or nausea.   Where can you learn more?  Go to https://www.[employer].net/patiented  Enter R798 in the search box to learn more about \"Hearing Loss: Care Instructions.\"  Current as of: September 27, 2023               Content Version: 14.0    6301-8359 Healthwise, Incorporated.   Care " instructions adapted under license by your healthcare professional. If you have questions about a medical condition or this instruction, always ask your healthcare professional. Healthwise, [facility] disclaims any warranty or liability for your use of this information.      Learning About Sleeping Well  What does sleeping well mean?     Sleeping well means getting enough sleep to feel good and stay healthy. How much sleep is enough varies among people.  The number of hours you sleep and how you feel when you wake up are both important. If you do not feel refreshed, you probably need more sleep. Another sign of not getting enough sleep is feeling tired during the day.  Experts recommend that adults get at least 7 or more hours of sleep per day. Children and older adults need more sleep.  Why is getting enough sleep important?  Getting enough quality sleep is a basic part of good health. When your sleep suffers, your physical health, mood, and your thoughts can suffer too. You may find yourself feeling more grumpy or stressed. Not getting enough sleep also can lead to serious problems, including injury, accidents, anxiety, and depression.  What might cause poor sleeping?  Many things can cause sleep problems, including:  Changes to your sleep schedule.  Stress. Stress can be caused by fear about a single event, such as giving a speech. Or you may have ongoing stress, such as worry about work or school.  Depression, anxiety, and other mental or emotional conditions.  Changes in your sleep habits or surroundings. This includes changes that happen where you sleep, such as noise, light, or sleeping in a different bed. It also includes changes in your sleep pattern, such as having jet lag or working a late shift.  Health problems, such as pain, breathing problems, and restless legs syndrome.  Lack of regular exercise.  Using alcohol, nicotine, or caffeine before bed.  How can you help yourself?  Here are some tips that  "may help you sleep more soundly and wake up feeling more refreshed.  Your sleeping area   Use your bedroom only for sleeping and sex. A bit of light reading may help you fall asleep. But if it doesn't, do your reading elsewhere in the house. Try not to use your TV, computer, smartphone, or tablet while you are in bed.  Be sure your bed is big enough to stretch out comfortably, especially if you have a sleep partner.  Keep your bedroom quiet, dark, and cool. Use curtains, blinds, or a sleep mask to block out light. To block out noise, use earplugs, soothing music, or a \"white noise\" machine.  Your evening and bedtime routine   Create a relaxing bedtime routine. You might want to take a warm shower or bath, or listen to soothing music.  Go to bed at the same time every night. And get up at the same time every morning, even if you feel tired.  What to avoid   Limit caffeine (coffee, tea, caffeinated sodas) during the day, and don't have any for at least 6 hours before bedtime.  Avoid drinking alcohol before bedtime. Alcohol can cause you to wake up more often during the night.  Try not to smoke or use tobacco, especially in the evening. Nicotine can keep you awake.  Limit naps during the day, especially close to bedtime.  Avoid lying in bed awake for too long. If you can't fall asleep or if you wake up in the middle of the night and can't get back to sleep within about 20 minutes, get out of bed and go to another room until you feel sleepy.  Avoid taking medicine right before bed that may keep you awake or make you feel hyper or energized. Your doctor can tell you if your medicine may do this and if you can take it earlier in the day.  If you can't sleep   Imagine yourself in a peaceful, pleasant scene. Focus on the details and feelings of being in a place that is relaxing.  Get up and do a quiet or boring activity until you feel sleepy.  Avoid drinking any liquids before going to bed to help prevent waking up often to " "use the bathroom.  Where can you learn more?  Go to https://www.[employer].net/patiented  Enter J942 in the search box to learn more about \"Learning About Sleeping Well.\"  Current as of: July 10, 2023  Content Version: 14.1 2006-2024 [employer].   Care instructions adapted under license by your healthcare professional. If you have questions about a medical condition or this instruction, always ask your healthcare professional. [employer] disclaims any warranty or liability for your use of this information.    Bladder Training: Care Instructions  Your Care Instructions     Bladder training is used to treat urge incontinence and stress incontinence. Urge incontinence means that the need to urinate comes on so fast that you can't get to a toilet in time. Stress incontinence means that you leak urine because of pressure on your bladder. For example, it may happen when you laugh, cough, or lift something heavy.  Bladder training can increase how long you can wait before you have to urinate. It can also help your bladder hold more urine. And it can give you better control over the urge to urinate.  It is important to remember that bladder training takes a few weeks to a few months to make a difference. You may not see results right away, but don't give up.  Follow-up care is a key part of your treatment and safety. Be sure to make and go to all appointments, and call your doctor if you are having problems. It's also a good idea to know your test results and keep a list of the medicines you take.  How can you care for yourself at home?  Work with your doctor to come up with a bladder training program that is right for you. You may use one or more of the following methods.  Delayed urination  In the beginning, try to keep from urinating for 5 minutes after you first feel the need to go.  While you wait, take deep, slow breaths to relax. Kegel exercises can also help you delay the need to go to " "the bathroom.  After some practice, when you can easily wait 5 minutes to urinate, try to wait 10 minutes before you urinate.  Slowly increase the waiting period until you are able to control when you have to urinate.  Scheduled urination  Empty your bladder when you first wake up in the morning.  Schedule times throughout the day when you will urinate.  Start by going to the bathroom every hour, even if you don't need to go.  Slowly increase the time between trips to the bathroom.  When you have found a schedule that works well for you, keep doing it.  If you wake up during the night and have to urinate, do it. Apply your schedule to waking hours only.  Kegel exercises  These tighten and strengthen pelvic muscles, which can help you control the flow of urine. (If doing these exercises causes pain, stop doing them and talk with your doctor.) To do Kegel exercises:  Squeeze your muscles as if you were trying not to pass gas. Or squeeze your muscles as if you were stopping the flow of urine. Your belly, legs, and buttocks shouldn't move.  Hold the squeeze for 3 seconds, then relax for 5 to 10 seconds.  Start with 3 seconds, then add 1 second each week until you are able to squeeze for 10 seconds.  Repeat the exercise 10 times a session. Do 3 to 8 sessions a day.  When should you call for help?  Watch closely for changes in your health, and be sure to contact your doctor if:    Your incontinence is getting worse.     You do not get better as expected.   Where can you learn more?  Go to https://www.[employer].net/patiented  Enter V684 in the search box to learn more about \"Bladder Training: Care Instructions.\"  Current as of: November 15, 2023               Content Version: 14.0    3404-0226 Healthwise, Inc[employer].   Care instructions adapted under license by your healthcare professional. If you have questions about a medical condition or this instruction, always ask your healthcare professional. [employer], " [facility] disclaims any warranty or liability for your use of this information.      Learning About Depression Screening  What is depression screening?  Depression screening is a way to see if you have depression symptoms. It may be done by a doctor or counselor. It's often part of a routine checkup. That's because your mental health is just as important as your physical health.  Depression is a mental health condition that affects how you feel, think, and act. You may:  Have less energy.  Lose interest in your daily activities.  Feel sad and grouchy for a long time.  Depression is very common. It affects people of all ages.  Many things can lead to depression. Some people become depressed after they have a stroke or find out they have a major illness like cancer or heart disease. The death of a loved one or a breakup may lead to depression. It can run in families. Most experts believe that a combination of inherited genes and stressful life events can cause it.  What happens during screening?  You may be asked to fill out a form about your depression symptoms. You and the doctor will discuss your answers. The doctor may ask you more questions to learn more about how you think, act, and feel.  What happens after screening?  If you have symptoms of depression, your doctor will talk to you about your options.  Doctors usually treat depression with medicines or counseling. Often, combining the two works best. Many people don't get help because they think that they'll get over the depression on their own. But people with depression may not get better unless they get treatment.  The cause of depression is not well understood. There may be many factors involved. But if you have depression, it's not your fault.  A serious symptom of depression is thinking about death or suicide. If you or someone you care about talks about this or about feeling hopeless, get help right away.  It's important to know that depression can  "be treated. Medicine, counseling, and self-care may help.  Where can you learn more?  Go to https://www.[employer].net/patiented  Enter T185 in the search box to learn more about \"Learning About Depression Screening.\"  Current as of: June 24, 2023  Content Version: 14.1 2006-2024 [employer].   Care instructions adapted under license by your healthcare professional. If you have questions about a medical condition or this instruction, always ask your healthcare professional. [employer] disclaims any warranty or liability for your use of this information.    Substance Use Disorder: Care Instructions  Overview     You can improve your life and health by stopping your use of alcohol or drugs. When you don't drink or use drugs, you may feel and sleep better. You may get along better with your family, friends, and coworkers. There are medicines and programs that can help with substance use disorder.  How can you care for yourself at home?  Here are some ways to help you stay sober and prevent relapse.  If you have been given medicine to help keep you sober or reduce your cravings, be sure to take it exactly as prescribed.  Talk to your doctor about programs that can help you stop using drugs or drinking alcohol.  Do not keep alcohol or drugs in your home.  Plan ahead. Think about what you'll say if other people ask you to drink or use drugs. Try not to spend time with people who drink or use drugs.  Use the time and money spent on drinking or drugs to do something that's important to you.  Preventing a relapse  Have a plan to deal with relapse. Learn to recognize changes in your thinking that lead you to drink or use drugs. Get help before you start to drink or use drugs again.  Try to stay away from situations, friends, or places that may lead you to drink or use drugs.  If you feel the need to drink alcohol or use drugs again, seek help right away. Call a trusted friend or family member. " Some people get support from organizations such as Narcotics Anonymous or [employer] or from treatment facilities.  If you relapse, get help as soon as you can. Some people make a plan with another person that outlines what they want that person to do for them if they relapse. The plan usually includes how to handle the relapse and who to notify in case of relapse.  Don't give up. Remember that a relapse doesn't mean that you have failed. Use the experience to learn the triggers that lead you to drink or use drugs. Then quit again. Recovery is a lifelong process. Many people have several relapses before they are able to quit for good.  Follow-up care is a key part of your treatment and safety. Be sure to make and go to all appointments, and call your doctor if you are having problems. It's also a good idea to know your test results and keep a list of the medicines you take.  When should you call for help?   Call 911  anytime you think you may need emergency care. For example, call if you or someone else:    Has overdosed or has withdrawal signs. Be sure to tell the emergency workers that you are or someone else is using or trying to quit using drugs. Overdose or withdrawal signs may include:  Losing consciousness.  Seizure.  Seeing or hearing things that aren't there (hallucinations).     Is thinking or talking about suicide or harming others.   Where to get help 24 hours a day, 7 days a week   If you or someone you know talks about suicide, self-harm, a mental health crisis, a substance use crisis, or any other kind of emotional distress, get help right away. You can:    Call the Suicide and Crisis Lifeline at 967.     Call 4-608-205-TALK (1-132.372.7441).     Text HOME to 323799 to access the Crisis Text Line.   Consider saving these numbers in your phone.  Go to [employer] for more information or to chat online.  Call your doctor now or seek immediate medical care if:    You are having withdrawal  "symptoms. These may include nausea or vomiting, sweating, shakiness, and anxiety.   Watch closely for changes in your health, and be sure to contact your doctor if:    You have a relapse.     You need more help or support to stop.   Where can you learn more?  Go to https://www.[employer].net/patiented  Enter H573 in the search box to learn more about \"Substance Use Disorder: Care Instructions.\"  Current as of: November 15, 2023               Content Version: 14.0    6649-8850 [employer].   Care instructions adapted under license by your healthcare professional. If you have questions about a medical condition or this instruction, always ask your healthcare professional. [employer] disclaims any warranty or liability for your use of this information.         "

## 2024-08-21 ENCOUNTER — THERAPY VISIT (OUTPATIENT)
Dept: PHYSICAL THERAPY | Facility: CLINIC | Age: 69
End: 2024-08-21
Attending: PHYSICIAN ASSISTANT
Payer: COMMERCIAL

## 2024-08-21 DIAGNOSIS — M62.81 MUSCLE WEAKNESS (GENERALIZED): Primary | ICD-10-CM

## 2024-08-21 DIAGNOSIS — M54.42 ACUTE LEFT-SIDED LOW BACK PAIN WITH LEFT-SIDED SCIATICA: ICD-10-CM

## 2024-08-21 PROCEDURE — 97161 PT EVAL LOW COMPLEX 20 MIN: CPT | Mod: GP

## 2024-08-21 PROCEDURE — 97110 THERAPEUTIC EXERCISES: CPT | Mod: GP

## 2024-08-21 NOTE — PROGRESS NOTES
PHYSICAL THERAPY EVALUATION  Type of Visit: Evaluation              Subjective    started having some left sides low back pain w/ some radiating symptoms after walking ~6 blocks from the bus stop. Saw MD who put her through a couple of tests and told her she had sciatica. She was given some initial exercises to do that she reports have been helping, but then she stopped doing them cause she felt better however her pain is now back.        Presenting condition or subjective complaint: pain in my left side from my buttock,down my leg and fo my knee  Date of onset: 24    Relevant medical history:     Dates & types of surgery: 2 biopsies carpal tunnel 2eye surgeries    Prior diagnostic imaging/testing results:       Prior therapy history for the same diagnosis, illness or injury:        Living Environment  Social support: With a significant other or spouse   Type of home: Apartment/condo   Stairs to enter the home:         Ramp: Yes   Stairs inside the home: Yes 15 Is there a railing: Yes     Help at home: Other  Equipment owned:       Employment: No    Hobbies/Interests: prayer  knitting  choir singing visiting folks   in [employer]  fitness class jigsaw puzzles  taking walks    Patient goals for therapy: easily walk ,doing my adlswith less pain sitting with some comfort for more than 30minutes and standing without pain for more than an hour    Pain assessment: Location: Left Glute Med/Piriformis/Ratin/10     Objective   Hip MMT: 4-/5 Globally  TTP: Left Glute Med/Piriformis    Assessment & Plan   CLINICAL IMPRESSIONS  Medical Diagnosis: Acute Left Sided Low Back Pain w/ Left Sided Sciatica    Treatment Diagnosis: Low Back Pain   Impression/Assessment: Patient is a 69 year old female with low back pain with radicular symptom complaints.  The following significant findings have been identified: Pain, Decreased ROM/flexibility, and Decreased strength. These impairments interfere  with their ability to perform self care tasks, recreational activities, household mobility, and community mobility as compared to previous level of function.     Clinical Decision Making (Complexity):  Clinical Presentation: Stable/Uncomplicated  Clinical Presentation Rationale: based on medical and personal factors listed in PT evaluation  Clinical Decision Making (Complexity): Low complexity    PLAN OF CARE  Treatment Interventions:  Modalities: Cryotherapy, E-stim, Hot Pack  Interventions: Manual Therapy, Neuromuscular Re-education, Therapeutic Activity, Therapeutic Exercise    Long Term Goals     PT Goal 1  Goal Identifier: Pain  Goal Description: Pt's goal is for left sided pain to go away in order to improve function at home.  Rationale: to maximize safety and independence with performance of ADLs and functional tasks;to maximize safety and independence within the home;to maximize safety and independence within the community;to maximize safety and independence with transportation  Goal Progress: Ongoing  Target Date: 11/13/24      Frequency of Treatment: 1-2x/week  Duration of Treatment: 12 weeks    Recommended Referrals to Other Professionals:  none  Education Assessment:   Learner/Method: Patient  Education Comments: no questions at this time    Risks and benefits of evaluation/treatment have been explained.   Patient/Family/caregiver agrees with Plan of Care.     Evaluation Time:     PT Eval, Low Complexity Minutes (79979): 15     Signing Clinician: Norbert Figueroa, PT        [facility]                                                                                   OUTPATIENT PHYSICAL THERAPY      PLAN OF TREATMENT FOR OUTPATIENT REHABILITATION   Patient's Last Name, First Name, Grace Vásquez YOB: 1955   Provider's Name   [facility]   Medical Record No.  [id number]     Onset Date: 07/18/24  Start of Care Date:  08/21/24     Medical Diagnosis:  Acute Left Sided Low Back Pain w/ Left Sided Sciatica      PT Treatment Diagnosis:  Low Back Pain Plan of Treatment  Frequency/Duration: 1-2x/week/ 12 weeks    Certification date from 08/21/24 to 11/13/24         See note for plan of treatment details and functional goals     Norbert Figueroa, PT                         I CERTIFY THE NEED FOR THESE SERVICES FURNISHED UNDER        THIS PLAN OF TREATMENT AND WHILE UNDER MY CARE     (Physician attestation of this document indicates review and certification of the therapy plan).              Referring Provider:  Norbert Booth    Initial Assessment  See Epic Evaluation- Start of Care Date: 08/21/24

## 2024-08-22 PROBLEM — M54.42 LEFT-SIDED LOW BACK PAIN WITH LEFT-SIDED SCIATICA: Status: ACTIVE | Noted: 2024-08-22

## 2024-08-22 PROBLEM — M62.81 MUSCLE WEAKNESS (GENERALIZED): Status: ACTIVE | Noted: 2024-08-22

## 2024-08-27 ENCOUNTER — OFFICE VISIT (OUTPATIENT)
Dept: PHARMACY | Facility: CLINIC | Age: 69
End: 2024-08-27
Payer: COMMERCIAL

## 2024-08-27 VITALS
WEIGHT: 187.9 LBS | OXYGEN SATURATION: 92 % | BODY MASS INDEX: 34.58 KG/M2 | HEART RATE: 63 BPM | DIASTOLIC BLOOD PRESSURE: 60 MMHG | SYSTOLIC BLOOD PRESSURE: 120 MMHG

## 2024-08-27 DIAGNOSIS — E66.01 CLASS 2 SEVERE OBESITY DUE TO EXCESS CALORIES WITH SERIOUS COMORBIDITY AND BODY MASS INDEX (BMI) OF 35.0 TO 35.9 IN ADULT (H): ICD-10-CM

## 2024-08-27 DIAGNOSIS — E66.812 CLASS 2 SEVERE OBESITY DUE TO EXCESS CALORIES WITH SERIOUS COMORBIDITY AND BODY MASS INDEX (BMI) OF 35.0 TO 35.9 IN ADULT (H): ICD-10-CM

## 2024-08-27 DIAGNOSIS — R73.03 PRE-DIABETES: ICD-10-CM

## 2024-08-27 DIAGNOSIS — E78.2 MIXED HYPERLIPIDEMIA: ICD-10-CM

## 2024-08-27 DIAGNOSIS — E03.9 HYPOTHYROIDISM, UNSPECIFIED TYPE: ICD-10-CM

## 2024-08-27 DIAGNOSIS — I10 ESSENTIAL HYPERTENSION: Primary | ICD-10-CM

## 2024-08-27 PROCEDURE — 99606 MTMS BY PHARM EST 15 MIN: CPT | Performed by: PHARMACIST

## 2024-08-27 NOTE — PROGRESS NOTES
Medication Therapy Management (MTM) Encounter    ASSESSMENT:                            Medication Adherence/Access: none     Obesity/Pre-Diabetes:  Stay on max. Tolerated dose of Metformin -500mg. 2 x day + lifestyle plan --A1c - 5.7% --excellent improvement , continue metformin and daily exercise.     Goal is to be <190lbs. by next appt. 6-3-24. She is 187.9lbs today 8-27-24.         Hypertension:   Stable. Patient is meeting blood pressure goal of < 140/90mmHg.        Hyperlipidemia:   Stable.  Patient is on high intensity statin which is indicated based on 2019 ACC/AHA guidelines for lipid management.      Hypothyroidism:   Stable         PLAN:                            1. Blood Pressure today in clinic = 120/60mmhg.-- all your walking and current Blood Pressure medications all working well !    2. Make a lab appt. In 2 months -mid October for thyroid lab recheck.         Follow-up: Return in about 3 months (around 12/9/2024), or @ 2;30 PM, for Medication Therapy Management Visit, Lab Work, BP Recheck, Weight loss.        SUBJECTIVE/OBJECTIVE:                          Grace Chinchilla is a 69 year old female coming in for a follow-up (6-18-24) visit. She was referred to me from Laila Sanches  .          Reason for visit:   Bp recheck.       Allergies/ADRs: Reviewed in chart; bupropion caused sickness?  Past Medical History: Reviewed in chart  Tobacco: She reports that she has never smoked. She has never used smokeless tobacco.  Alcohol: not currently using  Other Substance Use: none  E-cigarette Use: none  Caffeine: not much   Social: retired   Personal Healthcare Goals:  lose weight in a healthy way and keep it off/ reverse pre-diabetes.   Activity: sedentary in the past now walking more.      Medication Adherence/Access: Has pill boxes-- takes meds daily .      Hypertension   Added 2.5mg./daily amlodipine 4-8-24. Increased dose to 5mg. 4-29-24.  Losartan 25mg./day   Patient reports no current medication  side effects.  Patient does not self-monitor blood pressure.         BP Readings from Last 3 Encounters:   08/27/24 120/60   08/06/24 138/70   07/26/24 (!) 146/73     Pulse Readings from Last 3 Encounters:   08/27/24 63   08/06/24 74   07/26/24 75     Hyperlipidemia     Atorvastatin 80mg daily  Patient reports no significant myalgias or other side effects.  The 10-year ASCVD risk score (Isiah HOLLOWAY, et al., 2019) is: 11%    Values used to calculate the score:      Age: 69 years      Sex: Female      Is Non- : No      Diabetic: No      Tobacco smoker: No      Systolic Blood Pressure: 120 mmHg      Is BP treated: Yes      HDL Cholesterol: 48 mg/dL      Total Cholesterol: 173 mg/dL       Recent Labs   Lab Test 08/06/24  1526 08/03/23  1106   CHOL 173 187   HDL 48* 50    100   TRIG 125 187*       Hypothyroidism     Levothyroxine 125 mcg daily. (Admits she takes her iron pill right with her thyroid pill)  Patient is having the following symptoms: hypothyroidism =none , hyperthyroidism =none.        TSH   Date Value Ref Range Status   08/06/2024 0.14 (L) 0.30 - 4.20 uIU/mL Final   03/14/2022 3.25 0.30 - 5.00 uIU/mL Final   04/02/2007 5.88 (H) 0.4 - 5.0 mU/L Final      Laila Sanches, [facility]  8/9/2024  4:16 PM CDT Back to Top      Labs look good overall.  TSH is a little low, but T4 is normal.  As long as you are not having symptoms, we can leave your current dose of levothyroxine as it is.  We should plan to recheck in a couple months to monitor.  I have placed a lab only order and you can schedule this either through [employer] or calling central scheduling.       Class II Obesity (BMI 35 to 39.9)/pre-diabetes:   Ozempic stopped last fall.   Metformin - tried it -- then went to ozempic. We restarted Met 500mg er tabs 10-10-23 --had diarrhea SE at 3/day --now taking - 1 tab 2 x day --tolerates well.     Patient reports  current medication side effects: loose stools but not all the time --feels salads  trigger loose stools.     Eats 2 x day now on my LC+IF lifestyle plan -- eating more salads , walking more now-trying to walk 30 minutes /day now. (She averages 3 K+ steps/day-4-29-24).  He highest was 9k steps/day .     She does go weekly now to [employer] class to walk 5K steps/day --otherwise has sciatica pain in left leg/hip so doesn't walk too much on daily basis.     She has used [employer] in the past to lose wt. -her sisters feel she only goes there for social aspect of the program .   Cost is 4$/month . She did not join yet.   Every Thursday sister jf and her go to the [facility] and workout for 45 minutes.     Sister jo ann has concerns that Grace wont stick to the lifestyle plan .           Previously :   Nutrition/Eating Habits: saw Dr. Steve special diet + weekly ozempic --got down to 180lbs.   --then cost $>200/month too much stopped it and weight came back.  Has 3 sisters --2 of hem very active in her life.   Has been on [employer] 4 different in her life , sisters - are in charge of her money POA over her .  Her  works full time - cannot control finances , she gets 15$ and [facility] [facility] $ /month for fun stuff , sisters take her shopping weekly. They use HSA card to pay  for her rx drugs.     Current eating habits :  Bfast - cereal most days --quick easy /she admits lazy, or will have oatmeal or pbutter /jelly sandwich , or yogurt with jam or sugar , water   Mid am snack --no  Lunch : skips or bowel of cottage cheese  Mid afternoon snack : apple and a banana -psychological thought she would get hungry   Dinner; minnestrone soup 1-2 bowls, or a salad.   Late night snacks : 10-11 pm hubby snacks--she might have a sandwich pbandj or a yogurt , eats out of habit or other reasons.     Exercise/Activity:   has membership thru [employer] - fitness club (not going)- or go to [facility] -swimming --but no swimsuit.   Does see psychiatrist.   Medication History:  Ozempic: too $$ to continue.  Wt Readings from Last 4 Encounters:  "  08/27/24 187 lb 14.4 oz (85.2 kg)   08/06/24 185 lb 8 oz (84.1 kg)   07/26/24 186 lb (84.4 kg)   05/29/24 189 lb 8 oz (86 kg)     Estimated body mass index is 34.58 kg/m  as calculated from the following:    Height as of 7/26/24: 5' 1.81\" (1.57 m).    Weight as of this encounter: 187 lb 14.4 oz (85.2 kg).    Lab Results   Component Value Date    A1C 5.7 04/08/2024    A1C 6.2 08/03/2023    A1C 5.3 09/16/2022    A1C 6.1 03/14/2022    A1C 6.0 09/17/2019             /60   Pulse 63   Wt 187 lb 14.4 oz (85.2 kg)   LMP 09/13/2006   SpO2 92%   BMI 34.58 kg/m      ----------------------------------------------------------------------------------------------------------    I spent 30 minutes with this patient today. All changes were made via collaborative practice agreement with ZACH Melara CNP. A copy of the visit note was provided to the patient's provider(s).    A summary of these recommendations was given after todays office visit.     Jarett Sen [facility].  Medication Therapy Management Provider  [phone]     Medication Therapy Recommendations  No medication therapy recommendations to display           "

## 2024-08-27 NOTE — PATIENT INSTRUCTIONS
"Recommendations from today's MTM visit:                                                         1. Blood Pressure today in clinic = 120/60mmhg.-- all your walking and current Blood Pressure medications all working well !    2. Make a lab appt. In 2 months -mid October for thyroid lab recheck.         Follow-up: Return in about 3 months (around 12/9/2024), or @ 2;30 PM, for Medication Therapy Management Visit, Lab Work, BP Recheck, Weight loss.    It was great speaking with you today.  I value your experience and would be very thankful for your time in providing feedback in our clinic survey. In the next few days, you may receive an email or text message from [employer] with a link to a survey related to your  clinical pharmacist.\"     To schedule another MTM appointment, please call the clinic directly or you may call the MTM scheduling line at [phone] or toll-free at [phone].     My Clinical Pharmacist's contact information:                                                      Please feel free to contact me with any questions or concerns you have.      Jarett Sen [facility].  Medication Therapy Management Provider  [phone]    " Love from [facility] called to discuss script for Losartan. Please call Love back.

## 2024-08-27 NOTE — Clinical Note
Laila--saw jessi today --doing well , bp wnl, she will recheck thyroid lab - mid October then see me mid December for med review --lili

## 2024-08-28 ENCOUNTER — THERAPY VISIT (OUTPATIENT)
Dept: PHYSICAL THERAPY | Facility: CLINIC | Age: 69
End: 2024-08-28
Payer: COMMERCIAL

## 2024-08-28 DIAGNOSIS — M62.81 MUSCLE WEAKNESS (GENERALIZED): ICD-10-CM

## 2024-08-28 DIAGNOSIS — M54.42 LEFT-SIDED LOW BACK PAIN WITH LEFT-SIDED SCIATICA: Primary | ICD-10-CM

## 2024-08-28 PROCEDURE — 97530 THERAPEUTIC ACTIVITIES: CPT | Mod: GP

## 2024-08-28 PROCEDURE — 97110 THERAPEUTIC EXERCISES: CPT | Mod: GP

## 2024-09-11 ENCOUNTER — THERAPY VISIT (OUTPATIENT)
Dept: PHYSICAL THERAPY | Facility: CLINIC | Age: 69
End: 2024-09-11
Payer: COMMERCIAL

## 2024-09-11 DIAGNOSIS — M54.42 LEFT-SIDED LOW BACK PAIN WITH LEFT-SIDED SCIATICA: Primary | Chronic | ICD-10-CM

## 2024-09-11 DIAGNOSIS — M62.81 MUSCLE WEAKNESS (GENERALIZED): ICD-10-CM

## 2024-09-11 PROCEDURE — 97110 THERAPEUTIC EXERCISES: CPT | Mod: GP

## 2024-09-11 PROCEDURE — 97530 THERAPEUTIC ACTIVITIES: CPT | Mod: GP

## 2024-09-20 ENCOUNTER — PATIENT OUTREACH (OUTPATIENT)
Dept: CARE COORDINATION | Facility: CLINIC | Age: 69
End: 2024-09-20
Payer: COMMERCIAL

## 2024-09-24 DIAGNOSIS — H35.372 EPIRETINAL MEMBRANE (ERM) OF LEFT EYE: Primary | ICD-10-CM

## 2024-09-26 ENCOUNTER — TELEPHONE (OUTPATIENT)
Dept: GASTROENTEROLOGY | Facility: CLINIC | Age: 69
End: 2024-09-26
Payer: COMMERCIAL

## 2024-09-26 NOTE — TELEPHONE ENCOUNTER
"Endoscopy Scheduling Screen    Have you had any respiratory illness or flu-like symptoms in the last 10 days?  No    What is your communication preference for Instructions and/or Bowel Prep?   Mail/USPS    What insurance is in the chart?  Other:  [employer] MEDICARE    Ordering/Referring Provider: IVETH VILLAREAL   (If ordering provider performs procedure, schedule with ordering provider unless otherwise instructed. )    BMI: Estimated body mass index is 34.58 kg/m  as calculated from the following:    Height as of 7/26/24: 1.57 m (5' 1.81\").    Weight as of 8/27/24: 85.2 kg (187 lb 14.4 oz).     Sedation Ordered  moderate sedation.   If patient BMI > 50 do not schedule in [facility].    If patient BMI > 45 do not schedule at [facility].    Are you taking methadone or Suboxone?  NO, No RN review required.    Have you been diagnosed and are being treated for severe PTSD or severe anxiety?  NO, No RN review required.    Are you taking any prescription medications for pain 3 or more times per week?   NO, No RN review required.    Do you have a history of malignant hyperthermia?  No    (Females) Are you currently pregnant?   No     Have you been diagnosed or told you have pulmonary hypertension?   No    Do you have an LVAD?  No    Have you been told you have moderate to severe sleep apnea?  Yes. Do you use a CPAP? Yes. (RN Review required for scheduling unless scheduling in Hospital.)     Have you been told you have COPD, asthma, or any other lung disease?  No    Do you have any heart conditions?  Yes     In the past year, have you had any hospitalizations for heart related issues including cardiomyopathy, heart attack, or stent placement?  No    Do you have any implantable devices in your body (pacemaker, ICD)?  No    Do you take nitroglycerine?  No    Have you ever had or are you waiting for an organ transplant?  No. Continue scheduling, no site restrictions.    Have you had a stroke or transient ischemic attack (TIA aka \"mini stroke\" in " "the last 6 months?   No    Have you been diagnosed with or been told you have cirrhosis of the liver?   No.    Are you currently on dialysis?   No    Do you need assistance transferring?   No    BMI: Estimated body mass index is 34.58 kg/m  as calculated from the following:    Height as of 7/26/24: 1.57 m (5' 1.81\").    Weight as of 8/27/24: 85.2 kg (187 lb 14.4 oz).     Is patients BMI > 40 and scheduling location [facility]?  No    Do you take an injectable or oral medication for weight loss or diabetes (excluding insulin)?  No    Do you take the medication Naltrexone?  No    Do you take blood thinners?  No       Prep   Are you currently on dialysis or do you have chronic kidney disease?  No    Do you have a diagnosis of diabetes?  Yes (Golytely Prep)    Do you have a diagnosis of cystic fibrosis (CF)?  No    On a regular basis do you go 3 -5 days between bowel movements?  No    BMI > 40?  No    Preferred Pharmacy:    [facility] - [place], [place] - [address] [place] [address]  Phone: [phone] Fax: [phone]    Final Scheduling Details     Procedure scheduled  Colonoscopy    Surgeon:  GARETT     Date of procedure:  12/18/24     Pre-OP / PAC:   No - Not required for this site.    Location  [facility] - Per exclusion criteria.    Sedation   Moderate Sedation - Per order.      Patient Reminders:   You will receive a call from a Nurse to review instructions and health history.  This assessment must be completed prior to your procedure.  Failure to complete the Nurse assessment may result in the procedure being cancelled.      On the day of your procedure, please designate an adult(s) who can drive you home stay with you for the next 24 hours. The medicines used in the exam will make you sleepy. You will not be able to drive.      You cannot take public transportation, ride share services, or non-medical taxi service without a responsible caregiver.  Medical transport " services are allowed with the requirement that a responsible caregiver will receive you at your destination.  We require that drivers and caregivers are confirmed prior to your procedure.

## 2024-10-08 ENCOUNTER — OFFICE VISIT (OUTPATIENT)
Dept: OPHTHALMOLOGY | Facility: CLINIC | Age: 69
End: 2024-10-08
Attending: OPHTHALMOLOGY
Payer: COMMERCIAL

## 2024-10-08 DIAGNOSIS — H47.20 OPTIC ATROPHY: Primary | ICD-10-CM

## 2024-10-08 DIAGNOSIS — H35.372 EPIRETINAL MEMBRANE (ERM) OF LEFT EYE: ICD-10-CM

## 2024-10-08 DIAGNOSIS — H25.811 COMBINED FORM OF AGE-RELATED CATARACT, RIGHT EYE: ICD-10-CM

## 2024-10-08 DIAGNOSIS — H40.003 GLAUCOMA SUSPECT OF BOTH EYES: ICD-10-CM

## 2024-10-08 PROCEDURE — G0463 HOSPITAL OUTPT CLINIC VISIT: HCPCS | Performed by: OPHTHALMOLOGY

## 2024-10-08 PROCEDURE — 92134 CPTRZ OPH DX IMG PST SGM RTA: CPT | Performed by: OPHTHALMOLOGY

## 2024-10-08 PROCEDURE — 99214 OFFICE O/P EST MOD 30 MIN: CPT | Mod: GC | Performed by: OPHTHALMOLOGY

## 2024-10-08 RX ORDER — VITAMIN B COMPLEX
TABLET ORAL DAILY
COMMUNITY

## 2024-10-08 ASSESSMENT — VISUAL ACUITY
CORRECTION_TYPE: GLASSES
OD_CC+: -2
OS_CC: 20/100
OD_CC: 20/20
METHOD: SNELLEN - LINEAR
OS_CC+: +1

## 2024-10-08 ASSESSMENT — REFRACTION_WEARINGRX
OD_AXIS: 155
SPECS_TYPE: PAL
OS_CYLINDER: +2.00
OD_CYLINDER: +2.25
OS_AXIS: 022
OD_SPHERE: -2.00
OS_ADD: +2.50
OS_SPHERE: -1.00
OD_ADD: +2.50

## 2024-10-08 ASSESSMENT — EXTERNAL EXAM - LEFT EYE: OS_EXAM: NORMAL

## 2024-10-08 ASSESSMENT — SLIT LAMP EXAM - LIDS
COMMENTS: NORMAL
COMMENTS: NORMAL

## 2024-10-08 ASSESSMENT — CUP TO DISC RATIO
OD_RATIO: 0.5
OS_RATIO: 0.5

## 2024-10-08 ASSESSMENT — TONOMETRY
OS_IOP_MMHG: 17
OD_IOP_MMHG: 13
IOP_METHOD: TONOPEN

## 2024-10-08 ASSESSMENT — CONF VISUAL FIELD
OD_NORMAL: 1
OD_SUPERIOR_NASAL_RESTRICTION: 0
OD_INFERIOR_TEMPORAL_RESTRICTION: 0
METHOD: COUNTING FINGERS
OD_INFERIOR_NASAL_RESTRICTION: 0
OS_INFERIOR_NASAL_RESTRICTION: 3
OD_SUPERIOR_TEMPORAL_RESTRICTION: 0

## 2024-10-08 ASSESSMENT — EXTERNAL EXAM - RIGHT EYE: OD_EXAM: NORMAL

## 2024-10-08 NOTE — PROGRESS NOTES
CC -   Post Op OS ERM peel on 05/25/2023    INTERVAL HISTORY - Since last visit 8/4/23 had cataract surgery left eye with Dr. Amin 10/26/23 and saw Dr. 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 No mass on MRI. She has not noticed a big change in vision either eye.     [facility] -   Grace Chinchilla is a  69 year old year-old patient with history of ERM OS.  Noted 9/2022, uncertain when GELACIO prior was, likely 7-8 years per patient. Had not noticed vision change OS       PAST OCULAR SURGERY  PPV/MS OS 5/25/23  CE IOL OS 10/26/23    RETINAL IMAGING:  OCT 10/08/2024  OD - retina normal, PVD  OS -  tr residual ERM, parafoveal intraretinal fluid, thin RNFL    OCT RNFL 1/30/24  OD - focal SN borderline global 85  OS - abnl ST/IT, borderline SN & T, global 69      ASSESSMENT & PLAN  # H/o ERM OS   - noted 9/2022 uncertain onset   - pre-op VA 20/100   - s/p 25g PPV, ERM/ILM peel, endolaser OS 5/25/23   - VA likely 2/2 optic neuropathy as described by Dr. Hair      # OAG Suspect OS > OD vs Optic neuropathy   - abnl RNFL, thin rim on DFE   - was seeing Brennan, will refer to general for observation       # PVD OD   - advised S/Sx RD 10/2024      # NS OD   - BCVA 20/20 observe at this time.    - Refer to general for management     Pseudophakic left eye    - clear posterior capsule      # retinal anomalies   - ?thin inner retina noted pre-op 2/2023   - likely optic neuropathy per Dr. Hair    Return in about 1 year (around 10/8/2025) for DFE OU, OCT OU.    Marvin Valdes MD  Resident Physician, PGY-3  Department of Ophthalmology    ATTESTATION     Attending Physician Attestation:      Complete documentation of historical and exam elements from today's encounter can be found in the full encounter summary report (not reduplicated in this progress note).  I personally obtained the chief complaint(s) and history of present illness.  I confirmed and edited as necessary the  review of systems, past medical/surgical history, family history, social history, and examination findings as documented by others; and I examined the patient myself.  I personally reviewed the relevant tests, images, and reports as documented above.  I personally reviewed the ophthalmic test(s) associated with this encounter, agree with the interpretation(s) as documented by the resident/fellow, and have edited the corresponding report(s) as necessary.   I formulated and edited as necessary the assessment and plan and discussed the findings and management plan with the patient and family    Jessica Motley MD, PhD  , Vitreoretinal Surgery  Department of Ophthalmology  [facility]

## 2024-10-08 NOTE — NURSING NOTE
Chief Complaints and History of Present Illnesses   Patient presents with    Epiretinal Membrane Follow Up     OS ERM peel on 05/25/2023     Chief Complaint(s) and History of Present Illness(es)       Epiretinal Membrane Follow Up              Laterality: both eyes    Comments: OS ERM peel on 05/25/2023              Comments    Patient states has noticed some episodes of when the eyelids of both eyes are not opening after waking, has had to manually lift the lids to open them up to 30 minutes. Has not noticed any blurry vision either eye. Some soreness when eyes are tired, no pain. Patient has not noticed any dryness. Does not take any eye medications. Longstanding floaters, notices more in brighter conditions, with no flashes of light or loss of vision/shade/curtain. No waviness noticed. Patient has many questions and comments during the exam.    Gabriela Woo on 10/8/2024 at 1:20 PM

## 2024-10-11 ENCOUNTER — TELEPHONE (OUTPATIENT)
Dept: GASTROENTEROLOGY | Facility: CLINIC | Age: 69
End: 2024-10-11

## 2024-10-14 ENCOUNTER — ANCILLARY PROCEDURE (OUTPATIENT)
Dept: GENERAL RADIOLOGY | Facility: CLINIC | Age: 69
End: 2024-10-14
Payer: COMMERCIAL

## 2024-10-14 ENCOUNTER — LAB (OUTPATIENT)
Dept: LAB | Facility: CLINIC | Age: 69
End: 2024-10-14
Payer: COMMERCIAL

## 2024-10-14 ENCOUNTER — OFFICE VISIT (OUTPATIENT)
Dept: FAMILY MEDICINE | Facility: CLINIC | Age: 69
End: 2024-10-14
Payer: COMMERCIAL

## 2024-10-14 VITALS
TEMPERATURE: 97.1 F | HEIGHT: 61 IN | DIASTOLIC BLOOD PRESSURE: 74 MMHG | WEIGHT: 179 LBS | HEART RATE: 82 BPM | RESPIRATION RATE: 16 BRPM | BODY MASS INDEX: 33.79 KG/M2 | SYSTOLIC BLOOD PRESSURE: 138 MMHG | OXYGEN SATURATION: 99 %

## 2024-10-14 DIAGNOSIS — S93.402A SPRAIN OF LEFT ANKLE, UNSPECIFIED LIGAMENT, INITIAL ENCOUNTER: ICD-10-CM

## 2024-10-14 DIAGNOSIS — E03.9 HYPOTHYROIDISM, UNSPECIFIED TYPE: ICD-10-CM

## 2024-10-14 DIAGNOSIS — S93.402A SPRAIN OF LEFT ANKLE, UNSPECIFIED LIGAMENT, INITIAL ENCOUNTER: Primary | ICD-10-CM

## 2024-10-14 LAB
T4 FREE SERPL-MCNC: 1.87 NG/DL (ref 0.9–1.7)
TSH SERPL DL<=0.005 MIU/L-ACNC: 0.13 UIU/ML (ref 0.3–4.2)

## 2024-10-14 PROCEDURE — 73610 X-RAY EXAM OF ANKLE: CPT | Mod: TC | Performed by: STUDENT IN AN ORGANIZED HEALTH CARE EDUCATION/TRAINING PROGRAM

## 2024-10-14 PROCEDURE — 36415 COLL VENOUS BLD VENIPUNCTURE: CPT

## 2024-10-14 PROCEDURE — 84443 ASSAY THYROID STIM HORMONE: CPT

## 2024-10-14 PROCEDURE — 84439 ASSAY OF FREE THYROXINE: CPT

## 2024-10-14 PROCEDURE — 99213 OFFICE O/P EST LOW 20 MIN: CPT

## 2024-10-14 PROCEDURE — G2211 COMPLEX E/M VISIT ADD ON: HCPCS

## 2024-10-14 ASSESSMENT — PAIN SCALES - GENERAL: PAINLEVEL: MODERATE PAIN (5)

## 2024-10-14 NOTE — PROGRESS NOTES
"  Assessment & Plan     Sprain of left ankle, unspecified ligament, initial encounter  Lateral malleolus pain without known injury.   Xray done in clinic that shows no acute fractures, awaiting radiologist final read.   ACE wrapped left ankle in clinic and applied left ankle brace for support to wear for the next 2-3 weeks. Recommend early intervention with rehabilitation- provided referral to PT.   May use tylenol and/or ibuprofen as needed for pain  Continue ice therapy.   Follow-up if pain is worsening and can also consider ortho referral at that time.   - XR Ankle Left G/E 3 Views  - Ankle/Foot Bracing Supplies Order Ankle Brace; Left        Subjective   Grace is a 69 year old, presenting for the following health issues:  Musculoskeletal Problem (Foot Pain - Left Ankle)        10/14/2024     2:32 PM   Additional Questions   Roomed by Diana Mason     Musculoskeletal Problem  This is a new problem. The current episode started 1 to 4 weeks ago. The problem occurs 2 to 4 times per day. The problem has been gradually worsening.      Left foot pain started about 2 weeks ago located on lateral malleolus  No injury that she knows of  Pain is intermittent, not every day   Worse with walking   Hasn't noticed any swelling or bruising.     Helps to apply ice to the area.       Review of Systems  Constitutional, HEENT, cardiovascular, pulmonary, gi and gu systems are negative, except as otherwise noted.      Objective    /74 (BP Location: Right arm, Patient Position: Sitting, Cuff Size: Adult Regular)   Pulse 82   Temp 97.1  F (36.2  C) (Temporal)   Resp 16   Ht 1.549 m (5' 1\")   Wt 81.2 kg (179 lb)   LMP 09/13/2006   SpO2 99%   BMI 33.82 kg/m    Body mass index is 33.82 kg/m .  Physical Exam   GENERAL: alert and no distress  MS: left lateral malleolus with mild swelling and tender to touch. No bruising. Pain with active range of motion especially internal rotation of ankle. Able to bear weight.   SKIN: no " suspicious lesions or rashes  PSYCH: mentation appears normal, affect normal/bright    Xray - Reviewed and interpreted by me. No acute fractures.         Signed Electronically by: ZACH Flower CNP

## 2024-10-21 ENCOUNTER — THERAPY VISIT (OUTPATIENT)
Dept: PHYSICAL THERAPY | Facility: CLINIC | Age: 69
End: 2024-10-21
Payer: COMMERCIAL

## 2024-10-21 DIAGNOSIS — S93.402A SPRAIN OF LEFT ANKLE, UNSPECIFIED LIGAMENT, INITIAL ENCOUNTER: ICD-10-CM

## 2024-10-21 PROCEDURE — 97110 THERAPEUTIC EXERCISES: CPT | Mod: GP

## 2024-10-21 PROCEDURE — 97161 PT EVAL LOW COMPLEX 20 MIN: CPT | Mod: GP

## 2024-10-21 ASSESSMENT — ACTIVITIES OF DAILY LIVING (ADL)
PERFORMING_LIGHT_ACTIVITIES_AROUND_YOUR_HOME: A LITTLE BIT OF DIFFICULTY
GOING_UP_OR_DOWN_10_STAIRS: QUITE A BIT OF DIFFICULTY
PLEASE_INDICATE_YOR_PRIMARY_REASON_FOR_REFERRAL_TO_THERAPY:: FOOT AND/OR ANKLE
ROLLING_OVER_IN_BED: NO DIFFICULTY
MAKING_SHARP_TURNS_WHILE_RUNNING_FAST: EXTREME DIFFICULTY OR UNABLE TO PERFORM ACTIVITY
PERFORMING_HEAVY_ACTIVITIES_AROUND_YOUR_HOME: MODERATE DIFFICULTY
GETTING_INTO_AND_OUT_OF_A_BATH: NO DIFFICULTY
WALKING_BETWEEN_ROOMS: NO DIFFICULTY
ANY_OF_YOUR_USUAL_WORK,_HOUSEWORK_OR_SCHOOL_ACTIVITIES: A LITTLE BIT OF DIFFICULTY
SHOPPING: QUITE A BIT OF DIFFICULTY
RUNNING_ON_UNEVEN_GROUND: EXTREME DIFFICULTY OR UNABLE TO PERFORM ACTIVITY
WALKING_2_BLOCKS: A LITTLE BIT OF DIFFICULTY
SQUATTING: NO DIFFICULTY
WALKING_A_MILE: QUITE A BIT OF DIFFICULTY
LIFTING_AN_OBJECT,_LIKE_A_BAG_OF_GROCERIES_FROM_THE_FLOOR: NO DIFFICULTY
RUNNING_ON_EVEN_GROUND: EXTREME DIFFICULTY OR UNABLE TO PERFORM ACTIVITY
LEFS_RAW_SCORE: 0
LEFS_SCORE(%): 0
SITTING_FOR_1_HOUR: MODERATE DIFFICULTY
PUTTING_ON_YOUR_SHOES_OR_SOCKS: MODERATE DIFFICULTY
GETTING_INTO_OR_OUT_OF_A_CAR: A LITTLE BIT OF DIFFICULTY
STANDING_FOR_1_HOUR: QUITE A BIT OF DIFFICULTY
YOUR_USUAL_HOBBIES,_RECREATIONAL_OR_SPORTING_ACTIVITIES: A LITTLE BIT OF DIFFICULTY

## 2024-10-21 NOTE — PROGRESS NOTES
PHYSICAL THERAPY EVALUATION  Type of Visit: Evaluation             Subjective       Presenting condition or subjective complaint: pain and tenderness in my left foot  Patient presents with pain in her left foot and ankle, it has been going on for about 2 weeks. NKJONATHAN. Reports history of left ankle fracture about 7-8 years ago, no surgery but casted and use a boot. Endorses history of sciatic pain on the left LE, she has exercises for this but hasn't been doing them for the last couple weeks.     Date of onset: 10/14/24 (referral date)    Relevant medical history: Anemia; Arthritis; Depression; High blood pressure; Incontinence; Mental Illness; Overweight; Pain at night or rest; Sleep disorder like apnea; Thyroid problems   Dates & types of surgery: breast biopsy but dont know the year  was beningn    Prior diagnostic imaging/testing results: X-ray     Prior therapy history for the same diagnosis, illness or injury:        Prior Level of Function  Transfers: Independent  Ambulation: Independent  ADL: Independent  IADL:  Independent    Living Environment  Social support: With a significant other or spouse   Type of home: Apartment/condo   Stairs to enter the home: No       Ramp: Yes   Stairs inside the home: Yes 15 Is there a railing: Yes     Help at home: Self Cares (home health aide/personal care attendant, family, etc); Medication and/or finances  Equipment owned:       Employment:      Hobbies/Interests:      Patient goals for therapy: ride my bike and walk for a distance  reach above my head to get an object or bending down for same reason    Pain assessment: See objective evaluation for additional pain details     Objective   KEY FINDINGS/IMPRESSION:  Patient presenting with signs and symptoms consistent with left sided lumbar radiculopathy versus left ankle injury. Will continue treatment for radiculopathy and will further assess/treat left ankle pain if this does not resolve it.     FOOT/ANKLE EVALUATION  PAIN:  Pain Level at Best: 0/10  Pain Level at Worst: 5/10  Pain Location: lateral ankle, at times into dorsal aspect of foot. She has noticed a couple instances up higher into lateral calf  Pain Quality: Aching, Dull, Sharp, and Throbbing  Other symptoms: denies numbness/tingling  Pain Frequency: intermittent  Time Dependent?: no  Pain is Exacerbated By: has a hard time telling, but has noticed if she is sitting for awhile can be harder to go out and walk, challenging going downstairs  Pain is Relieved By: keeping feet elevated  Pain Progression: Unchanged  INTEGUMENTARY (edema, incisions):   POSTURE:   GAIT:   Weightbearing Status:   Assistive Device(s):   Gait Deviations:   BALANCE/PROPRIOCEPTION: Single Leg Stance Eyes Open (seconds): <5 sec  WEIGHT BEARING ALIGNMENT:   NON-WEIGHTBEARING ALIGNMENT:    ROM:   (Degrees) Left AROM Left PROM  Right AROM Right PROM   Ankle   Dorsiflexion  WNL  WNL   CKC Ankle DF Knee to Wall Test       Ankle Plantarflexion  WNL  WNL   Ankle   Inversion  WNL, pain end range lateral ankle  WNL   Ankle   Eversion  WNL  WNL   Great Toe   Flexion  WNL  WNL   Great Toe Extension  WNL  WNL   Pain:   End feel:     STRENGTH:   Pain: - none + mild ++ moderate +++ severe  Strength Scale: 0-5/5 Left Right   Ankle Dorsiflexion 5- 5   Ankle Plantarflexion Unable to SL heel raise Completes a few reps of SL heel raise   Ankle Inversion 5 5   Ankle Eversion 5 5   Great Toe Flexion     Great Toe Extension 4+ 5   Hip flexion R: 5/5, L 4+/5; knee extension R: 5/5, 4+/5  All MMT pain free    FLEXIBILITY:   SPECIAL TESTS:   FUNCTIONAL TESTS:   PALPATION:   Mild TTP anterior aspect of lateral malleolus  JOINT MOBILITY: left foot WNL,  LUMBAR ASSESSMENT:  Positive Slump on L  Decreased sensation to light touch L LE L5, S1 dermatomes  Repeated flexion increased L ankle pain  Prone lying initially abolished, then patient reported onset left foot pain  Lumbar ROM: flexion hands mid shin * L ankle, extension min loss,  SB R WNL, SB L min loss * L ankle      Assessment & Plan   CLINICAL IMPRESSIONS  Medical Diagnosis: Sprain of left ankle    Treatment Diagnosis: L ankle pain   Impression/Assessment: Patient is a 69 year old female with L ankle complaints.  The following significant findings have been identified: Pain, Decreased ROM/flexibility, Decreased strength, Impaired balance, Decreased activity tolerance, and Impaired posture. These impairments interfere with their ability to perform self care tasks, recreational activities, household chores, household mobility, and community mobility as compared to previous level of function.     Clinical Decision Making (Complexity):  Clinical Presentation: Stable/Uncomplicated  Clinical Presentation Rationale: based on medical and personal factors listed in PT evaluation  Clinical Decision Making (Complexity): Low complexity    PLAN OF CARE  Treatment Interventions:  Modalities: Dry Needling, Mechanical Traction  Interventions: Manual Therapy, Neuromuscular Re-education, Therapeutic Activity, Therapeutic Exercise, Self-Care/Home Management    Long Term Goals     PT Goal 1  Goal Identifier: Sitting  Goal Description: Patient will be able to sit for at least 1 hr w/o increased pain upon returning to stand and walk  Rationale: to maximize safety and independence with performance of ADLs and functional tasks;to maximize safety and independence within the home;to maximize safety and independence with self cares;to maximize safety and independence with transportation  Target Date: 01/13/25      Frequency of Treatment: 1x/week  Duration of Treatment: 4 weeks tapering to 2x/month for 8 weeks    Recommended Referrals to Other Professionals:  none at this time  Education Assessment:   Learner/Method: Patient;Demonstration;Pictures/Video;No Barriers to Learning    Risks and benefits of evaluation/treatment have been explained.   Patient/Family/caregiver agrees with Plan of Care.     Evaluation Time:      PT Michael, Low Complexity Minutes (53191): 25     Signing Clinician: ZEE Krueger [facility]                                                                                   OUTPATIENT PHYSICAL THERAPY      PLAN OF TREATMENT FOR OUTPATIENT REHABILITATION   Patient's Last Name, First Name, Grace Vásquez YOB: 1955   Provider's Name   [facility]   Medical Record No.  [id number]     Onset Date: 10/14/24 (referral date)  Start of Care Date: 10/21/24     Medical Diagnosis:  Sprain of left ankle      PT Treatment Diagnosis:  L ankle pain Plan of Treatment  Frequency/Duration: 1x/week/ 4 weeks tapering to 2x/month for 8 weeks    Certification date from 10/21/24 to 01/13/25         See note for plan of treatment details and functional goals     July Garner PT                         I CERTIFY THE NEED FOR THESE SERVICES FURNISHED UNDER        THIS PLAN OF TREATMENT AND WHILE UNDER MY CARE     (Physician attestation of this document indicates review and certification of the therapy plan).              Referring Provider:  Lizette Pabon    Initial Assessment  See Epic Evaluation- Start of Care Date: 10/21/24

## 2024-10-24 ENCOUNTER — TELEPHONE (OUTPATIENT)
Dept: FAMILY MEDICINE | Facility: CLINIC | Age: 69
End: 2024-10-24
Payer: COMMERCIAL

## 2024-10-24 DIAGNOSIS — E03.9 HYPOTHYROIDISM, UNSPECIFIED TYPE: ICD-10-CM

## 2024-10-24 RX ORDER — LEVOTHYROXINE SODIUM 112 UG/1
112 TABLET ORAL DAILY
Qty: 90 TABLET | Refills: 0 | Status: SHIPPED | OUTPATIENT
Start: 2024-10-24

## 2024-10-24 NOTE — TELEPHONE ENCOUNTER
We need to decrease levothyroxine dose.  Sent in [facility] and she should come in for 2 month recheck.     ---  Left message to call back and ask to speak with an available nurse.    VAN KentN, PHN, RN-[facility]  [phone]

## 2024-10-25 NOTE — TELEPHONE ENCOUNTER
Grace returned call. Relayed message regarding change of decrease in dosing for the levothyroxine.   Patient stated understanding.     VAN MilesN RN  [facility]

## 2024-10-30 ENCOUNTER — TELEPHONE (OUTPATIENT)
Dept: GASTROENTEROLOGY | Facility: CLINIC | Age: 69
End: 2024-10-30
Payer: COMMERCIAL

## 2024-10-30 NOTE — TELEPHONE ENCOUNTER
Caller:     Reason for Reschedule/Cancellation   (please be detailed, any staff messages or encounters to note?):  OUT OF TOWN      Prior to reschedule please review:  Ordering Provider:     IVETH VILLAREAL     Sedation Determined: CS  Does patient have any ASC Exclusions, please identify?:       Notes on Cancelled Procedure:  Procedure: Lower Endoscopy [Colonoscopy]   Date: 12/18  Location: [facility]; [address] [place] [address]   Surgeon: GARETT      Rescheduled: Yes,   Procedure: Lower Endoscopy [Colonoscopy]    Date: 2/13   Location: [facility]; [address] [place] [address]    Surgeon: ASHWINI   Sedation Level Scheduled  CS ,  Reason for Sedation Level ORDER   Instructions updated and sent: Y     Does patient need PAC or Pre -Op Rescheduled? : N       Did you cancel or rescheduled an EUS procedure? No.

## 2024-11-08 ENCOUNTER — ALLIED HEALTH/NURSE VISIT (OUTPATIENT)
Dept: FAMILY MEDICINE | Facility: CLINIC | Age: 69
End: 2024-11-08
Payer: COMMERCIAL

## 2024-11-08 DIAGNOSIS — Z23 ENCOUNTER FOR IMMUNIZATION: Primary | ICD-10-CM

## 2024-11-08 PROCEDURE — 91320 SARSCV2 VAC 30MCG TRS-SUC IM: CPT

## 2024-11-08 PROCEDURE — 90662 IIV NO PRSV INCREASED AG IM: CPT

## 2024-11-08 PROCEDURE — G0008 ADMIN INFLUENZA VIRUS VAC: HCPCS

## 2024-11-08 PROCEDURE — 90480 ADMN SARSCOV2 VAC 1/ONLY CMP: CPT

## 2024-11-08 NOTE — PROGRESS NOTES
Prior to immunization administration, verified patients identity using patient s name and date of birth. Please see Immunization Activity for additional information.     Is the patient's temperature normal (100.5 or less)? Yes     Patient MEETS CRITERIA. PROCEED with vaccine administration.      Patient instructed to remain in clinic for 15 minutes afterwards, and to report any adverse reactions.      Link to Ancillary Visit Immunization Standing Orders SmartSet     Screening performed by Alyssa Vora MA on 11/8/2024 at 3:00 PM.

## 2024-11-11 ENCOUNTER — THERAPY VISIT (OUTPATIENT)
Dept: PHYSICAL THERAPY | Facility: CLINIC | Age: 69
End: 2024-11-11
Payer: COMMERCIAL

## 2024-11-11 DIAGNOSIS — M62.81 MUSCLE WEAKNESS (GENERALIZED): ICD-10-CM

## 2024-11-11 DIAGNOSIS — M54.42 LEFT-SIDED LOW BACK PAIN WITH LEFT-SIDED SCIATICA: Primary | Chronic | ICD-10-CM

## 2024-11-11 PROCEDURE — 97110 THERAPEUTIC EXERCISES: CPT | Mod: GP

## 2024-11-18 ENCOUNTER — NURSE TRIAGE (OUTPATIENT)
Dept: FAMILY MEDICINE | Facility: CLINIC | Age: 69
End: 2024-11-18

## 2024-11-18 NOTE — TELEPHONE ENCOUNTER
"Since Wednesday has had \"stuffy nose\"  States that symptoms have been the same if not slightly better  Initially had a sore throat which has since resolved  Has been more of a mouth breather   Yesterday, though she was feeling and \"went out\" but later in the day was feeling worse again later in the day  Work a mask to go to [employer] and a meeting  Has had a \"tender chest\" from coughing  \"It is not my heart\"  When this happened last year it was bronchitis   Denies fever  No know sick contacts  As taken tylenol   Denies sinus pain just congestion   Denies chest pain, difficulty breathing, or SOB  Frequently blowing nose due to mucus volume  Has not taken a home COVID test -- does not have any   Advised patient we could have her seen tomorrow  She does not have a ride but has someone who has offered already so she will call them to verify and then call the nurse line back to schedule an appointment tomorrow.     Patient to continue current plan unless notified otherwise. Patient to call and ask to speak to triage nurse if develops any new/worsening symptoms. Patient verbalized understanding and agrees with plan.      Reason for Disposition   Patient wants to be seen    Additional Information   Negative: SEVERE difficulty breathing (e.g., struggling for each breath, speaks in single words)   Negative: Very weak (can't stand)   Negative: Sounds like a life-threatening emergency to the triager   Negative: Difficulty breathing and not from stuffy nose (e.g., not relieved by cleaning out the nose)   Negative: Runny nose is caused by pollen or other allergies   Negative: Cough is main symptom   Negative: Sore throat is main symptom   Negative: Patient sounds very sick or weak to the triager   Negative: Fever > 103 F (39.4 C)   Negative: Fever > 101 F (38.3 C) and over 60 years of age   Negative: Fever > 100.0 F (37.8 C) and has diabetes mellitus or a weak immune system (e.g., HIV positive, cancer chemotherapy, organ " transplant, splenectomy, chronic steroids)   Negative: Fever > 100.0 F (37.8 C) and bedridden (e.g., CVA, chronic illness, recovering from surgery)   Negative: Fever present > 3 days (72 hours)   Negative: Fever returns after gone for over 24 hours and symptoms worse or not improved   Negative: Sinus pain (not just congestion) and fever   Negative: Earache   Negative: Sinus congestion (pressure, fullness) present > 10 days   Negative: Nasal discharge present > 10 days   Negative: Using nasal washes and pain medicine > 24 hours and sinus pain (lower forehead, cheekbone, or eye) persists    Protocols used: Common Cold-A-[place]    Arabella Calzada RN  [facility]

## 2024-11-21 ENCOUNTER — OFFICE VISIT (OUTPATIENT)
Dept: FAMILY MEDICINE | Facility: CLINIC | Age: 69
End: 2024-11-21
Payer: COMMERCIAL

## 2024-11-21 VITALS
DIASTOLIC BLOOD PRESSURE: 63 MMHG | TEMPERATURE: 97.1 F | OXYGEN SATURATION: 97 % | WEIGHT: 176 LBS | RESPIRATION RATE: 16 BRPM | HEIGHT: 61 IN | HEART RATE: 86 BPM | BODY MASS INDEX: 33.23 KG/M2 | SYSTOLIC BLOOD PRESSURE: 140 MMHG

## 2024-11-21 DIAGNOSIS — J40 BRONCHITIS: ICD-10-CM

## 2024-11-21 DIAGNOSIS — R09.81 NASAL CONGESTION: Primary | ICD-10-CM

## 2024-11-21 DIAGNOSIS — F31.89 OTHER BIPOLAR DISORDER (H): ICD-10-CM

## 2024-11-21 DIAGNOSIS — K59.1 FUNCTIONAL DIARRHEA: ICD-10-CM

## 2024-11-21 PROBLEM — E66.812 CLASS 2 SEVERE OBESITY DUE TO EXCESS CALORIES WITH SERIOUS COMORBIDITY AND BODY MASS INDEX (BMI) OF 35.0 TO 35.9 IN ADULT (H): Status: RESOLVED | Noted: 2021-08-30 | Resolved: 2024-11-21

## 2024-11-21 PROBLEM — E66.01 CLASS 2 SEVERE OBESITY DUE TO EXCESS CALORIES WITH SERIOUS COMORBIDITY AND BODY MASS INDEX (BMI) OF 35.0 TO 35.9 IN ADULT (H): Status: RESOLVED | Noted: 2021-08-30 | Resolved: 2024-11-21

## 2024-11-21 RX ORDER — ALBUTEROL SULFATE 90 UG/1
2 INHALANT RESPIRATORY (INHALATION) EVERY 6 HOURS PRN
Qty: 6.7 G | Refills: 0 | Status: SHIPPED | OUTPATIENT
Start: 2024-11-21

## 2024-11-21 RX ORDER — FLUTICASONE PROPIONATE 50 MCG
1 SPRAY, SUSPENSION (ML) NASAL DAILY
Qty: 9.9 ML | Refills: 0 | Status: SHIPPED | OUTPATIENT
Start: 2024-11-21

## 2024-11-21 ASSESSMENT — PAIN SCALES - GENERAL: PAINLEVEL_OUTOF10: NO PAIN (0)

## 2024-11-21 NOTE — PROGRESS NOTES
"  Assessment & Plan     Nasal congestion  Recommend Mucinex and nasal spray.  Ok to continue tylenol as needed.    - fluticasone (FLONASE) 50 MCG/ACT nasal spray; Spray 1 spray into both nostrils daily.    Bronchitis  Mild symptoms, worsened in the cold.  Had improvement in the past with albuterol.  - albuterol (PROAIR HFA/PROVENTIL HFA/VENTOLIN HFA) 108 (90 Base) MCG/ACT inhaler; Inhale 2 puffs into the lungs every 6 hours as needed for shortness of breath, wheezing or cough.    Functional diarrhea  Most likely 2/2 to her metformin.  Recommend taking with food.      Other bipolar disorder  Managed by psychiatry.        Subjective   Grace is a 69 year old, presenting for the following health issues:  Sinus Problem (Since last Wednesday ) and URI      11/21/2024     1:25 PM   Additional Questions   Roomed by Diana Mason   Accompanied by Sister     Sinus Problem     URI         Head feeling very congested.  Chest is also tender and sore.  Throat is not sore.  No fevers.  Also diarrhea, but doesn't think this is related  Congestion for a week.   Not using any medications.  Took tylenol once.            Objective    BP (!) 144/74 (BP Location: Right arm, Patient Position: Sitting, Cuff Size: Adult Regular)   Pulse 86   Temp 97.1  F (36.2  C) (Temporal)   Resp 16   Ht 1.549 m (5' 1\")   Wt 79.8 kg (176 lb)   LMP 09/13/2006   SpO2 97%   BMI 33.25 kg/m    Body mass index is 33.25 kg/m .  Physical Exam   GENERAL: alert and no distress  EYES: Eyes grossly normal to inspection, PERRL and conjunctivae and sclerae normal  HENT: ear canals and TM's normal, nose and mouth without ulcers or lesions  NECK: no adenopathy, no asymmetry, masses, or scars  RESP: lungs clear to auscultation - no rales, rhonchi or wheezes  CV: regular rate and rhythm, normal S1 S2, no S3 or S4, no murmur, click or rub, no peripheral edema  MS: no gross musculoskeletal defects noted, no edema            Signed Electronically by: Laila Sanches, " DNP

## 2024-11-27 ENCOUNTER — TELEPHONE (OUTPATIENT)
Dept: FAMILY MEDICINE | Facility: CLINIC | Age: 69
End: 2024-11-27
Payer: COMMERCIAL

## 2024-11-27 NOTE — TELEPHONE ENCOUNTER
Call received from patient:  Questions about directions for multiple medications: albuterol inhaler, Mucinex, Levothyroxine and Flonase nasal spray      Writer answered patient's questions to the best of writer's knowledge with chart review.    Confirmed plan of care for thyroid lab recheck, per patient's request.    VAN GarciaN, RN-[facility]

## 2024-12-18 ENCOUNTER — NURSE TRIAGE (OUTPATIENT)
Dept: FAMILY MEDICINE | Facility: CLINIC | Age: 69
End: 2024-12-18
Payer: COMMERCIAL

## 2024-12-18 NOTE — TELEPHONE ENCOUNTER
"Nurse Triage SBAR    Is this a 2nd Level Triage? NO    Situation:  Severe headache, weakness, difficult speech    Background: Severe headache, generalized weakness and difficulty with speech that began 45 minutes prior to call.     Assessment: Patient sounds weak on the phone, speech somewhat garbled at times reports that she \"is scared\" and has a severe headache that began suddenly. This is different than headaches she has previously experienced. Describes feelings of impending doom. Generalized weakness, feels unable to get up off of her couch safely. Currently home alone.    Protocol Recommended Disposition:   Call  Now    Recommendation:  Advised calling 911, though patient repeatedly declined. Called 911 and stayed on the line with patient until they arrived.     Routed to provider    Does the patient meet one of the following criteria for ADS visit consideration? 16+ years old, with an [facility] PCP     TIP  Providers, please consider if this condition is appropriate for management at one of our Acute and Diagnostic Services sites.     If patient is a good candidate, please use dotphrase <dot>triageresponse and select Refer to ADS to document.      Reason for Disposition   Difficult to awaken or acting confused (e.g., disoriented, slurred speech)    Protocols used: Headache-A-OH    "

## 2025-01-07 ENCOUNTER — TELEPHONE (OUTPATIENT)
Dept: FAMILY MEDICINE | Facility: CLINIC | Age: 70
End: 2025-01-07
Payer: COMMERCIAL

## 2025-01-07 NOTE — TELEPHONE ENCOUNTER
General Call    Contacts       Contact Date/Time Type Contact Phone/Fax    01/07/2025 11:02 AM CST Phone (Incoming) Grace Chinchilla (Self) [phone] (M)          Reason for Call:  Lab     What are your questions or concerns:  Pt wants to know if lab appt tomorrow 1/8/25 is fasting     Date of last appointment with provider: 11/21/24    Okay to leave a detailed message?: Yes at Cell number on file:    Telephone Information:   Mobile [phone]

## 2025-01-08 ENCOUNTER — LAB (OUTPATIENT)
Dept: LAB | Facility: CLINIC | Age: 70
End: 2025-01-08
Payer: COMMERCIAL

## 2025-01-08 DIAGNOSIS — E03.9 HYPOTHYROIDISM, UNSPECIFIED TYPE: ICD-10-CM

## 2025-01-08 LAB — TSH SERPL DL<=0.005 MIU/L-ACNC: 0.82 UIU/ML (ref 0.3–4.2)

## 2025-01-08 PROCEDURE — 84443 ASSAY THYROID STIM HORMONE: CPT

## 2025-01-08 PROCEDURE — 36415 COLL VENOUS BLD VENIPUNCTURE: CPT

## 2025-01-12 NOTE — PROGRESS NOTES
Medication Therapy Management (MTM) Encounter    ASSESSMENT:                            Medication Adherence/Access: none     Obesity/Pre-Diabetes:  Stay on max. Tolerated dose of Metformin -500mg. 2 x day + lifestyle plan --A1c - 5.6% --excellent improvement , continue metformin and daily exercise.       Hypertension:   Stable. Patient is meeting blood pressure goal of < 140/90mmHg.        Hyperlipidemia:   Stable.  Patient is on high intensity statin which is indicated based on 2019 ACC/AHA guidelines for lipid management.      Hypothyroidism:   Stable         PLAN:                            1.  A1c today =5.6% --excellent --no longer even pre-diabetes --this is a normal reading --but please stay on Metformin 2 x day .  Feel free to join [facility] if you'd like !!  2. Blood Pressure today 132/60mmhg. Weight 184.5lbs.   3. Continue all the same medications as is.       Follow-up: 1-31-25 with Norbert Booth.   Return in about 6 months (around 7/14/2025), or @ 3 PM, for Medication Therapy Management Visit, Lab Work, BP Recheck, A1c lab.    SUBJECTIVE/OBJECTIVE:                          Grace Chinchilla is a 69 year old female coming in for a follow-up (8-27-24) visit. She was referred to me from Laila Sanches DNP.   .          Reason for visit:   Bp recheck.       Allergies/ADRs: Reviewed in chart; bupropion caused sickness?  Past Medical History: Reviewed in chart  Tobacco: She reports that she has never smoked. She has never used smokeless tobacco.  Alcohol: not currently using  Other Substance Use: none  E-cigarette Use: none  Caffeine: not much   Social: retired   Personal Healthcare Goals:  lose weight in a healthy way and keep it off/ reverse pre-diabetes.   Activity: sedentary in the past now walking more.      Medication Adherence/Access: Has pill boxes-- takes meds daily .      Hypertension   Added 2.5mg./daily amlodipine 4-8-24. Increased dose to 5mg. 4-29-24.  Losartan 25mg./day   Patient reports no current  medication side effects.  Patient does not self-monitor blood pressure.         BP Readings from Last 3 Encounters:   01/16/25 132/60   11/21/24 (!) 140/63   11/08/24 133/67     Pulse Readings from Last 3 Encounters:   01/16/25 81   11/21/24 86   11/08/24 88     Hyperlipidemia     Atorvastatin 80mg daily  Patient reports no significant myalgias or other side effects.  The 10-year ASCVD risk score (Isiah HOLLOWAY, et al., 2019) is: 13.1%    Values used to calculate the score:      Age: 69 years      Sex: Female      Is Non- : No      Diabetic: No      Tobacco smoker: No      Systolic Blood Pressure: 132 mmHg      Is BP treated: Yes      HDL Cholesterol: 48 mg/dL      Total Cholesterol: 173 mg/dL       Recent Labs   Lab Test 08/06/24  1526 08/03/23  1106   CHOL 173 187   HDL 48* 50    100   TRIG 125 187*       Hypothyroidism     Levothyroxine 112 mcg daily. (Admits she takes her iron pill right with her thyroid pill)  Patient is having the following symptoms: hypothyroidism =none , hyperthyroidism =none.    TSH   Date Value Ref Range Status   01/08/2025 0.82 0.30 - 4.20 uIU/mL Final   03/14/2022 3.25 0.30 - 5.00 uIU/mL Final   04/02/2007 5.88 (H) 0.4 - 5.0 mU/L Final         Class II Obesity (BMI 35 to 39.9)/pre-diabetes:   Ozempic stopped last fall.   Metformin - tried it -- then went to ozempic. We restarted Met 500mg er tabs --now taking - 1 tab 2 x day --tolerates well.     Patient reports  current medication side effects: loose stools but not all the time --feels salads trigger loose stools.     Eats 2 x day now on my LC+IF lifestyle plan -- eating more salads , walking more now-trying to walk 30 minutes /day now. (She averages 3 K+ steps/day-4-29-24).  He highest was 9k steps/day .     She does go weekly now to [employer] class to walk 5K steps/day --otherwise has sciatica pain in left leg/hip so doesn't walk too much on daily basis.     She has used [facility] in the past to lose wt. -her sisters  "feel she only goes there for social aspect of the program .   Cost is 4$/month . She did not join yet.   Every Thursday sister jf and her go to the [facility] and workout for 45 minutes.     Sister jo ann has concerns that Grace wont stick to the lifestyle plan .           Previously :   Nutrition/Eating Habits: saw Dr. Steve special diet + weekly ozempic --got down to 180lbs.   --then cost $>200/month too much stopped it and weight came back.  Has 3 sisters --2 of hem very active in her life.   Has been on [facility] 4 different in her life , sisters - are in charge of her money POA over her .  Her  works full time - cannot control finances , she gets 15$ and [facility] $ /month for fun stuff , sisters take her shopping weekly. They use HSA card to pay  for her rx drugs.     Current eating habits :  Bfast - cereal most days --quick easy /she admits lazy, or will have oatmeal or pbutter /jelly sandwich , or yogurt with jam or sugar , water   Mid am snack --no  Lunch : skips or bowel of cottage cheese  Mid afternoon snack : apple and a banana -psychological thought she would get hungry   Dinner; minnestrone soup 1-2 bowls, or a salad.   Late night snacks : 10-11 pm hubby snacks--she might have a sandwich pbandj or a yogurt , eats out of habit or other reasons.     Exercise/Activity:   has membership thru [employer] - fitness club (not going)- or go to [facility] -swimming --but no swimsuit.   Does see psychiatrist.   Medication History:  Ozempic: too $$ to continue.  Wt Readings from Last 4 Encounters:   01/16/25 184 lb 8 oz (83.7 kg)   11/21/24 176 lb (79.8 kg)   11/08/24 179 lb 12.8 oz (81.6 kg)   10/14/24 179 lb (81.2 kg)     Estimated body mass index is 34.86 kg/m  as calculated from the following:    Height as of 11/21/24: 5' 1\" (1.549 m).    Weight as of this encounter: 184 lb 8 oz (83.7 kg).    Lab Results   Component Value Date    A1C 5.7 04/08/2024    A1C 6.2 08/03/2023    A1C 5.3 09/16/2022    A1C 6.1 03/14/2022    " A1C 6.0 09/17/2019               /60   Pulse 81   Wt 184 lb 8 oz (83.7 kg)   LMP 09/13/2006   SpO2 94%   BMI 34.86 kg/m      ----------------------------------------------------------------------------------------------------------    I spent 30 minutes with this patient today. All changes were made via collaborative practice agreement with ZACH Melara CNP. A copy of the visit note was provided to the patient's provider(s).    A summary of these recommendations was given after todays office visit.     Jarett Sen Rph.  Medication Therapy Management Provider  [phone]     Medication Therapy Recommendations  No medication therapy recommendations to display

## 2025-01-16 ENCOUNTER — LAB (OUTPATIENT)
Dept: LAB | Facility: CLINIC | Age: 70
End: 2025-01-16
Payer: COMMERCIAL

## 2025-01-16 ENCOUNTER — OFFICE VISIT (OUTPATIENT)
Dept: PHARMACY | Facility: CLINIC | Age: 70
End: 2025-01-16
Payer: COMMERCIAL

## 2025-01-16 VITALS
DIASTOLIC BLOOD PRESSURE: 60 MMHG | OXYGEN SATURATION: 94 % | SYSTOLIC BLOOD PRESSURE: 132 MMHG | HEART RATE: 81 BPM | BODY MASS INDEX: 34.86 KG/M2 | WEIGHT: 184.5 LBS

## 2025-01-16 DIAGNOSIS — E03.9 HYPOTHYROIDISM, UNSPECIFIED TYPE: ICD-10-CM

## 2025-01-16 DIAGNOSIS — F31.89 OTHER BIPOLAR DISORDER (H): ICD-10-CM

## 2025-01-16 DIAGNOSIS — E78.2 MIXED HYPERLIPIDEMIA: ICD-10-CM

## 2025-01-16 DIAGNOSIS — E66.812 CLASS 2 SEVERE OBESITY DUE TO EXCESS CALORIES WITH SERIOUS COMORBIDITY AND BODY MASS INDEX (BMI) OF 35.0 TO 35.9 IN ADULT (H): ICD-10-CM

## 2025-01-16 DIAGNOSIS — E66.01 CLASS 2 SEVERE OBESITY DUE TO EXCESS CALORIES WITH SERIOUS COMORBIDITY AND BODY MASS INDEX (BMI) OF 35.0 TO 35.9 IN ADULT (H): ICD-10-CM

## 2025-01-16 DIAGNOSIS — I10 ESSENTIAL HYPERTENSION: Primary | ICD-10-CM

## 2025-01-16 DIAGNOSIS — R73.03 PRE-DIABETES: ICD-10-CM

## 2025-01-16 LAB
EST. AVERAGE GLUCOSE BLD GHB EST-MCNC: 114 MG/DL
HBA1C MFR BLD: 5.6 % (ref 0–5.6)

## 2025-01-16 NOTE — PATIENT INSTRUCTIONS
"Recommendations from today's MTM visit:                                                         1.  A1c today =5.6% --excellent --no longer even pre-diabetes --this is a normal reading --but please stay on Metformin 2 x day .  Feel free to join [facility] if you'd like !!  2. Blood Pressure today 132/60mmhg. Weight 184.5lbs.   3. Continue all the same medications as is.       Follow-up: 1-31-25 with Norbert Booth.   Return in about 6 months (around 7/14/2025), or @ 3 PM, for Medication Therapy Management Visit, Lab Work, BP Recheck, A1c lab.    It was great speaking with you today.  I value your experience and would be very thankful for your time in providing feedback in our clinic survey. In the next few days, you may receive an email or text message from [employer] with a link to a survey related to your  clinical pharmacist.\"     To schedule another MTM appointment, please call the clinic directly or you may call the MTM scheduling line at [phone] or toll-free at [phone].     My Clinical Pharmacist's contact information:                                                      Please feel free to contact me with any questions or concerns you have.      Jarett Sen Rph.  Medication Therapy Management Provider  [phone]    "

## 2025-01-16 NOTE — LETTER
January 16, 2025  Grace Chinchilla  [address] [place] [address]    Dear Ms. Chinchilla, [facility]     Thank you for talking with me on Jan 16, 2025 about your health and medications. As a follow-up to our conversation, I have included two documents:      Your Recommended To-Do List has steps you should take to get the best results from your medications.  Your Medication List will help you keep track of your medications and how to take them.    If you want to talk about these documents, please call Jarett Sen RPH at phone: [phone], Monday-Friday 8-4:30pm.    I look forward to working with you and your doctors to make sure your medications work well for you.    Sincerely,  Jarett Sen RPH  [facility] Pharmacist, [facility]

## 2025-01-16 NOTE — LETTER
_  Medication List        Prepared on: Jan 16, 2025     Bring your Medication List when you go to the doctor, hospital, or   emergency room. And, share it with your family or caregivers.     Note any changes to how you take your medications.  Cross out medications when you no longer use them.    Medication How I take it Why I use it Prescriber   albuterol (PROAIR HFA/PROVENTIL HFA/VENTOLIN HFA) 108 (90 Base) MCG/ACT inhaler Inhale 2 puffs into the lungs every 6 hours as needed for shortness of breath, wheezing or cough. Bronchitis Laila Sanches DNP   amLODIPine (NORVASC) 5 MG tablet Take 1 tablet (5 mg) by mouth at bedtime Vaginal Atrophy Laila Sanches DNP   amphetamine-dextroamphetamine (ADDERALL XR) 10 MG 24 hr capsule Take 10 mg by mouth daily Plus 1x30mg xr tab /day for total daily dose =40mg./day. Attention Deficit Hyperactivity Disorder Patient Reported   amphetamine-dextroamphetamine (ADDERALL XR) 30 MG 24 hr capsule Take 30 mg by mouth daily Plus 1x10mg xr tab daily for 40mg. /daily dose. Attention Deficit Hyperactivity Disorder Patient Reported   atorvastatin (LIPITOR) 80 MG tablet Take 1 tablet (80 mg) by mouth daily Mixed Hyperlipidemia Laila Sanches DNP   Calcium-Magnesium-Zinc 333-133-5 MG TABS per tablet Take 1 tablet by mouth daily  General health  Patient Reported   cholecalciferol 25 MCG (1000 UT) TABS Take 1,000 Units by mouth daily  General health  Patient Reported   clonazePAM (KLONOPIN) 0.5 MG tablet Take 0.5 mg by mouth 2 times daily as needed for anxiety  Anxiety  Patient Reported   Cyanocobalamin (VITAMIN B-12) 500 MCG LOZG Take 1,500 mcg by mouth daily  General health  Patient Reported   diclofenac (VOLTAREN) 1 % topical gel Apply 4 g topically as needed for moderate pain  Pain relief  Patient Reported   divalproex sodium extended-release (DEPAKOTE ER) 250 MG 24 hr tablet Take 250 mg by mouth daily Taken with Depakote 500 x 2 for a total of 1250mg daily  Bi-polar Patient Reported    divalproex sodium extended-release (DEPAKOTE ER) 500 MG 24 hr tablet Take 1,000 mg by mouth daily In addition to Depakote 250mg  Bi-polar  Patient Reported   DULoxetine (CYMBALTA) 30 MG capsule Take 3 capsules by mouth daily  Depression/pain relief  Patient Reported   estradiol (ESTRACE) 0.1 MG/GM vaginal cream Place 2 g vaginally twice a week Vaginal Atrophy Laila Sanches DNP   ferrous sulfate (FEROSUL) 325 (65 Fe) MG tablet Take 325 mg by mouth daily (with dinner)  Anemia Patient Reported   Flaxseed, Linseed, (FLAX SEEDS PO) Take 1 capsule by mouth daily (with breakfast).  General health  Patient Reported   levothyroxine (SYNTHROID/LEVOTHROID) 112 MCG tablet Take 1 tablet (112 mcg) by mouth daily. Hypothyroidism, unspecified type Laila Sanches DNP   losartan (COZAAR) 25 MG tablet Take 1 tablet (25 mg) by mouth daily Benign Essential Hypertension Laila Sanches DNP   metFORMIN (GLUCOPHAGE XR) 500 MG 24 hr tablet Take 1 tablet by mouth after breakfast and dinner daily . Pre-Diabetes Laila Sanches DNP   multivitamin w/minerals (THERA-VIT-M) tablet Take 2 tablets by mouth daily  General health  Patient Reported   Nutritional Supplements (GLUCOSAMINE COMPLEX PO) Take 2 packets by mouth Every afternoon  Pain relief  Patient Reported   nystatin (MYCOSTATIN) 930354 UNIT/GM external ointment Apply topically 2 times daily To vagina Huyen Infection Laila Sanches DNP   Vitamin D3 (VITAMIN D, CHOLECALCIFEROL,) 25 mcg (1000 units) tablet Take by mouth daily.  General health  Patient Reported         Add new medications, over-the-counter drugs, herbals, vitamins, or  minerals in the blank rows below.    Medication How I take it Why I use it Prescriber                                      Allergies:      - Bupropion        Side effects I have had:      Not on File        Other Information:              My notes and questions:

## 2025-01-25 DIAGNOSIS — E03.9 HYPOTHYROIDISM, UNSPECIFIED TYPE: ICD-10-CM

## 2025-01-27 RX ORDER — LEVOTHYROXINE SODIUM 112 UG/1
112 TABLET ORAL DAILY
Qty: 90 TABLET | Refills: 2 | Status: SHIPPED | OUTPATIENT
Start: 2025-01-27

## 2025-01-29 ENCOUNTER — OFFICE VISIT (OUTPATIENT)
Dept: OPHTHALMOLOGY | Facility: CLINIC | Age: 70
End: 2025-01-29
Attending: OPHTHALMOLOGY
Payer: COMMERCIAL

## 2025-01-29 DIAGNOSIS — Z98.890 H/O VITRECTOMY: ICD-10-CM

## 2025-01-29 DIAGNOSIS — H40.003 GLAUCOMA SUSPECT OF BOTH EYES: Primary | ICD-10-CM

## 2025-01-29 DIAGNOSIS — H35.372 EPIRETINAL MEMBRANE (ERM) OF LEFT EYE: ICD-10-CM

## 2025-01-29 DIAGNOSIS — H25.811 COMBINED FORM OF AGE-RELATED CATARACT, RIGHT EYE: ICD-10-CM

## 2025-01-29 DIAGNOSIS — Z96.1 PSEUDOPHAKIA, LEFT EYE: ICD-10-CM

## 2025-01-29 DIAGNOSIS — H47.20 OPTIC ATROPHY: ICD-10-CM

## 2025-01-29 PROCEDURE — G0463 HOSPITAL OUTPT CLINIC VISIT: HCPCS | Performed by: OPHTHALMOLOGY

## 2025-01-29 PROCEDURE — 92083 EXTENDED VISUAL FIELD XM: CPT | Performed by: OPHTHALMOLOGY

## 2025-01-29 PROCEDURE — 92133 CPTRZD OPH DX IMG PST SGM ON: CPT | Performed by: OPHTHALMOLOGY

## 2025-01-29 ASSESSMENT — TONOMETRY
OS_IOP_MMHG: 17
IOP_METHOD: TONOPEN
OD_IOP_MMHG: 13
IOP_METHOD: APPLANATION
OD_IOP_MMHG: 18
OS_IOP_MMHG: 24

## 2025-01-29 ASSESSMENT — REFRACTION_WEARINGRX
OD_ADD: +2.50
OD_CYLINDER: +2.25
OS_ADD: +2.50
OS_AXIS: 022
SPECS_TYPE: PAL
OD_AXIS: 155
OS_CYLINDER: +2.00
OS_SPHERE: -1.00
OD_SPHERE: -2.00

## 2025-01-29 ASSESSMENT — EXTERNAL EXAM - RIGHT EYE: OD_EXAM: NORMAL

## 2025-01-29 ASSESSMENT — SLIT LAMP EXAM - LIDS
COMMENTS: NORMAL
COMMENTS: NORMAL

## 2025-01-29 ASSESSMENT — VISUAL ACUITY
OS_CC: 20/125
CORRECTION_TYPE: GLASSES
OS_PH_CC: 20/100
OD_CC: 20/20-2
METHOD: SNELLEN - LINEAR

## 2025-01-29 ASSESSMENT — CUP TO DISC RATIO
OD_RATIO: 0.5
OS_RATIO: 0.6

## 2025-01-29 ASSESSMENT — EXTERNAL EXAM - LEFT EYE: OS_EXAM: NORMAL

## 2025-01-29 ASSESSMENT — CONF VISUAL FIELD: COMMENTS: HVF TODAY

## 2025-01-29 NOTE — PROGRESS NOTES
Chief Complaint(s) and History of Present Illness(es)     Glaucoma Suspect Evaluation            Laterality: left eye    Associated symptoms: floaters (unchanged).  Negative for flashes    Pain scale: 0/10          Comments    Pt feels vision is largely unchanged since LV here, denies eye   pain/discomfort.    Oc meds:  none    Jennie Elilott, GLORIA 2:47 PM 01/29/2025                 Review of systems for the eyes was negative other than the pertinent positives/negatives listed in the HPI.      Assessment & Plan      Grace Chinchilla is a 69 year old female with the following diagnoses:   1. Glaucoma suspect of both eyes    2. Combined form of age-related cataract, right eye    3. Epiretinal membrane (ERM) of left eye    4. H/O vitrectomy - Left Eye    5. Pseudophakia, left eye    6. Optic atrophy - Left Eye      Referred by Dr. Motley for observation of glaucoma suspect. Has also seen Dr. Hair for a left optic neuropathy with a negative MRI brain and orbits for compressive/infiltrative etiology.     Had membrane peel 5/25/23 for ERM. She cannot remember if her vision changed dramatically before or after surgery.     Maximum intraocular pressure 21/24  Family history: unknown  Trauma history: negative  Gonioscopy: right eye patient unable to tolerate, left eye open to   Pachmetry: 518/519 (pentacam)    Today's testing:  Visual field January 29, 2025: Right eye - possible very mild nasal step, unreliable; Left eye - nasal paracentral scotoma stable, unreliable  OCT nerve fiber layer January 29, 2025: right eye superonasal thinning stable, left eye thinning superior, temporal and inferior, possible progression IT vs poor tracing    She has a known left optic neuropathy and is likely a glaucoma suspect.   Apparent RNFL progression today is most likely due to artifact given motion   Recommend to observe and repeat testing in 3 months at this time  Intraocular pressure borderline today    Patient  disposition:   Return in about 3 months (around 4/29/2025) for VT only, OCT NFL.      Marvin Valdes MD  Resident Physician, PGY-3  Department of Ophthalmology        Attending Physician Attestation:  Complete documentation of historical and exam elements from today's encounter can be found in the full encounter summary report (not reduplicated in this progress note).  I personally obtained the chief complaint(s) and history of present illness.  I confirmed and edited as necessary the review of systems, past medical/surgical history, family history, social history, and examination findings as documented by others; and I examined the patient myself.  I personally reviewed the relevant tests, images, and reports as documented above.  I formulated and edited as necessary the assessment and plan and discussed the findings and management plan with the patient and family. Attending Physician Image/Tesing Attestation: I personally reviewed the ophthalmic test(s) associated with this encounter, agree with the interpretation(s) as documented by the resident/fellow, and have edited the corresponding report(s) as necessary.  . - Jimi Meneses MD

## 2025-02-13 ENCOUNTER — HOSPITAL ENCOUNTER (OUTPATIENT)
Facility: CLINIC | Age: 70
Discharge: HOME | End: 2025-02-13
Attending: INTERNAL MEDICINE | Admitting: INTERNAL MEDICINE
Payer: COMMERCIAL

## 2025-02-13 VITALS
RESPIRATION RATE: 16 BRPM | SYSTOLIC BLOOD PRESSURE: 130 MMHG | OXYGEN SATURATION: 97 % | DIASTOLIC BLOOD PRESSURE: 61 MMHG | HEART RATE: 64 BPM

## 2025-02-13 LAB — COLONOSCOPY: NORMAL

## 2025-02-13 PROCEDURE — 88305 TISSUE EXAM BY PATHOLOGIST: CPT | Mod: 26 | Performed by: STUDENT IN AN ORGANIZED HEALTH CARE EDUCATION/TRAINING PROGRAM

## 2025-02-13 PROCEDURE — 88305 TISSUE EXAM BY PATHOLOGIST: CPT | Mod: TC | Performed by: INTERNAL MEDICINE

## 2025-02-13 PROCEDURE — G0500 MOD SEDAT ENDO SERVICE >5YRS: HCPCS | Performed by: INTERNAL MEDICINE

## 2025-02-13 PROCEDURE — 45380 COLONOSCOPY AND BIOPSY: CPT | Mod: PT | Performed by: INTERNAL MEDICINE

## 2025-02-13 PROCEDURE — 250N000011 HC RX IP 250 OP 636: Performed by: INTERNAL MEDICINE

## 2025-02-13 PROCEDURE — 99153 MOD SED SAME PHYS/QHP EA: CPT | Mod: PT | Performed by: INTERNAL MEDICINE

## 2025-02-13 RX ORDER — FENTANYL CITRATE 50 UG/ML
INJECTION, SOLUTION INTRAMUSCULAR; INTRAVENOUS PRN
Status: DISCONTINUED | OUTPATIENT
Start: 2025-02-13 | End: 2025-02-13 | Stop reason: HOSPADM

## 2025-02-13 ASSESSMENT — ACTIVITIES OF DAILY LIVING (ADL)
ADLS_ACUITY_SCORE: 42

## 2025-02-13 NOTE — OR NURSING
Colonoscopy with polypectomy X 1 performed under moderate sedation, patient tolerated. Transferred to  and report given to recovery RN.

## 2025-02-14 LAB
PATH REPORT.COMMENTS IMP SPEC: NORMAL
PATH REPORT.COMMENTS IMP SPEC: NORMAL
PATH REPORT.FINAL DX SPEC: NORMAL
PATH REPORT.GROSS SPEC: NORMAL
PATH REPORT.MICROSCOPIC SPEC OTHER STN: NORMAL
PATH REPORT.RELEVANT HX SPEC: NORMAL
PHOTO IMAGE: NORMAL

## 2025-02-25 ENCOUNTER — OFFICE VISIT (OUTPATIENT)
Dept: FAMILY MEDICINE | Facility: CLINIC | Age: 70
End: 2025-02-25
Payer: COMMERCIAL

## 2025-02-25 VITALS
DIASTOLIC BLOOD PRESSURE: 60 MMHG | RESPIRATION RATE: 21 BRPM | HEART RATE: 82 BPM | BODY MASS INDEX: 34.55 KG/M2 | TEMPERATURE: 98.4 F | WEIGHT: 183 LBS | SYSTOLIC BLOOD PRESSURE: 127 MMHG | OXYGEN SATURATION: 98 % | HEIGHT: 61 IN

## 2025-02-25 DIAGNOSIS — E04.1 LEFT THYROID NODULE: ICD-10-CM

## 2025-02-25 DIAGNOSIS — G43.909 MIGRAINE WITHOUT STATUS MIGRAINOSUS, NOT INTRACTABLE, UNSPECIFIED MIGRAINE TYPE: Primary | ICD-10-CM

## 2025-02-25 PROCEDURE — 99214 OFFICE O/P EST MOD 30 MIN: CPT | Performed by: FAMILY MEDICINE

## 2025-02-25 PROCEDURE — 3074F SYST BP LT 130 MM HG: CPT | Performed by: FAMILY MEDICINE

## 2025-02-25 PROCEDURE — G2211 COMPLEX E/M VISIT ADD ON: HCPCS | Performed by: FAMILY MEDICINE

## 2025-02-25 PROCEDURE — 1111F DSCHRG MED/CURRENT MED MERGE: CPT | Performed by: FAMILY MEDICINE

## 2025-02-25 PROCEDURE — 3078F DIAST BP <80 MM HG: CPT | Performed by: FAMILY MEDICINE

## 2025-02-25 ASSESSMENT — PATIENT HEALTH QUESTIONNAIRE - PHQ9
SUM OF ALL RESPONSES TO PHQ QUESTIONS 1-9: 15
SUM OF ALL RESPONSES TO PHQ QUESTIONS 1-9: 15
10. IF YOU CHECKED OFF ANY PROBLEMS, HOW DIFFICULT HAVE THESE PROBLEMS MADE IT FOR YOU TO DO YOUR WORK, TAKE CARE OF THINGS AT HOME, OR GET ALONG WITH OTHER PEOPLE: SOMEWHAT DIFFICULT

## 2025-02-25 NOTE — PROGRESS NOTES
"  Assessment & Plan     Migraine without status migrainosus, not intractable, unspecified migraine type  Was in the ER with a headache, right-sided facial droop and slurred speech.  Concern of stroke but workup was negative.  Neurology consult was done.  Found to be possibly migraine.  She has history of migraine but no similar symptoms in the past.  Not on any abortive or preventative care.  We discussed she can continue to monitor her symptoms as her previous episodes were mild and if symptoms are recurring or frequent we should consider abortive treatment for migraine.  She agreed.    Left thyroid nodule  Incidental finding.  She was advised to obtain thyroid ultrasound.  Upon reviewing her records, she had previous ultrasound and thyroid biopsy which did not show any abnormality in the past.  Left thyroid nodule biopsy in 2019 and FNA 10/19 - colloid nodule .  Offered follow-up thyroid ultrasound.  She will decide and notify us if she is interested.      MED REC REQUIRED  Post Medication Reconciliation Status:  Discharge medications reconciled, continue medications without change  BMI  Estimated body mass index is 34.58 kg/m  as calculated from the following:    Height as of this encounter: 1.549 m (5' 1\").    Weight as of this encounter: 83 kg (183 lb).   Weight management plan: Discussed healthy diet and exercise guidelines           Melita Edwards is a 69 year old, presenting for the following health issues:  ER F/U (ED/HOSP/Hospital F/U, severe migraine, confusion released mid December)        2/25/2025    11:25 AM   Additional Questions   Roomed by Tomasa MILAN     ED Follow up:    Facility: [facility]   Date of visit: 12/18/2024  Reason for visit: ED/HOSP/Hospital F/U, severe migraine, nausea, weakness  Current Status: no headache        8/6/2024     2:14 PM 11/8/2024     2:25 PM 2/25/2025    11:16 AM   PHQ   PHQ-9 Total Score 12 14  15    Q9: Thoughts of better off dead/self-harm " "past 2 weeks Not at all Not at all Not at all       Patient-reported     Doing well now and no new episode of migraine.   No new stressor. History of migraine  but not as bad. Never needed migraine medication in the past.   Was having headache, right sided facial droop and slurred speech. Mri was negative for stroke. Found to be complex migraine.     Thyroid nodule was noticed incidentally.          Objective    /60 (BP Location: Right arm, Patient Position: Right side, Cuff Size: Adult Regular)   Pulse 82   Temp 98.4  F (36.9  C) (Oral)   Resp 21   Ht 1.549 m (5' 1\")   Wt 83 kg (183 lb)   LMP 09/13/2006   SpO2 98%   BMI 34.58 kg/m    Body mass index is 34.58 kg/m .  Physical Exam             The longitudinal plan of care for the diagnosis(es)/condition(s) as documented were addressed during this visit. Due to the added complexity in care, I will continue to support Grace in the subsequent management and with ongoing continuity of care.  Signed Electronically by: Jesus Nichols MD, MD      "

## 2025-03-03 ENCOUNTER — THERAPY VISIT (OUTPATIENT)
Dept: PHYSICAL THERAPY | Facility: CLINIC | Age: 70
End: 2025-03-03
Payer: COMMERCIAL

## 2025-03-03 DIAGNOSIS — M62.81 MUSCLE WEAKNESS (GENERALIZED): ICD-10-CM

## 2025-03-03 DIAGNOSIS — M54.42 LEFT-SIDED LOW BACK PAIN WITH LEFT-SIDED SCIATICA: Primary | Chronic | ICD-10-CM

## 2025-03-03 PROCEDURE — 97110 THERAPEUTIC EXERCISES: CPT | Mod: GP

## 2025-03-03 NOTE — PROGRESS NOTES
03/03/25 0500   Appointment Info   Signing clinician's name / credentials July Garner DPT   Total/Authorized Visits 12   Visits Used 5   Medical Diagnosis Acute Left Sided Low Back Pain w/ Left Sided Sciatica   PT Tx Diagnosis Low Back Pain   Progress Note/Certification   Start of Care Date 08/21/24   Onset of illness/injury or Date of Surgery 07/18/24   Therapy Frequency 1x/month   Predicted Duration 1 month   Certification date from 02/10/25   Certification date to 03/10/25   Progress Note Completed Date 11/11/25   PT Goal 1   Goal Identifier Pain   Goal Description Pt's goal is for left sided pain to go away in order to improve function at home.   Rationale to maximize safety and independence with performance of ADLs and functional tasks;to maximize safety and independence within the home;to maximize safety and independence within the community;to maximize safety and independence with transportation   Target Date 11/13/24   Date Met 03/03/25   Subjective Report   Subjective Report Patient presents after 4 month hiatus from therapy. She has been doing well, not having the sciatic pain. Does not work on PT exercises but walks regularly and goes to [employer] [facility] once a week. Still has some difficulty going down stairs.   Objective Measures   Objective Measures Objective Measure 1   Objective Measure 1   Details flexion WNL, extension WNL, pain free. Completes 10 STS w/o pain. Requires UE assist for ascending/descending stairs. poor eccentric control in descent. no pain.   Therapeutic Procedure/Exercise   Therapeutic Procedures: strength, endurance, ROM, flexibility minutes (56014) 40   PTRx Ther Proc 1 Sit to Stand   PTRx Ther Proc 1 - Details 10x w/o UE   PTRx Ther Proc 2 Bridging   PTRx Ther Proc 2 - Details 5x for form review   PTRx Ther Proc 3 Hip Abduction Straight Leg Raise   PTRx Ther Proc 3 - Details 5x for form review   PTRx Ther Proc 4 Marching in Place   PTRx Ther Proc 4 - Details 6x for  review of form   PTRx Ther Proc 5 Supine Sciatic Nerve Sliders   PTRx Ther Proc 5 - Details VR   PTRx Ther Proc 6 Standing Hamstring Stretch   PTRx Ther Proc 6 - Details VR   Therapeutic Activity   Ther Act 1 Sit to Stand   Ther Act 2 3-Way Hip   Neuromuscular Re-education   PTRx Neuro Re-ed 1 Alternating Step Ups   PTRx Neuro Re-ed 1 - Details No Notes   Plan   Updates to plan of care STS and step ups   Plan for next session discharge   Total Session Time   Timed Code Treatment Minutes 40   Total Treatment Time (sum of timed and untimed services) 40         DISCHARGE  Reason for Discharge: Patient has met all goals.    Equipment Issued: none    Discharge Plan: Patient to continue home program.    Referring Provider:  Norbert LEBLANC [facility]                                                                                   OUTPATIENT PHYSICAL THERAPY    PLAN OF TREATMENT FOR OUTPATIENT REHABILITATION   Patient's Last Name, First Name, Grace Vásquez YOB: 1955   Provider's Name   [facility]   Medical Record No.  [id number]     Onset Date: 07/18/24  Start of Care Date: 08/21/24     Medical Diagnosis:  Acute Left Sided Low Back Pain w/ Left Sided Sciatica      PT Treatment Diagnosis:  Low Back Pain Plan of Treatment  Frequency/Duration: 1x/month/ 1 month    Certification date from 02/10/25 to 03/10/25         See note for plan of treatment details and functional goals     July Garner, PT                         I CERTIFY THE NEED FOR THESE SERVICES FURNISHED UNDER        THIS PLAN OF TREATMENT AND WHILE UNDER MY CARE     (Physician attestation of this document indicates review and certification of the therapy plan).              Referring Provider:  Norbert Booth    Initial Assessment  See Epic Evaluation- Start of Care Date: 08/21/24

## 2025-04-08 ENCOUNTER — PATIENT OUTREACH (OUTPATIENT)
Dept: CARE COORDINATION | Facility: CLINIC | Age: 70
End: 2025-04-08
Payer: COMMERCIAL

## 2025-04-30 ENCOUNTER — OFFICE VISIT (OUTPATIENT)
Dept: OPHTHALMOLOGY | Facility: CLINIC | Age: 70
End: 2025-04-30
Attending: OPHTHALMOLOGY
Payer: COMMERCIAL

## 2025-04-30 DIAGNOSIS — Z96.1 PSEUDOPHAKIA, LEFT EYE: ICD-10-CM

## 2025-04-30 DIAGNOSIS — Z98.890 H/O VITRECTOMY: ICD-10-CM

## 2025-04-30 DIAGNOSIS — H47.20 OPTIC ATROPHY: ICD-10-CM

## 2025-04-30 DIAGNOSIS — H35.372 EPIRETINAL MEMBRANE (ERM) OF LEFT EYE: ICD-10-CM

## 2025-04-30 DIAGNOSIS — H40.1124 PRIMARY OPEN ANGLE GLAUCOMA (POAG) OF LEFT EYE, INDETERMINATE STAGE: Primary | ICD-10-CM

## 2025-04-30 PROCEDURE — 92133 CPTRZD OPH DX IMG PST SGM ON: CPT | Performed by: OPHTHALMOLOGY

## 2025-04-30 PROCEDURE — G0463 HOSPITAL OUTPT CLINIC VISIT: HCPCS | Performed by: OPHTHALMOLOGY

## 2025-04-30 ASSESSMENT — CUP TO DISC RATIO
OS_RATIO: 0.6
OD_RATIO: 0.5

## 2025-04-30 ASSESSMENT — EXTERNAL EXAM - LEFT EYE: OS_EXAM: NORMAL

## 2025-04-30 ASSESSMENT — VISUAL ACUITY
METHOD: SNELLEN - LINEAR
OS_CC: 20/200
OD_CC+: -1
CORRECTION_TYPE: GLASSES
OD_CC: 20/20

## 2025-04-30 ASSESSMENT — SLIT LAMP EXAM - LIDS
COMMENTS: NORMAL
COMMENTS: NORMAL

## 2025-04-30 ASSESSMENT — EXTERNAL EXAM - RIGHT EYE: OD_EXAM: NORMAL

## 2025-04-30 ASSESSMENT — REFRACTION_WEARINGRX
OS_AXIS: 022
OD_CYLINDER: +2.25
OD_AXIS: 155
OS_ADD: +2.50
SPECS_TYPE: PAL
OD_ADD: +2.50
OS_CYLINDER: +2.00
OS_SPHERE: -1.00
OD_SPHERE: -2.00

## 2025-04-30 ASSESSMENT — TONOMETRY
IOP_METHOD: TONOPEN
OD_IOP_MMHG: 13
OS_IOP_MMHG: 22

## 2025-04-30 NOTE — PROGRESS NOTES
HPI       Glaucoma Suspect Follow Up    In both eyes.  Associated symptoms include dryness and floaters.  Negative for eye pain and flashes.  Pain was noted as 0/10. Additional comments: Glaucoma suspect of both eyes             Comments    Pt states vision is the same.  No pain.  No flashes.  No change to floaters.  No ocular meds.  No DM.    GLORIA Aguilar April 30, 2025 1:13 PM              Last edited by Gibran Sanches COT on 4/30/2025  1:13 PM.          Review of systems for the eyes was negative other than the pertinent positives/negatives listed in the HPI.      Assessment & Plan      Grace Chinchilla is a 70 year old female with the following diagnoses:   1. Primary open angle glaucoma (POAG) of left eye, indeterminate stage    2. Optic atrophy - Left Eye    3. H/O vitrectomy - Left Eye    4. Epiretinal membrane (ERM) of left eye    5. Pseudophakia, left eye           Referred by Dr. Motley for observation of glaucoma suspect. Has also seen Dr. Hair for a left optic neuropathy with a negative MRI brain and orbits for compressive/infiltrative etiology.      Had membrane peel 5/25/23 for ERM. She cannot remember if her vision changed dramatically before or after surgery.      Maximum intraocular pressure 21/24  Family history: unknown  Trauma history: negative  Gonioscopy: right eye patient unable to tolerate, left eye open to   Pachmetry: 518/519 (pentacam)     Today's testing:  OCT nerve fiber layer 4/30/2025: right eye superonasal thinning - stable, left eye thinning superior, temporal and inferior, likely diffuse progression      Today there is again evidence of RNFL thinning  Right eye intraocular pressure excellent, left eye remains borderline    Discussed difficulty in diagnosis of glaucoma with confounding optic neuropathy  Given consecutive visits with thinning nerve fiber layer, glaucoma is likely and intraocular pressure lowering is recommended  RBA to selected laser  trabeculoplasty (SLT) discussed  Wishes to discuss options with family and call to schedule laser or get drop prescription later  Discussed need for chronic treatment and recommended management in detail     Patient disposition:   Return for Call to schedule SLT v start drops.           Attending Physician Attestation:  Complete documentation of historical and exam elements from today's encounter can be found in the full encounter summary report (not reduplicated in this progress note).  I personally obtained the chief complaint(s) and history of present illness.  I confirmed and edited as necessary the review of systems, past medical/surgical history, family history, social history, and examination findings as documented by others; and I examined the patient myself.  I personally reviewed the relevant tests, images, and reports as documented above.  I formulated and edited as necessary the assessment and plan and discussed the findings and management plan with the patient and family. . - Jimi Meneses MD

## 2025-04-30 NOTE — NURSING NOTE
Chief Complaints and History of Present Illnesses   Patient presents with    Glaucoma Suspect Follow Up     Glaucoma suspect of both eyes     Chief Complaint(s) and History of Present Illness(es)       Glaucoma Suspect Follow Up              Laterality: both eyes    Associated symptoms: dryness and floaters.  Negative for eye pain and flashes    Pain scale: 0/10    Comments: Glaucoma suspect of both eyes              Comments    Pt states vision is the same.  No pain.  No flashes.  No change to floaters.  No ocular meds.  No DM.    GLORIA Aguilar April 30, 2025 1:13 PM

## 2025-04-30 NOTE — PATIENT INSTRUCTIONS
Selected laser trabeculoplasty (SLT) right eye: CPT = 81157    Open angle glaucoma (POAG): ICD10 = H40.1124

## 2025-05-27 ENCOUNTER — TELEPHONE (OUTPATIENT)
Dept: OPHTHALMOLOGY | Facility: CLINIC | Age: 70
End: 2025-05-27
Payer: COMMERCIAL

## 2025-05-27 NOTE — TELEPHONE ENCOUNTER
Spoke to pt and sister at 1715    Reviewed SLT laser and glaucoma eye drops    Reviewed eye drops would need to be taken likely for years/lifetime to reduce eye pressure    Reviewed SLT laser in clinic procedure and may reduce eye pressure enough that drops will not be needed-- reviewed may still need drops following procedure if does not reduce pressure enough.    Reviewed also SLT will not cure glaucoma and in future may need additional treatment to control glaucoma.    Sister reviewed with pt if felt would be able to take eye drops forever/every day vs the procedure.    Pt/sister would like to go forth with procedure and scheduled June 18th at 1245 with Dr. Meneses for consult/liklely procedure    Heraclio Wells RN 5:21 PM 05/27/25

## 2025-05-27 NOTE — TELEPHONE ENCOUNTER
[facility] Call Center    Phone Message    May a detailed message be left on voicemail: yes     Reason for Call: Other: Patient calling to schedule procedure with Dr Meneses and is requesting a call back to go over options again.    Please call patient back at [phone]. Thank you.    Action Taken: Message routed to:  [facility] Eye    Travel Screening: Not Applicable

## 2025-06-18 ENCOUNTER — OFFICE VISIT (OUTPATIENT)
Dept: OPHTHALMOLOGY | Facility: CLINIC | Age: 70
End: 2025-06-18
Attending: OPHTHALMOLOGY
Payer: COMMERCIAL

## 2025-06-18 DIAGNOSIS — Z98.890 H/O VITRECTOMY: ICD-10-CM

## 2025-06-18 DIAGNOSIS — H40.003 GLAUCOMA SUSPECT OF BOTH EYES: Primary | ICD-10-CM

## 2025-06-18 PROCEDURE — 65855 TRABECULOPLASTY LASER SURG: CPT | Mod: LT | Performed by: OPHTHALMOLOGY

## 2025-06-18 ASSESSMENT — REFRACTION_WEARINGRX
OD_ADD: +2.50
OS_AXIS: 022
OD_CYLINDER: +2.25
SPECS_TYPE: PAL
OD_AXIS: 155
OS_ADD: +2.50
OS_SPHERE: -1.00
OS_CYLINDER: +2.00
OD_SPHERE: -2.00

## 2025-06-18 ASSESSMENT — TONOMETRY
IOP_METHOD: ICARE
OS_IOP_MMHG: 16
OS_IOP_MMHG: 18
IOP_METHOD: TONOPEN
OD_IOP_MMHG: 11

## 2025-06-18 ASSESSMENT — VISUAL ACUITY
OD_PH_CC: 20/30
METHOD: SNELLEN - LINEAR
OD_CC+: -2
OS_CC: 20/250
OD_CC: 20/40
OD_PH_CC+: -1
CORRECTION_TYPE: GLASSES

## 2025-06-18 NOTE — PROGRESS NOTES
HPI       Glaucoma Follow Up    In left eye.  Associated symptoms include floaters.  Negative for flashes and burning.  Side effects of treatment include none.  Pain was noted as 2/10. Additional comments: POAG-lt eye/ possible SLT  No drops             Comments    Patient reports when her eyes get tired they feel sore.    Audelia Kim 12:36 PM 06/18/2025            Last edited by Audelia Kim, NP on 6/18/2025 12:36 PM.          Review of systems for the eyes was negative other than the pertinent positives/negatives listed in the HPI.      Assessment & Plan      Grace Chinchilla is a 70 year old female with the following diagnoses:   1. Glaucoma suspect of both eyes    2. H/O vitrectomy       Here for SLT left eye, procedure visit  Accompanied by sister, Sarai  Discussed r/b/a of SLT left eye and patient wishes to proceed    SLT left eye performed today 6/18/25    No complications, and post-procedure IOP 16    Patient disposition:   Return in about 4 weeks (around 7/16/2025) for VT only.    Demarcus Arredondo MD  Resident Physician, PGY-3  Department of Ophthalmology        Attending Physician Attestation:  Complete documentation of historical and exam elements from today's encounter can be found in the full encounter summary report (not reduplicated in this progress note).  I personally obtained the chief complaint(s) and history of present illness.  I confirmed and edited as necessary the review of systems, past medical/surgical history, family history, social history, and examination findings as documented by others; and I examined the patient myself.  I personally reviewed the relevant tests, images, and reports as documented above.  I formulated and edited as necessary the assessment and plan and discussed the findings and management plan with the patient and family. . - Jimi Meneses MD

## 2025-06-19 ASSESSMENT — EXTERNAL EXAM - RIGHT EYE: OD_EXAM: NORMAL

## 2025-06-19 ASSESSMENT — SLIT LAMP EXAM - LIDS
COMMENTS: NORMAL
COMMENTS: NORMAL

## 2025-06-19 ASSESSMENT — EXTERNAL EXAM - LEFT EYE: OS_EXAM: NORMAL

## 2025-06-23 ENCOUNTER — TELEPHONE (OUTPATIENT)
Dept: OPHTHALMOLOGY | Facility: CLINIC | Age: 70
End: 2025-06-23
Payer: COMMERCIAL

## 2025-06-23 ENCOUNTER — NURSE TRIAGE (OUTPATIENT)
Dept: NURSING | Facility: CLINIC | Age: 70
End: 2025-06-23
Payer: COMMERCIAL

## 2025-06-23 NOTE — TELEPHONE ENCOUNTER
Pt/ called back    Triage team reached back out several times and going to voicemail.    I scheduled at 1130 AM tomorrow with Dr. Swain and left message with details of tomorrow scheduled appt.    Heraclio Wells RN 4:45 PM 06/23/25

## 2025-06-23 NOTE — TELEPHONE ENCOUNTER
Nurse Triage SBAR    Is this a 2nd Level Triage? YES, LICENSED PRACTITIONER REVIEW IS REQUIRED    Situation: Patient calling to discuss recovery after her laser procedure    Background:  History of laser trabeculoplasty on 6/18. Experiencing soreness and difficulty opening eyes since that procedure.    Assessment:  Grace is complaining of 5 days bilateral eye soreness and inability to open her eyes more than to squint. Her pain is at a mild to moderate level. Grace denies vision changes to the best of her knowledge. No other concerns.     Protocol Recommended Disposition:       Recommendation:  Grace would like to speak with the eye team for advice and is available to come in for evaluation if recommended.     Routed to provider/care team.          Reason for Disposition   Eye pain present > 24 hours    Additional Information   Negative: Followed an eye injury   Negative: Eye pain from chemical in the eye   Negative: Eye pain from foreign body in eye   Negative: Has sinus pain or pressure   Negative: SEVERE eye pain   Negative: Complete loss of vision in one or both eyes   Negative: Eyelids are very swollen (shut or almost) and fever   Negative: Eyelid (outer) is very red and fever   Negative: Foreign body sensation ('feels like something is in there') and irrigation didn't help   Negative: Vomiting   Negative: Ulcer or sore seen on the cornea (clear center part of the eye)   Negative: Recent eye surgery and increasing eye pain   Negative: Blurred vision AND new-onset or getting worse   Negative: Patient sounds very sick or weak to the triager   Negative: MODERATE eye pain or discomfort (e.g., interferes with normal activities or awakens from sleep; more than mild)   Negative: Looking at light causes MODERATE to SEVERE eye pain (i.e., photophobia)   Negative: Eyelids are very swollen (shut or almost) and no fever   Negative: Painful rash near eye and multiple small blisters grouped together   Negative: 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 ONSET: \"When did the pain start?\" (e.g., minutes, hours, days)      After her procedure on 6/18  2. TIMING: \"Does the pain come and go, or has it been constant since it started?\" (e.g., constant, intermittent, fleeting)      constant  3. SEVERITY: \"How bad is the pain?\"  (Scale 1-10; mild, moderate or severe)      Mild to moderate  4. LOCATION: \"Where does it hurt?\"  (e.g., eyelid, eye, cheekbone)      Eyes bilaterally  5. CAUSE: \"What do you think is causing the pain?\"      Laser procedure on 6/18  6. VISION: \"Do you have blurred vision or changes in your vision?\"       no  7. EYE DISCHARGE: \"Is there any discharge (pus) from the eye(s)?\"  If Yes, ask: \"What color is it?\"       no  8. FEVER: \"Do you have a fever?\" If Yes, ask: \"What is it, how was it measured, and when did it start?\"       no  9. OTHER SYMPTOMS: \"Do you have any other symptoms?\" (e.g., headache, nasal discharge, facial rash)      no  10. PREGNANCY: \"Is there any chance you are pregnant?\" \"When was your last menstrual period?\"        no    Protocols used: Eye Pain and Other Symptoms-A-[place]    "

## 2025-06-23 NOTE — TELEPHONE ENCOUNTER
Called times two and left message with direct number at [phone]    Home/mobile [phone]    Heraclio Wells RN 12:37 PM 06/23/25

## 2025-06-23 NOTE — TELEPHONE ENCOUNTER
I tried calling Grace x 3 and left a VM     Called her  x3 and unable to LVM     Ok to add into Dr. Swain's sameday spot for tomorrow - 1130 am or 230 pm     Maryam Marquez Communication Facilitator on 6/23/2025 at 4:26 PM

## 2025-06-23 NOTE — TELEPHONE ENCOUNTER
[facility] Call Center    Phone Message    May a detailed message be left on voicemail: yes     Reason for Call: Other: Patient returned a call to the clinic stating this would be from Dr. Meneses or Dr. Motley regarding the hard time opening eyes after surgery. Patient stated spouses phone was called. Please call patient's number: [phone] anytime. Thank you.     Action Taken: Other: [facility]    Travel Screening: Not Applicable     Date of Service:

## 2025-06-24 ENCOUNTER — OFFICE VISIT (OUTPATIENT)
Dept: OPHTHALMOLOGY | Facility: CLINIC | Age: 70
End: 2025-06-24
Payer: COMMERCIAL

## 2025-06-24 DIAGNOSIS — H04.123 DRY EYE SYNDROME OF BOTH EYES: Primary | ICD-10-CM

## 2025-06-24 DIAGNOSIS — H40.003 GLAUCOMA SUSPECT OF BOTH EYES: ICD-10-CM

## 2025-06-24 PROCEDURE — G0463 HOSPITAL OUTPT CLINIC VISIT: HCPCS

## 2025-06-24 ASSESSMENT — TONOMETRY
OD_IOP_MMHG: 13
OS_IOP_MMHG: 23
OD_IOP_MMHG: 18
OD_IOP_MMHG: 39
OS_IOP_MMHG: 24
OS_IOP_MMHG: 17
OD_IOP_MMHG: 24
OS_IOP_MMHG: 30

## 2025-06-24 ASSESSMENT — EXTERNAL EXAM - LEFT EYE: OS_EXAM: NORMAL

## 2025-06-24 ASSESSMENT — VISUAL ACUITY
OS_CC: 20/250
CORRECTION_TYPE: GLASSES
OD_CC: 20/40
METHOD: SNELLEN - LINEAR

## 2025-06-24 ASSESSMENT — SLIT LAMP EXAM - LIDS
COMMENTS: NORMAL
COMMENTS: NORMAL

## 2025-06-24 ASSESSMENT — EXTERNAL EXAM - RIGHT EYE: OD_EXAM: NORMAL

## 2025-06-24 NOTE — PROGRESS NOTES
History  HPI    New symptoms since SLT os:   Difficulty opening eyelid- want to stay close - takes 15-20 mins to get eyes to open.   Eyes sore, itch, burn, pain intermittently 8/10 (od sharp - medially).   Vision stable ou.     Gtts: pred bid os (also used in od twice due to lids)             Janelle STYLES, June 24, 2025 11:43 AM        Last edited by Janelle Hernandez COA on 6/24/2025 11:54 AM.          Assessment/Plan  (H04.123) Dry eye syndrome of both eyes  (primary encounter diagnosis)  Plan:   -Educated on today's findings.   -No A/C reaction, Epi Defect, Dendrite or Infiltrate on today's exam. So Signs of infection in today's exam.  -Suspect a dry eye flare up. Do not suspect side effects from SLT.   -Start Warm Compresses 10-15 mins at least at bedtime each eye. Either with a Shmuel Mask or Rice/Sock Method.   -Start Artifical Tears QID or PRN each eye.   -Discussed Brands like Systane or Refresh.   -Start Pred Acetate BID each eye for 2 weeks then stop.  -Patient currently sleeps with a fan and sleep apnea mask. Discussed that it could be causing flare up.   -Discussed stopping fan and securing the sleep apnea mask or wear a sleeping mask/eye patches to covr the eye when you are sleeping.    (H40.003) Glaucoma suspect of both eyes  Plan:   -Continue Care with Dr. eMneses  -Monitor.    Return to clinic as scheduled with Dr. Meneses on 07/30/2025    Complete documentation of historical and exam elements from today's encounter can be found in the full encounter summary report (not reduplicated in this progress note). I personally obtained the chief complaint(s) and history of present illness. I confirmed and edited as necessary the review of systems, past medical/surgical history, family history, social history, and examination findings as document by others; and I examined the patient myself. I personally reviewed the relevant tests, images, and reports as documented above. I formulated and edited as necessary  the assessment and plan and discussed the findings and management plan with the patient and family.    Bobo Swain O.D.

## 2025-06-24 NOTE — PATIENT INSTRUCTIONS
Discussed using artificial tears at least 1 drop 4 times a day. Suggested brands are systane or refresh.      Start Prednisolone Acetate (Pink Cap) 1 drop 2 times a day on both eyes for 2 weeks then stop.    Start Warm Compresses 10-15 mins at least at bedtime each eye. Either with a Shmuel Mask or Rice/Sock Method.     Wear a sleeping mask at night time for now to avoid the air.

## 2025-06-25 ASSESSMENT — TONOMETRY
IOP_METHOD: TONOPEN

## 2025-06-27 ENCOUNTER — TELEPHONE (OUTPATIENT)
Dept: OPHTHALMOLOGY | Facility: CLINIC | Age: 70
End: 2025-06-27
Payer: COMMERCIAL

## 2025-06-27 NOTE — TELEPHONE ENCOUNTER
Caller reporting the following red-flag symptom(s): Soreness, itching, irritated, red, burning hard to open since laser procedure 6/24/25.    Per the system red-flag symptom policy, patient was instructed to:  speak with a Registered Nurse    Action:  Patient warm transferred to a Registered Nurse

## 2025-06-30 ENCOUNTER — LAB (OUTPATIENT)
Dept: LAB | Facility: CLINIC | Age: 70
End: 2025-06-30
Payer: COMMERCIAL

## 2025-06-30 DIAGNOSIS — I10 ESSENTIAL HYPERTENSION: Primary | ICD-10-CM

## 2025-06-30 DIAGNOSIS — Z79.899 ENCOUNTER FOR LONG-TERM (CURRENT) USE OF MEDICATIONS: ICD-10-CM

## 2025-06-30 LAB
ALBUMIN SERPL BCG-MCNC: 4.3 G/DL (ref 3.5–5.2)
ALP SERPL-CCNC: 78 U/L (ref 40–150)
ALT SERPL W P-5'-P-CCNC: 12 U/L (ref 0–50)
ANION GAP SERPL CALCULATED.3IONS-SCNC: 14 MMOL/L (ref 7–15)
AST SERPL W P-5'-P-CCNC: 18 U/L (ref 0–45)
BASOPHILS # BLD AUTO: 0 10E3/UL (ref 0–0.2)
BASOPHILS NFR BLD AUTO: 0 %
BILIRUB SERPL-MCNC: 0.3 MG/DL
BILIRUBIN DIRECT (ROCHE PRO & PURE): 0.09 MG/DL (ref 0–0.45)
BUN SERPL-MCNC: 20.3 MG/DL (ref 8–23)
CALCIUM SERPL-MCNC: 10.1 MG/DL (ref 8.8–10.4)
CHLORIDE SERPL-SCNC: 101 MMOL/L (ref 98–107)
CREAT SERPL-MCNC: 0.89 MG/DL (ref 0.51–0.95)
EGFRCR SERPLBLD CKD-EPI 2021: 69 ML/MIN/1.73M2
EOSINOPHIL # BLD AUTO: 0.2 10E3/UL (ref 0–0.7)
EOSINOPHIL NFR BLD AUTO: 3 %
ERYTHROCYTE [DISTWIDTH] IN BLOOD BY AUTOMATED COUNT: 14.6 % (ref 10–15)
GLUCOSE SERPL-MCNC: 88 MG/DL (ref 70–99)
HCO3 SERPL-SCNC: 25 MMOL/L (ref 22–29)
HCT VFR BLD AUTO: 37 % (ref 35–47)
HGB BLD-MCNC: 11.8 G/DL (ref 11.7–15.7)
IMM GRANULOCYTES # BLD: 0 10E3/UL
IMM GRANULOCYTES NFR BLD: 0 %
LYMPHOCYTES # BLD AUTO: 1.8 10E3/UL (ref 0.8–5.3)
LYMPHOCYTES NFR BLD AUTO: 29 %
MCH RBC QN AUTO: 26.4 PG (ref 26.5–33)
MCHC RBC AUTO-ENTMCNC: 31.9 G/DL (ref 31.5–36.5)
MCV RBC AUTO: 83 FL (ref 78–100)
MONOCYTES # BLD AUTO: 0.8 10E3/UL (ref 0–1.3)
MONOCYTES NFR BLD AUTO: 13 %
NEUTROPHILS # BLD AUTO: 3.5 10E3/UL (ref 1.6–8.3)
NEUTROPHILS NFR BLD AUTO: 55 %
PLATELET # BLD AUTO: 282 10E3/UL (ref 150–450)
POTASSIUM SERPL-SCNC: 4.9 MMOL/L (ref 3.4–5.3)
PROT SERPL-MCNC: 7.2 G/DL (ref 6.4–8.3)
RBC # BLD AUTO: 4.47 10E6/UL (ref 3.8–5.2)
SODIUM SERPL-SCNC: 140 MMOL/L (ref 135–145)
VALPROATE SERPL-MCNC: 42.4 UG/ML (ref 50–100)
WBC # BLD AUTO: 6.4 10E3/UL (ref 4–11)

## 2025-06-30 PROCEDURE — 80164 ASSAY DIPROPYLACETIC ACD TOT: CPT

## 2025-06-30 PROCEDURE — 80053 COMPREHEN METABOLIC PANEL: CPT

## 2025-06-30 PROCEDURE — 36415 COLL VENOUS BLD VENIPUNCTURE: CPT

## 2025-06-30 PROCEDURE — 85025 COMPLETE CBC W/AUTO DIFF WBC: CPT

## 2025-06-30 PROCEDURE — 82248 BILIRUBIN DIRECT: CPT

## 2025-06-30 NOTE — TELEPHONE ENCOUNTER
Spoke to pt at 1641    Symptoms improved some over weekend, but still having hard time opening eyes in AM.    Offered appt tomorrow/this Week with Dr. Swain.    Scheduled Wednesday with Dr. Swain in same day time slot.    Heraclio Wells RN 4:47 PM 06/30/25

## 2025-06-30 NOTE — TELEPHONE ENCOUNTER
[facility] Call Center    Phone Message    May a detailed message be left on voicemail: yes     Reason for Call: Other: Patient called on 6/27/2025 and was given direction per Triage RN to use the refresh eye drops 2 x per day and warm compresses. Eyes are feeling better once they are opened, but patient is still have hard time opening initially. Patient reports that the prednisolone, per Dr. Jimi Meneses, is not taken regularly. Sending HP due to unresolved symptoms. Please call patient back after 9:30am. Thank you.    Action Taken: Other: [facility]    Travel Screening: Not Applicable     Date of Service:

## 2025-07-02 ENCOUNTER — OFFICE VISIT (OUTPATIENT)
Dept: OPHTHALMOLOGY | Facility: CLINIC | Age: 70
End: 2025-07-02
Payer: COMMERCIAL

## 2025-07-02 DIAGNOSIS — H04.123 DRY EYE SYNDROME OF BOTH EYES: Primary | ICD-10-CM

## 2025-07-02 DIAGNOSIS — H40.003 GLAUCOMA SUSPECT OF BOTH EYES: ICD-10-CM

## 2025-07-02 PROCEDURE — G0463 HOSPITAL OUTPT CLINIC VISIT: HCPCS

## 2025-07-02 PROCEDURE — 99213 OFFICE O/P EST LOW 20 MIN: CPT

## 2025-07-02 ASSESSMENT — VISUAL ACUITY
METHOD: SNELLEN - LINEAR
OS_CC: 20/70
OD_CC: 20/25
CORRECTION_TYPE: GLASSES
OS_CC+: -2

## 2025-07-02 ASSESSMENT — EXTERNAL EXAM - RIGHT EYE: OD_EXAM: NORMAL

## 2025-07-02 ASSESSMENT — TONOMETRY
IOP_METHOD: ICARE
OS_IOP_MMHG: 13
OD_IOP_MMHG: 11

## 2025-07-02 ASSESSMENT — EXTERNAL EXAM - LEFT EYE: OS_EXAM: NORMAL

## 2025-07-02 ASSESSMENT — SLIT LAMP EXAM - LIDS
COMMENTS: NORMAL
COMMENTS: NORMAL

## 2025-07-02 NOTE — PROGRESS NOTES
History  HPI       Eye Pain Both Eyes      In both eyes.  Associated symptoms include blurred vision and photophobia.  Treatments tried include eye drops and warm compresses.             Comments    Here for pain in both eyes. Difficulty opening eyes. VA is blurry. Some photophobia. Using drops and warm compresses as instructed.    VLAD Wolf 2:24 PM July 2, 2025             Last edited by Saul Mcguire COMT on 7/2/2025  2:24 PM.          Assessment/Plan  (H04.123) Dry eye syndrome of both eyes  (primary encounter diagnosis)  Plan:   -Educated on today's findings.   -Improvement in today's ocular surface and VA. Patient has not been fully compliant with all the treatment.  -No A/C reaction, Epi Defect, Dendrite or Infiltrate on today's exam. So Signs of infection in today's exam.  -Suspect a dry eye flare up. Do not suspect side effects from SLT.   -Continue Warm Compresses 10-15 mins at least at bedtime each eye. Either with a Shmuel Mask or Rice/Sock Method.   -Continue Artifical Tears QID or PRN each eye.   -Discussed Brands like Systane or Refresh.   -Continue Pred Acetate BID each eye for 2 weeks then stop.  -Discussed using a night time ointment 0.25 inch on both eyes.  -Patient currently sleeps with a fan and sleep apnea mask. Discussed that it could be causing flare up.   -Discussed stopping fan and securing the sleep apnea mask or wear a sleeping mask/eye patches to covr the eye when you are sleeping.    (H40.003) Glaucoma suspect of both eyes  Plan:   -Continue Care with Dr. Meneses  -Monitor.    Return to clinic as scheduled with Dr. Meneses on 07/30/2025    Complete documentation of historical and exam elements from today's encounter can be found in the full encounter summary report (not reduplicated in this progress note). I personally obtained the chief complaint(s) and history of present illness. I confirmed and edited as necessary the review of systems, past medical/surgical history, family history,  social history, and examination findings as document by others; and I examined the patient myself. I personally reviewed the relevant tests, images, and reports as documented above. I formulated and edited as necessary the assessment and plan and discussed the findings and management plan with the patient and family.    Bobo Swain O.D.

## 2025-07-02 NOTE — PATIENT INSTRUCTIONS
Continue using artificial tears at least 1 drop 4 times a day. Suggested brands are systane or refresh.      Continue Prednisolone Acetate (Pink Cap) 1 drop 2 times a day on both eyes for 2 weeks then stop.    Start lubricating ointment (Brands Genteal or Systane Night Time) 0.25 inches at night time on both eyes    Continue Warm Compresses 10-15 mins at least at bedtime each eye. Either with a Shmuel Mask or Rice/Sock Method.     Wear a sleeping mask at night time for now to avoid the air.

## 2025-07-03 ENCOUNTER — RESULTS FOLLOW-UP (OUTPATIENT)
Dept: FAMILY MEDICINE | Facility: CLINIC | Age: 70
End: 2025-07-03

## 2025-07-05 NOTE — PROGRESS NOTES
Medication Therapy Management (MTM) Encounter    ASSESSMENT:                            Medication Adherence/Access: none     Obesity/Pre-Diabetes:  Stay on max. Tolerated dose of Metformin -500mg. 2 x day + lifestyle plan--Patient sister wants her to lose 20lbs. But glp-1 drug not affordable --see plan for non drug ways to lose the weight.         Hypertension:   Stable. Patient is meeting blood pressure goal of < 140/90mmHg.        Hyperlipidemia:   Stable.  Patient is on high intensity statin which is indicated based on 2019 ACC/AHA guidelines for lipid management.      Hypothyroidism:   Stable         PLAN:                                1. Clinic weight today 187.5lbs. --your sister seems to think you need to weight 20lbs. less.   Because she will not pay for you to have weekly Ozempic dose --please find a group and restart TOPS program + go to the [facility] --daily to walk and use the [employer] exercise machines --this will really help you lose weight and lessen your depression.    2 lbs./month natural wt.loss would be a great goal !         Follow-up: Return in about 5 months (around 12/18/2025), or @ 11:30 AM, for Medication Therapy Management Visit, Weight loss, BP Recheck.      SUBJECTIVE/OBJECTIVE:                          Grace Chinchilla is a 70 year old female coming in for a follow-up (1-16-25) visit. She was referred to me from Laila Sanches DNP.   .    Here with  Jesse  at todays visit.       Reason for visit:   Bp recheck.   Puja -sister manages her finances , sister jo ann manages her grocery shopping and limits her $$ to buy food.     Moved lives in new place --depressed --misses her neighbors.      Allergies/ADRs: Reviewed in chart; bupropion caused sickness?  Past Medical History: Reviewed in chart  Tobacco: She reports that she has never smoked. She has never used smokeless tobacco.  Alcohol: not currently using  Other Substance Use: none  E-cigarette Use: none  Caffeine: not much   Social:  retired   Personal Healthcare Goals:  lose weight in a healthy way and keep it off/ reverse pre-diabetes.   Activity: sedentary in the past now walking more.      Medication Adherence/Access: Has pill boxes-- takes meds daily .      Hypertension   Added 2.5mg./daily amlodipine 4-8-24. Increased dose to 5mg. 4-29-24.  Losartan 25mg./day   Patient reports no current medication side effects.  Patient does not self-monitor blood pressure.             Hyperlipidemia     Atorvastatin 80mg daily  Patient reports no significant myalgias or other side effects.          Hypothyroidism     Levothyroxine 112 mcg daily. (Admits she takes her iron pill right with her thyroid pill)  Patient is having the following symptoms: hypothyroidism =none , hyperthyroidism =none.          Class II Obesity (BMI 35 to 39.9)/pre-diabetes:   Ozempic stopped last fall. Cost too $$.   Metformin - tried it -- then went to ozempic. We restarted Met 500mg er tabs --now taking - 1 tab 2 x day --tolerates well.     Patient reports  current medication side effects: loose stools but not all the time --feels salads trigger loose stools.     She admits not moving at all (no exercise) and likes her [employer] sandwiches.  She feels her depression is playing a role in this.       Previously :     Eats 2 x day now on my LC+IF lifestyle plan -- eating more salads , walking more now-trying to walk 30 minutes /day now. (She averages 3 K+ steps/day-4-29-24).  He highest was 9k steps/day .     She does go weekly now to [email or web address] class to walk 5K steps/day --otherwise has sciatica pain in left leg/hip so doesn't walk too much on daily basis.     She has used [facility] in the past to lose wt. -her sisters feel she only goes there for social aspect of the program .   Cost is 4$/month . She did not join yet.   Every Thursday sister jf and her go to the [email or web address] and workout for 45 minutes.     Sister jo ann has concerns that Grace wont stick to the lifestyle plan .  "          Previously :   Nutrition/Eating Habits: saw Dr. Steve special diet + weekly ozempic --got down to 180lbs.   --then cost $>200/month too much stopped it and weight came back.  Has 3 sisters --2 of hem very active in her life.   Has been on TOPS 4 different in her life , sisters - are in charge of her money POA over her .  Her  works full time - cannot control finances , she gets 15$ and [facility] 25 $ /month for fun stuff , sisters take her shopping weekly. They use HSA card to pay  for her rx drugs.     Current eating habits :  Bfast - cereal most days --quick easy /she admits lazy, or will have oatmeal or pbutter /jelly sandwich , or yogurt with jam or sugar , water   Mid am snack --no  Lunch : skips or bowel of cottage cheese  Mid afternoon snack : apple and a banana -psychological thought she would get hungry   Dinner; minnestrone soup 1-2 bowls, or a salad.   Late night snacks : 10-11 pm [employer] snacks--she might have a sandwich pbandj or a yogurt , eats out of habit or other reasons.     Exercise/Activity:   has membership thru [email or web address] - fitness club (not going)- or go to [employer] -swimming --but no swimsuit.   Does see psychiatrist.   Medication History:  Ozempic: too $$ to continue.  Wt Readings from Last 4 Encounters:   07/14/25 187 lb 8 oz (85 kg)   02/25/25 183 lb (83 kg)   01/16/25 184 lb 8 oz (83.7 kg)   11/21/24 176 lb (79.8 kg)     Estimated body mass index is 35.43 kg/m  as calculated from the following:    Height as of 2/25/25: 5' 1\" (1.549 m).    Weight as of this encounter: 187 lb 8 oz (85 kg).            /60   Pulse 65   Wt 187 lb 8 oz (85 kg)   LMP 09/13/2006   SpO2 96%   BMI 35.43 kg/m      ----------------------------------------------------------------------------------------------------------    I spent 30 minutes with this patient today. All changes were made via collaborative practice agreement with ZACH Melara CNP. A copy of the visit note was provided to the " patient's provider(s).    A summary of these recommendations was given after todays office visit.     Jarett Sen Rph.  Medication Therapy Management Provider  [phone]     Medication Therapy Recommendations  No medication therapy recommendations to display

## 2025-07-08 DIAGNOSIS — R73.03 PRE-DIABETES: ICD-10-CM

## 2025-07-09 RX ORDER — METFORMIN HYDROCHLORIDE 500 MG/1
TABLET, EXTENDED RELEASE ORAL
Qty: 180 TABLET | Refills: 0 | Status: SHIPPED | OUTPATIENT
Start: 2025-07-09

## 2025-07-14 ENCOUNTER — OFFICE VISIT (OUTPATIENT)
Dept: PHARMACY | Facility: CLINIC | Age: 70
End: 2025-07-14
Payer: COMMERCIAL

## 2025-07-14 VITALS
DIASTOLIC BLOOD PRESSURE: 60 MMHG | HEART RATE: 65 BPM | BODY MASS INDEX: 35.43 KG/M2 | WEIGHT: 187.5 LBS | SYSTOLIC BLOOD PRESSURE: 132 MMHG | OXYGEN SATURATION: 96 %

## 2025-07-14 DIAGNOSIS — E66.812 CLASS 2 SEVERE OBESITY DUE TO EXCESS CALORIES WITH SERIOUS COMORBIDITY AND BODY MASS INDEX (BMI) OF 35.0 TO 35.9 IN ADULT (H): Primary | ICD-10-CM

## 2025-07-14 DIAGNOSIS — M17.10 PRIMARY LOCALIZED OSTEOARTHROSIS OF LOWER LEG, UNSPECIFIED LATERALITY: ICD-10-CM

## 2025-07-14 DIAGNOSIS — R73.03 PRE-DIABETES: ICD-10-CM

## 2025-07-14 DIAGNOSIS — E78.2 MIXED HYPERLIPIDEMIA: ICD-10-CM

## 2025-07-14 DIAGNOSIS — I10 ESSENTIAL HYPERTENSION: ICD-10-CM

## 2025-07-14 DIAGNOSIS — E66.01 CLASS 2 SEVERE OBESITY DUE TO EXCESS CALORIES WITH SERIOUS COMORBIDITY AND BODY MASS INDEX (BMI) OF 35.0 TO 35.9 IN ADULT (H): Primary | ICD-10-CM

## 2025-07-14 DIAGNOSIS — E03.9 HYPOTHYROIDISM, UNSPECIFIED TYPE: ICD-10-CM

## 2025-07-14 PROCEDURE — 99607 MTMS BY PHARM ADDL 15 MIN: CPT | Performed by: PHARMACIST

## 2025-07-14 PROCEDURE — 3078F DIAST BP <80 MM HG: CPT | Performed by: PHARMACIST

## 2025-07-14 PROCEDURE — 99606 MTMS BY PHARM EST 15 MIN: CPT | Performed by: PHARMACIST

## 2025-07-14 PROCEDURE — 3075F SYST BP GE 130 - 139MM HG: CPT | Performed by: PHARMACIST

## 2025-07-14 RX ORDER — METFORMIN HYDROCHLORIDE 500 MG/1
TABLET, EXTENDED RELEASE ORAL
Qty: 180 TABLET | Refills: 1 | Status: SHIPPED | OUTPATIENT
Start: 2025-07-14

## 2025-07-14 NOTE — PATIENT INSTRUCTIONS
"Recommendations from today's MTM visit:                                                         1. Clinic weight today 187.5lbs. --your sister seems to think you need to weight 20lbs. less.   Because she will not pay for you to have weekly Ozempic dose --please find a group and restart TOPS program + go to the [facility] --daily to walk and use the [employer] exercise machines --this will really help you lose weight and lessen your depression.    2 lbs./month natural wt.loss would be a great goal !         Follow-up: Return in about 5 months (around 12/18/2025), or @ 11:30 AM, for Medication Therapy Management Visit, Weight loss, BP Recheck.    It was great speaking with you today.  I value your experience and would be very thankful for your time in providing feedback in our clinic survey. In the next few days, you may receive an email or text message from [employer] with a link to a survey related to your  clinical pharmacist.\"     To schedule another MTM appointment, please call the clinic directly or you may call the MTM scheduling line at [phone] or toll-free at [phone].     My Clinical Pharmacist's contact information:                                                      Please feel free to contact me with any questions or concerns you have.      Jarett Sen Rph.  Medication Therapy Management Provider  [phone]    "

## 2025-07-14 NOTE — Clinical Note
Laila--xenia--saw nikko today --she stated her sister jo ann feels she needs to lose 20 lbs.-- but she cannot afford the ozempic --so I suggested she get back to [facility] -wt.loss support group and go to the [facility] at least 5 days /week for exercise.   F/up with me in December -25.  -Jarett

## 2025-07-30 ENCOUNTER — OFFICE VISIT (OUTPATIENT)
Dept: OPHTHALMOLOGY | Facility: CLINIC | Age: 70
End: 2025-07-30
Attending: OPHTHALMOLOGY
Payer: COMMERCIAL

## 2025-07-30 DIAGNOSIS — H35.372 EPIRETINAL MEMBRANE (ERM) OF LEFT EYE: ICD-10-CM

## 2025-07-30 DIAGNOSIS — Z98.890 H/O VITRECTOMY: ICD-10-CM

## 2025-07-30 DIAGNOSIS — Z96.1 PSEUDOPHAKIA, LEFT EYE: ICD-10-CM

## 2025-07-30 DIAGNOSIS — H40.1124 PRIMARY OPEN ANGLE GLAUCOMA (POAG) OF LEFT EYE, INDETERMINATE STAGE: Primary | ICD-10-CM

## 2025-07-30 PROCEDURE — 99213 OFFICE O/P EST LOW 20 MIN: CPT | Performed by: OPHTHALMOLOGY

## 2025-07-30 PROCEDURE — G0463 HOSPITAL OUTPT CLINIC VISIT: HCPCS | Performed by: OPHTHALMOLOGY

## 2025-07-30 ASSESSMENT — REFRACTION_MANIFEST
OS_SPHERE: -1.50
OD_ADD: +2.50
OS_CYLINDER: +2.50
OD_SPHERE: -1.75
OS_ADD: +2.50
OD_CYLINDER: +2.25
OD_AXIS: 160
OS_AXIS: 020

## 2025-07-30 ASSESSMENT — REFRACTION_WEARINGRX
OD_CYLINDER: +2.50
OS_SPHERE: -1.75
OD_ADD: +2.50
OS_ADD: +2.50
SPECS_TYPE: PAL
OS_AXIS: 010
OS_CYLINDER: +2.75
OD_AXIS: 147
OD_SPHERE: -2.50

## 2025-07-30 ASSESSMENT — EXTERNAL EXAM - LEFT EYE: OS_EXAM: NORMAL

## 2025-07-30 ASSESSMENT — VISUAL ACUITY
OD_CC: 20/25
METHOD: SNELLEN - LINEAR
OS_CC: 20/50
OD_CC+: -3
CORRECTION_TYPE: GLASSES
METHOD_MR_RETINOSCOPY: 1
OS_CC+: -2

## 2025-07-30 ASSESSMENT — TONOMETRY
OS_IOP_MMHG: 14
IOP_METHOD: TONOPEN
OD_IOP_MMHG: 13

## 2025-07-30 ASSESSMENT — EXTERNAL EXAM - RIGHT EYE: OD_EXAM: NORMAL

## 2025-07-30 ASSESSMENT — SLIT LAMP EXAM - LIDS
COMMENTS: MGD
COMMENTS: MGD

## 2025-07-30 NOTE — PROGRESS NOTES
HPI       Follow Up    In both eyes.  Since onset it is stable.  Associated symptoms include photophobia and floaters.  Negative for flashes.  Treatments tried include eye drops.             Comments    Here for follow up. VA is about the same. Some photophobia. Stable floaters without flashes. Using drops.    VLAD Wolf 12:19 PM July 30, 2025             Last edited by Saul Mcguire COMT on 7/30/2025 12:20 PM.          Review of systems for the eyes was negative other than the pertinent positives/negatives listed in the HPI.      Assessment & Plan      Grace Chinchilla is a 70 year old female with the following diagnoses:   1. Primary open angle glaucoma (POAG) of left eye, indeterminate stage    2. Epiretinal membrane (ERM) of left eye    3. H/O vitrectomy    4. Pseudophakia, left eye           S/P SLT left eye 6/2025  Intraocular pressure improved and at goal of low-midteens  Continue artificial tears and warm compresses   Monitor     Patient disposition:   Return in about 4 months (around 11/30/2025) for VT only, OCT NFL.           Attending Physician Attestation:  Complete documentation of historical and exam elements from today's encounter can be found in the full encounter summary report (not reduplicated in this progress note).  I personally obtained the chief complaint(s) and history of present illness.  I confirmed and edited as necessary the review of systems, past medical/surgical history, family history, social history, and examination findings as documented by others; and I examined the patient myself.  I personally reviewed the relevant tests, images, and reports as documented above.  I formulated and edited as necessary the assessment and plan and discussed the findings and management plan with the patient and family. . - Jimi Meneses MD

## 2025-07-31 NOTE — TELEPHONE ENCOUNTER
Reason for Call:  Other call back    Detailed comments:pt is calling and wanting to know when her last tetnus shot was.  If she is up to date or not, if she is not then would like to get it.    Please calll pt. She is going to camp in September and needs to be updated before then    Phone Number Patient can be reached at: Cell number on file:    Telephone Information:   Mobile [phone]       Best Time: any    Can we leave a detailed message on this number? YES    Call taken on 7/14/2021 at 2:25 PM by Pam J. Behr     Detail Level: Generalized Detail Level: Detailed

## 2025-08-27 ENCOUNTER — OFFICE VISIT (OUTPATIENT)
Dept: FAMILY MEDICINE | Facility: CLINIC | Age: 70
End: 2025-08-27
Payer: COMMERCIAL

## 2025-08-27 SDOH — HEALTH STABILITY: PHYSICAL HEALTH: ON AVERAGE, HOW MANY DAYS PER WEEK DO YOU ENGAGE IN MODERATE TO STRENUOUS EXERCISE (LIKE A BRISK WALK)?: 4 DAYS

## 2025-08-27 ASSESSMENT — PAIN SCALES - GENERAL: PAINLEVEL_OUTOF10: MODERATE PAIN (4)

## 2025-08-27 ASSESSMENT — PATIENT HEALTH QUESTIONNAIRE - PHQ9
SUM OF ALL RESPONSES TO PHQ QUESTIONS 1-9: 10
10. IF YOU CHECKED OFF ANY PROBLEMS, HOW DIFFICULT HAVE THESE PROBLEMS MADE IT FOR YOU TO DO YOUR WORK, TAKE CARE OF THINGS AT HOME, OR GET ALONG WITH OTHER PEOPLE: SOMEWHAT DIFFICULT
SUM OF ALL RESPONSES TO PHQ QUESTIONS 1-9: 10

## 2025-08-28 ENCOUNTER — PATIENT OUTREACH (OUTPATIENT)
Dept: CARE COORDINATION | Facility: CLINIC | Age: 70
End: 2025-08-28
Payer: COMMERCIAL

## 2025-09-01 ENCOUNTER — PATIENT OUTREACH (OUTPATIENT)
Dept: CARE COORDINATION | Facility: CLINIC | Age: 70
End: 2025-09-01
Payer: COMMERCIAL

## 2025-09-02 DIAGNOSIS — E03.9 HYPOTHYROIDISM, UNSPECIFIED TYPE: ICD-10-CM

## (undated) DEVICE — EYE PACK 25GA CONSTELLATION 10,000 CPM PPK9380-02

## (undated) DEVICE — EYE CANN IRR 27GA ANTERIOR CHAMBER 581280

## (undated) DEVICE — GLOVE BIOGEL PI MICRO SZ 7.0 48570

## (undated) DEVICE — LINEN TOWEL PACK X5 5464

## (undated) DEVICE — EYE LENS FLAT VITRECTOMY S5.7010U

## (undated) DEVICE — EYE TIP IRRIGATION & ASPIRATION POLYMER CVD 0.3MM 8065751512

## (undated) DEVICE — PACK VITRECTOMY/RET CUSTOM ASC PQ15VRU12

## (undated) DEVICE — EYE PROBE LASER 25GA FLEX RFID 8065751114

## (undated) DEVICE — PACK CATARACT CUSTOM ASC SEY15CPUMC

## (undated) DEVICE — EYE CANN IRR 25GA CYSTOTOME 581610

## (undated) DEVICE — SOL WATER IRRIG 500ML BOTTLE 2F7113

## (undated) DEVICE — EYE INST FCP DISP 27+ SHARKSKIN ILM 711.88

## (undated) DEVICE — EYE PACK CUSTOM ANTERIOR 30DEG TIP CENTURION PPK6682-04

## (undated) DEVICE — SYR 05ML LL W/O NDL

## (undated) DEVICE — TAPE MICROPORE 1"X1.5YD 1530S-1

## (undated) DEVICE — GLOVE PROTEXIS MICRO 7.0  2D73PM70

## (undated) DEVICE — EYE LENS VITRECTOMY MAGNIFYING ODVM

## (undated) DEVICE — GLOVE PROTEXIS MICRO 7.5  2D73PM75

## (undated) DEVICE — EYE KNIFE SLIT XSTAR VISITEC 2.6MM 45DEG 373726

## (undated) DEVICE — EYE DRSG PAD OVAL

## (undated) DEVICE — EYE KNIFE STILETTO VISITEC 1.1MM ANG 45DEG SIDEPORT 376620

## (undated) DEVICE — EYE HANDPIECE 23GA VITRECTOMY CENTURION 8065752134

## (undated) DEVICE — EYE DRAPE MICROSCOPE UNIVERSAL (BIOM FULL) 08-MK55140

## (undated) DEVICE — EYE SHIELD PLASTIC

## (undated) DEVICE — SU PLAIN 6-0 TG140-8 18" 1735G

## (undated) DEVICE — EYE PACK CONSTELLATION VFC 8065750957

## (undated) RX ORDER — FENTANYL CITRATE 50 UG/ML
INJECTION, SOLUTION INTRAMUSCULAR; INTRAVENOUS
Status: DISPENSED
Start: 2025-02-13

## (undated) RX ORDER — PROPOFOL 10 MG/ML
INJECTION, EMULSION INTRAVENOUS
Status: DISPENSED
Start: 2023-05-25

## (undated) RX ORDER — ONDANSETRON 2 MG/ML
INJECTION INTRAMUSCULAR; INTRAVENOUS
Status: DISPENSED
Start: 2023-05-25

## (undated) RX ORDER — ACETAMINOPHEN 325 MG/1
TABLET ORAL
Status: DISPENSED
Start: 2023-10-26

## (undated) RX ORDER — GLYCOPYRROLATE 0.2 MG/ML
INJECTION INTRAMUSCULAR; INTRAVENOUS
Status: DISPENSED
Start: 2023-05-25

## (undated) RX ORDER — ACETAMINOPHEN 325 MG/1
TABLET ORAL
Status: DISPENSED
Start: 2023-05-25

## (undated) RX ORDER — DEXAMETHASONE SODIUM PHOSPHATE 4 MG/ML
INJECTION, SOLUTION INTRA-ARTICULAR; INTRALESIONAL; INTRAMUSCULAR; INTRAVENOUS; SOFT TISSUE
Status: DISPENSED
Start: 2023-05-25

## (undated) RX ORDER — FENTANYL CITRATE 50 UG/ML
INJECTION, SOLUTION INTRAMUSCULAR; INTRAVENOUS
Status: DISPENSED
Start: 2023-05-25

## (undated) RX ORDER — LIDOCAINE HYDROCHLORIDE AND EPINEPHRINE 10; 10 MG/ML; UG/ML
INJECTION, SOLUTION INFILTRATION; PERINEURAL
Status: DISPENSED
Start: 2023-05-25